# Patient Record
Sex: FEMALE | Race: WHITE | NOT HISPANIC OR LATINO | Employment: OTHER | URBAN - METROPOLITAN AREA
[De-identification: names, ages, dates, MRNs, and addresses within clinical notes are randomized per-mention and may not be internally consistent; named-entity substitution may affect disease eponyms.]

---

## 2017-01-30 RX ORDER — DILTIAZEM HYDROCHLORIDE 90 MG/1
CAPSULE, EXTENDED RELEASE ORAL
Qty: 90 CAPSULE | Refills: 0 | Status: SHIPPED | OUTPATIENT
Start: 2017-01-30 | End: 2017-05-02 | Stop reason: SDUPTHER

## 2017-03-02 ENCOUNTER — OFFICE VISIT (OUTPATIENT)
Dept: ENDOCRINOLOGY | Age: 72
End: 2017-03-02

## 2017-03-02 VITALS
WEIGHT: 99 LBS | HEIGHT: 60 IN | OXYGEN SATURATION: 96 % | SYSTOLIC BLOOD PRESSURE: 102 MMHG | DIASTOLIC BLOOD PRESSURE: 64 MMHG | HEART RATE: 89 BPM | BODY MASS INDEX: 19.44 KG/M2

## 2017-03-02 DIAGNOSIS — E05.20 TOXIC MULTINODULAR GOITER: Primary | ICD-10-CM

## 2017-03-02 DIAGNOSIS — I10 ESSENTIAL HYPERTENSION: ICD-10-CM

## 2017-03-02 DIAGNOSIS — I47.1 MULTIFOCAL ATRIAL TACHYCARDIA (HCC): ICD-10-CM

## 2017-03-02 DIAGNOSIS — J43.9 PULMONARY EMPHYSEMA, UNSPECIFIED EMPHYSEMA TYPE (HCC): ICD-10-CM

## 2017-03-02 DIAGNOSIS — E55.9 VITAMIN D INSUFFICIENCY: ICD-10-CM

## 2017-03-02 PROCEDURE — 99214 OFFICE O/P EST MOD 30 MIN: CPT | Performed by: INTERNAL MEDICINE

## 2017-03-02 NOTE — PROGRESS NOTES
Subjective   Maryam Arredondo is a 71 y.o. female.     HPI Comments: F/u for nontoxic multinodular goiter, hypothyroidism, copd , asthma     Goiter     Hypothyroidism     COPD        Patient is a 71-year-old female who came in for follow-up. She has toxic multinodular goiter. She had a thyroid scan and uptake in February 2014 which showed increased uptake of 40% with a heterogeneous distribution of the radiopharmaceutical in both lobes of the thyroid gland. A dominant area of photopenia is present on the left lobe. She had a thyroid ultrasound which showed a multinodular goiter with cysts and solid nodules. The dominant nodule in the left is partially cystic. She had a follow-up ultrasound in August 2014 which showed the left lobe dominant nodule is smaller. She is on Tapazole 10 mg 1/2 tab once a day. She has significant weight change since Oct 2016.  She denies bowel changes. She denies heat or cold intolerance. She denies palpitations. She denies tremors.      She was admitted last March 2016 for possible pneumonia and multifocal atrial tachycardia.  She has COPD. She denies any shortness of breath or coughing. She is on maintenance Advair, Spiriva and Pro-Air prn for COPD. She is on nocturnal O2. She follows with Dr. Anthony.      She has hypertension and was taken off lisinopril last May 2014. She is on diltiazem ER 90 mg/day. She follows with Dr. Bobo.      She had a previous right mastectomy for breast cancer. She has completed 5 years of Arimidex in 2009. She has osteopenia and was on Fosamax for unknown period of time. Her last bone mineral density was 2016 at [facility] and was normal. She is on Vit D 2000 units/day.     She will have a followup colonoscopy with Dr. Eddy next month.    The following portions of the patient's history were reviewed and updated as appropriate: allergies, current medications, past family history, past medical history, past social history, past surgical history and problem  "list.    Review of Systems   Constitutional: Negative.    HENT: Negative.    Eyes: Negative.    Respiratory: Negative.    Cardiovascular: Negative.    Gastrointestinal: Negative.    Endocrine: Negative.    Genitourinary: Negative.    Musculoskeletal: Negative.    Skin: Negative.    Allergic/Immunologic: Negative.    Neurological: Negative.    Hematological: Bruises/bleeds easily (bruise ).   Psychiatric/Behavioral: Positive for sleep disturbance (only on  o2 at night  ).       Objective      Vitals:    03/02/17 1026   BP: 102/64   BP Location: Left arm   Patient Position: Sitting   Cuff Size: Small Adult   Pulse: 89   SpO2: 96%   Weight: 99 lb (44.9 kg)   Height: 60\" (152.4 cm)     Physical Exam   Constitutional: She is oriented to person, place, and time. She appears well-developed and well-nourished. No distress.   HENT:   Head: Normocephalic.   Nose: Nose normal.   Mouth/Throat: No oropharyngeal exudate.   Eyes: Conjunctivae and EOM are normal. Right eye exhibits no discharge. Left eye exhibits no discharge. No scleral icterus.   Neck: Neck supple. No JVD present. No tracheal deviation present. No thyromegaly present.   Cardiovascular: Normal rate, regular rhythm, normal heart sounds and intact distal pulses.  Exam reveals no gallop and no friction rub.    No murmur heard.  Pulmonary/Chest: Effort normal and breath sounds normal. No respiratory distress. She has no wheezes. She has no rales. She exhibits no tenderness.   Abdominal: Soft. Bowel sounds are normal. She exhibits no distension and no mass. There is no tenderness.   Musculoskeletal: Normal range of motion. She exhibits no edema, tenderness or deformity.   Lymphadenopathy:     She has no cervical adenopathy.   Neurological: She is alert and oriented to person, place, and time. She displays normal reflexes. Coordination normal.   Skin: Skin is warm and dry. No erythema. No pallor.   Psychiatric: She has a normal mood and affect. Her behavior is normal. "     Office Visit on 10/21/2016   Component Date Value Ref Range Status   • Glucose 10/21/2016 90  65 - 99 mg/dL Final   • BUN 10/21/2016 17  8 - 23 mg/dL Final   • Creatinine 10/21/2016 0.79  0.57 - 1.00 mg/dL Final   • eGFR Non  Am 10/21/2016 72  >60 mL/min/1.73 Final    Comment: The MDRD GFR formula is only valid for adults with stable  renal function between ages 18 and 70.     • eGFR  Am 10/21/2016 87  >60 mL/min/1.73 Final   • BUN/Creatinine Ratio 10/21/2016 21.5  7.0 - 25.0 Final   • Sodium 10/21/2016 144  136 - 145 mmol/L Final   • Potassium 10/21/2016 4.3  3.5 - 5.2 mmol/L Final   • Chloride 10/21/2016 105  98 - 107 mmol/L Final   • Total CO2 10/21/2016 26.1  22.0 - 29.0 mmol/L Final   • Calcium 10/21/2016 10.0  8.6 - 10.5 mg/dL Final   • Total Protein 10/21/2016 6.7  6.0 - 8.5 g/dL Final   • Albumin 10/21/2016 4.30  3.50 - 5.20 g/dL Final   • Globulin 10/21/2016 2.4  gm/dL Final   • A/G Ratio 10/21/2016 1.8  g/dL Final   • Total Bilirubin 10/21/2016 <0.2  0.1 - 1.2 mg/dL Final   • Alkaline Phosphatase 10/21/2016 79  39 - 117 U/L Final   • AST (SGOT) 10/21/2016 20  1 - 32 U/L Final   • ALT (SGPT) 10/21/2016 19  1 - 33 U/L Final   • TSH 10/21/2016 1.670  0.270 - 4.200 mIU/mL Final   • Free T4 10/21/2016 1.01  0.93 - 1.70 ng/dL Final   • 25 Hydroxy, Vitamin D 10/21/2016 66.6  30.0 - 100.0 ng/mL Final    Comment: Reference Range for Total Vitamin D 25(OH)  Deficiency Â  Â <20.0 ng/mL  Insufficiency 21-29 ng/mL  Sufficiency Â   ng/mL  Toxicity      >100 ng/ml       Assessment/Plan   Maryam was seen today for goiter, hypothyroidism, copd and asthma.    Diagnoses and all orders for this visit:    Toxic multinodular goiter  -     Comprehensive Metabolic Panel  -     TSH  -     T4, Free    Vitamin D insufficiency    Essential hypertension    Multifocal atrial tachycardia    Pulmonary emphysema, unspecified emphysema type      Continue Tapazole 5 mg once a day.  Check thyroid function tests  today.  Continue vitamin D 2000 units per day.  Continue diltiazem per Dr. Bobo.  Continue inhalers for Dr. Seymour.    Send copy of my notes and labs to Dr. Seymour, Dr. Anthony and Dr. Bobo.    RTC 4 mos.

## 2017-03-03 LAB
ALBUMIN SERPL-MCNC: 4.1 G/DL (ref 3.5–5.2)
ALBUMIN/GLOB SERPL: 1.6 G/DL
ALP SERPL-CCNC: 77 U/L (ref 39–117)
ALT SERPL-CCNC: 14 U/L (ref 1–33)
AST SERPL-CCNC: 18 U/L (ref 1–32)
BILIRUB SERPL-MCNC: 0.2 MG/DL (ref 0.1–1.2)
BUN SERPL-MCNC: 15 MG/DL (ref 8–23)
BUN/CREAT SERPL: 21.1 (ref 7–25)
CALCIUM SERPL-MCNC: 9.9 MG/DL (ref 8.6–10.5)
CHLORIDE SERPL-SCNC: 104 MMOL/L (ref 98–107)
CO2 SERPL-SCNC: 28.8 MMOL/L (ref 22–29)
CREAT SERPL-MCNC: 0.71 MG/DL (ref 0.57–1)
GLOBULIN SER CALC-MCNC: 2.6 GM/DL
GLUCOSE SERPL-MCNC: 76 MG/DL (ref 65–99)
POTASSIUM SERPL-SCNC: 4.1 MMOL/L (ref 3.5–5.2)
PROT SERPL-MCNC: 6.7 G/DL (ref 6–8.5)
SODIUM SERPL-SCNC: 145 MMOL/L (ref 136–145)
T4 FREE SERPL-MCNC: 1.01 NG/DL (ref 0.93–1.7)
TSH SERPL DL<=0.005 MIU/L-ACNC: 0.8 MIU/ML (ref 0.27–4.2)

## 2017-03-30 ENCOUNTER — TRANSCRIBE ORDERS (OUTPATIENT)
Dept: SURGERY | Facility: CLINIC | Age: 72
End: 2017-03-30

## 2017-03-30 ENCOUNTER — TRANSCRIBE ORDERS (OUTPATIENT)
Dept: ADMINISTRATIVE | Facility: HOSPITAL | Age: 72
End: 2017-03-30

## 2017-03-30 DIAGNOSIS — Z12.31 VISIT FOR SCREENING MAMMOGRAM: Primary | ICD-10-CM

## 2017-04-21 RX ORDER — METHIMAZOLE 10 MG/1
TABLET ORAL
Qty: 30 TABLET | Refills: 4 | Status: ON HOLD | OUTPATIENT
Start: 2017-04-21 | End: 2017-04-28 | Stop reason: SDUPTHER

## 2017-04-28 ENCOUNTER — ANESTHESIA (OUTPATIENT)
Dept: GASTROENTEROLOGY | Facility: HOSPITAL | Age: 72
End: 2017-04-28

## 2017-04-28 ENCOUNTER — ANESTHESIA EVENT (OUTPATIENT)
Dept: GASTROENTEROLOGY | Facility: HOSPITAL | Age: 72
End: 2017-04-28

## 2017-04-28 PROCEDURE — 25010000002 PROPOFOL 10 MG/ML EMULSION: Performed by: ANESTHESIOLOGY

## 2017-04-28 RX ORDER — PROPOFOL 10 MG/ML
VIAL (ML) INTRAVENOUS CONTINUOUS PRN
Status: DISCONTINUED | OUTPATIENT
Start: 2017-04-28 | End: 2017-04-28 | Stop reason: SURG

## 2017-04-28 RX ORDER — PROPOFOL 10 MG/ML
VIAL (ML) INTRAVENOUS AS NEEDED
Status: DISCONTINUED | OUTPATIENT
Start: 2017-04-28 | End: 2017-04-28 | Stop reason: SURG

## 2017-04-28 RX ORDER — LIDOCAINE HYDROCHLORIDE 20 MG/ML
INJECTION, SOLUTION INFILTRATION; PERINEURAL AS NEEDED
Status: DISCONTINUED | OUTPATIENT
Start: 2017-04-28 | End: 2017-04-28 | Stop reason: SURG

## 2017-04-28 RX ADMIN — LIDOCAINE HYDROCHLORIDE 60 MG: 20 INJECTION, SOLUTION INFILTRATION; PERINEURAL at 10:15

## 2017-04-28 RX ADMIN — PROPOFOL 50 MG: 10 INJECTION, EMULSION INTRAVENOUS at 10:30

## 2017-04-28 RX ADMIN — PROPOFOL 50 MG: 10 INJECTION, EMULSION INTRAVENOUS at 10:25

## 2017-04-28 RX ADMIN — PROPOFOL 50 MG: 10 INJECTION, EMULSION INTRAVENOUS at 10:15

## 2017-04-28 RX ADMIN — SODIUM CHLORIDE, POTASSIUM CHLORIDE, SODIUM LACTATE AND CALCIUM CHLORIDE: 600; 310; 30; 20 INJECTION, SOLUTION INTRAVENOUS at 10:10

## 2017-04-28 RX ADMIN — PROPOFOL 50 MG: 10 INJECTION, EMULSION INTRAVENOUS at 10:20

## 2017-04-28 RX ADMIN — PROPOFOL 100 MCG/KG/MIN: 10 INJECTION, EMULSION INTRAVENOUS at 10:15

## 2017-04-28 NOTE — ANESTHESIA PREPROCEDURE EVALUATION
Anesthesia Evaluation     Patient summary reviewed and Nursing notes reviewed      Airway   Mallampati: I  TM distance: <3 FB  Neck ROM: full  no difficulty expected  Dental - normal exam     Pulmonary - normal exam   (+) COPD, asthma,   Cardiovascular - normal exam    (+) hypertension,       Neuro/Psych- negative ROS  GI/Hepatic/Renal/Endo    (+)  hyperthyroidism    Musculoskeletal (-) negative ROS    Abdominal  - normal exam    Bowel sounds: normal.   Substance History - negative use     OB/GYN negative ob/gyn ROS         Other                                    Anesthesia Plan    ASA 3     MAC     Anesthetic plan and risks discussed with patient.

## 2017-04-28 NOTE — ANESTHESIA POSTPROCEDURE EVALUATION
Patient: Maryam Arredondo    Procedure Summary     Date Anesthesia Start Anesthesia Stop Room / Location    04/28/17 1010 1104  [facility]       Procedure Diagnosis Surgeon Provider    COLONOSCOPY WITH POLYPECTOMY(COLD BIOPSY AND HOT SNARE) (N/A ) History of colon polyps  (History of colon polyps [Z86.010]) MD Erasmo Huber MD          Anesthesia Type: MAC  Last vitals  /61 (04/28/17 1120)    Temp      Pulse 74 (04/28/17 1120)   Resp 16 (04/28/17 1120)    SpO2 96 % (04/28/17 1120)      Post Anesthesia Care and Evaluation    Patient location during evaluation: PACU  Patient participation: complete - patient participated  Level of consciousness: awake and alert  Pain score: 0  Pain management: adequate  Airway patency: patent  Anesthetic complications: No anesthetic complications  PONV Status: none  Cardiovascular status: acceptable  Respiratory status: acceptable  Hydration status: acceptable

## 2017-04-29 RX ORDER — DILTIAZEM HYDROCHLORIDE 90 MG/1
CAPSULE, EXTENDED RELEASE ORAL
Qty: 90 CAPSULE | Refills: 0 | Status: CANCELLED | OUTPATIENT
Start: 2017-04-29

## 2017-05-01 ENCOUNTER — TELEPHONE (OUTPATIENT)
Dept: INTERNAL MEDICINE | Facility: HOSPITAL | Age: 72
End: 2017-05-01

## 2017-05-02 RX ORDER — DILTIAZEM HYDROCHLORIDE 90 MG/1
90 CAPSULE, EXTENDED RELEASE ORAL DAILY
Qty: 90 CAPSULE | Refills: 1 | Status: SHIPPED | OUTPATIENT
Start: 2017-05-02 | End: 2017-10-27 | Stop reason: SDUPTHER

## 2017-05-25 DIAGNOSIS — Z12.31 SCREENING MAMMOGRAM, ENCOUNTER FOR: Primary | ICD-10-CM

## 2017-06-29 ENCOUNTER — HOSPITAL ENCOUNTER (OUTPATIENT)
Dept: MAMMOGRAPHY | Facility: HOSPITAL | Age: 72
Discharge: HOME | End: 2017-06-29
Attending: SURGERY | Admitting: SURGERY

## 2017-06-29 DIAGNOSIS — Z12.31 VISIT FOR SCREENING MAMMOGRAM: ICD-10-CM

## 2017-06-29 PROCEDURE — G0202 SCR MAMMO BI INCL CAD: HCPCS

## 2017-06-29 PROCEDURE — 77063 BREAST TOMOSYNTHESIS BI: CPT

## 2017-07-12 ENCOUNTER — OFFICE VISIT (OUTPATIENT)
Dept: SURGERY | Facility: CLINIC | Age: 72
End: 2017-07-12

## 2017-07-12 VITALS
WEIGHT: 96 LBS | HEIGHT: 60 IN | HEART RATE: 86 BPM | RESPIRATION RATE: 20 BRPM | SYSTOLIC BLOOD PRESSURE: 106 MMHG | DIASTOLIC BLOOD PRESSURE: 68 MMHG | OXYGEN SATURATION: 96 % | BODY MASS INDEX: 18.85 KG/M2

## 2017-07-12 DIAGNOSIS — Z85.3 HISTORY OF BREAST CANCER IN FEMALE: Primary | ICD-10-CM

## 2017-07-12 PROCEDURE — 99213 OFFICE O/P EST LOW 20 MIN: CPT | Performed by: SURGERY

## 2017-07-12 NOTE — PROGRESS NOTES
Chief complaint:  Follow up    History of Present Illness    This is Maryam Arredondo 71 y.o. status post *** and is doing very well.  Patient denies fever, chills, nausea or vomiting.  Patient's pain is well-controlled.      The following portions of the patient's history were reviewed and updated as appropriate: allergies, current medications, past family history, past medical history, past social history, past surgical history and problem list.    Physical Exam  Incision is well-healed without evidence of {No wound infection:37935}.    Patient does not use tobacco products currently and I have counseled the patient not to start using tobacco products in the future.    Assessment/plan:    This is Maryam Arredondo 71 y.o. status post *** and is doing very well.  I have instructed the patient not lift greater than 10 pounds for total of 6 weeks from the time of surgery. I have instructed the patient follow-up as needed.    Luiza Eddy MD  General, Minimally Invasive and Endoscopic Surgery  [facility]    [address], [address]  [place], [place], [address]  P: [phone]  F: [phone]    Cc:  Kevin Seymour MD

## 2017-07-12 NOTE — PROGRESS NOTES
Chief complaint: History of breast cancer    Patient is a 71 y.o. female referred by Kevin Seymour MD for yearly follow-up of right breast cancer.  Patient has had a previous right mastectomy and denies any palpable masses in the left breast on self breast examinations.  Patient denies any nipple discharge.     Past Medical History:   Diagnosis Date   • Allergic rhinitis    • Asthma    • Breast cancer     s/p Lumpectomy ~2000 and chemo/XRT. Then  Masectomy ~2004 for some recurrence   • COPD (chronic obstructive pulmonary disease)    • Hypertension    • Hyperthyroidism    • Osteopenia    • Pneumothorax    • Toxic multinodular goiter    • Toxic multinodular goiter    • Vitamin D deficiency      Past Surgical History:   Procedure Laterality Date   • BREAST LUMPECTOMY Right    • BREAST LUMPECTOMY     • CARDIAC CATHETERIZATION      PS SAPHENOUS VAIN RIGHT LEG   • COLONOSCOPY      Complete   • COLONOSCOPY N/A 4/28/2017    Procedure: COLONOSCOPY WITH POLYPECTOMY(COLD BIOPSY AND HOT SNARE);  Surgeon: Luiza Eddy MD;  Location: [facility] ENDOSCOPY;  Service:    • HYSTERECTOMY     • MASTECTOMY Right 2004   • NECK SURGERY      2 spurs removed   • VASCULAR SURGERY      spider vein ablation     Family History   Problem Relation Age of Onset   • Arthritis Mother    • Heart failure Mother      Congestive Heart Failure   • Lung cancer Father    • Breast cancer Paternal Grandmother      Social History   Substance Use Topics   • Smoking status: Former Smoker     Packs/day: 2.00     Types: Cigarettes     Quit date: 3/14/1995   • Smokeless tobacco: Never Used      Comment: D/C 20 years ago, smoking 2 ppd before quitting   • Alcohol use Yes      Comment: Social use rare         Current Outpatient Prescriptions:   •  albuterol (PROVENTIL HFA;VENTOLIN HFA) 108 (90 BASE) MCG/ACT inhaler, Inhale 1 puff as needed., Disp: , Rfl:   •  budesonide-formoterol (SYMBICORT) 160-4.5 MCG/ACT inhaler, Inhale 2 puffs 2 (two) times a day.,  Disp: , Rfl:   •  calcium carbonate-vitamin d 600-400 MG-UNIT per tablet, Take 1 tablet by mouth., Disp: , Rfl:   •  cetirizine (ZyrTEC) 10 MG tablet, Take 10 mg by mouth daily., Disp: , Rfl:   •  Cholecalciferol (VITAMIN D3) 2000 UNITS tablet, Take 1 tablet by mouth daily., Disp: , Rfl:   •  diltiaZEM SR (CARDIZEM SR) 90 MG 12 hr capsule, Take 1 capsule by mouth Daily., Disp: 90 capsule, Rfl: 1  •  fluticasone (FLONASE) 50 MCG/ACT nasal spray, 2 Squirts into each nostril daily., Disp: , Rfl:   •  fluticasone-salmeterol (ADVAIR DISKUS) 250-50 MCG/DOSE DISKUS, Inhale 1 puff 2 (two) times a day. 1 puff twice daily or as directed , Disp: , Rfl:   •  methimazole (TAPAZOLE) 10 MG tablet, 1/2 tablet daily, Disp: 30 tablet, Rfl: 5  •  metroNIDAZOLE (METROCREAM) 0.75 % cream, , Disp: , Rfl:   •  multivitamin-minerals (OCUVITE) tablet, Take 1 tablet by mouth daily., Disp: , Rfl:   •  SPIRIVA RESPIMAT 2.5 MCG/ACT aerosol solution, 2 puffs 2 (two) times a day., Disp: , Rfl:   •  vitamin B-12 (CYANOCOBALAMIN) 1000 MCG tablet, Take 1,000 mcg by mouth daily., Disp: , Rfl:     Review of Systems   General: Patient reports good health  Eyes: No eye problems  Ears, nose, mouth and throat: No rhinitis, no hearing problems, no chronic cough  Cardiovascular/heart: Denies palpitations, syncope or chest pain  Respiratory/lung: Denies shortness of breath, hemoptysis, or dyspnea on exertion   Genital/urinary: No frequency, hematuria or dysuria  Hematological/lymphatic: Denies anemia or other problems  Musculoskeletal: No joint pain, no defects  Skin: No psoriasis or other skin issues  Neurological: No seizures or other neurological problems  Psychiatric: None  Endocrine: Negative  Gastro-intestinal: No constipation, no diarrhea, no melena, no hematochezia  All other systems have been reviewed and are negative.  Vitals:    07/12/17 1347   BP: 106/68   BP Location: Left arm   Patient Position: Sitting   Cuff Size: Adult   Pulse: 86   Resp: 20  "  SpO2: 96%   Weight: 96 lb (43.5 kg)   Height: 60\" (152.4 cm)       Physical Exam  General/physcological:   Alert and oriented x3 in no acute distress  HEENT: Normal cephalic, atraumatic, PERRLA, EOMI, sclera anicteric, moist mucous membranes, neck is supple, no JVD, no carotid bruits, no thyromegaly no adenopathy  Respiratory: CTA and percussion  CVA: RRR, normal S1-S2, no murmurs, no gallops or rubs  Breast: I have examined the breast in sitting position as well as supine position.  In the sitting position, the right mastectomy flaps have no evidence of local recurrence.  The left breast has no palpable masses or nipple discharge.  There is no palpable axillary or supraclavicular adenopathy bilaterally.  In the supine position, there is no new findings.  GI: Positive BS, soft, nondistended, nontender, no rebound, no guarding, no hernias, no organomegaly and no masses  Musculoskeletal: Full range of motion, no clubbing, no cyanosis or edema  Neurovascular: Grossly intact    Patient does not use tobacco products currently and I have counseled the patient to not start using tobacco products in the future.  I have personally reviewed her mammogram which is benign    Assessment:  Personal history of right breast cancer  Plan:  I have recommended that the patient continue self breast examinations and yearly mammograms and physical exam.  Patient will return in one year.    Luiza Eddy MD  General, Minimally Invasive and Endoscopic Surgery  [facility]    [address], [address]  [place], [place], [facility]  P: [phone]  F: [phone]    Cc:  Kevin Seymour MD                    "

## 2017-07-24 ENCOUNTER — OFFICE VISIT (OUTPATIENT)
Dept: ENDOCRINOLOGY | Age: 72
End: 2017-07-24

## 2017-07-24 VITALS
OXYGEN SATURATION: 95 % | DIASTOLIC BLOOD PRESSURE: 64 MMHG | BODY MASS INDEX: 18.81 KG/M2 | HEART RATE: 108 BPM | HEIGHT: 60 IN | SYSTOLIC BLOOD PRESSURE: 96 MMHG | WEIGHT: 95.8 LBS

## 2017-07-24 DIAGNOSIS — E55.9 VITAMIN D INSUFFICIENCY: ICD-10-CM

## 2017-07-24 DIAGNOSIS — J43.9 PULMONARY EMPHYSEMA, UNSPECIFIED EMPHYSEMA TYPE (HCC): ICD-10-CM

## 2017-07-24 DIAGNOSIS — I10 ESSENTIAL HYPERTENSION: ICD-10-CM

## 2017-07-24 DIAGNOSIS — E05.20 TOXIC MULTINODULAR GOITER: Primary | ICD-10-CM

## 2017-07-24 PROCEDURE — 99214 OFFICE O/P EST MOD 30 MIN: CPT | Performed by: INTERNAL MEDICINE

## 2017-07-24 NOTE — PROGRESS NOTES
Subjective   Maryam Arredondo is a 71 y.o. female.     HPI Comments: F/u for nontoxic MNG,hypothyroidism, copd , asthma     Goiter   Associated symptoms include fatigue.   Hypothyroidism   Associated symptoms include fatigue.   COPD   Associated symptoms include fatigue.      Patient is a 71-year-old female who came in for follow-up. She has toxic multinodular goiter. She had a thyroid scan and uptake in February 2014 which showed increased uptake of 40% with a heterogeneous distribution of the radiopharmaceutical in both lobes of the thyroid gland. A dominant area of photopenia is present on the left lobe. She had a thyroid ultrasound which showed a multinodular goiter with cysts and solid nodules. The dominant nodule in the left is partially cystic. She had a follow-up ultrasound in August 2014 which showed the left lobe dominant nodule is smaller. She is on Tapazole 5 mg 1 tab once a day. She has lost 4 lbs since 3/17.  She denies bowel changes. She denies heat or cold intolerance. She denies palpitations. She denies tremors.      She was admitted last March 2016 for possible pneumonia and multifocal atrial tachycardia.  She has COPD. She denies any shortness of breath or coughing. She is on maintenance Advair or Symbicort, Spiriva and Pro-Air prn for COPD. She is on nocturnal O2. She follows with Dr. Anthony.      She has hypertension and was taken off lisinopril last May 2014. She is on diltiazem ER 90 mg/day. She follows with Dr. Bobo.      She had a previous right mastectomy for breast cancer. She has completed 5 years of Arimidex in 2009. She has osteopenia and was on Fosamax for unknown period of time. Her last bone mineral density was 2016 at [facility] and was normal. She is on Vit D 2000 units/day.    She had a colonoscopy in April 2017 and 2 tubular adenoma with low-grade dysplasia were removed by Dr. Eddy.  She will have follow-up colonoscopy in 2 years.  She denies bowel complaints.      The following  "portions of the patient's history were reviewed and updated as appropriate: allergies, current medications, past family history, past medical history, past social history, past surgical history and problem list.    Review of Systems   Constitutional: Positive for fatigue.   HENT: Negative.    Respiratory: Negative.    Cardiovascular: Negative.    Gastrointestinal: Negative.    Endocrine: Negative.    Genitourinary: Negative.    Musculoskeletal: Negative.    Skin: Negative.    Allergic/Immunologic: Negative.    Neurological: Negative.    Hematological: Bruises/bleeds easily.   Psychiatric/Behavioral: Positive for sleep disturbance ( on o2 duting thenight ).       Objective      Vitals:    07/24/17 1152   BP: 96/64   BP Location: Left arm   Patient Position: Sitting   Cuff Size: Small Adult   Pulse: 108   SpO2: 95%   Weight: 95 lb 12.8 oz (43.5 kg)   Height: 60\" (152.4 cm)     Physical Exam   Constitutional: She is oriented to person, place, and time. She appears well-developed and well-nourished. No distress.   HENT:   Head: Normocephalic.   Nose: Nose normal.   Mouth/Throat: No oropharyngeal exudate.   Eyes: Conjunctivae and EOM are normal. Right eye exhibits no discharge. Left eye exhibits no discharge. No scleral icterus.   Neck: Neck supple. No JVD present. No tracheal deviation present. No thyromegaly present.   Cardiovascular: Normal rate, regular rhythm and normal heart sounds.  Exam reveals no gallop and no friction rub.    No murmur heard.  Pulmonary/Chest: Effort normal and breath sounds normal. No respiratory distress. She has no wheezes. She has no rales. She exhibits no tenderness.   Abdominal: Soft. Bowel sounds are normal. She exhibits no distension and no mass. There is no tenderness.   Musculoskeletal: Normal range of motion. She exhibits no edema, tenderness or deformity.   Lymphadenopathy:     She has no cervical adenopathy.   Neurological: She is alert and oriented to person, place, and time. She " has normal reflexes. She displays normal reflexes. Coordination normal.   Skin: Skin is warm and dry. No rash noted. No erythema. No pallor.   Psychiatric: She has a normal mood and affect. Her behavior is normal.     Admission on 04/28/2017, Discharged on 04/28/2017   Component Date Value Ref Range Status   • Case Report 04/28/2017    Final                    Value:Surgical Pathology Report                         Case: [id number]                                  Authorizing Provider:  Luiza Eddy MD     Collected:           04/28/2017 10:37 AM          Ordering Location:     [facility]  Received:            04/28/2017 12:47 PM                                 ENDO SUITES                                                                  Pathologist:           Dioni Ann MD                                                        Specimens:   1) - Large Intestine, Cecum, POLYP                                                                  2) - Large Intestine, Right / Ascending Colon, POLYP                                                3) - Large Intestine, Transverse Colon, POLYP                                             • Final Diagnosis 04/28/2017    Final                    Value:This result contains rich text formatting which cannot be displayed here.   • Gross Description 04/28/2017    Final                    Value:This result contains rich text formatting which cannot be displayed here.   • Microscopic Description 04/28/2017    Final                    Value:This result contains rich text formatting which cannot be displayed here.     Assessment/Plan   Maryam was seen today for goiter, hypothyroidism, copd and asthma.    Diagnoses and all orders for this visit:    Toxic multinodular goiter  -     Comprehensive Metabolic Panel  -     CBC & Differential  -     TSH  -     T4, Free  -     T3, Free    Vitamin D insufficiency  -     CBC & Differential  -     Vitamin D 25  Hydroxy    Essential hypertension    Pulmonary emphysema, unspecified emphysema type      Continue Tapazole 5 mg/day.  Continue Vit D 2000 u/day.  Continue diltiazem Er per Dr. Bobo.  Continue Advair/Symbicort, Spiriva, and Pro-air.    Send copy of my note and labs to Dr. Seymour and Dr. Bobo    RTC 4 mos.

## 2017-07-25 LAB
25(OH)D3+25(OH)D2 SERPL-MCNC: 65 NG/ML (ref 30–100)
ALBUMIN SERPL-MCNC: 4.2 G/DL (ref 3.5–5.2)
ALBUMIN/GLOB SERPL: 1.4 G/DL
ALP SERPL-CCNC: 73 U/L (ref 39–117)
ALT SERPL-CCNC: 16 U/L (ref 1–33)
AST SERPL-CCNC: 21 U/L (ref 1–32)
BASOPHILS # BLD AUTO: 0.04 10*3/MM3 (ref 0–0.2)
BASOPHILS NFR BLD AUTO: 0.6 % (ref 0–1.5)
BILIRUB SERPL-MCNC: 0.2 MG/DL (ref 0.1–1.2)
BUN SERPL-MCNC: 11 MG/DL (ref 8–23)
BUN/CREAT SERPL: 15.3 (ref 7–25)
CALCIUM SERPL-MCNC: 10.1 MG/DL (ref 8.6–10.5)
CHLORIDE SERPL-SCNC: 104 MMOL/L (ref 98–107)
CO2 SERPL-SCNC: 27.9 MMOL/L (ref 22–29)
CREAT SERPL-MCNC: 0.72 MG/DL (ref 0.57–1)
EOSINOPHIL # BLD AUTO: 0.13 10*3/MM3 (ref 0–0.7)
EOSINOPHIL NFR BLD AUTO: 1.9 % (ref 0.3–6.2)
ERYTHROCYTE [DISTWIDTH] IN BLOOD BY AUTOMATED COUNT: 14.5 % (ref 11.7–13)
GLOBULIN SER CALC-MCNC: 3 GM/DL
GLUCOSE SERPL-MCNC: 82 MG/DL (ref 65–99)
HCT VFR BLD AUTO: 41.7 % (ref 35.6–45.5)
HGB BLD-MCNC: 13.1 G/DL (ref 11.9–15.5)
IMM GRANULOCYTES # BLD: 0.02 10*3/MM3 (ref 0–0.03)
IMM GRANULOCYTES NFR BLD: 0.3 % (ref 0–0.5)
LYMPHOCYTES # BLD AUTO: 1.33 10*3/MM3 (ref 0.9–4.8)
LYMPHOCYTES NFR BLD AUTO: 19.4 % (ref 19.6–45.3)
MCH RBC QN AUTO: 31.6 PG (ref 26.9–32)
MCHC RBC AUTO-ENTMCNC: 31.4 G/DL (ref 32.4–36.3)
MCV RBC AUTO: 100.7 FL (ref 80.5–98.2)
MONOCYTES # BLD AUTO: 0.48 10*3/MM3 (ref 0.2–1.2)
MONOCYTES NFR BLD AUTO: 7 % (ref 5–12)
NEUTROPHILS # BLD AUTO: 4.84 10*3/MM3 (ref 1.9–8.1)
NEUTROPHILS NFR BLD AUTO: 70.8 % (ref 42.7–76)
PLATELET # BLD AUTO: 291 10*3/MM3 (ref 140–500)
POTASSIUM SERPL-SCNC: 4.5 MMOL/L (ref 3.5–5.2)
PROT SERPL-MCNC: 7.2 G/DL (ref 6–8.5)
RBC # BLD AUTO: 4.14 10*6/MM3 (ref 3.9–5.2)
SODIUM SERPL-SCNC: 144 MMOL/L (ref 136–145)
T3FREE SERPL-MCNC: 3.4 PG/ML (ref 2–4.4)
T4 FREE SERPL-MCNC: 1.25 NG/DL (ref 0.93–1.7)
TSH SERPL DL<=0.005 MIU/L-ACNC: 0.72 MIU/ML (ref 0.27–4.2)
WBC # BLD AUTO: 6.84 10*3/MM3 (ref 4.5–10.7)

## 2017-10-27 RX ORDER — DILTIAZEM HYDROCHLORIDE 90 MG/1
CAPSULE, EXTENDED RELEASE ORAL
Qty: 90 CAPSULE | Refills: 0 | Status: SHIPPED | OUTPATIENT
Start: 2017-10-27 | End: 2017-11-08 | Stop reason: HOSPADM

## 2017-10-30 ENCOUNTER — HOSPITAL ENCOUNTER (INPATIENT)
Facility: HOSPITAL | Age: 72
LOS: 9 days | Discharge: HOME | End: 2017-11-08
Attending: INTERNAL MEDICINE | Admitting: INTERNAL MEDICINE

## 2017-10-30 DIAGNOSIS — B34.8 INFECTION DUE TO PARAINFLUENZA VIRUS 1: ICD-10-CM

## 2017-10-30 DIAGNOSIS — J43.9 PULMONARY EMPHYSEMA, UNSPECIFIED EMPHYSEMA TYPE (HCC): ICD-10-CM

## 2017-10-30 DIAGNOSIS — R09.02 HYPOXIA: ICD-10-CM

## 2017-10-30 DIAGNOSIS — C34.12 MALIGNANT NEOPLASM OF UPPER LOBE OF LEFT LUNG (HCC): ICD-10-CM

## 2017-10-30 DIAGNOSIS — J18.9 PNEUMONIA DUE TO INFECTIOUS ORGANISM, UNSPECIFIED LATERALITY, UNSPECIFIED PART OF LUNG: ICD-10-CM

## 2017-10-30 DIAGNOSIS — R91.8 LUNG MASS: ICD-10-CM

## 2017-10-30 DIAGNOSIS — J44.0 COPD WITH ACUTE LOWER RESPIRATORY INFECTION (HCC): ICD-10-CM

## 2017-10-30 DIAGNOSIS — R53.1 GENERALIZED WEAKNESS: Primary | ICD-10-CM

## 2017-10-30 LAB — PROCALCITONIN SERPL-MCNC: 0.14 NG/ML (ref 0.1–0.25)

## 2017-10-30 PROCEDURE — 25010000002 LEVOFLOXACIN PER 250 MG: Performed by: INTERNAL MEDICINE

## 2017-10-30 PROCEDURE — 94640 AIRWAY INHALATION TREATMENT: CPT

## 2017-10-30 PROCEDURE — 25010000002 HEPARIN (PORCINE) PER 1000 UNITS: Performed by: INTERNAL MEDICINE

## 2017-10-30 PROCEDURE — 87486 CHLMYD PNEUM DNA AMP PROBE: CPT | Performed by: INTERNAL MEDICINE

## 2017-10-30 PROCEDURE — 94799 UNLISTED PULMONARY SVC/PX: CPT

## 2017-10-30 PROCEDURE — 84145 PROCALCITONIN (PCT): CPT | Performed by: INTERNAL MEDICINE

## 2017-10-30 PROCEDURE — 87581 M.PNEUMON DNA AMP PROBE: CPT | Performed by: INTERNAL MEDICINE

## 2017-10-30 PROCEDURE — 87633 RESP VIRUS 12-25 TARGETS: CPT | Performed by: INTERNAL MEDICINE

## 2017-10-30 PROCEDURE — 87040 BLOOD CULTURE FOR BACTERIA: CPT | Performed by: INTERNAL MEDICINE

## 2017-10-30 PROCEDURE — 87798 DETECT AGENT NOS DNA AMP: CPT | Performed by: INTERNAL MEDICINE

## 2017-10-30 RX ORDER — SODIUM CHLORIDE 0.9 % (FLUSH) 0.9 %
1-10 SYRINGE (ML) INJECTION AS NEEDED
Status: DISCONTINUED | OUTPATIENT
Start: 2017-10-30 | End: 2017-11-08 | Stop reason: HOSPADM

## 2017-10-30 RX ORDER — DILTIAZEM HYDROCHLORIDE 120 MG/1
120 CAPSULE, COATED, EXTENDED RELEASE ORAL
Status: DISCONTINUED | OUTPATIENT
Start: 2017-10-31 | End: 2017-11-08 | Stop reason: HOSPADM

## 2017-10-30 RX ORDER — TERBINAFINE HYDROCHLORIDE 250 MG/1
250 TABLET ORAL DAILY
COMMUNITY
End: 2017-11-08 | Stop reason: HOSPADM

## 2017-10-30 RX ORDER — ALBUTEROL SULFATE 2.5 MG/3ML
2.5 SOLUTION RESPIRATORY (INHALATION)
Status: DISCONTINUED | OUTPATIENT
Start: 2017-10-30 | End: 2017-11-08 | Stop reason: HOSPADM

## 2017-10-30 RX ORDER — LEVOFLOXACIN 5 MG/ML
750 INJECTION, SOLUTION INTRAVENOUS EVERY 24 HOURS
Status: DISCONTINUED | OUTPATIENT
Start: 2017-10-30 | End: 2017-11-01 | Stop reason: CLARIF

## 2017-10-30 RX ORDER — METHIMAZOLE 10 MG/1
5 TABLET ORAL DAILY
Status: DISCONTINUED | OUTPATIENT
Start: 2017-10-31 | End: 2017-11-08 | Stop reason: HOSPADM

## 2017-10-30 RX ORDER — MELATONIN
1000 DAILY
Status: DISCONTINUED | OUTPATIENT
Start: 2017-10-31 | End: 2017-11-08 | Stop reason: HOSPADM

## 2017-10-30 RX ORDER — ALBUTEROL SULFATE 90 UG/1
2 AEROSOL, METERED RESPIRATORY (INHALATION) EVERY 4 HOURS
Status: ON HOLD | COMMUNITY
Start: 2017-10-30 | End: 2017-11-08

## 2017-10-30 RX ORDER — BUDESONIDE AND FORMOTEROL FUMARATE DIHYDRATE 160; 4.5 UG/1; UG/1
2 AEROSOL RESPIRATORY (INHALATION)
Status: DISCONTINUED | OUTPATIENT
Start: 2017-10-30 | End: 2017-11-08 | Stop reason: HOSPADM

## 2017-10-30 RX ORDER — FLUTICASONE PROPIONATE 50 MCG
2 SPRAY, SUSPENSION (ML) NASAL DAILY
Status: DISCONTINUED | OUTPATIENT
Start: 2017-10-31 | End: 2017-11-08 | Stop reason: HOSPADM

## 2017-10-30 RX ORDER — MULTIPLE VITAMINS W/ MINERALS TAB 9MG-400MCG
1 TAB ORAL DAILY
Status: DISCONTINUED | OUTPATIENT
Start: 2017-10-31 | End: 2017-11-08 | Stop reason: HOSPADM

## 2017-10-30 RX ORDER — CETIRIZINE HYDROCHLORIDE 10 MG/1
10 TABLET ORAL DAILY
Status: DISCONTINUED | OUTPATIENT
Start: 2017-10-31 | End: 2017-11-08 | Stop reason: HOSPADM

## 2017-10-30 RX ORDER — HEPARIN SODIUM 5000 [USP'U]/ML
5000 INJECTION, SOLUTION INTRAVENOUS; SUBCUTANEOUS EVERY 12 HOURS SCHEDULED
Status: DISCONTINUED | OUTPATIENT
Start: 2017-10-30 | End: 2017-11-01

## 2017-10-30 RX ADMIN — HEPARIN SODIUM 5000 UNITS: 5000 INJECTION, SOLUTION INTRAVENOUS; SUBCUTANEOUS at 22:38

## 2017-10-30 RX ADMIN — LEVOFLOXACIN 750 MG: 5 INJECTION, SOLUTION INTRAVENOUS at 22:38

## 2017-10-30 RX ADMIN — ALBUTEROL SULFATE 2.5 MG: 2.5 SOLUTION RESPIRATORY (INHALATION) at 22:56

## 2017-10-31 ENCOUNTER — APPOINTMENT (OUTPATIENT)
Dept: CT IMAGING | Facility: HOSPITAL | Age: 72
End: 2017-10-31

## 2017-10-31 PROBLEM — B34.8: Status: ACTIVE | Noted: 2017-10-31

## 2017-10-31 LAB
ALBUMIN SERPL-MCNC: 3.4 G/DL (ref 3.5–5.2)
ALBUMIN/GLOB SERPL: 1 G/DL
ALP SERPL-CCNC: 65 U/L (ref 39–117)
ALT SERPL W P-5'-P-CCNC: 17 U/L (ref 1–33)
ANION GAP SERPL CALCULATED.3IONS-SCNC: 13.5 MMOL/L
AST SERPL-CCNC: 13 U/L (ref 1–32)
B PERT DNA SPEC QL NAA+PROBE: NOT DETECTED
BASOPHILS # BLD AUTO: 0.01 10*3/MM3 (ref 0–0.2)
BASOPHILS NFR BLD AUTO: 0.1 % (ref 0–1.5)
BILIRUB SERPL-MCNC: 0.2 MG/DL (ref 0.1–1.2)
BUN BLD-MCNC: 16 MG/DL (ref 8–23)
BUN/CREAT SERPL: 25.8 (ref 7–25)
C PNEUM DNA NPH QL NAA+NON-PROBE: NOT DETECTED
CALCIUM SPEC-SCNC: 8.6 MG/DL (ref 8.6–10.5)
CHLORIDE SERPL-SCNC: 101 MMOL/L (ref 98–107)
CO2 SERPL-SCNC: 25.5 MMOL/L (ref 22–29)
CREAT BLD-MCNC: 0.62 MG/DL (ref 0.57–1)
D-LACTATE SERPL-SCNC: 1 MMOL/L (ref 0.5–2)
DEPRECATED RDW RBC AUTO: 48.7 FL (ref 37–54)
EOSINOPHIL # BLD AUTO: 0.08 10*3/MM3 (ref 0–0.7)
EOSINOPHIL NFR BLD AUTO: 1.1 % (ref 0.3–6.2)
ERYTHROCYTE [DISTWIDTH] IN BLOOD BY AUTOMATED COUNT: 13.6 % (ref 11.7–13)
FLUAV H1 2009 PAND RNA NPH QL NAA+PROBE: NOT DETECTED
FLUAV H1 HA GENE NPH QL NAA+PROBE: NOT DETECTED
FLUAV H3 RNA NPH QL NAA+PROBE: NOT DETECTED
FLUAV SUBTYP SPEC NAA+PROBE: NOT DETECTED
FLUBV RNA ISLT QL NAA+PROBE: NOT DETECTED
GFR SERPL CREATININE-BSD FRML MDRD: 94 ML/MIN/1.73
GLOBULIN UR ELPH-MCNC: 3.3 GM/DL
GLUCOSE BLD-MCNC: 105 MG/DL (ref 65–99)
HADV DNA SPEC NAA+PROBE: NOT DETECTED
HCOV 229E RNA SPEC QL NAA+PROBE: NOT DETECTED
HCOV HKU1 RNA SPEC QL NAA+PROBE: NOT DETECTED
HCOV NL63 RNA SPEC QL NAA+PROBE: NOT DETECTED
HCOV OC43 RNA SPEC QL NAA+PROBE: NOT DETECTED
HCT VFR BLD AUTO: 36.9 % (ref 35.6–45.5)
HGB BLD-MCNC: 11.6 G/DL (ref 11.9–15.5)
HMPV RNA NPH QL NAA+NON-PROBE: NOT DETECTED
HPIV1 RNA SPEC QL NAA+PROBE: DETECTED
HPIV2 RNA SPEC QL NAA+PROBE: NOT DETECTED
HPIV3 RNA NPH QL NAA+PROBE: NOT DETECTED
HPIV4 P GENE NPH QL NAA+PROBE: NOT DETECTED
IMM GRANULOCYTES # BLD: 0.03 10*3/MM3 (ref 0–0.03)
IMM GRANULOCYTES NFR BLD: 0.4 % (ref 0–0.5)
LYMPHOCYTES # BLD AUTO: 1.14 10*3/MM3 (ref 0.9–4.8)
LYMPHOCYTES NFR BLD AUTO: 15.3 % (ref 19.6–45.3)
M PNEUMO IGG SER IA-ACNC: NOT DETECTED
MCH RBC QN AUTO: 30.9 PG (ref 26.9–32)
MCHC RBC AUTO-ENTMCNC: 31.4 G/DL (ref 32.4–36.3)
MCV RBC AUTO: 98.4 FL (ref 80.5–98.2)
MONOCYTES # BLD AUTO: 0.7 10*3/MM3 (ref 0.2–1.2)
MONOCYTES NFR BLD AUTO: 9.4 % (ref 5–12)
NEUTROPHILS # BLD AUTO: 5.47 10*3/MM3 (ref 1.9–8.1)
NEUTROPHILS NFR BLD AUTO: 73.7 % (ref 42.7–76)
PLATELET # BLD AUTO: 222 10*3/MM3 (ref 140–500)
PMV BLD AUTO: 10 FL (ref 6–12)
POTASSIUM BLD-SCNC: 3.3 MMOL/L (ref 3.5–5.2)
PROT SERPL-MCNC: 6.7 G/DL (ref 6–8.5)
RBC # BLD AUTO: 3.75 10*6/MM3 (ref 3.9–5.2)
RHINOVIRUS RNA SPEC NAA+PROBE: NOT DETECTED
RSV RNA NPH QL NAA+NON-PROBE: NOT DETECTED
SODIUM BLD-SCNC: 140 MMOL/L (ref 136–145)
TSH SERPL DL<=0.05 MIU/L-ACNC: 0.2 MIU/ML (ref 0.27–4.2)
WBC NRBC COR # BLD: 7.43 10*3/MM3 (ref 4.5–10.7)

## 2017-10-31 PROCEDURE — 94799 UNLISTED PULMONARY SVC/PX: CPT

## 2017-10-31 PROCEDURE — 83605 ASSAY OF LACTIC ACID: CPT | Performed by: INTERNAL MEDICINE

## 2017-10-31 PROCEDURE — 94640 AIRWAY INHALATION TREATMENT: CPT

## 2017-10-31 PROCEDURE — 0 IOPAMIDOL 61 % SOLUTION: Performed by: HOSPITALIST

## 2017-10-31 PROCEDURE — 87070 CULTURE OTHR SPECIMN AEROBIC: CPT | Performed by: INTERNAL MEDICINE

## 2017-10-31 PROCEDURE — 80053 COMPREHEN METABOLIC PANEL: CPT | Performed by: INTERNAL MEDICINE

## 2017-10-31 PROCEDURE — 84443 ASSAY THYROID STIM HORMONE: CPT | Performed by: INTERNAL MEDICINE

## 2017-10-31 PROCEDURE — 25010000002 LEVOFLOXACIN PER 250 MG: Performed by: INTERNAL MEDICINE

## 2017-10-31 PROCEDURE — 85025 COMPLETE CBC W/AUTO DIFF WBC: CPT | Performed by: INTERNAL MEDICINE

## 2017-10-31 PROCEDURE — 87205 SMEAR GRAM STAIN: CPT | Performed by: INTERNAL MEDICINE

## 2017-10-31 PROCEDURE — 71260 CT THORAX DX C+: CPT

## 2017-10-31 RX ORDER — POTASSIUM CHLORIDE 750 MG/1
40 CAPSULE, EXTENDED RELEASE ORAL AS NEEDED
Status: DISCONTINUED | OUTPATIENT
Start: 2017-10-31 | End: 2017-11-08 | Stop reason: HOSPADM

## 2017-10-31 RX ORDER — TERBINAFINE HYDROCHLORIDE 250 MG/1
250 TABLET ORAL
Status: DISCONTINUED | OUTPATIENT
Start: 2017-10-31 | End: 2017-11-08 | Stop reason: HOSPADM

## 2017-10-31 RX ORDER — CHOLECALCIFEROL (VITAMIN D3) 125 MCG
1000 CAPSULE ORAL DAILY
Status: DISCONTINUED | OUTPATIENT
Start: 2017-10-31 | End: 2017-11-08 | Stop reason: HOSPADM

## 2017-10-31 RX ORDER — ECHINACEA PURPUREA EXTRACT 125 MG
2 TABLET ORAL AS NEEDED
Status: DISCONTINUED | OUTPATIENT
Start: 2017-10-31 | End: 2017-11-08 | Stop reason: HOSPADM

## 2017-10-31 RX ORDER — POTASSIUM CHLORIDE 7.45 MG/ML
10 INJECTION INTRAVENOUS
Status: DISCONTINUED | OUTPATIENT
Start: 2017-10-31 | End: 2017-11-08 | Stop reason: HOSPADM

## 2017-10-31 RX ORDER — POTASSIUM CHLORIDE 1.5 G/1.77G
40 POWDER, FOR SOLUTION ORAL AS NEEDED
Status: DISCONTINUED | OUTPATIENT
Start: 2017-10-31 | End: 2017-11-08 | Stop reason: HOSPADM

## 2017-10-31 RX ADMIN — ALBUTEROL SULFATE 2.5 MG: 2.5 SOLUTION RESPIRATORY (INHALATION) at 16:13

## 2017-10-31 RX ADMIN — BUDESONIDE AND FORMOTEROL FUMARATE DIHYDRATE 2 PUFF: 160; 4.5 AEROSOL RESPIRATORY (INHALATION) at 19:19

## 2017-10-31 RX ADMIN — CYANOCOBALAMIN TAB 500 MCG 1000 MCG: 500 TAB at 22:49

## 2017-10-31 RX ADMIN — DILTIAZEM HYDROCHLORIDE 120 MG: 120 CAPSULE, COATED, EXTENDED RELEASE ORAL at 09:49

## 2017-10-31 RX ADMIN — POTASSIUM CHLORIDE 40 MEQ: 750 CAPSULE, EXTENDED RELEASE ORAL at 14:59

## 2017-10-31 RX ADMIN — ALBUTEROL SULFATE 2.5 MG: 2.5 SOLUTION RESPIRATORY (INHALATION) at 03:44

## 2017-10-31 RX ADMIN — ALBUTEROL SULFATE 2.5 MG: 2.5 SOLUTION RESPIRATORY (INHALATION) at 22:57

## 2017-10-31 RX ADMIN — METHIMAZOLE 5 MG: 10 TABLET ORAL at 09:49

## 2017-10-31 RX ADMIN — LEVOFLOXACIN 750 MG: 5 INJECTION, SOLUTION INTRAVENOUS at 22:00

## 2017-10-31 RX ADMIN — SALINE NASAL SPRAY 2 SPRAY: 1.5 SOLUTION NASAL at 16:07

## 2017-10-31 RX ADMIN — ALBUTEROL SULFATE 2.5 MG: 2.5 SOLUTION RESPIRATORY (INHALATION) at 11:13

## 2017-10-31 RX ADMIN — Medication 1 APPLICATION: at 09:51

## 2017-10-31 RX ADMIN — ALBUTEROL SULFATE 2.5 MG: 2.5 SOLUTION RESPIRATORY (INHALATION) at 07:48

## 2017-10-31 RX ADMIN — ALBUTEROL SULFATE 2.5 MG: 2.5 SOLUTION RESPIRATORY (INHALATION) at 19:19

## 2017-10-31 RX ADMIN — FLUTICASONE PROPIONATE 2 SPRAY: 50 SPRAY, METERED NASAL at 09:50

## 2017-10-31 RX ADMIN — BUDESONIDE AND FORMOTEROL FUMARATE DIHYDRATE 2 PUFF: 160; 4.5 AEROSOL RESPIRATORY (INHALATION) at 07:56

## 2017-10-31 RX ADMIN — POTASSIUM CHLORIDE 40 MEQ: 750 CAPSULE, EXTENDED RELEASE ORAL at 22:50

## 2017-10-31 RX ADMIN — VITAMIN D, TAB 1000IU (100/BT) 1000 UNITS: 25 TAB at 09:50

## 2017-10-31 RX ADMIN — CETIRIZINE HYDROCHLORIDE 10 MG: 10 TABLET, FILM COATED ORAL at 09:50

## 2017-10-31 RX ADMIN — MULTIPLE VITAMINS W/ MINERALS TAB 1 TABLET: TAB at 09:49

## 2017-10-31 RX ADMIN — IOPAMIDOL 75 ML: 612 INJECTION, SOLUTION INTRAVENOUS at 15:45

## 2017-11-01 LAB
ANION GAP SERPL CALCULATED.3IONS-SCNC: 11.5 MMOL/L
BASOPHILS # BLD AUTO: 0.02 10*3/MM3 (ref 0–0.2)
BASOPHILS NFR BLD AUTO: 0.3 % (ref 0–1.5)
BUN BLD-MCNC: 14 MG/DL (ref 8–23)
BUN/CREAT SERPL: 24.6 (ref 7–25)
CALCIUM SPEC-SCNC: 9.1 MG/DL (ref 8.6–10.5)
CHLORIDE SERPL-SCNC: 103 MMOL/L (ref 98–107)
CO2 SERPL-SCNC: 24.5 MMOL/L (ref 22–29)
CREAT BLD-MCNC: 0.57 MG/DL (ref 0.57–1)
DEPRECATED RDW RBC AUTO: 49.9 FL (ref 37–54)
EOSINOPHIL # BLD AUTO: 0.13 10*3/MM3 (ref 0–0.7)
EOSINOPHIL NFR BLD AUTO: 1.6 % (ref 0.3–6.2)
ERYTHROCYTE [DISTWIDTH] IN BLOOD BY AUTOMATED COUNT: 13.8 % (ref 11.7–13)
GFR SERPL CREATININE-BSD FRML MDRD: 104 ML/MIN/1.73
GLUCOSE BLD-MCNC: 103 MG/DL (ref 65–99)
HCT VFR BLD AUTO: 37.2 % (ref 35.6–45.5)
HGB BLD-MCNC: 11.6 G/DL (ref 11.9–15.5)
IMM GRANULOCYTES # BLD: 0.04 10*3/MM3 (ref 0–0.03)
IMM GRANULOCYTES NFR BLD: 0.5 % (ref 0–0.5)
LYMPHOCYTES # BLD AUTO: 1.07 10*3/MM3 (ref 0.9–4.8)
LYMPHOCYTES NFR BLD AUTO: 13.5 % (ref 19.6–45.3)
MCH RBC QN AUTO: 30.9 PG (ref 26.9–32)
MCHC RBC AUTO-ENTMCNC: 31.2 G/DL (ref 32.4–36.3)
MCV RBC AUTO: 98.9 FL (ref 80.5–98.2)
MONOCYTES # BLD AUTO: 0.76 10*3/MM3 (ref 0.2–1.2)
MONOCYTES NFR BLD AUTO: 9.6 % (ref 5–12)
NEUTROPHILS # BLD AUTO: 5.91 10*3/MM3 (ref 1.9–8.1)
NEUTROPHILS NFR BLD AUTO: 74.5 % (ref 42.7–76)
PLATELET # BLD AUTO: 278 10*3/MM3 (ref 140–500)
PMV BLD AUTO: 9.7 FL (ref 6–12)
POTASSIUM BLD-SCNC: 4.6 MMOL/L (ref 3.5–5.2)
RBC # BLD AUTO: 3.76 10*6/MM3 (ref 3.9–5.2)
SODIUM BLD-SCNC: 139 MMOL/L (ref 136–145)
WBC NRBC COR # BLD: 7.93 10*3/MM3 (ref 4.5–10.7)

## 2017-11-01 PROCEDURE — 97110 THERAPEUTIC EXERCISES: CPT

## 2017-11-01 PROCEDURE — 85025 COMPLETE CBC W/AUTO DIFF WBC: CPT | Performed by: HOSPITALIST

## 2017-11-01 PROCEDURE — 94799 UNLISTED PULMONARY SVC/PX: CPT

## 2017-11-01 PROCEDURE — 80048 BASIC METABOLIC PNL TOTAL CA: CPT | Performed by: HOSPITALIST

## 2017-11-01 PROCEDURE — 97161 PT EVAL LOW COMPLEX 20 MIN: CPT

## 2017-11-01 RX ORDER — LEVOFLOXACIN 750 MG/1
750 TABLET ORAL NIGHTLY
Status: DISCONTINUED | OUTPATIENT
Start: 2017-11-01 | End: 2017-11-07

## 2017-11-01 RX ADMIN — ALBUTEROL SULFATE 2.5 MG: 2.5 SOLUTION RESPIRATORY (INHALATION) at 03:31

## 2017-11-01 RX ADMIN — MULTIPLE VITAMINS W/ MINERALS TAB 1 TABLET: TAB at 08:36

## 2017-11-01 RX ADMIN — ALBUTEROL SULFATE 2.5 MG: 2.5 SOLUTION RESPIRATORY (INHALATION) at 09:25

## 2017-11-01 RX ADMIN — DILTIAZEM HYDROCHLORIDE 120 MG: 120 CAPSULE, COATED, EXTENDED RELEASE ORAL at 08:36

## 2017-11-01 RX ADMIN — ALBUTEROL SULFATE 2.5 MG: 2.5 SOLUTION RESPIRATORY (INHALATION) at 15:39

## 2017-11-01 RX ADMIN — VITAMIN D, TAB 1000IU (100/BT) 1000 UNITS: 25 TAB at 08:36

## 2017-11-01 RX ADMIN — BUDESONIDE AND FORMOTEROL FUMARATE DIHYDRATE 2 PUFF: 160; 4.5 AEROSOL RESPIRATORY (INHALATION) at 20:44

## 2017-11-01 RX ADMIN — ALBUTEROL SULFATE 2.5 MG: 2.5 SOLUTION RESPIRATORY (INHALATION) at 20:44

## 2017-11-01 RX ADMIN — CETIRIZINE HYDROCHLORIDE 10 MG: 10 TABLET, FILM COATED ORAL at 08:36

## 2017-11-01 RX ADMIN — LEVOFLOXACIN 750 MG: 750 TABLET, FILM COATED ORAL at 20:20

## 2017-11-01 RX ADMIN — METHIMAZOLE 5 MG: 10 TABLET ORAL at 08:37

## 2017-11-01 RX ADMIN — FLUTICASONE PROPIONATE 2 SPRAY: 50 SPRAY, METERED NASAL at 08:42

## 2017-11-01 RX ADMIN — TERBINAFINE HYDROCHLORIDE 250 MG: 250 TABLET ORAL at 12:37

## 2017-11-01 RX ADMIN — BUDESONIDE AND FORMOTEROL FUMARATE DIHYDRATE 2 PUFF: 160; 4.5 AEROSOL RESPIRATORY (INHALATION) at 09:25

## 2017-11-01 RX ADMIN — ALBUTEROL SULFATE 2.5 MG: 2.5 SOLUTION RESPIRATORY (INHALATION) at 11:59

## 2017-11-01 RX ADMIN — Medication 1 APPLICATION: at 08:42

## 2017-11-02 ENCOUNTER — APPOINTMENT (OUTPATIENT)
Dept: CT IMAGING | Facility: HOSPITAL | Age: 72
End: 2017-11-02
Attending: INTERNAL MEDICINE

## 2017-11-02 LAB
ANION GAP SERPL CALCULATED.3IONS-SCNC: 14.8 MMOL/L
BACTERIA SPEC RESP CULT: NORMAL
BACTERIA SPEC RESP CULT: NORMAL
BASOPHILS # BLD AUTO: 0.03 10*3/MM3 (ref 0–0.2)
BASOPHILS NFR BLD AUTO: 0.4 % (ref 0–1.5)
BUN BLD-MCNC: 15 MG/DL (ref 8–23)
BUN/CREAT SERPL: 26.8 (ref 7–25)
CALCIUM SPEC-SCNC: 9.4 MG/DL (ref 8.6–10.5)
CHLORIDE SERPL-SCNC: 99 MMOL/L (ref 98–107)
CO2 SERPL-SCNC: 25.2 MMOL/L (ref 22–29)
CREAT BLD-MCNC: 0.56 MG/DL (ref 0.57–1)
DEPRECATED RDW RBC AUTO: 48.7 FL (ref 37–54)
EOSINOPHIL # BLD AUTO: 0.16 10*3/MM3 (ref 0–0.7)
EOSINOPHIL NFR BLD AUTO: 2 % (ref 0.3–6.2)
ERYTHROCYTE [DISTWIDTH] IN BLOOD BY AUTOMATED COUNT: 13.4 % (ref 11.7–13)
GFR SERPL CREATININE-BSD FRML MDRD: 106 ML/MIN/1.73
GLUCOSE BLD-MCNC: 111 MG/DL (ref 65–99)
GRAM STN SPEC: NORMAL
HCT VFR BLD AUTO: 37.6 % (ref 35.6–45.5)
HGB BLD-MCNC: 11.9 G/DL (ref 11.9–15.5)
IMM GRANULOCYTES # BLD: 0.04 10*3/MM3 (ref 0–0.03)
IMM GRANULOCYTES NFR BLD: 0.5 % (ref 0–0.5)
INR PPP: 1.15 (ref 0.9–1.1)
LYMPHOCYTES # BLD AUTO: 1.24 10*3/MM3 (ref 0.9–4.8)
LYMPHOCYTES NFR BLD AUTO: 15.8 % (ref 19.6–45.3)
MCH RBC QN AUTO: 31.3 PG (ref 26.9–32)
MCHC RBC AUTO-ENTMCNC: 31.6 G/DL (ref 32.4–36.3)
MCV RBC AUTO: 98.9 FL (ref 80.5–98.2)
MONOCYTES # BLD AUTO: 0.77 10*3/MM3 (ref 0.2–1.2)
MONOCYTES NFR BLD AUTO: 9.8 % (ref 5–12)
NEUTROPHILS # BLD AUTO: 5.61 10*3/MM3 (ref 1.9–8.1)
NEUTROPHILS NFR BLD AUTO: 71.5 % (ref 42.7–76)
PLATELET # BLD AUTO: 283 10*3/MM3 (ref 140–500)
PMV BLD AUTO: 9.9 FL (ref 6–12)
POTASSIUM BLD-SCNC: 4.1 MMOL/L (ref 3.5–5.2)
PROTHROMBIN TIME: 14.3 SECONDS (ref 11.7–14.2)
RBC # BLD AUTO: 3.8 10*6/MM3 (ref 3.9–5.2)
SODIUM BLD-SCNC: 139 MMOL/L (ref 136–145)
WBC NRBC COR # BLD: 7.85 10*3/MM3 (ref 4.5–10.7)

## 2017-11-02 PROCEDURE — 85610 PROTHROMBIN TIME: CPT | Performed by: INTERNAL MEDICINE

## 2017-11-02 PROCEDURE — 0BBG3ZX EXCISION OF LEFT UPPER LUNG LOBE, PERCUTANEOUS APPROACH, DIAGNOSTIC: ICD-10-PCS | Performed by: RADIOLOGY

## 2017-11-02 PROCEDURE — 88360 TUMOR IMMUNOHISTOCHEM/MANUAL: CPT

## 2017-11-02 PROCEDURE — 80048 BASIC METABOLIC PNL TOTAL CA: CPT | Performed by: HOSPITALIST

## 2017-11-02 PROCEDURE — 88341 IMHCHEM/IMCYTCHM EA ADD ANTB: CPT | Performed by: HOSPITALIST

## 2017-11-02 PROCEDURE — 94799 UNLISTED PULMONARY SVC/PX: CPT

## 2017-11-02 PROCEDURE — 77012 CT SCAN FOR NEEDLE BIOPSY: CPT

## 2017-11-02 PROCEDURE — 88305 TISSUE EXAM BY PATHOLOGIST: CPT | Performed by: HOSPITALIST

## 2017-11-02 PROCEDURE — 88342 IMHCHEM/IMCYTCHM 1ST ANTB: CPT | Performed by: HOSPITALIST

## 2017-11-02 PROCEDURE — 97110 THERAPEUTIC EXERCISES: CPT

## 2017-11-02 PROCEDURE — 85025 COMPLETE CBC W/AUTO DIFF WBC: CPT | Performed by: HOSPITALIST

## 2017-11-02 RX ORDER — ALPRAZOLAM 0.5 MG/1
0.5 TABLET ORAL ONCE
Status: COMPLETED | OUTPATIENT
Start: 2017-11-02 | End: 2017-11-02

## 2017-11-02 RX ORDER — SODIUM CHLORIDE 0.9 % (FLUSH) 0.9 %
1-10 SYRINGE (ML) INJECTION AS NEEDED
Status: DISCONTINUED | OUTPATIENT
Start: 2017-11-02 | End: 2017-11-08 | Stop reason: HOSPADM

## 2017-11-02 RX ORDER — LIDOCAINE HYDROCHLORIDE 10 MG/ML
20 INJECTION, SOLUTION INFILTRATION; PERINEURAL ONCE
Status: COMPLETED | OUTPATIENT
Start: 2017-11-02 | End: 2017-11-02

## 2017-11-02 RX ADMIN — ALBUTEROL SULFATE 2.5 MG: 2.5 SOLUTION RESPIRATORY (INHALATION) at 07:22

## 2017-11-02 RX ADMIN — ALBUTEROL SULFATE 2.5 MG: 2.5 SOLUTION RESPIRATORY (INHALATION) at 23:30

## 2017-11-02 RX ADMIN — LEVOFLOXACIN 750 MG: 750 TABLET, FILM COATED ORAL at 21:17

## 2017-11-02 RX ADMIN — LIDOCAINE HYDROCHLORIDE 20 ML: 10 INJECTION, SOLUTION INFILTRATION; PERINEURAL at 10:41

## 2017-11-02 RX ADMIN — CETIRIZINE HYDROCHLORIDE 10 MG: 10 TABLET, FILM COATED ORAL at 09:14

## 2017-11-02 RX ADMIN — VITAMIN D, TAB 1000IU (100/BT) 1000 UNITS: 25 TAB at 09:14

## 2017-11-02 RX ADMIN — CYANOCOBALAMIN TAB 500 MCG 1000 MCG: 500 TAB at 09:14

## 2017-11-02 RX ADMIN — METHIMAZOLE 5 MG: 10 TABLET ORAL at 09:14

## 2017-11-02 RX ADMIN — MULTIPLE VITAMINS W/ MINERALS TAB 1 TABLET: TAB at 09:14

## 2017-11-02 RX ADMIN — BUDESONIDE AND FORMOTEROL FUMARATE DIHYDRATE 2 PUFF: 160; 4.5 AEROSOL RESPIRATORY (INHALATION) at 07:21

## 2017-11-02 RX ADMIN — ALPRAZOLAM 0.5 MG: 0.5 TABLET ORAL at 09:42

## 2017-11-02 RX ADMIN — TERBINAFINE HYDROCHLORIDE 250 MG: 250 TABLET ORAL at 14:11

## 2017-11-02 RX ADMIN — DILTIAZEM HYDROCHLORIDE 120 MG: 120 CAPSULE, COATED, EXTENDED RELEASE ORAL at 09:14

## 2017-11-02 RX ADMIN — ALBUTEROL SULFATE 2.5 MG: 2.5 SOLUTION RESPIRATORY (INHALATION) at 14:26

## 2017-11-02 RX ADMIN — ALBUTEROL SULFATE 2.5 MG: 2.5 SOLUTION RESPIRATORY (INHALATION) at 00:07

## 2017-11-02 RX ADMIN — BUDESONIDE AND FORMOTEROL FUMARATE DIHYDRATE 2 PUFF: 160; 4.5 AEROSOL RESPIRATORY (INHALATION) at 20:19

## 2017-11-02 RX ADMIN — Medication 1 APPLICATION: at 09:50

## 2017-11-02 RX ADMIN — FLUTICASONE PROPIONATE 2 SPRAY: 50 SPRAY, METERED NASAL at 14:12

## 2017-11-02 RX ADMIN — ALBUTEROL SULFATE 2.5 MG: 2.5 SOLUTION RESPIRATORY (INHALATION) at 03:18

## 2017-11-02 RX ADMIN — ALBUTEROL SULFATE 2.5 MG: 2.5 SOLUTION RESPIRATORY (INHALATION) at 20:19

## 2017-11-03 PROBLEM — C34.12 MALIGNANT NEOPLASM OF UPPER LOBE OF LEFT LUNG (HCC): Status: ACTIVE | Noted: 2017-11-03

## 2017-11-03 LAB
ANION GAP SERPL CALCULATED.3IONS-SCNC: 11.3 MMOL/L
BASOPHILS # BLD AUTO: 0.04 10*3/MM3 (ref 0–0.2)
BASOPHILS NFR BLD AUTO: 0.5 % (ref 0–1.5)
BUN BLD-MCNC: 15 MG/DL (ref 8–23)
BUN/CREAT SERPL: 24.2 (ref 7–25)
CALCIUM SPEC-SCNC: 9.1 MG/DL (ref 8.6–10.5)
CHLORIDE SERPL-SCNC: 102 MMOL/L (ref 98–107)
CO2 SERPL-SCNC: 27.7 MMOL/L (ref 22–29)
CREAT BLD-MCNC: 0.62 MG/DL (ref 0.57–1)
DEPRECATED RDW RBC AUTO: 49.1 FL (ref 37–54)
EOSINOPHIL # BLD AUTO: 0.24 10*3/MM3 (ref 0–0.7)
EOSINOPHIL NFR BLD AUTO: 3 % (ref 0.3–6.2)
ERYTHROCYTE [DISTWIDTH] IN BLOOD BY AUTOMATED COUNT: 13.5 % (ref 11.7–13)
GFR SERPL CREATININE-BSD FRML MDRD: 94 ML/MIN/1.73
GLUCOSE BLD-MCNC: 97 MG/DL (ref 65–99)
HCT VFR BLD AUTO: 36.7 % (ref 35.6–45.5)
HGB BLD-MCNC: 11.5 G/DL (ref 11.9–15.5)
IMM GRANULOCYTES # BLD: 0.08 10*3/MM3 (ref 0–0.03)
IMM GRANULOCYTES NFR BLD: 1 % (ref 0–0.5)
LYMPHOCYTES # BLD AUTO: 1.16 10*3/MM3 (ref 0.9–4.8)
LYMPHOCYTES NFR BLD AUTO: 14.7 % (ref 19.6–45.3)
MCH RBC QN AUTO: 31.4 PG (ref 26.9–32)
MCHC RBC AUTO-ENTMCNC: 31.3 G/DL (ref 32.4–36.3)
MCV RBC AUTO: 100.3 FL (ref 80.5–98.2)
MONOCYTES # BLD AUTO: 0.88 10*3/MM3 (ref 0.2–1.2)
MONOCYTES NFR BLD AUTO: 11.1 % (ref 5–12)
NEUTROPHILS # BLD AUTO: 5.51 10*3/MM3 (ref 1.9–8.1)
NEUTROPHILS NFR BLD AUTO: 69.7 % (ref 42.7–76)
PLATELET # BLD AUTO: 283 10*3/MM3 (ref 140–500)
PMV BLD AUTO: 9.9 FL (ref 6–12)
POTASSIUM BLD-SCNC: 4.4 MMOL/L (ref 3.5–5.2)
RBC # BLD AUTO: 3.66 10*6/MM3 (ref 3.9–5.2)
SODIUM BLD-SCNC: 141 MMOL/L (ref 136–145)
WBC NRBC COR # BLD: 7.91 10*3/MM3 (ref 4.5–10.7)

## 2017-11-03 PROCEDURE — 94799 UNLISTED PULMONARY SVC/PX: CPT

## 2017-11-03 PROCEDURE — 99223 1ST HOSP IP/OBS HIGH 75: CPT | Performed by: INTERNAL MEDICINE

## 2017-11-03 PROCEDURE — 80048 BASIC METABOLIC PNL TOTAL CA: CPT | Performed by: HOSPITALIST

## 2017-11-03 PROCEDURE — 85025 COMPLETE CBC W/AUTO DIFF WBC: CPT | Performed by: HOSPITALIST

## 2017-11-03 RX ADMIN — ALBUTEROL SULFATE 2.5 MG: 2.5 SOLUTION RESPIRATORY (INHALATION) at 08:28

## 2017-11-03 RX ADMIN — TERBINAFINE HYDROCHLORIDE 250 MG: 250 TABLET ORAL at 09:40

## 2017-11-03 RX ADMIN — ALBUTEROL SULFATE 2.5 MG: 2.5 SOLUTION RESPIRATORY (INHALATION) at 15:13

## 2017-11-03 RX ADMIN — BUDESONIDE AND FORMOTEROL FUMARATE DIHYDRATE 2 PUFF: 160; 4.5 AEROSOL RESPIRATORY (INHALATION) at 08:28

## 2017-11-03 RX ADMIN — BUDESONIDE AND FORMOTEROL FUMARATE DIHYDRATE 2 PUFF: 160; 4.5 AEROSOL RESPIRATORY (INHALATION) at 20:00

## 2017-11-03 RX ADMIN — MULTIPLE VITAMINS W/ MINERALS TAB 1 TABLET: TAB at 09:29

## 2017-11-03 RX ADMIN — ALBUTEROL SULFATE 2.5 MG: 2.5 SOLUTION RESPIRATORY (INHALATION) at 11:26

## 2017-11-03 RX ADMIN — LEVOFLOXACIN 750 MG: 750 TABLET, FILM COATED ORAL at 21:54

## 2017-11-03 RX ADMIN — FLUTICASONE PROPIONATE 2 SPRAY: 50 SPRAY, METERED NASAL at 09:29

## 2017-11-03 RX ADMIN — ALBUTEROL SULFATE 2.5 MG: 2.5 SOLUTION RESPIRATORY (INHALATION) at 23:20

## 2017-11-03 RX ADMIN — CETIRIZINE HYDROCHLORIDE 10 MG: 10 TABLET, FILM COATED ORAL at 09:29

## 2017-11-03 RX ADMIN — DILTIAZEM HYDROCHLORIDE 120 MG: 120 CAPSULE, COATED, EXTENDED RELEASE ORAL at 09:29

## 2017-11-03 RX ADMIN — VITAMIN D, TAB 1000IU (100/BT) 1000 UNITS: 25 TAB at 09:29

## 2017-11-03 RX ADMIN — ALBUTEROL SULFATE 2.5 MG: 2.5 SOLUTION RESPIRATORY (INHALATION) at 03:59

## 2017-11-03 RX ADMIN — ALBUTEROL SULFATE 2.5 MG: 2.5 SOLUTION RESPIRATORY (INHALATION) at 20:00

## 2017-11-03 RX ADMIN — METHIMAZOLE 5 MG: 10 TABLET ORAL at 09:29

## 2017-11-03 RX ADMIN — CYANOCOBALAMIN TAB 500 MCG 1000 MCG: 500 TAB at 09:29

## 2017-11-03 RX ADMIN — Medication 1 APPLICATION: at 09:29

## 2017-11-04 LAB
BACTERIA SPEC AEROBE CULT: NORMAL
BACTERIA SPEC AEROBE CULT: NORMAL

## 2017-11-04 PROCEDURE — 94799 UNLISTED PULMONARY SVC/PX: CPT

## 2017-11-04 RX ADMIN — BUDESONIDE AND FORMOTEROL FUMARATE DIHYDRATE 2 PUFF: 160; 4.5 AEROSOL RESPIRATORY (INHALATION) at 23:14

## 2017-11-04 RX ADMIN — MULTIPLE VITAMINS W/ MINERALS TAB 1 TABLET: TAB at 09:11

## 2017-11-04 RX ADMIN — CETIRIZINE HYDROCHLORIDE 10 MG: 10 TABLET, FILM COATED ORAL at 09:11

## 2017-11-04 RX ADMIN — CYANOCOBALAMIN TAB 500 MCG 1000 MCG: 500 TAB at 09:11

## 2017-11-04 RX ADMIN — ALBUTEROL SULFATE 2.5 MG: 2.5 SOLUTION RESPIRATORY (INHALATION) at 03:56

## 2017-11-04 RX ADMIN — ALBUTEROL SULFATE 2.5 MG: 2.5 SOLUTION RESPIRATORY (INHALATION) at 12:09

## 2017-11-04 RX ADMIN — BUDESONIDE AND FORMOTEROL FUMARATE DIHYDRATE 2 PUFF: 160; 4.5 AEROSOL RESPIRATORY (INHALATION) at 08:40

## 2017-11-04 RX ADMIN — DILTIAZEM HYDROCHLORIDE 120 MG: 120 CAPSULE, COATED, EXTENDED RELEASE ORAL at 09:10

## 2017-11-04 RX ADMIN — FLUTICASONE PROPIONATE 2 SPRAY: 50 SPRAY, METERED NASAL at 09:10

## 2017-11-04 RX ADMIN — METHIMAZOLE 5 MG: 10 TABLET ORAL at 09:10

## 2017-11-04 RX ADMIN — POLYETHYLENE GLYCOL 3350 17 G: 17 POWDER, FOR SOLUTION ORAL at 21:36

## 2017-11-04 RX ADMIN — ALBUTEROL SULFATE 2.5 MG: 2.5 SOLUTION RESPIRATORY (INHALATION) at 23:14

## 2017-11-04 RX ADMIN — LEVOFLOXACIN 750 MG: 750 TABLET, FILM COATED ORAL at 21:33

## 2017-11-04 RX ADMIN — TERBINAFINE HYDROCHLORIDE 250 MG: 250 TABLET ORAL at 09:15

## 2017-11-04 RX ADMIN — ALBUTEROL SULFATE 2.5 MG: 2.5 SOLUTION RESPIRATORY (INHALATION) at 08:40

## 2017-11-04 RX ADMIN — Medication 1 APPLICATION: at 09:15

## 2017-11-04 RX ADMIN — ALBUTEROL SULFATE 2.5 MG: 2.5 SOLUTION RESPIRATORY (INHALATION) at 16:18

## 2017-11-04 RX ADMIN — VITAMIN D, TAB 1000IU (100/BT) 1000 UNITS: 25 TAB at 09:11

## 2017-11-05 PROCEDURE — 94799 UNLISTED PULMONARY SVC/PX: CPT

## 2017-11-05 RX ADMIN — ALBUTEROL SULFATE 2.5 MG: 2.5 SOLUTION RESPIRATORY (INHALATION) at 22:35

## 2017-11-05 RX ADMIN — Medication 1 APPLICATION: at 08:04

## 2017-11-05 RX ADMIN — MULTIPLE VITAMINS W/ MINERALS TAB 1 TABLET: TAB at 08:04

## 2017-11-05 RX ADMIN — FLUTICASONE PROPIONATE 2 SPRAY: 50 SPRAY, METERED NASAL at 08:08

## 2017-11-05 RX ADMIN — SALINE NASAL SPRAY 2 SPRAY: 1.5 SOLUTION NASAL at 08:04

## 2017-11-05 RX ADMIN — ALBUTEROL SULFATE 2.5 MG: 2.5 SOLUTION RESPIRATORY (INHALATION) at 15:20

## 2017-11-05 RX ADMIN — ALBUTEROL SULFATE 2.5 MG: 2.5 SOLUTION RESPIRATORY (INHALATION) at 07:23

## 2017-11-05 RX ADMIN — LEVOFLOXACIN 750 MG: 750 TABLET, FILM COATED ORAL at 20:53

## 2017-11-05 RX ADMIN — ALBUTEROL SULFATE 2.5 MG: 2.5 SOLUTION RESPIRATORY (INHALATION) at 03:29

## 2017-11-05 RX ADMIN — BUDESONIDE AND FORMOTEROL FUMARATE DIHYDRATE 2 PUFF: 160; 4.5 AEROSOL RESPIRATORY (INHALATION) at 19:10

## 2017-11-05 RX ADMIN — CYANOCOBALAMIN TAB 500 MCG 1000 MCG: 500 TAB at 08:04

## 2017-11-05 RX ADMIN — DILTIAZEM HYDROCHLORIDE 120 MG: 120 CAPSULE, COATED, EXTENDED RELEASE ORAL at 08:20

## 2017-11-05 RX ADMIN — BUDESONIDE AND FORMOTEROL FUMARATE DIHYDRATE 2 PUFF: 160; 4.5 AEROSOL RESPIRATORY (INHALATION) at 07:23

## 2017-11-05 RX ADMIN — METHIMAZOLE 5 MG: 10 TABLET ORAL at 08:05

## 2017-11-05 RX ADMIN — CETIRIZINE HYDROCHLORIDE 10 MG: 10 TABLET, FILM COATED ORAL at 08:05

## 2017-11-05 RX ADMIN — TERBINAFINE HYDROCHLORIDE 250 MG: 250 TABLET ORAL at 08:05

## 2017-11-05 RX ADMIN — ALBUTEROL SULFATE 2.5 MG: 2.5 SOLUTION RESPIRATORY (INHALATION) at 12:11

## 2017-11-05 RX ADMIN — VITAMIN D, TAB 1000IU (100/BT) 1000 UNITS: 25 TAB at 08:05

## 2017-11-05 RX ADMIN — ALBUTEROL SULFATE 2.5 MG: 2.5 SOLUTION RESPIRATORY (INHALATION) at 19:09

## 2017-11-06 PROCEDURE — 94799 UNLISTED PULMONARY SVC/PX: CPT

## 2017-11-06 RX ADMIN — TERBINAFINE HYDROCHLORIDE 250 MG: 250 TABLET ORAL at 08:22

## 2017-11-06 RX ADMIN — Medication 1 APPLICATION: at 08:21

## 2017-11-06 RX ADMIN — METHIMAZOLE 5 MG: 10 TABLET ORAL at 08:20

## 2017-11-06 RX ADMIN — ALBUTEROL SULFATE 2.5 MG: 2.5 SOLUTION RESPIRATORY (INHALATION) at 19:41

## 2017-11-06 RX ADMIN — ALBUTEROL SULFATE 2.5 MG: 2.5 SOLUTION RESPIRATORY (INHALATION) at 07:20

## 2017-11-06 RX ADMIN — BUDESONIDE AND FORMOTEROL FUMARATE DIHYDRATE 2 PUFF: 160; 4.5 AEROSOL RESPIRATORY (INHALATION) at 19:42

## 2017-11-06 RX ADMIN — ALBUTEROL SULFATE 2.5 MG: 2.5 SOLUTION RESPIRATORY (INHALATION) at 11:16

## 2017-11-06 RX ADMIN — ALBUTEROL SULFATE 2.5 MG: 2.5 SOLUTION RESPIRATORY (INHALATION) at 23:54

## 2017-11-06 RX ADMIN — DILTIAZEM HYDROCHLORIDE 120 MG: 120 CAPSULE, COATED, EXTENDED RELEASE ORAL at 08:21

## 2017-11-06 RX ADMIN — CETIRIZINE HYDROCHLORIDE 10 MG: 10 TABLET, FILM COATED ORAL at 08:21

## 2017-11-06 RX ADMIN — CYANOCOBALAMIN TAB 500 MCG 1000 MCG: 500 TAB at 08:21

## 2017-11-06 RX ADMIN — BUDESONIDE AND FORMOTEROL FUMARATE DIHYDRATE 2 PUFF: 160; 4.5 AEROSOL RESPIRATORY (INHALATION) at 07:19

## 2017-11-06 RX ADMIN — ALBUTEROL SULFATE 2.5 MG: 2.5 SOLUTION RESPIRATORY (INHALATION) at 03:43

## 2017-11-06 RX ADMIN — VITAMIN D, TAB 1000IU (100/BT) 1000 UNITS: 25 TAB at 08:20

## 2017-11-06 RX ADMIN — MULTIPLE VITAMINS W/ MINERALS TAB 1 TABLET: TAB at 08:20

## 2017-11-06 RX ADMIN — FLUTICASONE PROPIONATE 2 SPRAY: 50 SPRAY, METERED NASAL at 08:21

## 2017-11-06 RX ADMIN — ALBUTEROL SULFATE 2.5 MG: 2.5 SOLUTION RESPIRATORY (INHALATION) at 14:40

## 2017-11-06 RX ADMIN — LEVOFLOXACIN 750 MG: 750 TABLET, FILM COATED ORAL at 21:18

## 2017-11-07 PROCEDURE — 94799 UNLISTED PULMONARY SVC/PX: CPT

## 2017-11-07 PROCEDURE — 93010 ELECTROCARDIOGRAM REPORT: CPT | Performed by: INTERNAL MEDICINE

## 2017-11-07 PROCEDURE — 93005 ELECTROCARDIOGRAM TRACING: CPT | Performed by: INTERNAL MEDICINE

## 2017-11-07 PROCEDURE — 94620 HC PULMONARY STRESS TEST SIMPLE: CPT

## 2017-11-07 PROCEDURE — 99222 1ST HOSP IP/OBS MODERATE 55: CPT | Performed by: INTERNAL MEDICINE

## 2017-11-07 PROCEDURE — 25010000002 DIGOXIN PER 500 MCG: Performed by: INTERNAL MEDICINE

## 2017-11-07 RX ORDER — ALBUTEROL SULFATE 2.5 MG/3ML
SOLUTION RESPIRATORY (INHALATION)
Status: DISPENSED
Start: 2017-11-07 | End: 2017-11-07

## 2017-11-07 RX ORDER — ALBUTEROL SULFATE 2.5 MG/3ML
2.5 SOLUTION RESPIRATORY (INHALATION) EVERY 4 HOURS PRN
Qty: 360 ML | Refills: 12 | Status: SHIPPED | OUTPATIENT
Start: 2017-11-07 | End: 2017-11-09

## 2017-11-07 RX ORDER — DIGOXIN 0.25 MG/ML
125 INJECTION INTRAMUSCULAR; INTRAVENOUS
Status: DISCONTINUED | OUTPATIENT
Start: 2017-11-07 | End: 2017-11-08 | Stop reason: HOSPADM

## 2017-11-07 RX ORDER — POLYETHYLENE GLYCOL 3350 17 G/17G
POWDER, FOR SOLUTION ORAL
Status: DISPENSED
Start: 2017-11-07 | End: 2017-11-07

## 2017-11-07 RX ADMIN — Medication 1 APPLICATION: at 08:03

## 2017-11-07 RX ADMIN — BUDESONIDE AND FORMOTEROL FUMARATE DIHYDRATE 2 PUFF: 160; 4.5 AEROSOL RESPIRATORY (INHALATION) at 07:04

## 2017-11-07 RX ADMIN — BUDESONIDE AND FORMOTEROL FUMARATE DIHYDRATE 2 PUFF: 160; 4.5 AEROSOL RESPIRATORY (INHALATION) at 18:54

## 2017-11-07 RX ADMIN — ALBUTEROL SULFATE 2.5 MG: 2.5 SOLUTION RESPIRATORY (INHALATION) at 18:53

## 2017-11-07 RX ADMIN — VITAMIN D, TAB 1000IU (100/BT) 1000 UNITS: 25 TAB at 08:01

## 2017-11-07 RX ADMIN — TERBINAFINE HYDROCHLORIDE 250 MG: 250 TABLET ORAL at 08:03

## 2017-11-07 RX ADMIN — DIGOXIN 125 MCG: 0.25 INJECTION INTRAMUSCULAR; INTRAVENOUS at 12:40

## 2017-11-07 RX ADMIN — POLYETHYLENE GLYCOL 3350 17 G: 17 POWDER, FOR SOLUTION ORAL at 08:02

## 2017-11-07 RX ADMIN — CYANOCOBALAMIN TAB 500 MCG 1000 MCG: 500 TAB at 08:01

## 2017-11-07 RX ADMIN — MULTIPLE VITAMINS W/ MINERALS TAB 1 TABLET: TAB at 08:01

## 2017-11-07 RX ADMIN — FLUTICASONE PROPIONATE 2 SPRAY: 50 SPRAY, METERED NASAL at 08:03

## 2017-11-07 RX ADMIN — CETIRIZINE HYDROCHLORIDE 10 MG: 10 TABLET, FILM COATED ORAL at 08:02

## 2017-11-07 RX ADMIN — METHIMAZOLE 5 MG: 10 TABLET ORAL at 08:01

## 2017-11-07 RX ADMIN — ALBUTEROL SULFATE 2.5 MG: 2.5 SOLUTION RESPIRATORY (INHALATION) at 11:05

## 2017-11-07 RX ADMIN — ALBUTEROL SULFATE 2.5 MG: 2.5 SOLUTION RESPIRATORY (INHALATION) at 03:47

## 2017-11-07 RX ADMIN — ALBUTEROL SULFATE 2.5 MG: 2.5 SOLUTION RESPIRATORY (INHALATION) at 07:04

## 2017-11-07 RX ADMIN — DILTIAZEM HYDROCHLORIDE 120 MG: 120 CAPSULE, COATED, EXTENDED RELEASE ORAL at 08:02

## 2017-11-07 RX ADMIN — ALBUTEROL SULFATE 2.5 MG: 2.5 SOLUTION RESPIRATORY (INHALATION) at 14:52

## 2017-11-08 VITALS
WEIGHT: 91.4 LBS | RESPIRATION RATE: 20 BRPM | SYSTOLIC BLOOD PRESSURE: 105 MMHG | BODY MASS INDEX: 17.94 KG/M2 | OXYGEN SATURATION: 94 % | HEART RATE: 122 BPM | DIASTOLIC BLOOD PRESSURE: 69 MMHG | HEIGHT: 60 IN | TEMPERATURE: 97.7 F

## 2017-11-08 LAB
T4 FREE SERPL-MCNC: 1.28 NG/DL (ref 0.93–1.7)
TSH SERPL DL<=0.05 MIU/L-ACNC: 1.01 MIU/ML (ref 0.27–4.2)

## 2017-11-08 PROCEDURE — 25010000002 DIGOXIN PER 500 MCG: Performed by: INTERNAL MEDICINE

## 2017-11-08 PROCEDURE — 94799 UNLISTED PULMONARY SVC/PX: CPT

## 2017-11-08 PROCEDURE — 84439 ASSAY OF FREE THYROXINE: CPT | Performed by: HOSPITALIST

## 2017-11-08 PROCEDURE — 84443 ASSAY THYROID STIM HORMONE: CPT | Performed by: HOSPITALIST

## 2017-11-08 RX ORDER — ALBUTEROL SULFATE 90 UG/1
2 AEROSOL, METERED RESPIRATORY (INHALATION) EVERY 4 HOURS PRN
Start: 2017-11-08 | End: 2020-04-08 | Stop reason: SDUPTHER

## 2017-11-08 RX ORDER — DIGOXIN 125 MCG
125 TABLET ORAL
Qty: 30 TABLET | Refills: 0 | Status: SHIPPED | OUTPATIENT
Start: 2017-11-08 | End: 2017-12-08

## 2017-11-08 RX ORDER — DILTIAZEM HYDROCHLORIDE 120 MG/1
120 CAPSULE, COATED, EXTENDED RELEASE ORAL
Qty: 30 CAPSULE | Refills: 0 | Status: SHIPPED | OUTPATIENT
Start: 2017-11-09 | End: 2017-12-09

## 2017-11-08 RX ADMIN — TERBINAFINE HYDROCHLORIDE 250 MG: 250 TABLET ORAL at 08:32

## 2017-11-08 RX ADMIN — ALBUTEROL SULFATE 2.5 MG: 2.5 SOLUTION RESPIRATORY (INHALATION) at 08:44

## 2017-11-08 RX ADMIN — DIGOXIN 125 MCG: 0.25 INJECTION INTRAMUSCULAR; INTRAVENOUS at 12:12

## 2017-11-08 RX ADMIN — BUDESONIDE AND FORMOTEROL FUMARATE DIHYDRATE 2 PUFF: 160; 4.5 AEROSOL RESPIRATORY (INHALATION) at 08:53

## 2017-11-08 RX ADMIN — ALBUTEROL SULFATE 2.5 MG: 2.5 SOLUTION RESPIRATORY (INHALATION) at 04:25

## 2017-11-08 RX ADMIN — METHIMAZOLE 5 MG: 10 TABLET ORAL at 08:31

## 2017-11-08 RX ADMIN — FLUTICASONE PROPIONATE 2 SPRAY: 50 SPRAY, METERED NASAL at 08:33

## 2017-11-08 RX ADMIN — DILTIAZEM HYDROCHLORIDE 120 MG: 120 CAPSULE, COATED, EXTENDED RELEASE ORAL at 08:31

## 2017-11-08 RX ADMIN — CYANOCOBALAMIN TAB 500 MCG 1000 MCG: 500 TAB at 08:31

## 2017-11-08 RX ADMIN — MULTIPLE VITAMINS W/ MINERALS TAB 1 TABLET: TAB at 08:31

## 2017-11-08 RX ADMIN — Medication 1 APPLICATION: at 12:12

## 2017-11-08 RX ADMIN — VITAMIN D, TAB 1000IU (100/BT) 1000 UNITS: 25 TAB at 08:32

## 2017-11-08 RX ADMIN — ALBUTEROL SULFATE 2.5 MG: 2.5 SOLUTION RESPIRATORY (INHALATION) at 00:07

## 2017-11-08 RX ADMIN — ALBUTEROL SULFATE 2.5 MG: 2.5 SOLUTION RESPIRATORY (INHALATION) at 12:31

## 2017-11-08 RX ADMIN — CETIRIZINE HYDROCHLORIDE 10 MG: 10 TABLET, FILM COATED ORAL at 08:32

## 2017-11-09 RX ORDER — ALBUTEROL SULFATE 2.5 MG/3ML
2.5 SOLUTION RESPIRATORY (INHALATION) EVERY 4 HOURS PRN
Qty: 360 ML | Refills: 3 | Status: SHIPPED | OUTPATIENT
Start: 2017-11-09 | End: 2018-04-24

## 2017-11-13 ENCOUNTER — TELEPHONE (OUTPATIENT)
Dept: SOCIAL WORK | Facility: HOSPITAL | Age: 72
End: 2017-11-13

## 2017-11-13 NOTE — TELEPHONE ENCOUNTER
Call Center contacted me regarding patient not able to get her nebulizer solution filled at pharmacy. It was delivered to her home today by [facility].

## 2017-11-15 ENCOUNTER — HOSPITAL ENCOUNTER (OUTPATIENT)
Dept: PET IMAGING | Facility: HOSPITAL | Age: 72
Discharge: HOME | End: 2017-11-15
Attending: INTERNAL MEDICINE | Admitting: INTERNAL MEDICINE

## 2017-11-15 ENCOUNTER — HOSPITAL ENCOUNTER (OUTPATIENT)
Dept: PET IMAGING | Facility: HOSPITAL | Age: 72
Discharge: HOME | End: 2017-11-15
Attending: INTERNAL MEDICINE

## 2017-11-15 DIAGNOSIS — C34.12 MALIGNANT NEOPLASM OF UPPER LOBE OF LEFT LUNG (HCC): ICD-10-CM

## 2017-11-15 LAB — GLUCOSE BLDC GLUCOMTR-MCNC: 85 MG/DL (ref 70–130)

## 2017-11-15 PROCEDURE — 0 FLUDEOXYGLUCOSE F18 SOLUTION: Performed by: INTERNAL MEDICINE

## 2017-11-15 PROCEDURE — A9552 F18 FDG: HCPCS | Performed by: INTERNAL MEDICINE

## 2017-11-15 PROCEDURE — 78815 PET IMAGE W/CT SKULL-THIGH: CPT

## 2017-11-15 PROCEDURE — 82962 GLUCOSE BLOOD TEST: CPT

## 2017-11-15 RX ADMIN — FLUDEOXYGLUCOSE F18 1 DOSE: 300 INJECTION INTRAVENOUS at 07:54

## 2017-11-22 ENCOUNTER — OFFICE VISIT (OUTPATIENT)
Dept: ONCOLOGY | Facility: CLINIC | Age: 72
End: 2017-11-22

## 2017-11-22 ENCOUNTER — LAB (OUTPATIENT)
Dept: LAB | Facility: HOSPITAL | Age: 72
End: 2017-11-22

## 2017-11-22 VITALS
WEIGHT: 92.8 LBS | BODY MASS INDEX: 18.22 KG/M2 | HEIGHT: 60 IN | SYSTOLIC BLOOD PRESSURE: 132 MMHG | OXYGEN SATURATION: 95 % | RESPIRATION RATE: 18 BRPM | HEART RATE: 96 BPM | TEMPERATURE: 98 F | DIASTOLIC BLOOD PRESSURE: 68 MMHG

## 2017-11-22 DIAGNOSIS — C34.12 MALIGNANT NEOPLASM OF UPPER LOBE OF LEFT LUNG (HCC): ICD-10-CM

## 2017-11-22 DIAGNOSIS — C34.12 MALIGNANT NEOPLASM OF UPPER LOBE OF LEFT LUNG (HCC): Primary | ICD-10-CM

## 2017-11-22 LAB
ALBUMIN SERPL-MCNC: 3.7 G/DL (ref 3.5–5.2)
ALBUMIN/GLOB SERPL: 1.2 G/DL (ref 1.1–2.4)
ALP SERPL-CCNC: 66 U/L (ref 38–116)
ALT SERPL W P-5'-P-CCNC: 13 U/L (ref 0–33)
ANION GAP SERPL CALCULATED.3IONS-SCNC: 10.2 MMOL/L
AST SERPL-CCNC: 17 U/L (ref 0–32)
BASOPHILS # BLD AUTO: 0.06 10*3/MM3 (ref 0–0.1)
BASOPHILS NFR BLD AUTO: 0.8 % (ref 0–1.1)
BILIRUB SERPL-MCNC: 0.2 MG/DL (ref 0.1–1.2)
BUN BLD-MCNC: 19 MG/DL (ref 6–20)
BUN/CREAT SERPL: 28.8 (ref 7.3–30)
CALCIUM SPEC-SCNC: 9.9 MG/DL (ref 8.5–10.2)
CHLORIDE SERPL-SCNC: 104 MMOL/L (ref 98–107)
CO2 SERPL-SCNC: 27.8 MMOL/L (ref 22–29)
CREAT BLD-MCNC: 0.66 MG/DL (ref 0.6–1.1)
DEPRECATED RDW RBC AUTO: 48.1 FL (ref 37–49)
EOSINOPHIL # BLD AUTO: 0.09 10*3/MM3 (ref 0–0.36)
EOSINOPHIL NFR BLD AUTO: 1.3 % (ref 1–5)
ERYTHROCYTE [DISTWIDTH] IN BLOOD BY AUTOMATED COUNT: 13.3 % (ref 11.7–14.5)
GFR SERPL CREATININE-BSD FRML MDRD: 88 ML/MIN/1.73
GLOBULIN UR ELPH-MCNC: 3.2 GM/DL (ref 1.8–3.5)
GLUCOSE BLD-MCNC: 77 MG/DL (ref 74–124)
HCT VFR BLD AUTO: 40 % (ref 34–45)
HGB BLD-MCNC: 12.6 G/DL (ref 11.5–14.9)
IMM GRANULOCYTES # BLD: 0.03 10*3/MM3 (ref 0–0.03)
IMM GRANULOCYTES NFR BLD: 0.4 % (ref 0–0.5)
LYMPHOCYTES # BLD AUTO: 1.15 10*3/MM3 (ref 1–3.5)
LYMPHOCYTES NFR BLD AUTO: 16.2 % (ref 20–49)
MCH RBC QN AUTO: 30.6 PG (ref 27–33)
MCHC RBC AUTO-ENTMCNC: 31.5 G/DL (ref 32–35)
MCV RBC AUTO: 97.1 FL (ref 83–97)
MONOCYTES # BLD AUTO: 0.59 10*3/MM3 (ref 0.25–0.8)
MONOCYTES NFR BLD AUTO: 8.3 % (ref 4–12)
NEUTROPHILS # BLD AUTO: 5.19 10*3/MM3 (ref 1.5–7)
NEUTROPHILS NFR BLD AUTO: 73 % (ref 39–75)
NRBC BLD MANUAL-RTO: 0 /100 WBC (ref 0–0)
PLATELET # BLD AUTO: 289 10*3/MM3 (ref 150–375)
PMV BLD AUTO: 9.3 FL (ref 8.9–12.1)
POTASSIUM BLD-SCNC: 4.4 MMOL/L (ref 3.5–4.7)
PROT SERPL-MCNC: 6.9 G/DL (ref 6.3–8)
RBC # BLD AUTO: 4.12 10*6/MM3 (ref 3.9–5)
SODIUM BLD-SCNC: 142 MMOL/L (ref 134–145)
WBC NRBC COR # BLD: 7.11 10*3/MM3 (ref 4–10)

## 2017-11-22 PROCEDURE — 99215 OFFICE O/P EST HI 40 MIN: CPT | Performed by: INTERNAL MEDICINE

## 2017-11-22 PROCEDURE — 36415 COLL VENOUS BLD VENIPUNCTURE: CPT | Performed by: INTERNAL MEDICINE

## 2017-11-22 PROCEDURE — 85025 COMPLETE CBC W/AUTO DIFF WBC: CPT | Performed by: INTERNAL MEDICINE

## 2017-11-22 PROCEDURE — 80053 COMPREHEN METABOLIC PANEL: CPT | Performed by: INTERNAL MEDICINE

## 2017-11-22 NOTE — PROGRESS NOTES
History:     Reason for follow up:   1.  Newly diagnosed left upper lobe non-small cell lung cancer, high PDL-1 (80%), negative egfr/alk/ros     HPI:  Maryam Arredondo presents for follow-up of her newly diagnosed lung cancer.  I initially met patient while inpatient for acute COPD exacerbation in parainfluenza virus when CT imaging revealed abnormality in the left upper lobe.  She underwent CT-guided biopsy which confirmed non-small cell lung cancer.  Since being discharged she had a PET CT scan performed which is detailed below.,  But does not reveal mediastinal, hilar adenopathy or abnormal liver lesions.  She has recovered quite nicely from her pneumonia and COPD exacerbation.  She continues to use the oxygen around the clock but is breathing much better.  She follows up with Dr. Anthony next week.  She denies any fevers, chills, night sweats.  She is a smoker.  She also follows with Dr. Christie for thyroid abnormalities.    Past Medical   Past Medical History:   Diagnosis Date   • Allergic rhinitis    • Asthma    • Breast cancer     s/p Lumpectomy ~2000 and chemo/XRT. Then  Masectomy ~2004 for some recurrence   • COPD (chronic obstructive pulmonary disease)    • Hyperthyroidism    • Osteopenia    • Oxygen dependent     2L - at night only   • Pneumothorax    • Toxic multinodular goiter    • Vitamin D deficiency     and   Patient Active Problem List   Diagnosis   • Pneumothorax   • Hypoxia   • Tachycardia   • Hyperthyroidism   • Essential hypertension   • Multifocal atrial tachycardia   • Toxic multinodular goiter   • Pulmonary emphysema   • Vitamin D insufficiency   • Pneumonia   • COPD with acute lower respiratory infection   • Lung mass - possible   • Infection due to parainfluenza virus 1   • Malignant neoplasm of upper lobe of left lung     Social History   Social History     Social History   • Marital status: Single     Spouse name: N/A   • Number of children: N/A   • Years of education: N/A     Occupational  "History   •  Retired     Social History Main Topics   • Smoking status: Former Smoker     Packs/day: 2.00     Types: Cigarettes     Quit date: 3/14/1995   • Smokeless tobacco: Never Used      Comment: D/C 20 years ago, smoking 2 ppd before quitting   • Alcohol use No      Comment: Social use rare   • Drug use: No   • Sexual activity: Defer      Comment: drinks decaf      Other Topics Concern   • Not on file     Social History Narrative     Family History  Family History   Problem Relation Age of Onset   • Arthritis Mother    • Heart failure Mother      Congestive Heart Failure   • Lung cancer Father    • Breast cancer Paternal Grandmother    • No Known Problems Sister      Allergies  Allergies   Allergen Reactions   • Codeine    • Mucinex D [Pseudoephedrine-Guaifenesin Er] Nausea Only     Felt hot in chest   • Penicillin V    • Penicillins        Medications: The current medication list was reviewed in the EMR.    Review of Systems  Review of Systems   Constitutional: Positive for fatigue. Negative for activity change, appetite change, chills, diaphoresis, fever and unexpected weight change.   Respiratory: Positive for shortness of breath. Negative for cough and chest tightness.    Cardiovascular: Negative for chest pain, palpitations and leg swelling.   Gastrointestinal: Negative for abdominal pain, blood in stool, constipation, diarrhea, nausea and vomiting.   Musculoskeletal: Positive for arthralgias. Negative for joint swelling and myalgias.   Hematological: Negative for adenopathy. Does not bruise/bleed easily.       Objective    Objective:     Vitals:    11/22/17 1353   BP: 132/68   Pulse: 96   Resp: 18   Temp: 98 °F (36.7 °C)   TempSrc: Oral   SpO2: 95%   Weight: 92 lb 12.8 oz (42.1 kg)   Height: 60.04\" (152.5 cm)   PainSc: 0-No pain     Current Status 11/22/2017   ECOG score 0     GENERAL:  Elderly female, thin, in no acute distress, oxygen in place.  SKIN:  Warm, dry without rashes, purpura or " petechiae.  HEAD:  Normocephalic.  EYES:  EOMs intact.  Conjunctivae normal.  EARS:  Hearing intact.  NOSE:  Septum midline.  No excoriations or nasal discharge.  NECK:  Supple with good range of motion; no thyromegaly or masses.  LYMPHATICS:  No cervical, supraclavicular adenopathy.  CHEST:  Lungs clear to percussion and auscultation.  Decreased air exchange diffusely, no wheezes  CARDIAC:  Regular rate and rhythm without murmurs, rubs or gallops. Normal S1,S2.  ABDOMEN:  Soft, nontender, no hepatosplenomegaly, normal bowel sounds  EXTREMITIES:  No clubbing, cyanosis or edema.  NEUROLOGICAL:  Cranial Nerves II-XII grossly intact.  No focal neurological deficits.  PSYCHIATRIC:  Normal affect and mood.         Labs and Imaging  Results for orders placed or performed in visit on 11/22/17   Comprehensive Metabolic Panel   Result Value Ref Range    Glucose 77 74 - 124 mg/dL    BUN 19 6 - 20 mg/dL    Creatinine 0.66 0.60 - 1.10 mg/dL    Sodium 142 134 - 145 mmol/L    Potassium 4.4 3.5 - 4.7 mmol/L    Chloride 104 98 - 107 mmol/L    CO2 27.8 22.0 - 29.0 mmol/L    Calcium 9.9 8.5 - 10.2 mg/dL    Total Protein 6.9 6.3 - 8.0 g/dL    Albumin 3.70 3.50 - 5.20 g/dL    ALT (SGPT) 13 0 - 33 U/L    AST (SGOT) 17 0 - 32 U/L    Alkaline Phosphatase 66 38 - 116 U/L    Total Bilirubin 0.2 0.1 - 1.2 mg/dL    eGFR Non African Amer 88 >60 mL/min/1.73    Globulin 3.2 1.8 - 3.5 gm/dL    A/G Ratio 1.2 1.1 - 2.4 g/dL    BUN/Creatinine Ratio 28.8 7.3 - 30.0    Anion Gap 10.2 mmol/L   CBC Auto Differential   Result Value Ref Range    WBC 7.11 4.00 - 10.00 10*3/mm3    RBC 4.12 3.90 - 5.00 10*6/mm3    Hemoglobin 12.6 11.5 - 14.9 g/dL    Hematocrit 40.0 34.0 - 45.0 %    MCV 97.1 (H) 83.0 - 97.0 fL    MCH 30.6 27.0 - 33.0 pg    MCHC 31.5 (L) 32.0 - 35.0 g/dL    RDW 13.3 11.7 - 14.5 %    RDW-SD 48.1 37.0 - 49.0 fl    MPV 9.3 8.9 - 12.1 fL    Platelets 289 150 - 375 10*3/mm3    Neutrophil % 73.0 39.0 - 75.0 %    Lymphocyte % 16.2 (L) 20.0 - 49.0 %     Monocyte % 8.3 4.0 - 12.0 %    Eosinophil % 1.3 1.0 - 5.0 %    Basophil % 0.8 0.0 - 1.1 %    Immature Grans % 0.4 0.0 - 0.5 %    Neutrophils, Absolute 5.19 1.50 - 7.00 10*3/mm3    Lymphocytes, Absolute 1.15 1.00 - 3.50 10*3/mm3    Monocytes, Absolute 0.59 0.25 - 0.80 10*3/mm3    Eosinophils, Absolute 0.09 0.00 - 0.36 10*3/mm3    Basophils, Absolute 0.06 0.00 - 0.10 10*3/mm3    Immature Grans, Absolute 0.03 0.00 - 0.03 10*3/mm3    nRBC 0.0 0.0 - 0.0 /100 WBC   **I reviewed PET/CT scans myself and concur with the below dictation.    Ct Chest With Contrast    Result Date: 10/31/2017  Narrative: CT CHEST WITH IV CONTRAST  HISTORY: 72-year-old female. Evaluate for pneumonia. Smoking history. Breast cancer history, right mastectomy.  TECHNIQUE: Radiation dose reduction techniques were utilized, including automated exposure control and exposure modulation based on body size. 3 mm images were obtained through the chest after the administration of IV contrast. Compared with previous chest CT from 11/03/2016.  FINDINGS: There or 2 new adjacent left upper lobe lung lesions. At the apex, there is a 3.2 cm pleural-based mass and slightly inferiorly there is an approximately 5 cm pleural-based mass along the anterior chest wall. There is also a new 7 mm irregular nodule along a left upper lobe scar adjacently. There are shotty and a few borderline enlarged mediastinal and bilateral hilar nodes. One of the more defined nodes is in the precarinal region measuring 1.5 x 0.8 cm. There is also significant bronchial thickening of both lower lobes and there is a sizable dense airspace consolidation in the peribronchial region of the right lower lobe. There are much less prominent reticular nodular opacities in the left lower lobe. There are no pleural or pericardial effusions. There are advanced emphysematous changes diffusely with associated scarring. Multinodular goiter is noted. In the visualized upper abdomen, there are 2  hyperenhancing right hepatic lobe liver lesions. There is a 1 cm peripherally enhancing lesion at the posterior hepatic segment and a uniformly hyperenhanced nodule is seen at the anterior hepatic segment measuring 8 mm.      Impression: 1. There has been interval development of what is likely extensive malignancy at the left upper lobe. There are 2 new pleural-based lesions measuring approximately 3 cm and 5 cm, and there is also a new 7 mm irregular nodule along a left upper lobe scar. The appearance is very suspicious for lung cancer. 2. There is likely acute bronchitis and pneumonia at both lower lobes, significantly more prominent in the right lower lobe. 3. The 2 tiny hyperenhancing liver lesions are indeterminate at this point. 4. There is mild mediastinal and bilateral hilar lymphadenopathy.  This report was finalized on 10/31/2017 4:13 PM by Dr. Ceci Solis MD.      Ct Needle Biopsy Lung    Result Date: 11/2/2017  Narrative: CT-GUIDED BIOPSY OF LUNG MASS  HISTORY: Female who is 72 years-old, with a history of breast cancer, right mastectomy, significant history of tobacco abuse with severe underlying emphysema presents for biopsy of anterior left upper lung mass.  The procedure, including but not limited to the risk of hemorrhage, infection, diagnostic failure, pneumothorax, chest tube placement, hemoptysis and possible hospital admission was discussed with the patient prior to examination and witnessed, informed consent was obtained.  PROCEDURE: Multiple axial images were obtained for localization purposes.  Radiation dose reduction techniques were utilized, including automated exposure control and exposure modulation based on body size.  Skin marker was placed.  Patient was prepped and draped in the usual fashion. Following adequate local anesthesia with 1% lidocaine, dermatotomy was placed and needle placement performed. Serial 18-gauge core biopsies were obtained and placed in formalin for analysis.  Follow-up imaging demonstrates no complicating feature.      Impression: 1. Satisfactory uneventful CT-guided biopsy of lung mass as described above. 2. No immediate significant post procedure complication.  This report was finalized on 11/2/2017 11:06 AM by Dr. Simón Zarate MD.      Nm Pet Skull Base To Mid Thigh    Result Date: 11/16/2017  Narrative:  FDG PET/CT IMAGING SKULL BASE TO MID THIGH  HISTORY: Newly diagnosed lung carcinoma. Initial staging.  TECHNIQUE: Blood glucose level at time of injection of FDG was 85 mg/dl.  6.2 mCi of FDG was injected. Approximately 90 minutes later, PET images were obtained. CT images were acquired for attenuation correction and anatomic localization.  COMPARISON: CT scan of the chest dated 10/31/2017.  FINDINGS: The biopsy proven carcinoma is a lobulated somewhat bilobed pleural-based mass in the anterior aspect of the left upper lobe. The bulkiest portion of the mass measures 4.8 x 3.0 cm in diameter. It has maximum SUV of approximately 10.0 g/mL. There is no hypermetabolic lymphadenopathy within the right ovary hilar region. Linear scarring of the posterior aspect of the right lung base demonstrates minimal uptake of FDG. Images of the neck demonstrate a small hypermetabolic nodule in the posterior aspect of the right lobe of the thyroid gland with maximum SUV of 8.4 g/mL. This may be a thyroid adenoma. A small thyroid malignancy or much less likely a metastatic lesion involving the thyroid gland cannot be entirely excluded. There are no foci of pathologic hypermetabolism within the abdomen or pelvis. Lung window images demonstrate severe emphysema that is most prominent in the upper lung zones.      Impression: Biopsy-proven lung carcinoma in the anterior aspect of the left upper lobe moderate hypermetabolism. There is no metabolic evidence of metastatic disease within the chest, abdomen or pelvis. A small hypermetabolic nodule in the right lobe of the thyroid gland may be  an adenoma or a small thyroid carcinoma. A metastasis to the thyroid gland is considered much less likely.  This report was finalized on 11/16/2017 3:07 PM by Dr. Erasmo Lion MD.        Assessment/Plan   Assessment/Plan:   This is a 72 y.o. female with:     1.  Newly diagnosed left upper lobe non-small cell lung cancer, high PDL-1 (80%), negative egfr/alk/ros   * Appears to be a local lung cancer based on PET/CT scan. On initially scans, it appeared she may have hilar and mediastinal adenopathy and an indeterminate liver lesion, but all PET negative. She had active infection during her initial CT scans that had resolved when PET was done. On CT, it appeared to be two lung lesions, all in one lobe, but are involving pleura- [id number]   * Per Pulmonology notes, her last recorded lung function was 46%, thus she wouldn't be a candidate for resection. Will refer to radiation oncology to consider radiation therapy to left upper lobe. She may be a candidate for adjuvant therapy, but I'd like to see her after radiation to decide.    * She is high PDL 1 and thus if she progressed that she could be a candidate for Keytruda therapy.     2. History of right breast cancer, initially diagnosed in 2000 treated with lumpectomy, radiation and endocrine therapy with local recurrence in 2005 treated with mastectomy and then Arimidex. New lung pathology does not appear to be related to prior breast cancer.      3. COPD  4. Abnormal thyroid imaging on PET. Unlikely to be metastatic disease. Will benefit from FNA, but I think she needs to have lung cancer treated first. She follows with Dr Christie and notes that she's had chronic thyroid disease - she sees him on 12/11 and we'll see what he says as well.      I spent 50 minutes with the patient, her sister, her niece and her nephew, with 41 minutes spent discussing her diagnosis of lung cancer, reviewing images with the patient and family members in reviewing treatment options.    Follow-up  in 1 month. I asked the patient to call for any questions, concerns, or new symptoms.

## 2017-11-30 ENCOUNTER — APPOINTMENT (OUTPATIENT)
Dept: RADIATION ONCOLOGY | Facility: HOSPITAL | Age: 72
End: 2017-11-30

## 2017-11-30 ENCOUNTER — CONSULT (OUTPATIENT)
Dept: RADIATION ONCOLOGY | Facility: HOSPITAL | Age: 72
End: 2017-11-30

## 2017-11-30 VITALS
BODY MASS INDEX: 17.94 KG/M2 | DIASTOLIC BLOOD PRESSURE: 70 MMHG | HEART RATE: 100 BPM | WEIGHT: 92 LBS | OXYGEN SATURATION: 97 % | SYSTOLIC BLOOD PRESSURE: 119 MMHG

## 2017-11-30 DIAGNOSIS — C34.12 MALIGNANT NEOPLASM OF UPPER LOBE OF LEFT LUNG (HCC): Primary | ICD-10-CM

## 2017-11-30 PROCEDURE — G0463 HOSPITAL OUTPT CLINIC VISIT: HCPCS | Performed by: RADIOLOGY

## 2017-11-30 PROCEDURE — 77334 RADIATION TREATMENT AID(S): CPT | Performed by: RADIOLOGY

## 2017-11-30 PROCEDURE — 77263 THER RADIOLOGY TX PLNG CPLX: CPT | Performed by: RADIOLOGY

## 2017-11-30 PROCEDURE — 77370 RADIATION PHYSICS CONSULT: CPT | Performed by: RADIOLOGY

## 2017-11-30 PROCEDURE — 99204 OFFICE O/P NEW MOD 45 MIN: CPT | Performed by: RADIOLOGY

## 2017-11-30 NOTE — PROGRESS NOTES
DIAGNOSIS and REASON FOR CONSULTATION: Malignant neoplasm of upper lobe of left lung - Stage T3 N0 M0 - for advice and recommendations regarding the diagnosis    Referring Provider:  Karlie Nur MD  Patient Care Team:  Kevin Seymour MD as PCP - General  Unruly Bobo MD as Consulting Physician (Cardiology)  Heather Anthony MD as Consulting Physician (Pulmonary Disease)  Luiza Eddy MD as Surgeon (General Surgery)  Laith Prado DPM as Consulting Physician (Podiatry)  Torrie Fierro MD as Referring Physician (Internal Medicine)  Karlie Nur MD as Consulting Physician (Hematology and Oncology)  Michael Christie MD as Consulting Physician (Endocrinology)    CHIEF COMPLAINT:  For advice and recommendations regarding Malignant neoplasm of upper lobe of left lung   Is k  HISTORY OF PRESENT ILLNESS:  The patient is a 72 y.o. year old female who is known to us from her previous radiation therapy directed at the right breast in 2000. She developed an in breast recurrence and underwent a mastectomy in 2004.    She has a history of a left sided lung lesion detected in March, 2016.  CAT scan on March 20, 2016 showed a 1.9 x 1.4 cm pleural-based nodule in the left lower lobe.  Close follow-up was recommended and she underwent a CT of the chest on November 3, 2016 which showed the prior mastectomy, resolution of the masslike opacity in the left lung base.    She then presented with respiratory symptoms in October and underwent a CAT scan of the chest on October 31, 2017 which showed 2 new left upper lobe lesions with a 3.2 cm pleural-based mass in the left upper lobe and a 5 cm pleural-based mass at the anterior chest wall with a new 7 mm irregular nodule along a left upper lobe scar, a few borderline enlarged mediastinal and bilateral hilar nodes with the largest in the precarinal region measuring 1.5 x 0.8 cm.  In addition two hyperenhancing right hepatic liver lesions were appreciated.    She  underwent a CT-guided biopsy on November 2, 2017 which revealed an adenocarcinoma consistent with a lung primary, negative for the ALK , Ross 1, EGFR.    She was referred on to the [facility] physicians who suggested a PET scan which was completed on November 15, 2017 and showed increased uptake in the left upper lobe biopsy-proven mass with an SUV of 10, measuring 4.8 x 3.0 cm.  No hypermetabolic adenopathy was appreciated.  A small hypermetabolic nodule was appreciated in the left lobe of the thyroid gland consistent with an adenoma.    She was discharged on oxygen and continues to use it around the clock.  She is following up with Dr. Christie regarding the thyroid abnormalities.  She is not felt to be a surgical candidate and the [facility] physicians are holding off systemic treatment for now.  I was asked to see the patient at the request of the referring provider noted above for advice and recommendations regarding this diagnosis.     Clinically, she is doing well. Her oxygenation has improved significantly. She is using the oxygean at all times but feels she is about ready to try some trials off the oxygen. She denies any pain, dysphagia, hemoptysis, cough or other respiratory symptoms     Past Medical History: she  has a past medical history of Allergic rhinitis; Asthma; Breast cancer; COPD (chronic obstructive pulmonary disease); Hyperthyroidism; Osteopenia; Oxygen dependent; Pneumothorax; Toxic multinodular goiter; and Vitamin D deficiency.    Past Surgical History:  she has a past surgical history that includes Breast lumpectomy (Right); Mastectomy (Right, 2004); Colonoscopy; Neck surgery; Hysterectomy; Vascular surgery; Breast lumpectomy; Cardiac catheterization; and Colonoscopy (N/A, 4/28/2017).    Meds:    Current Outpatient Prescriptions:   •  albuterol (PROVENTIL HFA;VENTOLIN HFA) 108 (90 Base) MCG/ACT inhaler, Inhale 2 puffs Every 4 (Four) Hours As Needed for Wheezing. Per MD - until current episode is resolved,  Disp: , Rfl:   •  albuterol (PROVENTIL) (2.5 MG/3ML) 0.083% nebulizer solution, Take 2.5 mg by nebulization Every 4 (Four) Hours As Needed for Wheezing., Disp: 360 mL, Rfl: 3  •  budesonide-formoterol (SYMBICORT) 160-4.5 MCG/ACT inhaler, Inhale 2 puffs 2 (two) times a day., Disp: , Rfl:   •  calcium carbonate-vitamin d 600-400 MG-UNIT per tablet, Take 1 tablet by mouth., Disp: , Rfl:   •  cetirizine (ZyrTEC) 10 MG tablet, Take 10 mg by mouth daily., Disp: , Rfl:   •  Cholecalciferol (VITAMIN D3) 2000 UNITS tablet, Take 1 tablet by mouth daily., Disp: , Rfl:   •  digoxin (LANOXIN) 125 MCG tablet, Take 1 tablet by mouth Daily for 30 days., Disp: 30 tablet, Rfl: 0  •  diltiaZEM CD (CARDIZEM CD) 120 MG 24 hr capsule, Take 1 capsule by mouth Daily for 30 days., Disp: 30 capsule, Rfl: 0  •  fluticasone (FLONASE) 50 MCG/ACT nasal spray, 2 Squirts into each nostril daily., Disp: , Rfl:   •  methimazole (TAPAZOLE) 10 MG tablet, 1/2 tablet daily (Patient taking differently: Take 5 mg by mouth Daily. 1/2 tablet daily), Disp: 30 tablet, Rfl: 5  •  multivitamin-minerals (OCUVITE) tablet, Take 1 tablet by mouth daily., Disp: , Rfl:   •  polyethylene glycol (MIRALAX) pack packet, Take 17 g by mouth Daily., Disp: , Rfl:   •  SPIRIVA RESPIMAT 2.5 MCG/ACT aerosol solution, 2 puffs Daily., Disp: , Rfl:   •  vitamin B-12 (CYANOCOBALAMIN) 1000 MCG tablet, Take 1,000 mcg by mouth daily., Disp: , Rfl:     Allergies:    Allergies   Allergen Reactions   • Codeine    • Mucinex D [Pseudoephedrine-Guaifenesin Er] Nausea Only     Felt hot in chest   • Penicillin V    • Penicillins        Family History:  her family history includes Arthritis in her mother; Breast cancer in her paternal grandmother; Heart failure in her mother; Lung cancer in her father; No Known Problems in her sister.    Social History:  she  reports that she quit smoking about 22 years ago. Her smoking use included Cigarettes. She smoked 2.00 packs per day. She has never used  smokeless tobacco. She reports that she does not drink alcohol or use illicit drugs.    Pertinent Findings on   Review of Systems   Constitutional: Positive for unexpected weight change. Negative for appetite change, chills, diaphoresis, fatigue and fever.   HENT:   Negative for hearing loss, lump/mass, mouth sores, nosebleeds, sore throat, tinnitus, trouble swallowing and voice change.    Eyes: Negative for eye problems and icterus.   Respiratory: Negative for chest tightness, cough, dizziness on exertion, hemoptysis, shortness of breath and wheezing.         Home oxygen use at 2L continuously   Cardiovascular: Negative for chest pain, leg swelling and palpitations.   Gastrointestinal: Negative for abdominal distention, abdominal pain, blood in stool, constipation, diarrhea, nausea, rectal pain and vomiting.   Endocrine: Negative for hot flashes.   Genitourinary: Negative for bladder incontinence, difficulty urinating, dyspareunia, dysuria, frequency, hematuria, menstrual problem, nocturia, pelvic pain, vaginal bleeding and vaginal discharge.    Musculoskeletal: Negative for arthralgias, back pain, flank pain, gait problem, myalgias, neck pain and neck stiffness.   Skin: Negative for itching, rash and wound.   Neurological: Negative for dizziness, extremity weakness, gait problem, headaches, light-headedness, numbness, seizures and speech difficulty.   Hematological: Negative for adenopathy. Does not bruise/bleed easily.   Psychiatric/Behavioral: Negative for confusion, decreased concentration, depression, sleep disturbance and suicidal ideas. The patient is not nervous/anxious.    :  Vitals:    11/30/17 0959   BP: 119/70   Pulse: 100   SpO2: 97%   Weight: 92 lb (41.7 kg)   PainSc: 0-No pain       Performance Status: (1) Restricted in physically strenuous activity, ambulatory and able to do work of light nature    Pertinent Findings on:  Physical Exam   Constitutional: She is oriented to person, place, and time.  She appears well-nourished. She is active and cooperative. No distress.   Thin   HENT:   Head: Normocephalic and atraumatic.   Nose: Nose normal.   Mouth/Throat: Mucous membranes are normal. Normal dentition.   Eyes: Conjunctivae and EOM are normal.   Neck: Normal range of motion.   Pulmonary/Chest: Effort normal.   On oxygen but sats good, without distress with speaking and ambulating   Abdominal: Normal appearance. There is no hepatosplenomegaly.   Musculoskeletal: Normal range of motion.       Vascular Status -  Her exam exhibits no right foot edema. Her exam exhibits no left foot edema.  Neurological: She is alert and oriented to person, place, and time.   Skin: Skin is warm and dry.   Psychiatric: She has a normal mood and affect. Her behavior is normal. Judgment and thought content normal.       Assessment:   1. Malignant neoplasm of upper lobe of left lung     Stage IIB (T3, N0, M0)  This assessment comes from my review of the imaging, pathology, physician notes and other pertinent information as mentioned.    Plan:   We reviewed today the details of the pathology and imaging studies up to this point and all questions were answered in that regard. There is a good understanding of the extent and stage of the disease.  We discussed the treatment options as well, specifically surgery, chemotherapy and radiation therapy and we discussed the specific recomendations given the extent of disease in this case.    Therefore, we discussed a course of treatment consisting of the radiation alone, approximately 7000 cGy in 35 treatments to the above mentioned areas over a 7 week period of time.  We discussed the logistics of the daily treatment as well as our treatment planning process.      We then discussed the acute side effects, specifically mild irritation of the skin in the treatment area and the small likelihood of increased cough, decreased appetite and fatigue.  We discussed the anticipated time frame for the  resolution of the above-mentioned symptoms. We then discussed the small but possible long-term possibility of radiation pneumonitis, fibrosis and the possibility of being more short of air at the conclusion of the radiation therapy simply from those benign changes.  We again discussed the importance of our treatment planning process in limiting the volume of lung treated as much as possible and I believe all questions were answered.     We were able to complete our initial treatment planning scan today. I will be fusing the most recent scans on to our treatment planning scan to insure adequate coverage of the involved area. I am anticipating getting the treatments underway early next week.    She returns to see Dr. Anthony early next week and is hopeful to decrease her oxygen use and be able to drive shortly.    I spent greater than 45 minutes in face-to-face time with the patient and 30 minutes of that time was spent in counseling and coordination of care, including review of imaging and pathology; prognosis and differential diagnosis; indications, goals, logistics, benefits and risks of treatment as well as alternatives and surveillance options.

## 2017-12-05 ENCOUNTER — OFFICE VISIT (OUTPATIENT)
Dept: CARDIOLOGY | Facility: CLINIC | Age: 72
End: 2017-12-05

## 2017-12-05 ENCOUNTER — APPOINTMENT (OUTPATIENT)
Dept: RADIATION ONCOLOGY | Facility: HOSPITAL | Age: 72
End: 2017-12-05

## 2017-12-05 VITALS
HEIGHT: 60 IN | WEIGHT: 95 LBS | DIASTOLIC BLOOD PRESSURE: 80 MMHG | BODY MASS INDEX: 18.65 KG/M2 | SYSTOLIC BLOOD PRESSURE: 120 MMHG | HEART RATE: 97 BPM

## 2017-12-05 DIAGNOSIS — I47.1 MULTIFOCAL ATRIAL TACHYCARDIA (HCC): Primary | ICD-10-CM

## 2017-12-05 PROCEDURE — 77301 RADIOTHERAPY DOSE PLAN IMRT: CPT | Performed by: RADIOLOGY

## 2017-12-05 PROCEDURE — 77300 RADIATION THERAPY DOSE PLAN: CPT | Performed by: RADIOLOGY

## 2017-12-05 PROCEDURE — 99214 OFFICE O/P EST MOD 30 MIN: CPT | Performed by: INTERNAL MEDICINE

## 2017-12-05 PROCEDURE — 77293 RESPIRATOR MOTION MGMT SIMUL: CPT | Performed by: RADIOLOGY

## 2017-12-05 PROCEDURE — 93000 ELECTROCARDIOGRAM COMPLETE: CPT | Performed by: INTERNAL MEDICINE

## 2017-12-05 NOTE — PROGRESS NOTES
Subjective:     Encounter Date:12/05/2017      Patient ID: Maryam Arredondo is a 72 y.o. female.    Chief Complaint:Multifocal atrial tachycardia  History of Present Illness    Patient presents today for reevaluation.  From a cardiovascular standpoint she's doing well.  Her heart rate remains under 100 consistently.  Her lungs have had steady improvement.  She has gone through rehabilitation she is able to get an incentive spirometer up to about 1500 mL and she is being evaluated by pulmonary rehabilitation.  All in all she's doing well with no palpitations shortness of breath or chest pain    Review of Systems   All other systems reviewed and are negative.        ECG 12 Lead  Date/Time: 12/5/2017 1:23 PM  Performed by: ROSELINE JI  Authorized by: ROSELINE JI   Comparison: compared with previous ECG from 12/2/2016  Similar to previous ECG  Rhythm: sinus rhythm  Clinical impression: abnormal ECG               Objective:     Physical Exam   Constitutional: She is oriented to person, place, and time.   Temporal muscular wasting appears chronically ill   HENT:   Head: Normocephalic.   Eyes: Conjunctivae are normal.   Neck: Normal range of motion.   Cardiovascular: Normal rate, regular rhythm and normal heart sounds.    Pulmonary/Chest: She has decreased breath sounds.   Abdominal: Soft. Bowel sounds are normal.   Musculoskeletal: Normal range of motion. She exhibits no edema.   Neurological: She is alert and oriented to person, place, and time.   Skin: Skin is warm and dry.   Psychiatric: She has a normal mood and affect. Her behavior is normal.   Vitals reviewed.      Lab Review:       Assessment:          Diagnosis Plan   1. Multifocal atrial tachycardia            Plan:       1.  Multifocal atrial tachycardia.  Patient remains stable on digoxin and all in all she's doing well.  In my clinical experience digoxin levels do not really help she has no signs of dig toxicity and her heart rate is well  controlled overall she's doing great I would continue the same.  2.  Significant lung disease.  3.  Patient is being set up for radiation treatment of another lung mass.  4.  At this point she is stable from a cardiovascular standpoint.  Would continue the same follow-up one year sooner or course if there is issues.  I did tell patient that with any type of lung exacerbation he will not be surprising if her heart rate goes up is usually very difficult to control patient's heart rates with history of MAT.

## 2017-12-06 PROCEDURE — 77470 SPECIAL RADIATION TREATMENT: CPT | Performed by: RADIOLOGY

## 2017-12-06 PROCEDURE — 77014 CHG CT GUIDANCE RADIATION THERAPY FLDS PLACEMENT: CPT | Performed by: RADIOLOGY

## 2017-12-06 PROCEDURE — 77386 CHG INTENSITY MODULATED RADIATION TX DLVR COMPLEX: CPT | Performed by: RADIOLOGY

## 2017-12-06 PROCEDURE — 77338 DESIGN MLC DEVICE FOR IMRT: CPT | Performed by: RADIOLOGY

## 2017-12-06 PROCEDURE — 77427 RADIATION TX MANAGEMENT X5: CPT | Performed by: RADIOLOGY

## 2017-12-06 PROCEDURE — 77386: CPT | Performed by: RADIOLOGY

## 2017-12-07 PROCEDURE — 77014 CHG CT GUIDANCE RADIATION THERAPY FLDS PLACEMENT: CPT | Performed by: RADIOLOGY

## 2017-12-07 PROCEDURE — 77386: CPT | Performed by: RADIOLOGY

## 2017-12-07 PROCEDURE — 77386 CHG INTENSITY MODULATED RADIATION TX DLVR COMPLEX: CPT | Performed by: RADIOLOGY

## 2017-12-08 PROCEDURE — 77386 CHG INTENSITY MODULATED RADIATION TX DLVR COMPLEX: CPT | Performed by: RADIOLOGY

## 2017-12-08 PROCEDURE — 77386: CPT | Performed by: RADIOLOGY

## 2017-12-08 PROCEDURE — 77014 CHG CT GUIDANCE RADIATION THERAPY FLDS PLACEMENT: CPT | Performed by: RADIOLOGY

## 2017-12-11 ENCOUNTER — OFFICE VISIT (OUTPATIENT)
Dept: ENDOCRINOLOGY | Age: 72
End: 2017-12-11

## 2017-12-11 VITALS
OXYGEN SATURATION: 96 % | SYSTOLIC BLOOD PRESSURE: 132 MMHG | DIASTOLIC BLOOD PRESSURE: 60 MMHG | HEIGHT: 60 IN | WEIGHT: 95.6 LBS | HEART RATE: 89 BPM | BODY MASS INDEX: 18.77 KG/M2

## 2017-12-11 DIAGNOSIS — I47.1 MULTIFOCAL ATRIAL TACHYCARDIA (HCC): ICD-10-CM

## 2017-12-11 DIAGNOSIS — E05.20 TOXIC MULTINODULAR GOITER: Primary | ICD-10-CM

## 2017-12-11 DIAGNOSIS — I10 ESSENTIAL HYPERTENSION: ICD-10-CM

## 2017-12-11 DIAGNOSIS — E05.90 HYPERTHYROIDISM: ICD-10-CM

## 2017-12-11 DIAGNOSIS — J43.9 PULMONARY EMPHYSEMA, UNSPECIFIED EMPHYSEMA TYPE (HCC): ICD-10-CM

## 2017-12-11 DIAGNOSIS — C34.12 MALIGNANT NEOPLASM OF UPPER LOBE OF LEFT LUNG (HCC): ICD-10-CM

## 2017-12-11 DIAGNOSIS — E55.9 VITAMIN D INSUFFICIENCY: ICD-10-CM

## 2017-12-11 LAB
ALBUMIN SERPL-MCNC: 4 G/DL (ref 3.5–5.2)
ALBUMIN/GLOB SERPL: 1.4 G/DL
ALP SERPL-CCNC: 73 U/L (ref 39–117)
ALT SERPL-CCNC: 11 U/L (ref 1–33)
AST SERPL-CCNC: 15 U/L (ref 1–32)
BILIRUB SERPL-MCNC: <0.2 MG/DL (ref 0.1–1.2)
BUN SERPL-MCNC: 19 MG/DL (ref 8–23)
BUN/CREAT SERPL: 29.2 (ref 7–25)
CALCIUM SERPL-MCNC: 9.6 MG/DL (ref 8.6–10.5)
CHLORIDE SERPL-SCNC: 103 MMOL/L (ref 98–107)
CO2 SERPL-SCNC: 29.4 MMOL/L (ref 22–29)
CREAT SERPL-MCNC: 0.65 MG/DL (ref 0.57–1)
GFR SERPLBLD CREATININE-BSD FMLA CKD-EPI: 108 ML/MIN/1.73
GFR SERPLBLD CREATININE-BSD FMLA CKD-EPI: 89 ML/MIN/1.73
GLOBULIN SER CALC-MCNC: 2.9 GM/DL
GLUCOSE SERPL-MCNC: 70 MG/DL (ref 65–99)
POTASSIUM SERPL-SCNC: 4.4 MMOL/L (ref 3.5–5.2)
PROT SERPL-MCNC: 6.9 G/DL (ref 6–8.5)
SODIUM SERPL-SCNC: 144 MMOL/L (ref 136–145)
T4 FREE SERPL-MCNC: 1.13 NG/DL (ref 0.93–1.7)
TSH SERPL DL<=0.005 MIU/L-ACNC: 0.81 MIU/ML (ref 0.27–4.2)

## 2017-12-11 PROCEDURE — 77386: CPT | Performed by: RADIOLOGY

## 2017-12-11 PROCEDURE — 99214 OFFICE O/P EST MOD 30 MIN: CPT | Performed by: INTERNAL MEDICINE

## 2017-12-11 PROCEDURE — 77014 CHG CT GUIDANCE RADIATION THERAPY FLDS PLACEMENT: CPT | Performed by: RADIOLOGY

## 2017-12-11 PROCEDURE — 77336 RADIATION PHYSICS CONSULT: CPT | Performed by: RADIOLOGY

## 2017-12-11 PROCEDURE — 77386 CHG INTENSITY MODULATED RADIATION TX DLVR COMPLEX: CPT | Performed by: RADIOLOGY

## 2017-12-11 RX ORDER — DIGOXIN 125 MCG
125 TABLET ORAL
COMMUNITY
End: 2019-11-25 | Stop reason: SDUPTHER

## 2017-12-11 RX ORDER — DILTIAZEM HYDROCHLORIDE 120 MG/1
120 CAPSULE, COATED, EXTENDED RELEASE ORAL DAILY
COMMUNITY
End: 2018-04-24 | Stop reason: ALTCHOICE

## 2017-12-11 NOTE — PROGRESS NOTES
Subjective   Maryam Arredondo is a 72 y.o. female.     HPI Comments: F/u for nontoxic MNG, hypothyroidism, COPD, asthma     Goiter     Hypothyroidism     COPD        Patient is a 72`-year-old female who came in for follow-up. She has toxic multinodular goiter. She had a thyroid scan and uptake in February 2014 which showed increased uptake of 40% with a heterogeneous distribution of the radiopharmaceutical in both lobes of the thyroid gland. A dominant area of photopenia is present on the left lobe. She had a thyroid ultrasound which showed a multinodular goiter with cysts and solid nodules. The dominant nodule in the left is partially cystic. She had a follow-up ultrasound in August 2014 which showed the left lobe dominant nodule is smaller. She is on Tapazole 5 mg 1 tab once a day. She has no weight change since July 2017.  She denies bowel changes. She denies heat or cold intolerance. She denies palpitations. She denies tremors.  Thyroid function test done on November 8, 2017 are as follows.  TSH 1.01.  Free T4 is normal at 1.28 ng per DL.      She had  left upper lobe lung biopsy in in November 2017 which yielded adenocarcinoma.  She had PET CT scan done in November 2017 which showed a small hypermetabolic nodule in the right lobe of the thyroid gland which may be an adenoma or a small thyroid cancer.  A metastasis to the thyroid gland is considered much less likely.  She has seen Dr. Nur and Dr. Bennett and is currently undergoing radiation therapy.    She was admitted last March 2016 for possible pneumonia and multifocal atrial tachycardia.  She has COPD. She denies any shortness of breath or coughing. She is on maintenance Advair or Symbicort, Spiriva and Pro-Air prn for COPD. She is on continuous O2. She follows with Dr. Anthony.      She has hypertension and was taken off lisinopril last May 2014. She is on diltiazem ER 90 mg/day and digoxin 0.125 mg/day. She follows with Dr. Bobo.      She had a previous  "right mastectomy for breast cancer. She has completed 5 years of Arimidex in 2009. She has osteopenia and was on Fosamax for unknown period of time. Her last bone mineral density was 2016 at [facility] and was normal. She is on Vit D 2000 units/day.     She had a colonoscopy in April 2017 and 2 tubular adenoma with low-grade dysplasia were removed by Dr. Eddy.  She will have follow-up colonoscopy in 2 years.  She denies bowel complaints.    The following portions of the patient's history were reviewed and updated as appropriate: allergies, current medications, past family history, past medical history, past social history, past surgical history and problem list.    Review of Systems   Constitutional: Negative.    HENT: Negative.    Eyes: Negative.    Respiratory: Positive for shortness of breath (on O2 / copd ).    Cardiovascular: Negative.    Gastrointestinal: Negative.    Endocrine: Negative.    Genitourinary: Negative.    Musculoskeletal: Negative.    Skin: Negative.    Allergic/Immunologic: Negative.    Neurological: Negative.    Hematological: Bruises/bleeds easily (bruise ).       Objective      Vitals:    12/11/17 1156   BP: 132/60   BP Location: Left arm   Patient Position: Sitting   Cuff Size: Small Adult   Pulse: 89   SpO2: 96%   Weight: 43.4 kg (95 lb 9.6 oz)   Height: 152.4 cm (60\")     Physical Exam   Constitutional: She is oriented to person, place, and time. She appears well-developed and well-nourished. No distress.   HENT:   Head: Normocephalic.   Nose: Nose normal.   Mouth/Throat: No oropharyngeal exudate.   Eyes: Conjunctivae and EOM are normal. Right eye exhibits no discharge. Left eye exhibits no discharge. No scleral icterus.   Neck: Neck supple. No JVD present. No tracheal deviation present. No thyromegaly present.   Cardiovascular: Normal rate, regular rhythm and normal heart sounds.  Exam reveals no friction rub.    No murmur heard.  Pulmonary/Chest: Effort normal and breath sounds normal. No " respiratory distress. She has no wheezes. She has no rales.   Abdominal: Soft. Bowel sounds are normal. She exhibits no distension and no mass. There is no tenderness.   Musculoskeletal: Normal range of motion. She exhibits no edema, tenderness or deformity.   Lymphadenopathy:     She has no cervical adenopathy.   Neurological: She is alert and oriented to person, place, and time. She displays normal reflexes. Coordination normal.   Skin: Skin is warm and dry. No rash noted. No erythema. No pallor.   Psychiatric: She has a normal mood and affect.     Lab on 11/22/2017   Component Date Value Ref Range Status   • Glucose 11/22/2017 77  74 - 124 mg/dL Final   • BUN 11/22/2017 19  6 - 20 mg/dL Final   • Creatinine 11/22/2017 0.66  0.60 - 1.10 mg/dL Final   • Sodium 11/22/2017 142  134 - 145 mmol/L Final   • Potassium 11/22/2017 4.4  3.5 - 4.7 mmol/L Final   • Chloride 11/22/2017 104  98 - 107 mmol/L Final   • CO2 11/22/2017 27.8  22.0 - 29.0 mmol/L Final   • Calcium 11/22/2017 9.9  8.5 - 10.2 mg/dL Final   • Total Protein 11/22/2017 6.9  6.3 - 8.0 g/dL Final   • Albumin 11/22/2017 3.70  3.50 - 5.20 g/dL Final   • ALT (SGPT) 11/22/2017 13  0 - 33 U/L Final   • AST (SGOT) 11/22/2017 17  0 - 32 U/L Final   • Alkaline Phosphatase 11/22/2017 66  38 - 116 U/L Final   • Total Bilirubin 11/22/2017 0.2  0.1 - 1.2 mg/dL Final   • eGFR Non African Amer 11/22/2017 88  >60 mL/min/1.73 Final   • Globulin 11/22/2017 3.2  1.8 - 3.5 gm/dL Final   • A/G Ratio 11/22/2017 1.2  1.1 - 2.4 g/dL Final   • BUN/Creatinine Ratio 11/22/2017 28.8  7.3 - 30.0 Final   • Anion Gap 11/22/2017 10.2  mmol/L Final   • WBC 11/22/2017 7.11  4.00 - 10.00 10*3/mm3 Final   • RBC 11/22/2017 4.12  3.90 - 5.00 10*6/mm3 Final   • Hemoglobin 11/22/2017 12.6  11.5 - 14.9 g/dL Final   • Hematocrit 11/22/2017 40.0  34.0 - 45.0 % Final   • MCV 11/22/2017 97.1* 83.0 - 97.0 fL Final   • MCH 11/22/2017 30.6  27.0 - 33.0 pg Final   • MCHC 11/22/2017 31.5* 32.0 - 35.0 g/dL  Final   • RDW 11/22/2017 13.3  11.7 - 14.5 % Final   • RDW-SD 11/22/2017 48.1  37.0 - 49.0 fl Final   • MPV 11/22/2017 9.3  8.9 - 12.1 fL Final   • Platelets 11/22/2017 289  150 - 375 10*3/mm3 Final   • Neutrophil % 11/22/2017 73.0  39.0 - 75.0 % Final   • Lymphocyte % 11/22/2017 16.2* 20.0 - 49.0 % Final   • Monocyte % 11/22/2017 8.3  4.0 - 12.0 % Final   • Eosinophil % 11/22/2017 1.3  1.0 - 5.0 % Final   • Basophil % 11/22/2017 0.8  0.0 - 1.1 % Final   • Immature Grans % 11/22/2017 0.4  0.0 - 0.5 % Final   • Neutrophils, Absolute 11/22/2017 5.19  1.50 - 7.00 10*3/mm3 Final   • Lymphocytes, Absolute 11/22/2017 1.15  1.00 - 3.50 10*3/mm3 Final   • Monocytes, Absolute 11/22/2017 0.59  0.25 - 0.80 10*3/mm3 Final   • Eosinophils, Absolute 11/22/2017 0.09  0.00 - 0.36 10*3/mm3 Final   • Basophils, Absolute 11/22/2017 0.06  0.00 - 0.10 10*3/mm3 Final   • Immature Grans, Absolute 11/22/2017 0.03  0.00 - 0.03 10*3/mm3 Final   • nRBC 11/22/2017 0.0  0.0 - 0.0 /100 WBC Final     Assessment/Plan   Maryam was seen today for goiter, hypothyroidism, copd and asthma.    Diagnoses and all orders for this visit:    Toxic multinodular goiter  -     TSH  -     T4, Free  -     Comprehensive Metabolic Panel  -     US Head Neck Soft Tissue    Malignant neoplasm of upper lobe of left lung  -     US Head Neck Soft Tissue    Essential hypertension    Multifocal atrial tachycardia    Pulmonary emphysema, unspecified emphysema type    Vitamin D insufficiency    Hyperthyroidism      She has a toxic multinodular goiter with the dominant nodule on the left side.  Continue Tapazole 5 mg once a day.  She had a PET scan done in November 2017 which showed increased uptake in the right lobe.  There is a hypermetabolic nodule on the right lobe of the thyroid gland which may be an adenoma or small thyroid cancer.  A metastasis to the thyroid gland is considered less likely on PET CT scan.    Schedule thyroid ultrasound.    Continue diltiazem and  digoxin per Dr. Bobo.    Continue vitamin D3 2000 units per day.    Send copy of my notes and labs to Dr. Kevin Seymour, Dr. Bobo, Dr. Nur, Dr. Bennett and Dr. Anthony    RTC 4 mos.

## 2017-12-12 ENCOUNTER — RADIATION ONCOLOGY WEEKLY ASSESSMENT (OUTPATIENT)
Dept: RADIATION ONCOLOGY | Facility: HOSPITAL | Age: 72
End: 2017-12-12

## 2017-12-12 VITALS
RESPIRATION RATE: 16 BRPM | SYSTOLIC BLOOD PRESSURE: 131 MMHG | HEIGHT: 60 IN | WEIGHT: 95.68 LBS | OXYGEN SATURATION: 98 % | DIASTOLIC BLOOD PRESSURE: 77 MMHG | BODY MASS INDEX: 18.78 KG/M2 | HEART RATE: 104 BPM

## 2017-12-12 DIAGNOSIS — C34.12 MALIGNANT NEOPLASM OF UPPER LOBE OF LEFT LUNG (HCC): Primary | ICD-10-CM

## 2017-12-12 PROCEDURE — 77386: CPT | Performed by: RADIOLOGY

## 2017-12-12 PROCEDURE — 77386 CHG INTENSITY MODULATED RADIATION TX DLVR COMPLEX: CPT | Performed by: RADIOLOGY

## 2017-12-12 PROCEDURE — 77014 CHG CT GUIDANCE RADIATION THERAPY FLDS PLACEMENT: CPT | Performed by: RADIOLOGY

## 2017-12-12 NOTE — PROGRESS NOTES
"  Physician Weekly Management Note    Diagnosis:     1. Malignant neoplasm of upper lobe of left lung     Stage IIB (T3, N0, M0)     Reason for Visit:   Radiation (4/35)    Concurrent Chemo:   No    Notes on Treatment course, Films, Medical progress and Plan:  Doing well. Still awaiting portable oxygen. No problems or questions, cont on.  Had thyroid eval with Dr. Christie. Records reviewed. Repeat U/S after this treatment.    ROS - Other than as listed above, as follows:  Constitutional - Normal - no complaints of fatigue, denies lack of appetite, fever, night sweats or change in weight.  Neck - Normal - denies neck masses, muscle weakness, neck pain, decreased range of motion or swelling.  Cardiovascular - Normal - denies arrhythmias, chest pain, dyspnea, edema, orthopnea or palpitations.  Respiratory - Normal - denies cough, dyspnea, hemoptysis, hiccoughs, pleuritic chest pain or wheezing.  Gastrointestinal - Normal - no complaints of constipation, abdominal pain, diarrhea, heartburn/dyspepsia, hematemesis, hemorrhoids, melena or GI bleeding, nausea, pain or cramping or vomiting.    PHYSICAL EXAM - Other than as listed above, as follows:  Vitals:    12/12/17 1007   BP: 131/77   Pulse: 104   Resp: 16   SpO2: 98%   Weight: 43.4 kg (95 lb 10.9 oz)   Height: 152.4 cm (60\")       Constitutional - Normal - no evidence of impaired alertness, inadequate appearance, premature or advanced chronologic age, uncooperativeness, altered mood, affect or disorientation.  Neck - Normal - no evidence of tender or enlarged lymph nodes, neck abnormalities, restricted range of motion or enlarged thyroid.  Chest - Normal - no evidence of chest abnormalities, tender or enlarged lymph nodes.  Respiratory - Normal - no evidence of abnormal breat sounds, chest abnormalities on palpation and chest abnormalities on percussion and normal breath sounds.  Hematologic/Lymphatic - Normal - no evidence of tender or enlarged axillary lymph nodes nor " "tender or enlarged neck nodes.    Performance Status:  (2) Ambulatory and capable of self care, unable to carry out work activity, up and about > 50% or waking hours    Problem added:  No problems updated.  Medications added: No orders of the defined types were placed in this encounter.    Ancillary referrals made: No orders of the defined types were placed in this encounter.      Technical aspects reviewed:  Weekly OBI approved if applicable? Yes  Weekly port films approved?   Yes  Change requests noted if applicable?  No  Patient setup and plan reviewed?  Yes    Chart Reviewed:  Continue current treatment plan?   Yes  Treatment plan change requested?  No    I have reviewed and marked as \"reviewed\" the current medications, allergies and problem list in the patients EMR.  I have reviewed the patient's medical, surgical  history in detail, reviewed any pertinent lab work and updated the computerized patient record if needed.    Patient's Care Team:  Patient Care Team:  Kevin Seymour MD as PCP - General  Unruly Bobo MD as Consulting Physician (Cardiology)  Heather Anthony MD as Consulting Physician (Pulmonary Disease)  Luiza Eddy MD as Surgeon (General Surgery)  Laith Prado DPM as Consulting Physician (Podiatry)  Karlie Nur MD as Consulting Physician (Hematology and Oncology)  Michael Christie MD as Consulting Physician (Endocrinology)    Seen and approved by:  Brandie Bennett MD, 12/12/2017  "

## 2017-12-13 ENCOUNTER — OFFICE VISIT (OUTPATIENT)
Dept: ONCOLOGY | Facility: CLINIC | Age: 72
End: 2017-12-13

## 2017-12-13 ENCOUNTER — APPOINTMENT (OUTPATIENT)
Dept: LAB | Facility: HOSPITAL | Age: 72
End: 2017-12-13

## 2017-12-13 VITALS
HEIGHT: 60 IN | SYSTOLIC BLOOD PRESSURE: 102 MMHG | DIASTOLIC BLOOD PRESSURE: 60 MMHG | TEMPERATURE: 98.1 F | BODY MASS INDEX: 18.42 KG/M2 | RESPIRATION RATE: 16 BRPM | WEIGHT: 93.8 LBS | HEART RATE: 96 BPM

## 2017-12-13 DIAGNOSIS — C34.12 MALIGNANT NEOPLASM OF UPPER LOBE OF LEFT LUNG (HCC): Primary | ICD-10-CM

## 2017-12-13 PROCEDURE — G0463 HOSPITAL OUTPT CLINIC VISIT: HCPCS | Performed by: INTERNAL MEDICINE

## 2017-12-13 PROCEDURE — 99215 OFFICE O/P EST HI 40 MIN: CPT | Performed by: INTERNAL MEDICINE

## 2017-12-13 PROCEDURE — 77386 CHG INTENSITY MODULATED RADIATION TX DLVR COMPLEX: CPT | Performed by: RADIOLOGY

## 2017-12-13 PROCEDURE — 77386: CPT | Performed by: RADIOLOGY

## 2017-12-13 PROCEDURE — 77014 CHG CT GUIDANCE RADIATION THERAPY FLDS PLACEMENT: CPT | Performed by: RADIOLOGY

## 2017-12-13 PROCEDURE — 77427 RADIATION TX MANAGEMENT X5: CPT | Performed by: RADIOLOGY

## 2017-12-13 NOTE — PROGRESS NOTES
History:     Reason for follow up:   1.  Stage IIB left upper lobe non-small cell lung cancer, high PDL-1 (80%), negative egfr/alk/ros     HPI:  Maryam Arredondo presents for follow-up of her Lung cancer.  I have reviewed notes from radiation oncology as well as endocrinology.  She is undergoing radiation therapy for curative intent treatment and received her 6th dose of 35 today.  Dr Christie has ordered a thyroid ultrasound. Patient thus far is tolerating treatment well and is improving in strength.  She has been scheduled for pulmonary rehabilitation.  She has chronic shortness of breath but denies any new cough.  No hemoptysis.  Her thyroid labs at her last visit with Dr. Christie were normal.    Past Medical   Past Medical History:   Diagnosis Date   • Allergic rhinitis    • Asthma    • Breast cancer     s/p Lumpectomy ~2000 and chemo/XRT. Then  Masectomy ~2004 for some recurrence   • COPD (chronic obstructive pulmonary disease)    • Hyperthyroidism    • Mitral regurgitation     mild to moderate   • Osteopenia    • Oxygen dependent     2L - at night only   • Pneumothorax    • SOB (shortness of breath)    • Tachycardia    • Toxic multinodular goiter    • Vitamin D deficiency     and   Patient Active Problem List   Diagnosis   • Pneumothorax   • Hypoxia   • Tachycardia   • Hyperthyroidism   • Essential hypertension   • Multifocal atrial tachycardia   • Toxic multinodular goiter   • Pulmonary emphysema   • Vitamin D insufficiency   • Pneumonia   • COPD with acute lower respiratory infection   • Lung mass - possible   • Infection due to parainfluenza virus 1   • Malignant neoplasm of upper lobe of left lung     Social History   Social History     Social History   • Marital status: Single     Spouse name: N/A   • Number of children: N/A   • Years of education: N/A     Occupational History   •  Retired     Social History Main Topics   • Smoking status: Former Smoker     Packs/day: 2.00     Types: Cigarettes     Quit date:  "3/14/1995   • Smokeless tobacco: Never Used      Comment: D/C 20 years ago, smoking 2 ppd before quitting   • Alcohol use No      Comment: Social use rare   • Drug use: No   • Sexual activity: Defer      Comment: drinks decaf      Other Topics Concern   • Not on file     Social History Narrative     Family History  Family History   Problem Relation Age of Onset   • Arthritis Mother    • Heart failure Mother      Congestive Heart Failure   • Lung cancer Father    • Breast cancer Paternal Grandmother    • No Known Problems Sister      Allergies  Allergies   Allergen Reactions   • Codeine    • Mucinex D [Pseudoephedrine-Guaifenesin Er] Nausea Only     Felt hot in chest   • Penicillin V    • Penicillins        Medications: The current medication list was reviewed in the EMR.    Review of Systems  Review of Systems   Constitutional: Positive for fatigue. Negative for activity change, appetite change, chills, diaphoresis, fever and unexpected weight change.   Respiratory: Positive for shortness of breath. Negative for cough and chest tightness.    Cardiovascular: Negative for chest pain, palpitations and leg swelling.   Gastrointestinal: Negative for abdominal pain, blood in stool, constipation, diarrhea, nausea and vomiting.   Musculoskeletal: Positive for arthralgias. Negative for joint swelling and myalgias.   Hematological: Negative for adenopathy. Does not bruise/bleed easily.       Objective    Objective:     Vitals:    12/13/17 1518   BP: 102/60   Pulse: 96   Resp: 16   Temp: 98.1 °F (36.7 °C)   Weight: 42.5 kg (93 lb 12.8 oz)   Height: 152.5 cm (60.04\")   PainSc: 0-No pain     Current Status 12/13/2017   ECOG score 0   GENERAL: female comfortable, no acute distress  SKIN:  Warm, without rashes, purpura or petechiae.   EYES:  EOMs intact.  Conjunctivae normal.  EARS:  Hearing intact.  RESP:  Lungs clear to percussion and auscultation. Poor airflow diffusely. Normal effort. Nasal cannula oxygen in place.   CARDIAC:  " Regular rate and rhythm without murmurs, rubs or gallops. Normal S1,S2. Lower extremity edema: No  GI:  Soft, nontender, normal bowel sounds  PSYCHIATRIC:  Normal affect and mood; alert and oriented x 3      Labs and Imaging  Results for orders placed or performed in visit on 12/11/17   TSH   Result Value Ref Range    TSH 0.810 0.270 - 4.200 mIU/mL   T4, Free   Result Value Ref Range    Free T4 1.13 0.93 - 1.70 ng/dL   Comprehensive Metabolic Panel   Result Value Ref Range    Glucose 70 65 - 99 mg/dL    BUN 19 8 - 23 mg/dL    Creatinine 0.65 0.57 - 1.00 mg/dL    eGFR Non African Am 89 >60 mL/min/1.73    eGFR African Am 108 >60 mL/min/1.73    BUN/Creatinine Ratio 29.2 (H) 7.0 - 25.0    Sodium 144 136 - 145 mmol/L    Potassium 4.4 3.5 - 5.2 mmol/L    Chloride 103 98 - 107 mmol/L    Total CO2 29.4 (H) 22.0 - 29.0 mmol/L    Calcium 9.6 8.6 - 10.5 mg/dL    Total Protein 6.9 6.0 - 8.5 g/dL    Albumin 4.00 3.50 - 5.20 g/dL    Globulin 2.9 gm/dL    A/G Ratio 1.4 g/dL    Total Bilirubin <0.2 0.1 - 1.2 mg/dL    Alkaline Phosphatase 73 39 - 117 U/L    AST (SGOT) 15 1 - 32 U/L    ALT (SGPT) 11 1 - 33 U/L     Assessment/Plan   Assessment/Plan:   This is a 72 y.o. female with:     1.  Stage IIB (T3N0M0) left upper lobe non-small cell lung cancer, high PDL-1 (80%), negative egfr/alk/ros   * Appears to be a local lung cancer based on PET/CT scan. On initially scans, it appeared she may have hilar and mediastinal adenopathy and an indeterminate liver lesion, but all PET negative. She had active infection during her initial CT scans that had resolved when PET was done. On CT, it appeared to be two lung lesions, all in one lobe, but are involving pleura- T3N0M0   * She is high PDL 1 and thus if she progressed that she could be a candidate for Keytruda therapy (not approved for adjuvant use though).    * Not a surgical candidate   * Currently undergoing definitive radiation therapy for local treatment   * Can consider adjuvant  chemotherapy once completed with radiation. Her strength is improving and she may be a candidate now. We discussed this today, but will discuss and re-evaluate her performance status after radiation.     2. History of right breast cancer, initially diagnosed in 2000 treated with lumpectomy, radiation, chemotherapy and endocrine therapy with local recurrence in 2005 treated with mastectomy and then Arimidex. New lung pathology does not appear to be related to prior breast cancer.      3. COPD  4. Abnormal thyroid imaging on PET. Unlikely to be metastatic disease. Following with Dr Christie and thyroid US is scheduled.      I again spent a good bit of time, 30 minutes total with 20 minutes spent face-to-face counseling on the standard of care treatment for stage II lung cancer which ideally is surgery followed by chemotherapy however with her not being a surgical candidate its radiation followed by possible chemotherapy.  Previously she was not considered a candidate for chemotherapy however she is becoming stronger and thus she may be a candidate    Follow-up in 2 months. I asked the patient to call for any questions, concerns, or new symptoms.

## 2017-12-14 ENCOUNTER — TRANSCRIBE ORDERS (OUTPATIENT)
Dept: CARDIAC REHAB | Facility: HOSPITAL | Age: 72
End: 2017-12-14

## 2017-12-14 DIAGNOSIS — J44.9 COPD, SEVERE (HCC): Primary | ICD-10-CM

## 2017-12-14 PROCEDURE — 77014 CHG CT GUIDANCE RADIATION THERAPY FLDS PLACEMENT: CPT | Performed by: RADIOLOGY

## 2017-12-14 PROCEDURE — 77386 CHG INTENSITY MODULATED RADIATION TX DLVR COMPLEX: CPT | Performed by: RADIOLOGY

## 2017-12-14 PROCEDURE — 77386: CPT | Performed by: RADIOLOGY

## 2017-12-15 PROCEDURE — 77386: CPT | Performed by: RADIOLOGY

## 2017-12-15 PROCEDURE — 77386 CHG INTENSITY MODULATED RADIATION TX DLVR COMPLEX: CPT | Performed by: RADIOLOGY

## 2017-12-15 PROCEDURE — 77014 CHG CT GUIDANCE RADIATION THERAPY FLDS PLACEMENT: CPT | Performed by: RADIOLOGY

## 2017-12-18 PROCEDURE — 77386 CHG INTENSITY MODULATED RADIATION TX DLVR COMPLEX: CPT | Performed by: RADIOLOGY

## 2017-12-18 PROCEDURE — 77336 RADIATION PHYSICS CONSULT: CPT | Performed by: RADIOLOGY

## 2017-12-18 PROCEDURE — 77014 CHG CT GUIDANCE RADIATION THERAPY FLDS PLACEMENT: CPT | Performed by: RADIOLOGY

## 2017-12-18 PROCEDURE — 77386: CPT | Performed by: RADIOLOGY

## 2017-12-19 ENCOUNTER — TREATMENT (OUTPATIENT)
Dept: CARDIAC REHAB | Facility: HOSPITAL | Age: 72
End: 2017-12-19

## 2017-12-19 DIAGNOSIS — J44.9 COPD, SEVERE (HCC): Primary | ICD-10-CM

## 2017-12-19 PROCEDURE — G0424 PULMONARY REHAB W EXER: HCPCS

## 2017-12-19 PROCEDURE — 77386 CHG INTENSITY MODULATED RADIATION TX DLVR COMPLEX: CPT | Performed by: RADIOLOGY

## 2017-12-19 PROCEDURE — 77386: CPT | Performed by: RADIOLOGY

## 2017-12-19 PROCEDURE — 77014 CHG CT GUIDANCE RADIATION THERAPY FLDS PLACEMENT: CPT | Performed by: RADIOLOGY

## 2017-12-20 ENCOUNTER — RADIATION ONCOLOGY WEEKLY ASSESSMENT (OUTPATIENT)
Dept: RADIATION ONCOLOGY | Facility: HOSPITAL | Age: 72
End: 2017-12-20

## 2017-12-20 VITALS
DIASTOLIC BLOOD PRESSURE: 60 MMHG | OXYGEN SATURATION: 97 % | TEMPERATURE: 97.3 F | HEART RATE: 100 BPM | BODY MASS INDEX: 119.17 KG/M2 | SYSTOLIC BLOOD PRESSURE: 110 MMHG | WEIGHT: 94.58 LBS | RESPIRATION RATE: 16 BRPM

## 2017-12-20 DIAGNOSIS — C34.12 MALIGNANT NEOPLASM OF UPPER LOBE OF LEFT LUNG (HCC): Primary | ICD-10-CM

## 2017-12-20 PROCEDURE — 77386 CHG INTENSITY MODULATED RADIATION TX DLVR COMPLEX: CPT | Performed by: RADIOLOGY

## 2017-12-20 PROCEDURE — 77427 RADIATION TX MANAGEMENT X5: CPT | Performed by: RADIOLOGY

## 2017-12-20 PROCEDURE — 77014 CHG CT GUIDANCE RADIATION THERAPY FLDS PLACEMENT: CPT | Performed by: RADIOLOGY

## 2017-12-20 PROCEDURE — 77386: CPT | Performed by: RADIOLOGY

## 2017-12-20 NOTE — PROGRESS NOTES
Physician Weekly Management Note    Diagnosis:     Diagnosis Plan   1. Malignant neoplasm of upper lobe of left lung         RT Details:  fx 11/35 left lung    Notes on Treatment course, Films, Medical progress:  Fatigue but otherwise doing well, no soa or cough out of normal.  Weekly Management:  Medication reviewed?   Yes  New medications given?   No  Problemlist reviewed?   Yes  Problem added?   No  Issues raised requiring referral to support services - task assigned to:  na    Technical aspects reviewed:  Weekly OBI approved?   Yes  Weekly port films approved?   Yes  Change requests noted on port film?   No  Patient setup and plan reviewed?   Yes    Chart Reviewed:  Continue current treatment plan?   Yes  Treatment plan change requested?   No  CBC reviewed?   Yes  Concurrent Chemo?   Yes    Objective     Toxicities:   fatigue     Review of Systems   Constitutional: Positive for fatigue.   Respiratory: Negative.           There were no vitals filed for this visit.    Current Status 12/13/2017   ECOG score 0       Physical Exam   Constitutional:   Thin, no acute distress           Problem Summary List    Diagnosis:     Diagnosis Plan   1. Malignant neoplasm of upper lobe of left lung       Pathology:   lung    Past Medical History:   Diagnosis Date   • Allergic rhinitis    • Asthma    • Breast cancer     s/p Lumpectomy ~2000 and chemo/XRT. Then  Masectomy ~2004 for some recurrence   • COPD (chronic obstructive pulmonary disease)    • Hyperthyroidism    • Mitral regurgitation     mild to moderate   • Osteopenia    • Oxygen dependent     2L - at night only   • Pneumothorax    • SOB (shortness of breath)    • Tachycardia    • Toxic multinodular goiter    • Vitamin D deficiency          Past Surgical History:   Procedure Laterality Date   • BREAST LUMPECTOMY Right    • BREAST LUMPECTOMY     • CARDIAC CATHETERIZATION      PS SAPHENOUS VAIN RIGHT LEG   • COLONOSCOPY      Complete   • COLONOSCOPY N/A 4/28/2017     Procedure: COLONOSCOPY WITH POLYPECTOMY(COLD BIOPSY AND HOT SNARE);  Surgeon: Luiza Eddy MD;  Location: [facility] ENDOSCOPY;  Service:    • HYSTERECTOMY     • MASTECTOMY Right 2004   • NECK SURGERY      2 spurs removed   • VASCULAR SURGERY      spider vein ablation         Current Outpatient Prescriptions on File Prior to Visit   Medication Sig Dispense Refill   • albuterol (PROVENTIL HFA;VENTOLIN HFA) 108 (90 Base) MCG/ACT inhaler Inhale 2 puffs Every 4 (Four) Hours As Needed for Wheezing. Per MD - until current episode is resolved     • albuterol (PROVENTIL) (2.5 MG/3ML) 0.083% nebulizer solution Take 2.5 mg by nebulization Every 4 (Four) Hours As Needed for Wheezing. 360 mL 3   • budesonide-formoterol (SYMBICORT) 160-4.5 MCG/ACT inhaler Inhale 2 puffs 2 (two) times a day.     • cetirizine (ZyrTEC) 10 MG tablet Take 10 mg by mouth daily.     • Cholecalciferol (VITAMIN D3) 2000 UNITS tablet Take 1 tablet by mouth daily.     • digoxin (LANOXIN) 125 MCG tablet Take 125 mcg by mouth Daily.     • diltiaZEM CD (CARTIA XT) 120 MG 24 hr capsule Take 120 mg by mouth Daily.     • fluticasone (FLONASE) 50 MCG/ACT nasal spray 2 Squirts into each nostril daily.     • methimazole (TAPAZOLE) 10 MG tablet 1/2 tablet daily (Patient taking differently: Take 5 mg by mouth Daily. 1/2 tablet daily) 30 tablet 5   • metroNIDAZOLE (METROCREAM) 0.75 % cream      • O2 (OXYGEN) Inhale Continuous.     • SPIRIVA RESPIMAT 2.5 MCG/ACT aerosol solution 2 puffs Daily.     • vitamin B-12 (CYANOCOBALAMIN) 1000 MCG tablet Take 1,000 mcg by mouth daily.       No current facility-administered medications on file prior to visit.        Allergies   Allergen Reactions   • Codeine    • Mucinex D [Pseudoephedrine-Guaifenesin Er] Nausea Only     Felt hot in chest   • Penicillin V    • Penicillins          Referring Provider:    No referring provider defined for this encounter.    Oncologist:  No primary care provider on file.      Seen and approved  by:  Vickie Lama MD  12/20/2017

## 2017-12-21 ENCOUNTER — TREATMENT (OUTPATIENT)
Dept: CARDIAC REHAB | Facility: HOSPITAL | Age: 72
End: 2017-12-21

## 2017-12-21 DIAGNOSIS — J44.9 COPD, SEVERE (HCC): Primary | ICD-10-CM

## 2017-12-21 PROCEDURE — 77386: CPT | Performed by: RADIOLOGY

## 2017-12-21 PROCEDURE — G0424 PULMONARY REHAB W EXER: HCPCS

## 2017-12-21 PROCEDURE — 77014 CHG CT GUIDANCE RADIATION THERAPY FLDS PLACEMENT: CPT | Performed by: RADIOLOGY

## 2017-12-21 PROCEDURE — 77386 CHG INTENSITY MODULATED RADIATION TX DLVR COMPLEX: CPT | Performed by: RADIOLOGY

## 2017-12-22 PROCEDURE — 77386: CPT | Performed by: RADIOLOGY

## 2017-12-22 PROCEDURE — 77014 CHG CT GUIDANCE RADIATION THERAPY FLDS PLACEMENT: CPT | Performed by: RADIOLOGY

## 2017-12-22 PROCEDURE — 77386 CHG INTENSITY MODULATED RADIATION TX DLVR COMPLEX: CPT | Performed by: RADIOLOGY

## 2017-12-23 ENCOUNTER — TREATMENT (OUTPATIENT)
Dept: CARDIAC REHAB | Facility: HOSPITAL | Age: 72
End: 2017-12-23

## 2017-12-23 DIAGNOSIS — J44.9 COPD, SEVERE (HCC): Primary | ICD-10-CM

## 2017-12-23 PROCEDURE — G0424 PULMONARY REHAB W EXER: HCPCS

## 2017-12-24 ENCOUNTER — HOSPITAL ENCOUNTER (OUTPATIENT)
Dept: ULTRASOUND IMAGING | Facility: HOSPITAL | Age: 72
Discharge: HOME | End: 2017-12-24
Attending: INTERNAL MEDICINE | Admitting: INTERNAL MEDICINE

## 2017-12-24 PROCEDURE — 76536 US EXAM OF HEAD AND NECK: CPT

## 2017-12-26 ENCOUNTER — RADIATION ONCOLOGY WEEKLY ASSESSMENT (OUTPATIENT)
Dept: RADIATION ONCOLOGY | Facility: HOSPITAL | Age: 72
End: 2017-12-26

## 2017-12-26 VITALS
RESPIRATION RATE: 16 BRPM | HEART RATE: 103 BPM | DIASTOLIC BLOOD PRESSURE: 70 MMHG | WEIGHT: 95 LBS | TEMPERATURE: 97 F | BODY MASS INDEX: 119.7 KG/M2 | OXYGEN SATURATION: 100 % | SYSTOLIC BLOOD PRESSURE: 141 MMHG

## 2017-12-26 DIAGNOSIS — C34.12 MALIGNANT NEOPLASM OF UPPER LOBE OF LEFT LUNG (HCC): Primary | ICD-10-CM

## 2017-12-26 PROCEDURE — 77386 CHG INTENSITY MODULATED RADIATION TX DLVR COMPLEX: CPT | Performed by: RADIOLOGY

## 2017-12-26 PROCEDURE — 77386: CPT | Performed by: RADIOLOGY

## 2017-12-26 PROCEDURE — 77336 RADIATION PHYSICS CONSULT: CPT | Performed by: RADIOLOGY

## 2017-12-26 PROCEDURE — 77014 CHG CT GUIDANCE RADIATION THERAPY FLDS PLACEMENT: CPT | Performed by: RADIOLOGY

## 2017-12-26 NOTE — PROGRESS NOTES
Physician Weekly Management Note    Diagnosis:     Diagnosis Plan   1. Malignant neoplasm of upper lobe of left lung         RT Details:  Treatment #14/35 - radiation alone.    Notes on Treatment course, Films, Medical progress:   Demonstrating good tolerance thus far.  Denies new shortness of breath, cough or esophageal symptoms.  Energy level is down a little bit.  No skin issues.  Continue as planned.    Weekly Management:  Medication reviewed?   Yes  New medications given?   No  Problemlist reviewed?   Yes  Problem added?   No      Technical aspects reviewed:  Weekly OBI approved?   Yes  Weekly port films approved?   Yes  Change requests noted on port film?   No  Patient setup and plan reviewed?   Yes    Chart Reviewed:  Continue current treatment plan?   Yes  Treatment plan change requested?   No  CBC reviewed?   No  Concurrent Chemo?   No    Objective     Toxicities:   As above     Review of Systems   As above    Vitals:    12/26/17 1016   BP: 141/70   Pulse: 103   Resp: 16   Temp: 97 °F (36.1 °C)   SpO2: 100%       Current Status 12/13/2017   ECOG score 0       Physical Exam  As above      Problem Summary List    Diagnosis:     Diagnosis Plan   1. Malignant neoplasm of upper lobe of left lung       Pathology:       Past Medical History:   Diagnosis Date   • Allergic rhinitis    • Asthma    • Breast cancer     s/p Lumpectomy ~2000 and chemo/XRT. Then  Masectomy ~2004 for some recurrence   • COPD (chronic obstructive pulmonary disease)    • Hyperthyroidism    • Mitral regurgitation     mild to moderate   • Osteopenia    • Oxygen dependent     2L - at night only   • Pneumothorax    • SOB (shortness of breath)    • Tachycardia    • Toxic multinodular goiter    • Vitamin D deficiency          Past Surgical History:   Procedure Laterality Date   • BREAST LUMPECTOMY Right    • BREAST LUMPECTOMY     • CARDIAC CATHETERIZATION      PS SAPHENOUS VAIN RIGHT LEG   • COLONOSCOPY      Complete   • COLONOSCOPY N/A 4/28/2017     Procedure: COLONOSCOPY WITH POLYPECTOMY(COLD BIOPSY AND HOT SNARE);  Surgeon: Luiza Eddy MD;  Location: [facility] ENDOSCOPY;  Service:    • HYSTERECTOMY     • MASTECTOMY Right 2004   • NECK SURGERY      2 spurs removed   • VASCULAR SURGERY      spider vein ablation         Current Outpatient Prescriptions on File Prior to Visit   Medication Sig Dispense Refill   • albuterol (PROVENTIL HFA;VENTOLIN HFA) 108 (90 Base) MCG/ACT inhaler Inhale 2 puffs Every 4 (Four) Hours As Needed for Wheezing. Per MD - until current episode is resolved     • albuterol (PROVENTIL) (2.5 MG/3ML) 0.083% nebulizer solution Take 2.5 mg by nebulization Every 4 (Four) Hours As Needed for Wheezing. 360 mL 3   • budesonide-formoterol (SYMBICORT) 160-4.5 MCG/ACT inhaler Inhale 2 puffs 2 (two) times a day.     • cetirizine (ZyrTEC) 10 MG tablet Take 10 mg by mouth daily.     • Cholecalciferol (VITAMIN D3) 2000 UNITS tablet Take 1 tablet by mouth daily.     • digoxin (LANOXIN) 125 MCG tablet Take 125 mcg by mouth Daily.     • diltiaZEM CD (CARTIA XT) 120 MG 24 hr capsule Take 120 mg by mouth Daily.     • fluticasone (FLONASE) 50 MCG/ACT nasal spray 2 Squirts into each nostril daily.     • methimazole (TAPAZOLE) 10 MG tablet 1/2 tablet daily (Patient taking differently: Take 5 mg by mouth Daily. 1/2 tablet daily) 30 tablet 5   • metroNIDAZOLE (METROCREAM) 0.75 % cream      • O2 (OXYGEN) Inhale Continuous.     • SPIRIVA RESPIMAT 2.5 MCG/ACT aerosol solution 2 puffs Daily.     • vitamin B-12 (CYANOCOBALAMIN) 1000 MCG tablet Take 1,000 mcg by mouth daily.       No current facility-administered medications on file prior to visit.        Allergies   Allergen Reactions   • Codeine    • Mucinex D [Pseudoephedrine-Guaifenesin Er] Nausea Only     Felt hot in chest   • Penicillin V    • Penicillins          Primary care MD:    Kevin Seymour MD    Oncologist: Karlie Nur M.D.    Seen and approved by:  Je Swanson MD  12/26/2017

## 2017-12-27 PROCEDURE — 77386: CPT | Performed by: RADIOLOGY

## 2017-12-27 PROCEDURE — 77386 CHG INTENSITY MODULATED RADIATION TX DLVR COMPLEX: CPT | Performed by: RADIOLOGY

## 2017-12-27 PROCEDURE — 77014 CHG CT GUIDANCE RADIATION THERAPY FLDS PLACEMENT: CPT | Performed by: RADIOLOGY

## 2017-12-28 ENCOUNTER — TREATMENT (OUTPATIENT)
Dept: CARDIAC REHAB | Facility: HOSPITAL | Age: 72
End: 2017-12-28

## 2017-12-28 ENCOUNTER — APPOINTMENT (OUTPATIENT)
Dept: ULTRASOUND IMAGING | Facility: HOSPITAL | Age: 72
End: 2017-12-28
Attending: INTERNAL MEDICINE

## 2017-12-28 DIAGNOSIS — J44.9 COPD, SEVERE (HCC): Primary | ICD-10-CM

## 2017-12-28 PROCEDURE — 77386: CPT | Performed by: RADIOLOGY

## 2017-12-28 PROCEDURE — 77386 CHG INTENSITY MODULATED RADIATION TX DLVR COMPLEX: CPT | Performed by: RADIOLOGY

## 2017-12-28 PROCEDURE — 77427 RADIATION TX MANAGEMENT X5: CPT | Performed by: RADIOLOGY

## 2017-12-28 PROCEDURE — 77014 CHG CT GUIDANCE RADIATION THERAPY FLDS PLACEMENT: CPT | Performed by: RADIOLOGY

## 2017-12-28 PROCEDURE — G0424 PULMONARY REHAB W EXER: HCPCS

## 2017-12-29 ENCOUNTER — APPOINTMENT (OUTPATIENT)
Dept: ULTRASOUND IMAGING | Facility: HOSPITAL | Age: 72
End: 2017-12-29
Attending: INTERNAL MEDICINE

## 2017-12-29 PROCEDURE — 77386 CHG INTENSITY MODULATED RADIATION TX DLVR COMPLEX: CPT | Performed by: RADIOLOGY

## 2017-12-29 PROCEDURE — 77386: CPT | Performed by: RADIOLOGY

## 2017-12-29 PROCEDURE — 77014 CHG CT GUIDANCE RADIATION THERAPY FLDS PLACEMENT: CPT | Performed by: RADIOLOGY

## 2017-12-30 ENCOUNTER — TREATMENT (OUTPATIENT)
Dept: CARDIAC REHAB | Facility: HOSPITAL | Age: 72
End: 2017-12-30

## 2017-12-30 DIAGNOSIS — J44.9 COPD, SEVERE (HCC): Primary | ICD-10-CM

## 2017-12-30 PROCEDURE — G0424 PULMONARY REHAB W EXER: HCPCS

## 2018-01-02 ENCOUNTER — TREATMENT (OUTPATIENT)
Dept: CARDIAC REHAB | Facility: HOSPITAL | Age: 73
End: 2018-01-02

## 2018-01-02 ENCOUNTER — APPOINTMENT (OUTPATIENT)
Dept: RADIATION ONCOLOGY | Facility: HOSPITAL | Age: 73
End: 2018-01-02

## 2018-01-02 DIAGNOSIS — J44.9 COPD, SEVERE (HCC): Primary | ICD-10-CM

## 2018-01-02 PROCEDURE — G0424 PULMONARY REHAB W EXER: HCPCS

## 2018-01-02 PROCEDURE — 77386: CPT | Performed by: RADIOLOGY

## 2018-01-02 PROCEDURE — 77014 CHG CT GUIDANCE RADIATION THERAPY FLDS PLACEMENT: CPT | Performed by: RADIOLOGY

## 2018-01-02 PROCEDURE — 77336 RADIATION PHYSICS CONSULT: CPT | Performed by: RADIOLOGY

## 2018-01-02 PROCEDURE — 77386 CHG INTENSITY MODULATED RADIATION TX DLVR COMPLEX: CPT | Performed by: RADIOLOGY

## 2018-01-03 ENCOUNTER — RADIATION ONCOLOGY WEEKLY ASSESSMENT (OUTPATIENT)
Dept: RADIATION ONCOLOGY | Facility: HOSPITAL | Age: 73
End: 2018-01-03

## 2018-01-03 VITALS
DIASTOLIC BLOOD PRESSURE: 69 MMHG | TEMPERATURE: 97.1 F | HEART RATE: 101 BPM | OXYGEN SATURATION: 97 % | RESPIRATION RATE: 16 BRPM | SYSTOLIC BLOOD PRESSURE: 126 MMHG

## 2018-01-03 DIAGNOSIS — C34.12 MALIGNANT NEOPLASM OF UPPER LOBE OF LEFT LUNG (HCC): Primary | ICD-10-CM

## 2018-01-03 PROCEDURE — 77386 CHG INTENSITY MODULATED RADIATION TX DLVR COMPLEX: CPT | Performed by: RADIOLOGY

## 2018-01-03 PROCEDURE — 77386: CPT | Performed by: RADIOLOGY

## 2018-01-03 PROCEDURE — 77014 CHG CT GUIDANCE RADIATION THERAPY FLDS PLACEMENT: CPT | Performed by: RADIOLOGY

## 2018-01-03 NOTE — PROGRESS NOTES
Physician Weekly Management Note    Diagnosis:     1. Malignant neoplasm of upper lobe of left lung     Stage IIB (T3, N0, M0)     Reason for Visit:   Radiation (19/35)    Concurrent Chemo:   No    Notes on Treatment course, Films, Medical progress and Plan:  Doing well. Still awaiting portable oxygen - working thru [facility]. Her pulmonary rehab is going well but hampered by the oxygen devices. I recommended she go to [employer] and decide which device she wants and let her MDs know. One of us can reorder if necessary. That hs been her only complaint thru her treatment thus far. She can't drive or work the current device which has been a struggle. Repeat thyroid work up discussed and she is pleased with that. No problems or questions with the RT or the lungs, cont on.      ROS - Other than as listed above, as follows:  Constitutional - Normal - no complaints of fatigue, denies lack of appetite, fever, night sweats or change in weight.  Neck - Normal - denies neck masses, muscle weakness, neck pain, decreased range of motion or swelling.  Cardiovascular - Normal - denies arrhythmias, chest pain, dyspnea, edema, orthopnea or palpitations.  Respiratory - Normal - denies cough, dyspnea, hemoptysis, hiccoughs, pleuritic chest pain or wheezing.  Gastrointestinal - Normal - no complaints of constipation, abdominal pain, diarrhea, heartburn/dyspepsia, hematemesis, hemorrhoids, melena or GI bleeding, nausea, pain or cramping or vomiting.    PHYSICAL EXAM - Other than as listed above, as follows:  Vitals:    01/03/18 0959   BP: 126/69   Pulse: 101   Resp: 16   Temp: 97.1 °F (36.2 °C)   TempSrc: Oral   SpO2: 97%   PainSc: 0-No pain       Constitutional - Normal - no evidence of impaired alertness, inadequate appearance, premature or advanced chronologic age, uncooperativeness, altered mood, affect or disorientation.  Neck - Normal - no evidence of tender or enlarged lymph nodes, neck abnormalities, restricted range of motion or  "enlarged thyroid.  Chest - Normal - no evidence of chest abnormalities, tender or enlarged lymph nodes.  Respiratory - Normal - no evidence of abnormal breat sounds, chest abnormalities on palpation and chest abnormalities on percussion and normal breath sounds.  Hematologic/Lymphatic - Normal - no evidence of tender or enlarged axillary lymph nodes nor tender or enlarged neck nodes.    Performance Status:  (2) Ambulatory and capable of self care, unable to carry out work activity, up and about > 50% or waking hours    Problem added:  No problems updated.  Medications added: No orders of the defined types were placed in this encounter.    Ancillary referrals made: No orders of the defined types were placed in this encounter.      Technical aspects reviewed:  Weekly OBI approved if applicable? Yes  Weekly port films approved?   Yes  Change requests noted if applicable?  No  Patient setup and plan reviewed?  Yes    Chart Reviewed:  Continue current treatment plan?   Yes  Treatment plan change requested?  No    I have reviewed and marked as \"reviewed\" the current medications, allergies and problem list in the patients EMR.  I have reviewed the patient's medical, surgical  history in detail, reviewed any pertinent lab work and updated the computerized patient record if needed.    Patient's Care Team:  Patient Care Team:  Kevin Seymour MD as PCP - General  Unruly Bobo MD as Consulting Physician (Cardiology)  Heather Anthony MD as Consulting Physician (Pulmonary Disease)  Luiza Eddy MD as Surgeon (General Surgery)  Laith Prado DPM as Consulting Physician (Podiatry)  Karlie Nur MD as Consulting Physician (Hematology and Oncology)  Michael Christie MD as Consulting Physician (Endocrinology)    Seen and approved by:  Brandie Bennett MD, 12/12/2017  "

## 2018-01-04 ENCOUNTER — TREATMENT (OUTPATIENT)
Dept: CARDIAC REHAB | Facility: HOSPITAL | Age: 73
End: 2018-01-04

## 2018-01-04 DIAGNOSIS — J44.9 COPD, SEVERE (HCC): Primary | ICD-10-CM

## 2018-01-04 PROCEDURE — G0424 PULMONARY REHAB W EXER: HCPCS

## 2018-01-04 PROCEDURE — 77386: CPT | Performed by: RADIOLOGY

## 2018-01-04 PROCEDURE — 77386 CHG INTENSITY MODULATED RADIATION TX DLVR COMPLEX: CPT | Performed by: RADIOLOGY

## 2018-01-04 PROCEDURE — 77014 CHG CT GUIDANCE RADIATION THERAPY FLDS PLACEMENT: CPT | Performed by: RADIOLOGY

## 2018-01-05 PROCEDURE — 77386: CPT | Performed by: RADIOLOGY

## 2018-01-05 PROCEDURE — 77427 RADIATION TX MANAGEMENT X5: CPT | Performed by: RADIOLOGY

## 2018-01-05 PROCEDURE — 77014 CHG CT GUIDANCE RADIATION THERAPY FLDS PLACEMENT: CPT | Performed by: RADIOLOGY

## 2018-01-05 PROCEDURE — 77386 CHG INTENSITY MODULATED RADIATION TX DLVR COMPLEX: CPT | Performed by: RADIOLOGY

## 2018-01-06 ENCOUNTER — TREATMENT (OUTPATIENT)
Dept: CARDIAC REHAB | Facility: HOSPITAL | Age: 73
End: 2018-01-06

## 2018-01-06 DIAGNOSIS — J44.9 COPD, SEVERE (HCC): Primary | ICD-10-CM

## 2018-01-06 PROCEDURE — G0424 PULMONARY REHAB W EXER: HCPCS

## 2018-01-08 PROCEDURE — 77386 CHG INTENSITY MODULATED RADIATION TX DLVR COMPLEX: CPT | Performed by: RADIOLOGY

## 2018-01-08 PROCEDURE — 77386: CPT | Performed by: RADIOLOGY

## 2018-01-08 PROCEDURE — 77014 CHG CT GUIDANCE RADIATION THERAPY FLDS PLACEMENT: CPT | Performed by: RADIOLOGY

## 2018-01-09 ENCOUNTER — TREATMENT (OUTPATIENT)
Dept: CARDIAC REHAB | Facility: HOSPITAL | Age: 73
End: 2018-01-09

## 2018-01-09 DIAGNOSIS — J44.9 COPD, SEVERE (HCC): Primary | ICD-10-CM

## 2018-01-09 PROCEDURE — 77386 CHG INTENSITY MODULATED RADIATION TX DLVR COMPLEX: CPT | Performed by: RADIOLOGY

## 2018-01-09 PROCEDURE — 77386: CPT | Performed by: RADIOLOGY

## 2018-01-09 PROCEDURE — 77014 CHG CT GUIDANCE RADIATION THERAPY FLDS PLACEMENT: CPT | Performed by: RADIOLOGY

## 2018-01-09 PROCEDURE — 77336 RADIATION PHYSICS CONSULT: CPT | Performed by: RADIOLOGY

## 2018-01-09 PROCEDURE — G0424 PULMONARY REHAB W EXER: HCPCS

## 2018-01-10 PROCEDURE — 77386: CPT | Performed by: RADIOLOGY

## 2018-01-10 PROCEDURE — 77014 CHG CT GUIDANCE RADIATION THERAPY FLDS PLACEMENT: CPT | Performed by: RADIOLOGY

## 2018-01-10 PROCEDURE — 77386 CHG INTENSITY MODULATED RADIATION TX DLVR COMPLEX: CPT | Performed by: RADIOLOGY

## 2018-01-11 ENCOUNTER — DOCUMENTATION (OUTPATIENT)
Dept: CARDIAC REHAB | Facility: HOSPITAL | Age: 73
End: 2018-01-11

## 2018-01-11 ENCOUNTER — TREATMENT (OUTPATIENT)
Dept: CARDIAC REHAB | Facility: HOSPITAL | Age: 73
End: 2018-01-11

## 2018-01-11 ENCOUNTER — RADIATION ONCOLOGY WEEKLY ASSESSMENT (OUTPATIENT)
Dept: RADIATION ONCOLOGY | Facility: HOSPITAL | Age: 73
End: 2018-01-11

## 2018-01-11 VITALS
TEMPERATURE: 98 F | WEIGHT: 96 LBS | HEART RATE: 89 BPM | SYSTOLIC BLOOD PRESSURE: 123 MMHG | BODY MASS INDEX: 120.96 KG/M2 | DIASTOLIC BLOOD PRESSURE: 68 MMHG | RESPIRATION RATE: 16 BRPM | OXYGEN SATURATION: 97 %

## 2018-01-11 DIAGNOSIS — C34.12 MALIGNANT NEOPLASM OF UPPER LOBE OF LEFT LUNG (HCC): Primary | ICD-10-CM

## 2018-01-11 DIAGNOSIS — J44.9 COPD, SEVERE (HCC): Primary | ICD-10-CM

## 2018-01-11 PROCEDURE — 77386 CHG INTENSITY MODULATED RADIATION TX DLVR COMPLEX: CPT | Performed by: RADIOLOGY

## 2018-01-11 PROCEDURE — G0424 PULMONARY REHAB W EXER: HCPCS

## 2018-01-11 PROCEDURE — 77386: CPT | Performed by: RADIOLOGY

## 2018-01-11 PROCEDURE — 77014 CHG CT GUIDANCE RADIATION THERAPY FLDS PLACEMENT: CPT | Performed by: RADIOLOGY

## 2018-01-11 RX ORDER — DILTIAZEM HYDROCHLORIDE 180 MG/1
180 CAPSULE, EXTENDED RELEASE ORAL DAILY
COMMUNITY
Start: 2018-01-03 | End: 2020-03-16 | Stop reason: SDUPTHER

## 2018-01-11 NOTE — PROGRESS NOTES
Physician Weekly Management Note    Diagnosis:     1. Malignant neoplasm of upper lobe of left lung     Stage IIB (T3, N0, M0)     Reason for Visit:   Radiation (25/35)    Concurrent Chemo:   No    Notes on Treatment course, Films, Medical progress and Plan:  Doing well.  Her pulmonary rehab is going well but hampered by the oxygen devices. Slight intermittent fatigue as expected. Breathing better.  No problems or questions with the RT or the lungs, cont on.      ROS - Other than as listed above, as follows:  Constitutional - Normal - no complaints of fatigue, denies lack of appetite, fever, night sweats or change in weight.  Neck - Normal - denies neck masses, muscle weakness, neck pain, decreased range of motion or swelling.  Cardiovascular - Normal - denies arrhythmias, chest pain, dyspnea, edema, orthopnea or palpitations.  Respiratory - Normal - denies cough, dyspnea, hemoptysis, hiccoughs, pleuritic chest pain or wheezing.  Gastrointestinal - Normal - no complaints of constipation, abdominal pain, diarrhea, heartburn/dyspepsia, hematemesis, hemorrhoids, melena or GI bleeding, nausea, pain or cramping or vomiting.    PHYSICAL EXAM - Other than as listed above, as follows:  Vitals:    01/11/18 1008   BP: 123/68   Pulse: 89   Resp: 16   Temp: 98 °F (36.7 °C)   TempSrc: Oral   SpO2: 97%   Weight: 43.5 kg (96 lb)   PainSc: 0-No pain       Constitutional - Normal - no evidence of impaired alertness, inadequate appearance, premature or advanced chronologic age, uncooperativeness, altered mood, affect or disorientation.  Neck - Normal - no evidence of tender or enlarged lymph nodes, neck abnormalities, restricted range of motion or enlarged thyroid.  Chest - Normal - no evidence of chest abnormalities, tender or enlarged lymph nodes.  Respiratory - Normal - no evidence of abnormal breat sounds, chest abnormalities on palpation and chest abnormalities on percussion and normal breath sounds.  Hematologic/Lymphatic -  "Normal - no evidence of tender or enlarged axillary lymph nodes nor tender or enlarged neck nodes.    Performance Status:  (2) Ambulatory and capable of self care, unable to carry out work activity, up and about > 50% or waking hours    Problem added:  No problems updated.  Medications added:   New Medications Ordered This Visit   Medications   • CARTIA  MG 24 hr capsule     Ancillary referrals made: No orders of the defined types were placed in this encounter.      Technical aspects reviewed:  Weekly OBI approved if applicable? Yes  Weekly port films approved?   Yes  Change requests noted if applicable?  No  Patient setup and plan reviewed?  Yes    Chart Reviewed:  Continue current treatment plan?   Yes  Treatment plan change requested?  No    I have reviewed and marked as \"reviewed\" the current medications, allergies and problem list in the patients EMR.  I have reviewed the patient's medical, surgical  history in detail, reviewed any pertinent lab work and updated the computerized patient record if needed.    Patient's Care Team:  Patient Care Team:  Kevin Seymour MD as PCP - General  Unruly Bobo MD as Consulting Physician (Cardiology)  Heather Anthony MD as Consulting Physician (Pulmonary Disease)  Luiza Eddy MD as Surgeon (General Surgery)  Laith Prado DPM as Consulting Physician (Podiatry)  Karlie Nur MD as Consulting Physician (Hematology and Oncology)  Michael Christie MD as Consulting Physician (Endocrinology)    Seen and approved by:  Brandie Bennett MD, 12/12/2017  "

## 2018-01-12 PROCEDURE — 77386: CPT | Performed by: RADIOLOGY

## 2018-01-12 PROCEDURE — 77386 CHG INTENSITY MODULATED RADIATION TX DLVR COMPLEX: CPT | Performed by: RADIOLOGY

## 2018-01-12 PROCEDURE — 77427 RADIATION TX MANAGEMENT X5: CPT | Performed by: RADIOLOGY

## 2018-01-12 PROCEDURE — 77014 CHG CT GUIDANCE RADIATION THERAPY FLDS PLACEMENT: CPT | Performed by: RADIOLOGY

## 2018-01-15 PROCEDURE — 77014 CHG CT GUIDANCE RADIATION THERAPY FLDS PLACEMENT: CPT | Performed by: RADIOLOGY

## 2018-01-15 PROCEDURE — 77386: CPT | Performed by: RADIOLOGY

## 2018-01-15 PROCEDURE — 77386 CHG INTENSITY MODULATED RADIATION TX DLVR COMPLEX: CPT | Performed by: RADIOLOGY

## 2018-01-16 PROCEDURE — 77336 RADIATION PHYSICS CONSULT: CPT | Performed by: RADIOLOGY

## 2018-01-17 PROCEDURE — 77386 CHG INTENSITY MODULATED RADIATION TX DLVR COMPLEX: CPT | Performed by: RADIOLOGY

## 2018-01-17 PROCEDURE — 77386: CPT | Performed by: RADIOLOGY

## 2018-01-17 PROCEDURE — 77014 CHG CT GUIDANCE RADIATION THERAPY FLDS PLACEMENT: CPT | Performed by: RADIOLOGY

## 2018-01-18 ENCOUNTER — TREATMENT (OUTPATIENT)
Dept: CARDIAC REHAB | Facility: HOSPITAL | Age: 73
End: 2018-01-18

## 2018-01-18 DIAGNOSIS — J44.9 COPD, SEVERE (HCC): Primary | ICD-10-CM

## 2018-01-18 PROCEDURE — 77386: CPT | Performed by: RADIOLOGY

## 2018-01-18 PROCEDURE — 77386 CHG INTENSITY MODULATED RADIATION TX DLVR COMPLEX: CPT | Performed by: RADIOLOGY

## 2018-01-18 PROCEDURE — 77014 CHG CT GUIDANCE RADIATION THERAPY FLDS PLACEMENT: CPT | Performed by: RADIOLOGY

## 2018-01-18 PROCEDURE — G0424 PULMONARY REHAB W EXER: HCPCS

## 2018-01-19 ENCOUNTER — RADIATION ONCOLOGY WEEKLY ASSESSMENT (OUTPATIENT)
Dept: RADIATION ONCOLOGY | Facility: HOSPITAL | Age: 73
End: 2018-01-19

## 2018-01-19 VITALS
TEMPERATURE: 96.7 F | HEART RATE: 99 BPM | DIASTOLIC BLOOD PRESSURE: 73 MMHG | BODY MASS INDEX: 119.7 KG/M2 | RESPIRATION RATE: 16 BRPM | WEIGHT: 95 LBS | SYSTOLIC BLOOD PRESSURE: 128 MMHG | OXYGEN SATURATION: 98 %

## 2018-01-19 DIAGNOSIS — C34.12 MALIGNANT NEOPLASM OF UPPER LOBE OF LEFT LUNG (HCC): Primary | ICD-10-CM

## 2018-01-19 PROCEDURE — 77386: CPT | Performed by: RADIOLOGY

## 2018-01-19 PROCEDURE — 77386 CHG INTENSITY MODULATED RADIATION TX DLVR COMPLEX: CPT | Performed by: RADIOLOGY

## 2018-01-19 PROCEDURE — 77014 CHG CT GUIDANCE RADIATION THERAPY FLDS PLACEMENT: CPT | Performed by: RADIOLOGY

## 2018-01-19 NOTE — PROGRESS NOTES
Physician Weekly Management Note    Diagnosis:     1. Malignant neoplasm of upper lobe of left lung     Stage IIB (T3, N0, M0)     Reason for Visit:   Radiation (30/35)    Concurrent Chemo:   No    Notes on Treatment course, Films, Medical progress and Plan:  Doing well.  Her pulmonary rehab is going well - getting new device soon.  Intermittent fatigue as expected. Breathing better.  Seeing a dietician next week as well  No problems or questions with the RT or the lungs, cont on.      ROS - Other than as listed above, as follows:  Constitutional - Normal - no complaints of fatigue, denies lack of appetite, fever, night sweats or change in weight.  Neck - Normal - denies neck masses, muscle weakness, neck pain, decreased range of motion or swelling.  Cardiovascular - Normal - denies arrhythmias, chest pain, dyspnea, edema, orthopnea or palpitations.  Respiratory - Normal - denies cough, dyspnea, hemoptysis, hiccoughs, pleuritic chest pain or wheezing.  Gastrointestinal - Normal - no complaints of constipation, abdominal pain, diarrhea, heartburn/dyspepsia, hematemesis, hemorrhoids, melena or GI bleeding, nausea, pain or cramping or vomiting.    PHYSICAL EXAM - Other than as listed above, as follows:  Vitals:    01/19/18 0943   BP: 128/73   Pulse: 99   Resp: 16   Temp: 96.7 °F (35.9 °C)   TempSrc: Oral   SpO2: 98%   Weight: 43.1 kg (95 lb)   PainSc: 0-No pain       Constitutional - Normal - no evidence of impaired alertness, inadequate appearance, premature or advanced chronologic age, uncooperativeness, altered mood, affect or disorientation.  Neck - Normal - no evidence of tender or enlarged lymph nodes, neck abnormalities, restricted range of motion or enlarged thyroid.  Chest - Normal - no evidence of chest abnormalities, tender or enlarged lymph nodes.  Respiratory - Normal - no evidence of abnormal breat sounds, chest abnormalities on palpation and chest abnormalities on percussion and normal breath  "sounds.  Hematologic/Lymphatic - Normal - no evidence of tender or enlarged axillary lymph nodes nor tender or enlarged neck nodes.    Performance Status:  (2) Ambulatory and capable of self care, unable to carry out work activity, up and about > 50% or waking hours    Problem added:  No problems updated.  Medications added:   No orders of the defined types were placed in this encounter.    Ancillary referrals made: No orders of the defined types were placed in this encounter.      Technical aspects reviewed:  Weekly OBI approved if applicable? Yes  Weekly port films approved?   Yes  Change requests noted if applicable?  No  Patient setup and plan reviewed?  Yes    Chart Reviewed:  Continue current treatment plan?   Yes  Treatment plan change requested?  No    I have reviewed and marked as \"reviewed\" the current medications, allergies and problem list in the patients EMR.  I have reviewed the patient's medical, surgical  history in detail, reviewed any pertinent lab work and updated the computerized patient record if needed.    Patient's Care Team:  Patient Care Team:  Kevin Seymour MD as PCP - General  Unruly Bobo MD as Consulting Physician (Cardiology)  Heather Anthony MD as Consulting Physician (Pulmonary Disease)  Luiza Eddy MD as Surgeon (General Surgery)  Laith Prado DPM as Consulting Physician (Podiatry)  Karlie Nur MD as Consulting Physician (Hematology and Oncology)  Michael Christie MD as Consulting Physician (Endocrinology)    Seen and approved by:  Brandie Bennett MD, 12/12/2017  "

## 2018-01-20 ENCOUNTER — TREATMENT (OUTPATIENT)
Dept: CARDIAC REHAB | Facility: HOSPITAL | Age: 73
End: 2018-01-20

## 2018-01-20 DIAGNOSIS — J44.9 COPD, SEVERE (HCC): Primary | ICD-10-CM

## 2018-01-20 PROCEDURE — G0424 PULMONARY REHAB W EXER: HCPCS

## 2018-01-22 PROCEDURE — 77386 CHG INTENSITY MODULATED RADIATION TX DLVR COMPLEX: CPT | Performed by: RADIOLOGY

## 2018-01-22 PROCEDURE — 77427 RADIATION TX MANAGEMENT X5: CPT | Performed by: RADIOLOGY

## 2018-01-22 PROCEDURE — 77386: CPT | Performed by: RADIOLOGY

## 2018-01-22 PROCEDURE — 77014 CHG CT GUIDANCE RADIATION THERAPY FLDS PLACEMENT: CPT | Performed by: RADIOLOGY

## 2018-01-23 ENCOUNTER — TREATMENT (OUTPATIENT)
Dept: CARDIAC REHAB | Facility: HOSPITAL | Age: 73
End: 2018-01-23

## 2018-01-23 DIAGNOSIS — J44.9 COPD, SEVERE (HCC): Primary | ICD-10-CM

## 2018-01-23 PROCEDURE — 77386: CPT | Performed by: RADIOLOGY

## 2018-01-23 PROCEDURE — 77336 RADIATION PHYSICS CONSULT: CPT | Performed by: RADIOLOGY

## 2018-01-23 PROCEDURE — 77014 CHG CT GUIDANCE RADIATION THERAPY FLDS PLACEMENT: CPT | Performed by: RADIOLOGY

## 2018-01-23 PROCEDURE — 77386 CHG INTENSITY MODULATED RADIATION TX DLVR COMPLEX: CPT | Performed by: RADIOLOGY

## 2018-01-23 NOTE — PROGRESS NOTES
CARDIAC/PULMONARY REHAB NUTRITION EDUCATION/ASSESSMENT      72 y.o.         Height: 60 in    Weight: 95.5 lb     BMI: 18.7 IBW: 100 lb +/- 10%                Time seen: 8:30 AM   Diet Survey Score: 54   Weight Assessment: Underweight  Weight Change:  Lost to 88 lb when ill, working to regain to usual weight  Usual Weight: 100 lb  Desired Weight: 100 lb     Current Diet: Regular  Appetite: good   Factors limiting PO intake:  N/A  Taste/smell changes:  No Food records reviewed? Yes     Occupation: Retired  Job Activity Level: N/A    Routine Exercise: mild   Who does the patient live with: Lives alone  Who does the cooking at home: Patient and sister  Spouse/significant other present for diet instruction today? Yes  Patient actively receiving lifestyle support from others at home? Yes       Pertinent Lab Values:   Total: No components found for: CHOLESTEROL  HDL: No results found for: HDL  LDL:No results found for: LDL  Triglyceride: No components found for: TRIGLYCERIDE  Last HGBA1C with date if applicable:No components found for: A1C  Glucose:   Glucose   Date Value Ref Range Status   11/22/2017 77 74 - 124 mg/dL Final    Nutritional Supplements: Vitamin D3, Vitamin B12, Eye vitamins             Stated Problem Areas / Concerns: Maryam states that she lost to 88 pounds in the Fall of 2017 after hospitalization and diagnosis of double pneumonia and stage II lung cancer. She is presently receiving radiation treatment for lung cancer. Her usual weight is 100 pounds. She has regained 7.5 pounds. She would like to maintain a goal weight of 100 pounds. She states that she had breast cancer treatment in 2004. She drinks a nutrition supplement beverage daily. (Boost or Ensure)     Assessment / Recommendations: Offered encouragement for Maryam to continue with daily nutrition supplement beverage. Maryam appears to have excellent support from her sister who prepares and delivers many of Maryam's meals. Her sister was present  today for medical nutrition assessment. Offered encouragement for Maryam's successful efforts at regaining weight and discussed high-protein, high-calorie eating tips to help with those goals. Discussed the [employer]'s nutrition tips.    Motivation level toward diet compliance: strong         Instructed on: [employer]'s nutrition tips, high-protein, high-calorie eating tips  Written materials given: [employer]'s nutrition tips, high-protein, high-calorie eating tips       Goals: Follow [employer]'s nutrition tips             10:26 AM  1/23/2018  Xin Keita RD

## 2018-01-24 ENCOUNTER — RADIATION ONCOLOGY WEEKLY ASSESSMENT (OUTPATIENT)
Dept: RADIATION ONCOLOGY | Facility: HOSPITAL | Age: 73
End: 2018-01-24

## 2018-01-24 VITALS
BODY MASS INDEX: 122.22 KG/M2 | WEIGHT: 97 LBS | RESPIRATION RATE: 16 BRPM | SYSTOLIC BLOOD PRESSURE: 137 MMHG | HEART RATE: 107 BPM | DIASTOLIC BLOOD PRESSURE: 81 MMHG | TEMPERATURE: 97.1 F | OXYGEN SATURATION: 98 %

## 2018-01-24 DIAGNOSIS — C34.12 MALIGNANT NEOPLASM OF UPPER LOBE OF LEFT LUNG (HCC): Primary | ICD-10-CM

## 2018-01-24 PROCEDURE — 77014 CHG CT GUIDANCE RADIATION THERAPY FLDS PLACEMENT: CPT | Performed by: RADIOLOGY

## 2018-01-24 PROCEDURE — 77386 CHG INTENSITY MODULATED RADIATION TX DLVR COMPLEX: CPT | Performed by: RADIOLOGY

## 2018-01-24 PROCEDURE — 77386: CPT | Performed by: RADIOLOGY

## 2018-01-24 NOTE — PROGRESS NOTES
Physician Weekly Management Note    Diagnosis:     1. Malignant neoplasm of upper lobe of left lung    ;  - Clinical: Stage IIB (T3, N0, M0)     Reason for Visit:   Radiation (33/35)    Concurrent Chemo:   No    Notes on Treatment course, Films, Medical progress and Plan:  Doing well. bREATHING BETTER, feeling better. Awaiting portable tank. Reviewed CT scan in 6 weeks and follow up here thereafter. Sees Dr. Nur on 2/15 as well. No problems or questions, cont on.    ROS - Other than as listed above, as follows:  Constitutional - Normal - no complaints of fatigue, denies lack of appetite, fever, night sweats or change in weight.  Neck - Normal - denies neck masses, muscle weakness, neck pain, decreased range of motion or swelling.  Cardiovascular - Normal - denies arrhythmias, chest pain, dyspnea, edema, orthopnea or palpitations.  Respiratory - Normal - denies cough, dyspnea, hemoptysis, hiccoughs, pleuritic chest pain or wheezing.  Gastrointestinal - Normal - no complaints of constipation, abdominal pain, diarrhea, heartburn/dyspepsia, hematemesis, hemorrhoids, melena or GI bleeding, nausea, pain or cramping or vomiting.    PHYSICAL EXAM - Other than as listed above, as follows:  Vitals:    01/24/18 0954   BP: 137/81   Pulse: 107   Resp: 16   Temp: 97.1 °F (36.2 °C)   TempSrc: Tympanic   SpO2: 98%   Weight: 44 kg (97 lb)   PainSc: 0-No pain       Constitutional - Normal - no evidence of impaired alertness, inadequate appearance, premature or advanced chronologic age, uncooperativeness, altered mood, affect or disorientation.  Neck - Normal - no evidence of tender or enlarged lymph nodes, neck abnormalities, restricted range of motion or enlarged thyroid.  Chest - Normal - no evidence of chest abnormalities, tender or enlarged lymph nodes.  Respiratory - Normal - no evidence of abnormal breat sounds, chest abnormalities on palpation and chest abnormalities on percussion and normal breath  "sounds.  Hematologic/Lymphatic - Normal - no evidence of tender or enlarged axillary lymph nodes nor tender or enlarged neck nodes.    Performance Status:  (2) Ambulatory and capable of self care, unable to carry out work activity, up and about > 50% or waking hours    Problem added:  No problems updated.  Medications added: No orders of the defined types were placed in this encounter.    Ancillary referrals made: No orders of the defined types were placed in this encounter.      Technical aspects reviewed:  Weekly OBI approved if applicable? Yes  Weekly port films approved?   Yes  Change requests noted if applicable?  No  Patient setup and plan reviewed?  Yes    Chart Reviewed:  Continue current treatment plan?   Yes  Treatment plan change requested?  No    I have reviewed and marked as \"reviewed\" the current medications, allergies and problem list in the patients EMR.  I have reviewed the patient's medical, surgical  history in detail, reviewed any pertinent lab work and updated the computerized patient record if needed.    Patient's Care Team:  Patient Care Team:  Kevin Seymour MD as PCP - General  Unruly Bobo MD as Consulting Physician (Cardiology)  Heather Anthony MD as Consulting Physician (Pulmonary Disease)  Luiza Eddy MD as Surgeon (General Surgery)  Laith Prado DPM as Consulting Physician (Podiatry)  Karlie Nur MD as Consulting Physician (Hematology and Oncology)  Michael Christie MD as Consulting Physician (Endocrinology)    Seen and approved by:  Brandie Bennett MD, 01/24/2018  "

## 2018-01-25 ENCOUNTER — TREATMENT (OUTPATIENT)
Dept: CARDIAC REHAB | Facility: HOSPITAL | Age: 73
End: 2018-01-25

## 2018-01-25 DIAGNOSIS — J44.9 COPD, SEVERE (HCC): Primary | ICD-10-CM

## 2018-01-25 PROCEDURE — 77386 CHG INTENSITY MODULATED RADIATION TX DLVR COMPLEX: CPT | Performed by: RADIOLOGY

## 2018-01-25 PROCEDURE — 77014 CHG CT GUIDANCE RADIATION THERAPY FLDS PLACEMENT: CPT | Performed by: RADIOLOGY

## 2018-01-25 PROCEDURE — 77386: CPT | Performed by: RADIOLOGY

## 2018-01-25 PROCEDURE — G0424 PULMONARY REHAB W EXER: HCPCS

## 2018-01-26 PROCEDURE — 77386 CHG INTENSITY MODULATED RADIATION TX DLVR COMPLEX: CPT | Performed by: RADIOLOGY

## 2018-01-26 PROCEDURE — 77014 CHG CT GUIDANCE RADIATION THERAPY FLDS PLACEMENT: CPT | Performed by: RADIOLOGY

## 2018-01-26 PROCEDURE — 77386: CPT | Performed by: RADIOLOGY

## 2018-01-27 ENCOUNTER — TREATMENT (OUTPATIENT)
Dept: CARDIAC REHAB | Facility: HOSPITAL | Age: 73
End: 2018-01-27

## 2018-01-27 DIAGNOSIS — J44.9 COPD, SEVERE (HCC): Primary | ICD-10-CM

## 2018-01-27 PROCEDURE — G0424 PULMONARY REHAB W EXER: HCPCS

## 2018-01-30 ENCOUNTER — TREATMENT (OUTPATIENT)
Dept: CARDIAC REHAB | Facility: HOSPITAL | Age: 73
End: 2018-01-30

## 2018-01-30 DIAGNOSIS — J44.9 COPD, SEVERE (HCC): Primary | ICD-10-CM

## 2018-01-30 PROCEDURE — G0424 PULMONARY REHAB W EXER: HCPCS

## 2018-02-01 ENCOUNTER — TREATMENT (OUTPATIENT)
Dept: CARDIAC REHAB | Facility: HOSPITAL | Age: 73
End: 2018-02-01

## 2018-02-01 DIAGNOSIS — J44.9 COPD, SEVERE (HCC): Primary | ICD-10-CM

## 2018-02-01 PROCEDURE — G0424 PULMONARY REHAB W EXER: HCPCS

## 2018-02-03 ENCOUNTER — TREATMENT (OUTPATIENT)
Dept: CARDIAC REHAB | Facility: HOSPITAL | Age: 73
End: 2018-02-03

## 2018-02-03 DIAGNOSIS — J44.9 COPD, SEVERE (HCC): Primary | ICD-10-CM

## 2018-02-03 PROCEDURE — G0424 PULMONARY REHAB W EXER: HCPCS

## 2018-02-06 ENCOUNTER — TELEPHONE (OUTPATIENT)
Dept: RADIATION ONCOLOGY | Facility: HOSPITAL | Age: 73
End: 2018-02-06

## 2018-02-06 ENCOUNTER — TREATMENT (OUTPATIENT)
Dept: CARDIAC REHAB | Facility: HOSPITAL | Age: 73
End: 2018-02-06

## 2018-02-06 DIAGNOSIS — J44.9 COPD, SEVERE (HCC): Primary | ICD-10-CM

## 2018-02-06 PROCEDURE — G0424 PULMONARY REHAB W EXER: HCPCS

## 2018-02-06 NOTE — TELEPHONE ENCOUNTER
Returned patient phone call. Ms Arredondo asks when Radiation will be out of her system so she can go to dentist.. Informed patient she may go to dentist.

## 2018-02-08 ENCOUNTER — TREATMENT (OUTPATIENT)
Dept: CARDIAC REHAB | Facility: HOSPITAL | Age: 73
End: 2018-02-08

## 2018-02-08 DIAGNOSIS — J44.9 COPD, SEVERE (HCC): Primary | ICD-10-CM

## 2018-02-08 PROCEDURE — G0424 PULMONARY REHAB W EXER: HCPCS

## 2018-02-10 ENCOUNTER — TREATMENT (OUTPATIENT)
Dept: CARDIAC REHAB | Facility: HOSPITAL | Age: 73
End: 2018-02-10

## 2018-02-10 DIAGNOSIS — J44.9 COPD, SEVERE (HCC): Primary | ICD-10-CM

## 2018-02-10 PROCEDURE — G0424 PULMONARY REHAB W EXER: HCPCS

## 2018-02-15 ENCOUNTER — OFFICE VISIT (OUTPATIENT)
Dept: ONCOLOGY | Facility: CLINIC | Age: 73
End: 2018-02-15

## 2018-02-15 ENCOUNTER — TREATMENT (OUTPATIENT)
Dept: CARDIAC REHAB | Facility: HOSPITAL | Age: 73
End: 2018-02-15

## 2018-02-15 ENCOUNTER — LAB (OUTPATIENT)
Dept: LAB | Facility: HOSPITAL | Age: 73
End: 2018-02-15

## 2018-02-15 VITALS
SYSTOLIC BLOOD PRESSURE: 120 MMHG | DIASTOLIC BLOOD PRESSURE: 60 MMHG | TEMPERATURE: 98.6 F | RESPIRATION RATE: 16 BRPM | HEIGHT: 60 IN | BODY MASS INDEX: 18.69 KG/M2 | WEIGHT: 95.2 LBS | HEART RATE: 106 BPM

## 2018-02-15 DIAGNOSIS — C34.12 MALIGNANT NEOPLASM OF UPPER LOBE OF LEFT LUNG (HCC): Primary | ICD-10-CM

## 2018-02-15 DIAGNOSIS — C34.12 MALIGNANT NEOPLASM OF UPPER LOBE OF LEFT LUNG (HCC): ICD-10-CM

## 2018-02-15 DIAGNOSIS — J44.9 COPD, SEVERE (HCC): Primary | ICD-10-CM

## 2018-02-15 LAB
ALBUMIN SERPL-MCNC: 3.8 G/DL (ref 3.5–5.2)
ALBUMIN/GLOB SERPL: 1.2 G/DL (ref 1.1–2.4)
ALP SERPL-CCNC: 81 U/L (ref 38–116)
ALT SERPL W P-5'-P-CCNC: 14 U/L (ref 0–33)
ANION GAP SERPL CALCULATED.3IONS-SCNC: 8.7 MMOL/L
AST SERPL-CCNC: 17 U/L (ref 0–32)
BASOPHILS # BLD AUTO: 0.04 10*3/MM3 (ref 0–0.1)
BASOPHILS NFR BLD AUTO: 0.6 % (ref 0–1.1)
BILIRUB SERPL-MCNC: 0.2 MG/DL (ref 0.1–1.2)
BUN BLD-MCNC: 14 MG/DL (ref 6–20)
BUN/CREAT SERPL: 21.5 (ref 7.3–30)
CALCIUM SPEC-SCNC: 9.4 MG/DL (ref 8.5–10.2)
CHLORIDE SERPL-SCNC: 104 MMOL/L (ref 98–107)
CO2 SERPL-SCNC: 31.3 MMOL/L (ref 22–29)
CREAT BLD-MCNC: 0.65 MG/DL (ref 0.6–1.1)
DEPRECATED RDW RBC AUTO: 51.3 FL (ref 37–49)
EOSINOPHIL # BLD AUTO: 0.1 10*3/MM3 (ref 0–0.36)
EOSINOPHIL NFR BLD AUTO: 1.5 % (ref 1–5)
ERYTHROCYTE [DISTWIDTH] IN BLOOD BY AUTOMATED COUNT: 14.2 % (ref 11.7–14.5)
GFR SERPL CREATININE-BSD FRML MDRD: 89 ML/MIN/1.73
GLOBULIN UR ELPH-MCNC: 3.3 GM/DL (ref 1.8–3.5)
GLUCOSE BLD-MCNC: 88 MG/DL (ref 74–124)
HCT VFR BLD AUTO: 40.6 % (ref 34–45)
HGB BLD-MCNC: 12.8 G/DL (ref 11.5–14.9)
IMM GRANULOCYTES # BLD: 0.05 10*3/MM3 (ref 0–0.03)
IMM GRANULOCYTES NFR BLD: 0.7 % (ref 0–0.5)
LYMPHOCYTES # BLD AUTO: 0.24 10*3/MM3 (ref 1–3.5)
LYMPHOCYTES NFR BLD AUTO: 3.6 % (ref 20–49)
MCH RBC QN AUTO: 30.8 PG (ref 27–33)
MCHC RBC AUTO-ENTMCNC: 31.5 G/DL (ref 32–35)
MCV RBC AUTO: 97.6 FL (ref 83–97)
MONOCYTES # BLD AUTO: 0.76 10*3/MM3 (ref 0.25–0.8)
MONOCYTES NFR BLD AUTO: 11.3 % (ref 4–12)
NEUTROPHILS # BLD AUTO: 5.54 10*3/MM3 (ref 1.5–7)
NEUTROPHILS NFR BLD AUTO: 82.3 % (ref 39–75)
NRBC BLD MANUAL-RTO: 0 /100 WBC (ref 0–0)
PLATELET # BLD AUTO: 260 10*3/MM3 (ref 150–375)
PMV BLD AUTO: 8.4 FL (ref 8.9–12.1)
POTASSIUM BLD-SCNC: 3.8 MMOL/L (ref 3.5–4.7)
PROT SERPL-MCNC: 7.1 G/DL (ref 6.3–8)
RBC # BLD AUTO: 4.16 10*6/MM3 (ref 3.9–5)
SODIUM BLD-SCNC: 144 MMOL/L (ref 134–145)
WBC NRBC COR # BLD: 6.73 10*3/MM3 (ref 4–10)

## 2018-02-15 PROCEDURE — 85025 COMPLETE CBC W/AUTO DIFF WBC: CPT | Performed by: INTERNAL MEDICINE

## 2018-02-15 PROCEDURE — 99215 OFFICE O/P EST HI 40 MIN: CPT | Performed by: INTERNAL MEDICINE

## 2018-02-15 PROCEDURE — 80053 COMPREHEN METABOLIC PANEL: CPT | Performed by: INTERNAL MEDICINE

## 2018-02-15 PROCEDURE — 36415 COLL VENOUS BLD VENIPUNCTURE: CPT | Performed by: INTERNAL MEDICINE

## 2018-02-15 PROCEDURE — G0424 PULMONARY REHAB W EXER: HCPCS

## 2018-02-15 RX ORDER — ACYCLOVIR 400 MG/1
TABLET ORAL
COMMUNITY
Start: 2018-02-12 | End: 2018-04-24

## 2018-02-15 NOTE — PROGRESS NOTES
History:     Reason for follow up:   1.  Stage IIB left upper lobe non-small cell lung cancer, high PDL-1 (80%), negative egfr/alk/ros   * status post radiation therapy     HPI:  Maryam Arredondo presents for follow-up of her Lung cancer.  She completed radiation therapy.  She returns today to discuss the possibility of adjuvant chemotherapy.  She remains on supplemental oxygen and has fatigue.  She is working with physical therapy and is increased her activity quite a bit but does not feel mentally or physically ready for chemotherapy.  She reports that her shortness of breath is improving.  She does have nasal and sinus congestion for which she is recently been started on an antibiotic.  She denies any fevers, chills, night sweats or change in cough.  She has a CT scan scheduled for March.    Past Medical   Past Medical History:   Diagnosis Date   • Allergic rhinitis    • Asthma    • Breast cancer     s/p Lumpectomy ~2000 and chemo/XRT. Then  Masectomy ~2004 for some recurrence   • COPD (chronic obstructive pulmonary disease)    • Hyperthyroidism    • Mitral regurgitation     mild to moderate   • Osteopenia    • Oxygen dependent     2L - at night only   • Pneumothorax    • SOB (shortness of breath)    • Tachycardia    • Toxic multinodular goiter    • Vitamin D deficiency     and   Patient Active Problem List   Diagnosis   • Pneumothorax   • Hypoxia   • Tachycardia   • Hyperthyroidism   • Essential hypertension   • Multifocal atrial tachycardia   • Toxic multinodular goiter   • Pulmonary emphysema   • Vitamin D insufficiency   • Pneumonia   • COPD with acute lower respiratory infection   • Lung mass - possible   • Infection due to parainfluenza virus 1   • Malignant neoplasm of upper lobe of left lung     Social History   Social History     Social History   • Marital status: Single     Spouse name: N/A   • Number of children: N/A   • Years of education: N/A     Occupational History   • Admistrative Assistant Retired  "    Social History Main Topics   • Smoking status: Former Smoker     Packs/day: 2.00     Years: 50.00     Types: Cigarettes     Quit date: 3/14/1994   • Smokeless tobacco: Never Used      Comment: D/C 20 years ago, smoking 2 ppd before quitting   • Alcohol use No      Comment: Social use rare   • Drug use: No   • Sexual activity: Defer      Comment: drinks decaf      Other Topics Concern   • Not on file     Social History Narrative     Family History  Family History   Problem Relation Age of Onset   • Arthritis Mother    • Heart failure Mother      Congestive Heart Failure   • Lung cancer Father    • Breast cancer Paternal Grandmother    • No Known Problems Sister      Allergies  Allergies   Allergen Reactions   • Codeine    • Mucinex D [Pseudoephedrine-Guaifenesin Er] Nausea Only     Felt hot in chest   • Penicillin V    • Penicillins        Medications: The current medication list was reviewed in the EMR.    Review of Systems  Review of Systems   Constitutional: Positive for fatigue. Negative for activity change, appetite change, chills, diaphoresis, fever and unexpected weight change.   HENT: Positive for congestion and sinus pressure.    Respiratory: Positive for shortness of breath. Negative for cough and chest tightness.    Cardiovascular: Negative for chest pain, palpitations and leg swelling.   Gastrointestinal: Negative for abdominal pain, blood in stool, constipation, diarrhea, nausea and vomiting.   Musculoskeletal: Positive for arthralgias. Negative for joint swelling and myalgias.   Hematological: Negative for adenopathy. Does not bruise/bleed easily.       Objective    Objective:     Vitals:    02/15/18 1115   BP: 120/60   Pulse: 106   Resp: 16   Temp: 98.6 °F (37 °C)   Weight: 43.2 kg (95 lb 3.2 oz)   Height: 152.5 cm (60.04\")   PainSc: 0-No pain     Current Status 2/15/2018   ECOG score 0   GENERAL:  Well-developed, thin female in no acute distress. Vital signs reviewed.   SKIN:  Warm, dry without " rashes, purpura or petechiae.  EYES:  Pupils equal, round and reactive to light.  EOMs intact.  Conjunctivae normal.  HEAD:  Normocephalic.  EARS/NOSE/MOUTH/THROAT:  Hearing intact. Septum midline.  No excoriations or nasal discharge. No stomatitis or ulcers.  Lips normal. Oropharynx without lesions or exudates. Nasal cannula oxygen in place. + frontal sinus pressure  NECK:  Supple with good range of motion; no masses  LYMPHATICS:  No cervical, supraclavicular, axillary adenopathy.  RESP:  Lungs clear to percussion and auscultation but with generally decreased airflow. Normal respiratory effort.   CARDIAC:   Tachycardic and regular rhythm without murmurs, rubs or gallops. Normal S1,S2. No edema  GI:  Soft, nontender, no hepatosplenomegaly, normal bowel sounds  MSK:  No clubbing or cyanosis, No joint swelling noted in hands  NEUROLOGICAL:  Cranial Nerves II-XII grossly intact.  No focal neurological deficits.  PSYCHIATRIC:  Normal affect and mood.  Alert and Oriented x 3.     Labs and Imaging  Results for orders placed or performed in visit on 02/15/18   Comprehensive Metabolic Panel   Result Value Ref Range    Glucose 88 74 - 124 mg/dL    BUN 14 6 - 20 mg/dL    Creatinine 0.65 0.60 - 1.10 mg/dL    Sodium 144 134 - 145 mmol/L    Potassium 3.8 3.5 - 4.7 mmol/L    Chloride 104 98 - 107 mmol/L    CO2 31.3 (H) 22.0 - 29.0 mmol/L    Calcium 9.4 8.5 - 10.2 mg/dL    Total Protein 7.1 6.3 - 8.0 g/dL    Albumin 3.80 3.50 - 5.20 g/dL    ALT (SGPT) 14 0 - 33 U/L    AST (SGOT) 17 0 - 32 U/L    Alkaline Phosphatase 81 38 - 116 U/L    Total Bilirubin 0.2 0.1 - 1.2 mg/dL    eGFR Non African Amer 89 >60 mL/min/1.73    Globulin 3.3 1.8 - 3.5 gm/dL    A/G Ratio 1.2 1.1 - 2.4 g/dL    BUN/Creatinine Ratio 21.5 7.3 - 30.0    Anion Gap 8.7 mmol/L   CBC Auto Differential   Result Value Ref Range    WBC 6.73 4.00 - 10.00 10*3/mm3    RBC 4.16 3.90 - 5.00 10*6/mm3    Hemoglobin 12.8 11.5 - 14.9 g/dL    Hematocrit 40.6 34.0 - 45.0 %    MCV  97.6 (H) 83.0 - 97.0 fL    MCH 30.8 27.0 - 33.0 pg    MCHC 31.5 (L) 32.0 - 35.0 g/dL    RDW 14.2 11.7 - 14.5 %    RDW-SD 51.3 (H) 37.0 - 49.0 fl    MPV 8.4 (L) 8.9 - 12.1 fL    Platelets 260 150 - 375 10*3/mm3    Neutrophil % 82.3 (H) 39.0 - 75.0 %    Lymphocyte % 3.6 (L) 20.0 - 49.0 %    Monocyte % 11.3 4.0 - 12.0 %    Eosinophil % 1.5 1.0 - 5.0 %    Basophil % 0.6 0.0 - 1.1 %    Immature Grans % 0.7 (H) 0.0 - 0.5 %    Neutrophils, Absolute 5.54 1.50 - 7.00 10*3/mm3    Lymphocytes, Absolute 0.24 (L) 1.00 - 3.50 10*3/mm3    Monocytes, Absolute 0.76 0.25 - 0.80 10*3/mm3    Eosinophils, Absolute 0.10 0.00 - 0.36 10*3/mm3    Basophils, Absolute 0.04 0.00 - 0.10 10*3/mm3    Immature Grans, Absolute 0.05 (H) 0.00 - 0.03 10*3/mm3    nRBC 0.0 0.0 - 0.0 /100 WBC     Assessment/Plan   Assessment/Plan:   This is a 72 y.o. female with:     1.  Stage IIB (T3N0M0) left upper lobe non-small cell lung cancer, high PDL-1 (80%), negative egfr/alk/ros   * Appears to be a local lung cancer based on PET/CT scan. On initially scans, it appeared she may have hilar and mediastinal adenopathy and an indeterminate liver lesion, but all PET negative. She had active infection during her initial CT scans that had resolved when PET was done. On CT, it appeared to be two lung lesions, all in one lobe, but are involving pleura- T3N0M0   * She is high PDL 1 and thus if she progressed that she could be a candidate for Keytruda therapy (not approved for adjuvant use though).    * status post definitive radiation therapy for local treatment, completed 1/26/2018. Not a surgical candidate   * We discussed that adjuvant chemotherapy would be standard of care however she has not a candidate for chemotherapy and declines at.  We discussed that at this point we will move into surveillance mode.  She has imaging studies scheduled with Dr. Bennett with follow-up afterwards to see her.  I will plan to see her back in 3 months time.  We discussed today that if  she were to have progression later than we can consider Keytruda.    2. History of right breast cancer, initially diagnosed in 2000 treated with lumpectomy, radiation, chemotherapy and endocrine therapy with local recurrence in 2005 treated with mastectomy and then Arimidex. New lung pathology does not appear to be related to prior breast cancer.      3. COPD  4. Abnormal thyroid imaging on PET. Unlikely to be metastatic disease. Following with Dr Christie.     I spent 27 minutes with the patient and family member with 23 minutes spent face-to-face counseling regarding standard of care treatment, side effects of chemotherapy and goals of care if this does progress.    Follow-up in 3 months. I asked the patient to call for any questions, concerns, or new symptoms.

## 2018-02-17 ENCOUNTER — TREATMENT (OUTPATIENT)
Dept: CARDIAC REHAB | Facility: HOSPITAL | Age: 73
End: 2018-02-17

## 2018-02-17 DIAGNOSIS — J44.9 COPD, SEVERE (HCC): Primary | ICD-10-CM

## 2018-02-17 PROCEDURE — G0424 PULMONARY REHAB W EXER: HCPCS

## 2018-02-20 ENCOUNTER — TREATMENT (OUTPATIENT)
Dept: CARDIAC REHAB | Facility: HOSPITAL | Age: 73
End: 2018-02-20

## 2018-02-20 DIAGNOSIS — J44.9 COPD, SEVERE (HCC): Primary | ICD-10-CM

## 2018-02-20 PROCEDURE — G0424 PULMONARY REHAB W EXER: HCPCS

## 2018-02-21 NOTE — PROGRESS NOTES
Date of First Steps ACP interview: 1/11/2018  Location of interview: [facility]  First Steps ACP Facilitator: Layla Jain RN  Referral Source: Dee Saba  Present for facilitation: Patient    SUMMARY OF ADVANCE CARE PLANNING DISCUSSION:  Maryam presents for First Steps facilitation. We reviewed purpose and goals for advance care planning.    I reviewed with Maryam qualities to consider when choosing a healthcare agent. Maryam had selected daughter, Patience Tripathi as her healthcare agent. I encouraged Maryam to discuss her preferences for future care with healthcare agents and others close to her.    Maryam describes past experiences dealing with friends or family who have been seriously ill: She has several family experiences. From these experiences Maryam learned that her daughter will make decisions for her.    Maryam identified the following as most important to living well: spending time with family    Mayram describes cultural, Spiritism, spiritual or personal practices/beliefs that are important to her or might help her choose the care wanted, or not wanted: Maryam voices that she is a Gnosticist.    Goals of medical care for severe, permanent brain injury were explored: Yes     Education materials were provided on: Advance Care Planning and Healthcare Agent Selection    Each section of the advance care/living will document was reviewed and discussed.    Advance care/living will document finished during this facilitation? no    Time spent on preparation, facilitation and documentation was under 30 minutes.    RECOMMENDATIONS/FOLLOW-UP:  Bring Living Will in for review.    CONSULT/NOTE ROUTED      Layla Jain RN

## 2018-02-22 ENCOUNTER — TREATMENT (OUTPATIENT)
Dept: CARDIAC REHAB | Facility: HOSPITAL | Age: 73
End: 2018-02-22

## 2018-02-22 DIAGNOSIS — J44.9 COPD, SEVERE (HCC): Primary | ICD-10-CM

## 2018-02-22 PROCEDURE — G0424 PULMONARY REHAB W EXER: HCPCS

## 2018-02-24 ENCOUNTER — TREATMENT (OUTPATIENT)
Dept: CARDIAC REHAB | Facility: HOSPITAL | Age: 73
End: 2018-02-24

## 2018-02-24 DIAGNOSIS — J44.9 COPD, SEVERE (HCC): Primary | ICD-10-CM

## 2018-02-24 PROCEDURE — G0424 PULMONARY REHAB W EXER: HCPCS

## 2018-02-25 ENCOUNTER — DOCUMENTATION (OUTPATIENT)
Dept: RADIATION ONCOLOGY | Facility: HOSPITAL | Age: 73
End: 2018-02-25

## 2018-02-26 NOTE — PROGRESS NOTES
RADIATION THERAPY TREATMENT SUMMARY    DIAGNOSIS:  Malignant neoplasm of upper lobe of left lung     Patient Care Team:  Kevin Seymour MD as PCP - General  Unruly Bobo MD as Consulting Physician (Cardiology)  Heather Anthony MD as Consulting Physician (Pulmonary Disease)  Luiza Eddy MD as Surgeon (General Surgery)  Laith Prado DPM as Consulting Physician (Podiatry)  Karlie Nur MD as Consulting Physician (Hematology and Oncology)  Michael Christie MD as Consulting Physician (Endocrinology)    The patient is a 72 y.o. year old female who has recently completed radiation therapy treatment for the above mentioned diagnosis.     Please see the specifics of the course of treatment below.    Dates of treatment:  12/6/2017 - 1/26/2018.  Treatment Site - Left Lung  Treatment Intent - Curative  Total Dose in cGy - 7000  Number of Treatments - 35  Dose per fraction - 200 cGy per fraction  Fractions per day - 1 fx/day  Fractions per week - 5 fx/week  Treatment Type - Rapid Arc  Energy - 6 MVP  Normalization - Volumetric Normalization-- see below  Imaging/Field Verification - IGRT using CBCT daily    Tolerance and Toxicities:  she tolerated the treatments well, with an improvement in her performance status throughout. she required no treatment breaks and the above course of treatments was completed in 51 elapsed days    F/U plans:  I have asked her to undergo a CT of the chest in 6 weeks and return to see me thereafter.

## 2018-02-27 ENCOUNTER — TREATMENT (OUTPATIENT)
Dept: CARDIAC REHAB | Facility: HOSPITAL | Age: 73
End: 2018-02-27

## 2018-02-27 DIAGNOSIS — J44.9 COPD, SEVERE (HCC): Primary | ICD-10-CM

## 2018-02-27 PROCEDURE — G0424 PULMONARY REHAB W EXER: HCPCS

## 2018-03-01 ENCOUNTER — TREATMENT (OUTPATIENT)
Dept: CARDIAC REHAB | Facility: HOSPITAL | Age: 73
End: 2018-03-01

## 2018-03-01 DIAGNOSIS — E05.20 TOXIC MULTINODULAR GOITER: Primary | ICD-10-CM

## 2018-03-01 DIAGNOSIS — J44.9 COPD, SEVERE (HCC): Primary | ICD-10-CM

## 2018-03-01 DIAGNOSIS — E05.90 HYPERTHYROIDISM: ICD-10-CM

## 2018-03-01 PROCEDURE — G0424 PULMONARY REHAB W EXER: HCPCS

## 2018-03-01 RX ORDER — METHIMAZOLE 10 MG/1
TABLET ORAL
Qty: 30 TABLET | Refills: 5 | Status: SHIPPED | OUTPATIENT
Start: 2018-03-01 | End: 2018-04-24 | Stop reason: SDUPTHER

## 2018-03-03 ENCOUNTER — TREATMENT (OUTPATIENT)
Dept: CARDIAC REHAB | Facility: HOSPITAL | Age: 73
End: 2018-03-03

## 2018-03-03 DIAGNOSIS — J44.9 COPD, SEVERE (HCC): Primary | ICD-10-CM

## 2018-03-03 PROCEDURE — G0424 PULMONARY REHAB W EXER: HCPCS

## 2018-03-06 ENCOUNTER — TREATMENT (OUTPATIENT)
Dept: CARDIAC REHAB | Facility: HOSPITAL | Age: 73
End: 2018-03-06

## 2018-03-06 DIAGNOSIS — J44.9 COPD, SEVERE (HCC): Primary | ICD-10-CM

## 2018-03-06 PROCEDURE — G0424 PULMONARY REHAB W EXER: HCPCS

## 2018-03-08 ENCOUNTER — TREATMENT (OUTPATIENT)
Dept: CARDIAC REHAB | Facility: HOSPITAL | Age: 73
End: 2018-03-08

## 2018-03-08 DIAGNOSIS — J44.9 COPD, SEVERE (HCC): Primary | ICD-10-CM

## 2018-03-08 PROCEDURE — G0424 PULMONARY REHAB W EXER: HCPCS

## 2018-03-13 ENCOUNTER — TREATMENT (OUTPATIENT)
Dept: CARDIAC REHAB | Facility: HOSPITAL | Age: 73
End: 2018-03-13

## 2018-03-13 DIAGNOSIS — J44.9 COPD, SEVERE (HCC): Primary | ICD-10-CM

## 2018-03-13 PROCEDURE — G0424 PULMONARY REHAB W EXER: HCPCS

## 2018-03-14 ENCOUNTER — HOSPITAL ENCOUNTER (OUTPATIENT)
Dept: CT IMAGING | Facility: HOSPITAL | Age: 73
Discharge: HOME | End: 2018-03-14
Attending: RADIOLOGY | Admitting: RADIOLOGY

## 2018-03-14 DIAGNOSIS — C34.12 MALIGNANT NEOPLASM OF UPPER LOBE OF LEFT LUNG (HCC): ICD-10-CM

## 2018-03-14 LAB — CREAT BLDA-MCNC: 0.6 MG/DL (ref 0.6–1.3)

## 2018-03-14 PROCEDURE — 71260 CT THORAX DX C+: CPT

## 2018-03-14 PROCEDURE — 0 IOPAMIDOL 61 % SOLUTION: Performed by: RADIOLOGY

## 2018-03-14 PROCEDURE — 82565 ASSAY OF CREATININE: CPT

## 2018-03-14 RX ADMIN — IOPAMIDOL 75 ML: 612 INJECTION, SOLUTION INTRAVENOUS at 11:39

## 2018-03-15 ENCOUNTER — TREATMENT (OUTPATIENT)
Dept: CARDIAC REHAB | Facility: HOSPITAL | Age: 73
End: 2018-03-15

## 2018-03-15 DIAGNOSIS — J44.9 COPD, SEVERE (HCC): Primary | ICD-10-CM

## 2018-03-15 PROCEDURE — G0424 PULMONARY REHAB W EXER: HCPCS

## 2018-03-17 ENCOUNTER — TREATMENT (OUTPATIENT)
Dept: CARDIAC REHAB | Facility: HOSPITAL | Age: 73
End: 2018-03-17

## 2018-03-17 DIAGNOSIS — J44.9 COPD, SEVERE (HCC): Primary | ICD-10-CM

## 2018-03-17 PROCEDURE — G0424 PULMONARY REHAB W EXER: HCPCS

## 2018-03-20 ENCOUNTER — TREATMENT (OUTPATIENT)
Dept: CARDIAC REHAB | Facility: HOSPITAL | Age: 73
End: 2018-03-20

## 2018-03-20 DIAGNOSIS — J44.9 COPD, SEVERE (HCC): Primary | ICD-10-CM

## 2018-03-20 PROCEDURE — G0424 PULMONARY REHAB W EXER: HCPCS

## 2018-03-22 ENCOUNTER — OFFICE VISIT (OUTPATIENT)
Dept: RADIATION ONCOLOGY | Facility: HOSPITAL | Age: 73
End: 2018-03-22

## 2018-03-22 ENCOUNTER — TREATMENT (OUTPATIENT)
Dept: CARDIAC REHAB | Facility: HOSPITAL | Age: 73
End: 2018-03-22

## 2018-03-22 ENCOUNTER — APPOINTMENT (OUTPATIENT)
Dept: RADIATION ONCOLOGY | Facility: HOSPITAL | Age: 73
End: 2018-03-22

## 2018-03-22 VITALS
HEART RATE: 95 BPM | SYSTOLIC BLOOD PRESSURE: 125 MMHG | WEIGHT: 95 LBS | TEMPERATURE: 98 F | OXYGEN SATURATION: 96 % | BODY MASS INDEX: 18.53 KG/M2 | DIASTOLIC BLOOD PRESSURE: 69 MMHG | RESPIRATION RATE: 16 BRPM

## 2018-03-22 DIAGNOSIS — C34.12 MALIGNANT NEOPLASM OF UPPER LOBE OF LEFT LUNG (HCC): Primary | ICD-10-CM

## 2018-03-22 DIAGNOSIS — J44.9 COPD, SEVERE (HCC): Primary | ICD-10-CM

## 2018-03-22 PROCEDURE — G0424 PULMONARY REHAB W EXER: HCPCS

## 2018-03-22 PROCEDURE — 99024 POSTOP FOLLOW-UP VISIT: CPT | Performed by: RADIOLOGY

## 2018-03-22 RX ORDER — IPRATROPIUM BROMIDE 21 UG/1
SPRAY, METERED NASAL AS NEEDED
COMMUNITY
Start: 2018-03-02 | End: 2019-02-12

## 2018-03-22 NOTE — PROGRESS NOTES
DIAGNOSIS and REASON FOR FOLLOW UP: Malignant neoplasm of upper lobe of left lung     Stage IIB (T3, N0, M0)     Patient Care Team:  Kevin Seymour MD as PCP - General  Unruly Bobo MD as Consulting Physician (Cardiology)  Heather Anthony MD as Consulting Physician (Pulmonary Disease)  Luiza Eddy MD as Surgeon (General Surgery)  Laith Prado DPM as Consulting Physician (Podiatry)  Karlie Nur MD as Consulting Physician (Hematology and Oncology)  Michael Christie MD as Consulting Physician (Endocrinology)    CHIEF COMPLAINT:  Post-radiation follow up  I had the pleasure of seeing Maryam Arredondo  back in the department today, now approximately 7 weeks out from the radiation therapy portion of her treatment for the above mentioned diagnosis.  She is doing amazingly well. She has completed her pulmonary rehab and is ready to return to [facility]. She is only intermittently short of breath and denies significant cough, dysphagia, hemoptysis or weight loss.    She did undergo a CT of the chest on March 14, 2018 which thankfully shows a nice response to treatment with a decrease in the size of the left lung disease and no new nodules or lymphadenopathy.     Past Medical History: she  has a past medical history of Allergic rhinitis; Asthma; Breast cancer; COPD (chronic obstructive pulmonary disease); Hyperthyroidism; Mitral regurgitation; Osteopenia; Oxygen dependent; Pneumothorax; SOB (shortness of breath); Tachycardia; Toxic multinodular goiter; and Vitamin D deficiency.   No history exists.       Past Surgical History:  she has a past surgical history that includes Breast lumpectomy (Right); Mastectomy (Right, 2004); Colonoscopy; Neck surgery; Hysterectomy; Vascular surgery; Breast lumpectomy; Cardiac catheterization; and Colonoscopy (N/A, 4/28/2017).    Meds:    Current Outpatient Prescriptions:   •  albuterol (PROVENTIL HFA;VENTOLIN HFA) 108 (90 Base) MCG/ACT inhaler, Inhale 2 puffs Every 4  (Four) Hours As Needed for Wheezing. Per MD - until current episode is resolved, Disp: , Rfl:   •  albuterol (PROVENTIL) (2.5 MG/3ML) 0.083% nebulizer solution, Take 2.5 mg by nebulization Every 4 (Four) Hours As Needed for Wheezing., Disp: 360 mL, Rfl: 3  •  budesonide-formoterol (SYMBICORT) 160-4.5 MCG/ACT inhaler, Inhale 2 puffs 2 (two) times a day., Disp: , Rfl:   •  CARTIA  MG 24 hr capsule, , Disp: , Rfl:   •  cetirizine (ZyrTEC) 10 MG tablet, Take 10 mg by mouth daily., Disp: , Rfl:   •  Cholecalciferol (VITAMIN D3) 2000 UNITS tablet, Take 1 tablet by mouth daily., Disp: , Rfl:   •  digoxin (LANOXIN) 125 MCG tablet, Take 125 mcg by mouth Daily., Disp: , Rfl:   •  diltiaZEM CD (CARTIA XT) 120 MG 24 hr capsule, Take 120 mg by mouth Daily., Disp: , Rfl:   •  fluticasone (FLONASE) 50 MCG/ACT nasal spray, 2 Squirts into each nostril daily., Disp: , Rfl:   •  ipratropium (ATROVENT) 0.03 % nasal spray, , Disp: , Rfl:   •  methimazole (TAPAZOLE) 10 MG tablet, 1/2 tablet daily (Patient taking differently: Take 5 mg by mouth Daily. 1/2 tablet daily), Disp: 30 tablet, Rfl: 5  •  metroNIDAZOLE (METROCREAM) 0.75 % cream, , Disp: , Rfl:   •  O2 (OXYGEN), Inhale Continuous., Disp: , Rfl:   •  SPIRIVA RESPIMAT 2.5 MCG/ACT aerosol solution, 2 puffs Daily., Disp: , Rfl:   •  vitamin B-12 (CYANOCOBALAMIN) 1000 MCG tablet, Take 1,000 mcg by mouth daily., Disp: , Rfl:   •  acyclovir (ZOVIRAX) 400 MG tablet, , Disp: , Rfl:   •  methIMAzole (TAPAZOLE) 10 MG tablet, TAKE ONE-HALF TABLET BY MOUTH DAILY, Disp: 30 tablet, Rfl: 5  •  mupirocin (BACTROBAN) 2 % ointment, , Disp: , Rfl:     Allergies:    Allergies   Allergen Reactions   • Codeine    • Mucinex D [Pseudoephedrine-Guaifenesin Er] Nausea Only     Felt hot in chest   • Penicillin V    • Penicillins        Family History:  her family history includes Arthritis in her mother; Breast cancer in her paternal grandmother; Heart failure in her mother; Lung cancer in her father;  No Known Problems in her sister.    Social History:  she  reports that she quit smoking about 24 years ago. Her smoking use included Cigarettes. She has a 100.00 pack-year smoking history. She has never used smokeless tobacco. She reports that she does not drink alcohol or use drugs.    Pertinent Findings on Review of Systems:  Fourteen systems have been reviewed with the patient and are negative other than as mentioned above.    Performance Status:  (2) Ambulatory and capable of self care, unable to carry out work activity, up and about > 50% or waking hours    Pertinent Findings on Physical Exam:  Vitals:    03/22/18 0955   BP: 125/69   Pulse: 95   Resp: 16   Temp: 98 °F (36.7 °C)   TempSrc: Oral   SpO2: 96%   Weight: 43.1 kg (95 lb)   PainSc: 0-No pain       Physical Exam   Constitutional: She is oriented to person, place, and time. She appears well-developed and well-nourished. She is active and cooperative. No distress.   HENT:   Head: Normocephalic and atraumatic.   Nose: Nose normal.   Mouth/Throat: Mucous membranes are normal. Normal dentition.   Eyes: Conjunctivae and EOM are normal.   Neck: Normal range of motion.   Pulmonary/Chest: Effort normal. She has wheezes.   Abdominal: Normal appearance. There is no hepatosplenomegaly.   Musculoskeletal: Normal range of motion.     Vascular Status -  Her right foot exhibits normal right foot edema. Her left foot exhibits normal left foot edema.  Neurological: She is alert and oriented to person, place, and time.   Skin: Skin is warm and dry.   Psychiatric: She has a normal mood and affect. Her behavior is normal. Judgment and thought content normal.       Assessment:  Malignant neoplasm of upper lobe of left lung    - Clinical: Stage IIB (T3, N0, M0)   Doing very well post treatment clinically and radiographically    Plan:   I am thrilled with her clinical and radiographic response, as is she. She does have some wheezing on exam and she has been off both the  nebulizer and her ProAir inhaler for at least several weeks. I recommended she return to the inhaler as prescribed as the nebulizer makes her jittery and anxious.     Regarding follow up, I recommended a repeat CT of the chest in 3 months and she was agreeable to that. She will return to see Dr. Nur in 2 months as well and she knows to call if she has problems prior to that.

## 2018-04-03 ENCOUNTER — HOSPITAL ENCOUNTER (OUTPATIENT)
Dept: ULTRASOUND IMAGING | Facility: HOSPITAL | Age: 73
Discharge: HOME | End: 2018-04-03
Attending: INTERNAL MEDICINE | Admitting: INTERNAL MEDICINE

## 2018-04-03 DIAGNOSIS — E05.90 HYPERTHYROIDISM: ICD-10-CM

## 2018-04-03 DIAGNOSIS — E05.20 TOXIC MULTINODULAR GOITER: ICD-10-CM

## 2018-04-03 PROCEDURE — 76536 US EXAM OF HEAD AND NECK: CPT

## 2018-04-05 ENCOUNTER — TELEPHONE (OUTPATIENT)
Dept: ENDOCRINOLOGY | Age: 73
End: 2018-04-05

## 2018-04-05 NOTE — TELEPHONE ENCOUNTER
----- Message from Dodie CHRISTOPH Jenkins sent at 4/5/2018  3:01 PM EDT -----  Contact: PATIENT  PATIENT ASKING FOR CALL BACK SAYING IT IS VERY IMPORTANT THAT SHE SPEAK TO YOU TODAY.  SHE SAID DR. GOMEZ IN DR. RICHARDS OFFICE TOLD HER SOMETHING DIFFERENT THAN YOU TOLD HER ABOUT HER THYROID.   SHE SAID DR. DURAN AND DR. BROWN WILL HAVE TO TALK WHEN DR. BROWN GETS BACK IN TOWN.   SHE WANTS  TO WAIT TO TELL YOU WHAT DR. GOMEZ TOLD HER.    [phone]

## 2018-04-06 ENCOUNTER — TRANSCRIBE ORDERS (OUTPATIENT)
Dept: ADMINISTRATIVE | Facility: HOSPITAL | Age: 73
End: 2018-04-06

## 2018-04-06 DIAGNOSIS — E04.1 THYROID NODULE: Primary | ICD-10-CM

## 2018-04-17 ENCOUNTER — TELEPHONE (OUTPATIENT)
Dept: RADIATION ONCOLOGY | Facility: HOSPITAL | Age: 73
End: 2018-04-17

## 2018-04-17 NOTE — TELEPHONE ENCOUNTER
Patient calls to ask if it is ok for mammogram after having radiation to lung . I assured her it is fine. She voices understanding.

## 2018-04-18 ENCOUNTER — TRANSCRIBE ORDERS (OUTPATIENT)
Dept: ADMINISTRATIVE | Facility: HOSPITAL | Age: 73
End: 2018-04-18

## 2018-04-18 DIAGNOSIS — E04.1 THYROID NODULE: Primary | ICD-10-CM

## 2018-04-23 ENCOUNTER — APPOINTMENT (OUTPATIENT)
Dept: ULTRASOUND IMAGING | Facility: HOSPITAL | Age: 73
End: 2018-04-23

## 2018-04-24 ENCOUNTER — OFFICE VISIT (OUTPATIENT)
Dept: ENDOCRINOLOGY | Age: 73
End: 2018-04-24

## 2018-04-24 VITALS
HEART RATE: 93 BPM | BODY MASS INDEX: 18.69 KG/M2 | SYSTOLIC BLOOD PRESSURE: 128 MMHG | HEIGHT: 60 IN | DIASTOLIC BLOOD PRESSURE: 62 MMHG | OXYGEN SATURATION: 96 % | WEIGHT: 95.2 LBS

## 2018-04-24 DIAGNOSIS — J43.9 PULMONARY EMPHYSEMA, UNSPECIFIED EMPHYSEMA TYPE (HCC): ICD-10-CM

## 2018-04-24 DIAGNOSIS — E05.20 TOXIC MULTINODULAR GOITER: Primary | ICD-10-CM

## 2018-04-24 DIAGNOSIS — C34.12 MALIGNANT NEOPLASM OF UPPER LOBE OF LEFT LUNG (HCC): ICD-10-CM

## 2018-04-24 DIAGNOSIS — I10 ESSENTIAL HYPERTENSION: ICD-10-CM

## 2018-04-24 PROCEDURE — 99214 OFFICE O/P EST MOD 30 MIN: CPT | Performed by: INTERNAL MEDICINE

## 2018-04-24 NOTE — PROGRESS NOTES
Subjective   Maryam Arredondo is a 72 y.o. female.     F/u for nontoxic MNG,hypothyroidism, COPD,asthma       Goiter   Associated symptoms include fatigue.   Hypothyroidism   Associated symptoms include fatigue.   COPD   Associated symptoms include fatigue.      Patient is a 72`-year-old female who came in for follow-up. She has toxic multinodular goiter. She had a thyroid scan and uptake in February 2014 which showed increased uptake of 40% with a heterogeneous distribution of the radiopharmaceutical in both lobes of the thyroid gland. A dominant area of photopenia is present on the left lobe. She had a thyroid ultrasound which showed a multinodular goiter with cysts and solid nodules. The dominant nodule in the left is partially cystic. She had a follow-up ultrasound in August 2014 which showed the left lobe dominant nodule is smaller. She is on Tapazole 10 mg 1 tab once a day. She has gained 1 lb since 12/17.  She denies bowel changes. She denies heat or cold intolerance. She denies palpitations. She denies tremors.  She denies dysphagia.      She had  left upper lobe lung biopsy in in November 2017 which yielded adenocarcinoma.  She had PET CT scan done in November 2017 which showed a small hypermetabolic nodule in the right lobe of the thyroid gland which may be an adenoma or a small thyroid cancer.  A metastasis to the thyroid gland is considered much less likely.  She has seen Dr. Nur and Dr. Bennett and has completed radiation therapy.  She is going thru rehab.  She is scheduled for a follow-up PET CT on May 30, 2008.     She has COPD. She denies any shortness of breath or coughing. She is on maintenance Symbicort, Spiriva and Pro-Air prn for COPD. She is on continuous O2. She follows with Dr. Anthony.      She has hypertension and history of multifocal tachycardia.  She was taken off lisinopril last May 2014. She is on diltiazem ER 90 mg/day and digoxin 0.125 mg/day. She follows with Dr. Bobo.      She had a  "previous right mastectomy for breast cancer. She has completed 5 years of Arimidex in 2009. She has osteopenia and was on Fosamax for unknown period of time. Her last bone mineral density was 2016 at [facility] and was normal. She is on Vit D 2000 units/day.     She had a colonoscopy in April 2017 and 2 tubular adenoma with low-grade dysplasia were removed by Dr. Eddy.  She will have follow-up colonoscopy in 2 years.  She denies bowel complaints.    The following portions of the patient's history were reviewed and updated as appropriate: allergies, current medications, past family history, past medical history, past social history, past surgical history and problem list.    Review of Systems   Constitutional: Positive for fatigue.   HENT: Negative.    Eyes: Negative.    Respiratory: Positive for shortness of breath.    Cardiovascular: Negative.    Gastrointestinal: Negative.    Endocrine: Negative.    Genitourinary: Negative.    Musculoskeletal: Negative.    Skin: Negative.    Allergic/Immunologic: Negative.    Neurological: Negative.    Hematological: Bruises/bleeds easily (bruise ).   Psychiatric/Behavioral: Negative.        Objective      Vitals:    04/24/18 1446   BP: 128/62   BP Location: Left arm   Patient Position: Sitting   Cuff Size: Small Adult   Pulse: 93   SpO2: 96%   Weight: 43.2 kg (95 lb 3.2 oz)   Height: 152.5 cm (60.04\")     Physical Exam   Constitutional: She is oriented to person, place, and time. She appears well-developed and well-nourished.   HENT:   Head: Normocephalic.   Nose: Nose normal.   Mouth/Throat: No oropharyngeal exudate.   Eyes: Conjunctivae and EOM are normal. Right eye exhibits no discharge. Left eye exhibits no discharge. No scleral icterus.   Neck: Neck supple. No JVD present. No tracheal deviation present. No thyromegaly present.   Cardiovascular: Normal rate, regular rhythm, normal heart sounds and intact distal pulses.  Exam reveals no friction rub.    No murmur " heard.  Pulmonary/Chest: Effort normal and breath sounds normal. No respiratory distress. She has no wheezes. She has no rales.   Abdominal: Soft. Bowel sounds are normal. She exhibits no distension. There is no tenderness. There is no guarding.   Musculoskeletal: Normal range of motion. She exhibits no edema, tenderness or deformity.   Lymphadenopathy:     She has no cervical adenopathy.   Neurological: She is alert and oriented to person, place, and time.   Skin: Skin is warm and dry. No rash noted. No erythema. No pallor.   Psychiatric: She has a normal mood and affect. Her behavior is normal.     Hospital Outpatient Visit on 03/14/2018   Component Date Value Ref Range Status   • Creatinine 03/14/2018 0.60  0.60 - 1.30 mg/dL Final     Assessment/Plan   Maryam was seen today for goiter, hypothyroidism, copd and asthma.    Diagnoses and all orders for this visit:    Toxic multinodular goiter  -     TSH  -     T4, Free    Malignant neoplasm of upper lobe of left lung    Pulmonary emphysema, unspecified emphysema type    Essential hypertension      I have discussed with patient and her sister about the malignancy risk of thyroid nodules.  Patient has been offered the option of needle biopsy versus continued observation.  She has been informed that the most common thyroid malignancy are typically slow growing.  She will wait for the results of the follow-up PET CT and make a decision.    Continue Tapazole 5 mg once a day.    Continue Cartia  mg once a day.    Continue Symbicort, Spiriva and albuterol.    Send copy of my note to Dr. Kevin Seymour, Dr. Nur, and Dr. Bobo    RTC 4 mos

## 2018-04-25 LAB
T4 FREE SERPL-MCNC: 1.31 NG/DL (ref 0.93–1.7)
TSH SERPL DL<=0.005 MIU/L-ACNC: 0.4 MIU/ML (ref 0.27–4.2)

## 2018-05-10 ENCOUNTER — APPOINTMENT (OUTPATIENT)
Dept: ONCOLOGY | Facility: CLINIC | Age: 73
End: 2018-05-10

## 2018-05-10 ENCOUNTER — APPOINTMENT (OUTPATIENT)
Dept: LAB | Facility: HOSPITAL | Age: 73
End: 2018-05-10

## 2018-05-10 DIAGNOSIS — Z12.31 SCREENING MAMMOGRAM, ENCOUNTER FOR: Primary | ICD-10-CM

## 2018-05-10 DIAGNOSIS — Z12.11 SCREEN FOR COLON CANCER: ICD-10-CM

## 2018-05-18 NOTE — PROGRESS NOTES
History:     Reason for follow up:   1.  Stage IIB left upper lobe non-small cell lung cancer, high PDL-1 (80%), negative egfr/alk/ros   * status post radiation therapy     HPI:  Maryam Arredondo presents for follow-up of her Lung cancer. She returns for surveillance. She has scans scheduled for next week with follow up with radiation oncology afterwards. No new cough. She has chronic cough that's unchanged. She has fatigue, but its stable.     Past Medical   Past Medical History:   Diagnosis Date   • Allergic rhinitis    • Asthma    • Breast cancer     s/p Lumpectomy ~2000 and chemo/XRT. Then  Masectomy ~2004 for some recurrence   • COPD (chronic obstructive pulmonary disease)    • Hyperthyroidism    • Mitral regurgitation     mild to moderate   • Osteopenia    • Oxygen dependent     2L - at night only   • Pneumothorax    • SOB (shortness of breath)    • Tachycardia    • Toxic multinodular goiter    • Vitamin D deficiency     and   Patient Active Problem List   Diagnosis   • Pneumothorax   • Hypoxia   • Tachycardia   • Hyperthyroidism   • Essential hypertension   • Multifocal atrial tachycardia   • Toxic multinodular goiter   • Pulmonary emphysema   • Vitamin D insufficiency   • Pneumonia   • COPD with acute lower respiratory infection   • Infection due to parainfluenza virus 1   • Malignant neoplasm of upper lobe of left lung     Social History   Social History     Social History   • Marital status: Single     Spouse name: N/A   • Number of children: N/A   • Years of education: N/A     Occupational History   • Admistrative Assistant Retired     Social History Main Topics   • Smoking status: Former Smoker     Packs/day: 2.00     Years: 50.00     Types: Cigarettes     Quit date: 3/14/1994   • Smokeless tobacco: Never Used      Comment: D/C 20 years ago, smoking 2 ppd before quitting   • Alcohol use No      Comment: Social use rare   • Drug use: No   • Sexual activity: Defer      Comment: drinks decaf      Other Topics  "Concern   • Not on file     Social History Narrative   • No narrative on file     Family History  Family History   Problem Relation Age of Onset   • Arthritis Mother    • Heart failure Mother         Congestive Heart Failure   • Lung cancer Father    • Breast cancer Paternal Grandmother    • No Known Problems Sister      Allergies  Allergies   Allergen Reactions   • Codeine    • Mucinex D [Pseudoephedrine-Guaifenesin Er] Nausea Only     Felt hot in chest   • Penicillin V    • Penicillins        Medications: The current medication list was reviewed in the EMR.    Review of Systems  Review of Systems   Constitutional: Positive for fatigue. Negative for activity change, appetite change, chills, diaphoresis, fever and unexpected weight change.   HENT: Negative for congestion and sinus pressure.    Respiratory: Positive for cough and shortness of breath. Negative for chest tightness.    Cardiovascular: Negative for chest pain, palpitations and leg swelling.   Gastrointestinal: Negative for abdominal pain, blood in stool, constipation, diarrhea, nausea and vomiting.   Musculoskeletal: Positive for arthralgias. Negative for joint swelling and myalgias.   Hematological: Negative for adenopathy. Does not bruise/bleed easily.       Objective    Objective:     Vitals:    05/21/18 1025   BP: 120/50   Pulse: 88   Resp: 18   Temp: 98.5 °F (36.9 °C)   SpO2: 92%  Comment: at rest   Weight: 43.3 kg (95 lb 6.4 oz)   Height: 152.5 cm (60.04\")   PainSc: 0-No pain     Current Status 5/21/2018   ECOG score 0   GENERAL:thin female comfortable, no acute distress, NC oxygen in place.   SKIN:  Warm, without rashes, purpura or petechiae.   EYES:  EOMs intact.  Conjunctivae normal. Pupils equal and reactive to light.   EARS:  Hearing intact.  RESP:  Lungs clear to percussion and auscultation but with decreased airflow diffusely. Normal effort.   CARDIAC:  Regular rate and rhythm without murmurs, rubs or gallops. Normal S1,S2. Lower extremity " edema:  No  GI:  Soft, nontender, normal bowel sounds  PSYCHIATRIC:  Normal affect and mood; alert and oriented x 3; Insight and judgement appropriate      Labs and Imaging  Results for orders placed or performed in visit on 05/21/18   CBC Auto Differential   Result Value Ref Range    WBC 7.69 4.00 - 10.00 10*3/mm3    RBC 4.11 3.90 - 5.00 10*6/mm3    Hemoglobin 12.7 11.5 - 14.9 g/dL    Hematocrit 39.7 34.0 - 45.0 %    MCV 96.6 83.0 - 97.0 fL    MCH 30.9 27.0 - 33.0 pg    MCHC 32.0 32.0 - 35.0 g/dL    RDW 14.0 11.7 - 14.5 %    RDW-SD 49.9 (H) 37.0 - 49.0 fl    MPV 9.6 8.9 - 12.1 fL    Platelets 245 150 - 375 10*3/mm3    Neutrophil % 79.3 (H) 39.0 - 75.0 %    Lymphocyte % 7.0 (L) 20.0 - 49.0 %    Monocyte % 11.2 4.0 - 12.0 %    Eosinophil % 1.3 1.0 - 5.0 %    Basophil % 0.5 0.0 - 1.1 %    Immature Grans % 0.7 (H) 0.0 - 0.5 %    Neutrophils, Absolute 6.10 1.50 - 7.00 10*3/mm3    Lymphocytes, Absolute 0.54 (L) 1.00 - 3.50 10*3/mm3    Monocytes, Absolute 0.86 (H) 0.25 - 0.80 10*3/mm3    Eosinophils, Absolute 0.10 0.00 - 0.36 10*3/mm3    Basophils, Absolute 0.04 0.00 - 0.10 10*3/mm3    Immature Grans, Absolute 0.05 (H) 0.00 - 0.03 10*3/mm3    nRBC 0.0 0.0 - 0.0 /100 WBC       Assessment/Plan   Assessment/Plan:   This is a 72 y.o. female with:     1.  Stage IIB (T3N0M0) left upper lobe non-small cell lung cancer, high PDL-1 (80%), negative egfr/alk/ros   * Appears to be a local lung cancer based on PET/CT scan. On initially scans, it appeared she may have hilar and mediastinal adenopathy and an indeterminate liver lesion, but all PET negative. She had active infection during her initial CT scans that had resolved when PET was done. On CT, it appeared to be two lung lesions, all in one lobe, but are involving pleura- T3N0M0   * She is high PDL 1 and thus if she progressed that she could be a candidate for Keytruda therapy (not approved for adjuvant use though).    * status post definitive radiation therapy for local  treatment, completed 1/26/2018. Not a surgical candidate   * We discussed that adjuvant chemotherapy would be standard of care however she was not a candidate for chemotherapy and declined as well.     * CT scans scheduled for next week. I will plan to see her back after scans to review the results.     2. History of right breast cancer, initially diagnosed in 2000 treated with lumpectomy, radiation, chemotherapy and endocrine therapy with local recurrence in 2005 treated with mastectomy and then Arimidex. New lung pathology does not appear to be related to prior breast cancer.      3. COPD    Follow-up in 2 weeks. I asked the patient to call for any questions, concerns, or new symptoms.

## 2018-05-21 ENCOUNTER — OFFICE VISIT (OUTPATIENT)
Dept: ONCOLOGY | Facility: CLINIC | Age: 73
End: 2018-05-21

## 2018-05-21 ENCOUNTER — LAB (OUTPATIENT)
Dept: LAB | Facility: HOSPITAL | Age: 73
End: 2018-05-21

## 2018-05-21 VITALS
RESPIRATION RATE: 18 BRPM | OXYGEN SATURATION: 92 % | WEIGHT: 95.4 LBS | SYSTOLIC BLOOD PRESSURE: 120 MMHG | HEART RATE: 88 BPM | TEMPERATURE: 98.5 F | BODY MASS INDEX: 18.73 KG/M2 | DIASTOLIC BLOOD PRESSURE: 50 MMHG | HEIGHT: 60 IN

## 2018-05-21 DIAGNOSIS — C34.12 MALIGNANT NEOPLASM OF UPPER LOBE OF LEFT LUNG (HCC): ICD-10-CM

## 2018-05-21 DIAGNOSIS — C34.12 MALIGNANT NEOPLASM OF UPPER LOBE OF LEFT LUNG (HCC): Primary | ICD-10-CM

## 2018-05-21 LAB
ALBUMIN SERPL-MCNC: 4 G/DL (ref 3.5–5.2)
ALBUMIN/GLOB SERPL: 1.4 G/DL (ref 1.1–2.4)
ALP SERPL-CCNC: 76 U/L (ref 38–116)
ALT SERPL W P-5'-P-CCNC: 17 U/L (ref 0–33)
ANION GAP SERPL CALCULATED.3IONS-SCNC: 10.6 MMOL/L
AST SERPL-CCNC: 17 U/L (ref 0–32)
BASOPHILS # BLD AUTO: 0.04 10*3/MM3 (ref 0–0.1)
BASOPHILS NFR BLD AUTO: 0.5 % (ref 0–1.1)
BILIRUB SERPL-MCNC: 0.2 MG/DL (ref 0.1–1.2)
BUN BLD-MCNC: 18 MG/DL (ref 6–20)
BUN/CREAT SERPL: 30 (ref 7.3–30)
CALCIUM SPEC-SCNC: 9.5 MG/DL (ref 8.5–10.2)
CHLORIDE SERPL-SCNC: 104 MMOL/L (ref 98–107)
CO2 SERPL-SCNC: 29.4 MMOL/L (ref 22–29)
CREAT BLD-MCNC: 0.6 MG/DL (ref 0.6–1.1)
DEPRECATED RDW RBC AUTO: 49.9 FL (ref 37–49)
EOSINOPHIL # BLD AUTO: 0.1 10*3/MM3 (ref 0–0.36)
EOSINOPHIL NFR BLD AUTO: 1.3 % (ref 1–5)
ERYTHROCYTE [DISTWIDTH] IN BLOOD BY AUTOMATED COUNT: 14 % (ref 11.7–14.5)
GFR SERPL CREATININE-BSD FRML MDRD: 98 ML/MIN/1.73
GLOBULIN UR ELPH-MCNC: 2.8 GM/DL (ref 1.8–3.5)
GLUCOSE BLD-MCNC: 64 MG/DL (ref 74–124)
HCT VFR BLD AUTO: 39.7 % (ref 34–45)
HGB BLD-MCNC: 12.7 G/DL (ref 11.5–14.9)
IMM GRANULOCYTES # BLD: 0.05 10*3/MM3 (ref 0–0.03)
IMM GRANULOCYTES NFR BLD: 0.7 % (ref 0–0.5)
LYMPHOCYTES # BLD AUTO: 0.54 10*3/MM3 (ref 1–3.5)
LYMPHOCYTES NFR BLD AUTO: 7 % (ref 20–49)
MCH RBC QN AUTO: 30.9 PG (ref 27–33)
MCHC RBC AUTO-ENTMCNC: 32 G/DL (ref 32–35)
MCV RBC AUTO: 96.6 FL (ref 83–97)
MONOCYTES # BLD AUTO: 0.86 10*3/MM3 (ref 0.25–0.8)
MONOCYTES NFR BLD AUTO: 11.2 % (ref 4–12)
NEUTROPHILS # BLD AUTO: 6.1 10*3/MM3 (ref 1.5–7)
NEUTROPHILS NFR BLD AUTO: 79.3 % (ref 39–75)
NRBC BLD MANUAL-RTO: 0 /100 WBC (ref 0–0)
PLATELET # BLD AUTO: 245 10*3/MM3 (ref 150–375)
PMV BLD AUTO: 9.6 FL (ref 8.9–12.1)
POTASSIUM BLD-SCNC: 4 MMOL/L (ref 3.5–4.7)
PROT SERPL-MCNC: 6.8 G/DL (ref 6.3–8)
RBC # BLD AUTO: 4.11 10*6/MM3 (ref 3.9–5)
SODIUM BLD-SCNC: 144 MMOL/L (ref 134–145)
WBC NRBC COR # BLD: 7.69 10*3/MM3 (ref 4–10)

## 2018-05-21 PROCEDURE — 99213 OFFICE O/P EST LOW 20 MIN: CPT | Performed by: INTERNAL MEDICINE

## 2018-05-21 PROCEDURE — 36415 COLL VENOUS BLD VENIPUNCTURE: CPT | Performed by: INTERNAL MEDICINE

## 2018-05-21 PROCEDURE — 85025 COMPLETE CBC W/AUTO DIFF WBC: CPT | Performed by: INTERNAL MEDICINE

## 2018-05-21 PROCEDURE — 80053 COMPREHEN METABOLIC PANEL: CPT | Performed by: INTERNAL MEDICINE

## 2018-05-30 ENCOUNTER — HOSPITAL ENCOUNTER (OUTPATIENT)
Dept: CT IMAGING | Facility: HOSPITAL | Age: 73
Discharge: HOME | End: 2018-05-30
Attending: RADIOLOGY | Admitting: RADIOLOGY

## 2018-05-30 DIAGNOSIS — C34.12 MALIGNANT NEOPLASM OF UPPER LOBE OF LEFT LUNG (HCC): ICD-10-CM

## 2018-05-30 LAB — CREAT BLDA-MCNC: 0.6 MG/DL (ref 0.6–1.3)

## 2018-05-30 PROCEDURE — 71260 CT THORAX DX C+: CPT

## 2018-05-30 PROCEDURE — 25010000002 IOPAMIDOL 61 % SOLUTION: Performed by: RADIOLOGY

## 2018-05-30 PROCEDURE — 82565 ASSAY OF CREATININE: CPT

## 2018-05-30 RX ADMIN — IOPAMIDOL 75 ML: 612 INJECTION, SOLUTION INTRAVENOUS at 09:23

## 2018-06-07 ENCOUNTER — OFFICE VISIT (OUTPATIENT)
Dept: RADIATION ONCOLOGY | Facility: HOSPITAL | Age: 73
End: 2018-06-07

## 2018-06-07 VITALS
WEIGHT: 97 LBS | HEIGHT: 60 IN | TEMPERATURE: 96.5 F | RESPIRATION RATE: 16 BRPM | DIASTOLIC BLOOD PRESSURE: 71 MMHG | SYSTOLIC BLOOD PRESSURE: 141 MMHG | BODY MASS INDEX: 19.04 KG/M2 | HEART RATE: 63 BPM | OXYGEN SATURATION: 93 %

## 2018-06-07 DIAGNOSIS — C34.12 MALIGNANT NEOPLASM OF UPPER LOBE OF LEFT LUNG (HCC): Primary | ICD-10-CM

## 2018-06-07 PROCEDURE — 99214 OFFICE O/P EST MOD 30 MIN: CPT | Performed by: RADIOLOGY

## 2018-06-07 NOTE — PROGRESS NOTES
DIAGNOSIS and REASON FOR FOLLOW UP: Malignant neoplasm of upper lobe of left lung     Stage IIB (T3, N0, M0)     Patient Care Team:  Kevin Seymour MD as PCP - General  Unruly Bobo MD as Consulting Physician (Cardiology)  Heather Anthony MD as Consulting Physician (Pulmonary Disease)  Luiza Eddy MD as Surgeon (General Surgery)  Laith Prado DPM as Consulting Physician (Podiatry)  Karlie Nur MD as Consulting Physician (Hematology and Oncology)  Michael Christie MD as Consulting Physician (Endocrinology)  Brandie Bennett MD as Consulting Physician (Radiation Oncology)    CHIEF COMPLAINT:  Post-radiation follow up  I had the pleasure of seeing Maryam Arredondo  back in the department today, now approximately 4 and 1/2 months out from the radiation therapy portion of her treatment for the above mentioned diagnosis.  She is doing amazingly well. She is now going to milestone routinely, has been out fishing and is feeling great. She is rarely short of air and is anxious to discontinue her oxygen use during the day if possible. She denies significant cough, dysphagia, hemoptysis or weight loss.  She is being more proactive with her inhaler use and does feel that has helped the wheezing.    She did undergo a CT of the chest on May 30, 2018 which thankfully still shows a decrease in size of the left upper lobe lesion, now measuring only 1.7 cm. Additionally no new lesions are noted, no new lymphadenopathy or metastatic disease is noted. nice response to treatment with a decrease in the size of the left lung disease and no new nodules or lymphadenopathy.     Past Medical History: she  has a past medical history of Allergic rhinitis; Asthma; Breast cancer; COPD (chronic obstructive pulmonary disease); Hyperthyroidism; Mitral regurgitation; Osteopenia; Oxygen dependent; Pneumothorax; SOB (shortness of breath); Tachycardia; Toxic multinodular goiter; and Vitamin D deficiency.   No history exists.        Past Surgical History:  she has a past surgical history that includes Breast lumpectomy (Right); Mastectomy (Right, 2004); Colonoscopy; Neck surgery; Hysterectomy; Vascular surgery; Breast lumpectomy; Cardiac catheterization; and Colonoscopy (N/A, 4/28/2017).    Meds:    Current Outpatient Prescriptions:   •  albuterol (PROVENTIL HFA;VENTOLIN HFA) 108 (90 Base) MCG/ACT inhaler, Inhale 2 puffs Every 4 (Four) Hours As Needed for Wheezing. Per MD - until current episode is resolved (Patient taking differently: Inhale 2 puffs As Needed for Wheezing. Per MD - until current episode is resolved ), Disp: , Rfl:   •  budesonide-formoterol (SYMBICORT) 160-4.5 MCG/ACT inhaler, Inhale 2 puffs 2 (two) times a day., Disp: , Rfl:   •  CARTIA  MG 24 hr capsule, , Disp: , Rfl:   •  cetirizine (ZyrTEC) 10 MG tablet, Take 10 mg by mouth daily., Disp: , Rfl:   •  Cholecalciferol (VITAMIN D3) 2000 UNITS tablet, Take 1 tablet by mouth daily., Disp: , Rfl:   •  digoxin (LANOXIN) 125 MCG tablet, Take 125 mcg by mouth Daily., Disp: , Rfl:   •  fluticasone (FLONASE) 50 MCG/ACT nasal spray, 2 Squirts into each nostril daily., Disp: , Rfl:   •  ipratropium (ATROVENT) 0.03 % nasal spray, As Needed., Disp: , Rfl:   •  methimazole (TAPAZOLE) 10 MG tablet, 1/2 tablet daily (Patient taking differently: Take 5 mg by mouth Daily. 1/2 tablet daily), Disp: 30 tablet, Rfl: 5  •  metroNIDAZOLE (METROCREAM) 0.75 % cream, , Disp: , Rfl:   •  mupirocin (BACTROBAN) 2 % ointment, , Disp: , Rfl:   •  O2 (OXYGEN), Inhale Continuous., Disp: , Rfl:   •  SPIRIVA RESPIMAT 2.5 MCG/ACT aerosol solution, 2 puffs Daily., Disp: , Rfl:   •  vitamin B-12 (CYANOCOBALAMIN) 1000 MCG tablet, Take 1,000 mcg by mouth daily., Disp: , Rfl:     Allergies:    Allergies   Allergen Reactions   • Codeine    • Mucinex D [Pseudoephedrine-Guaifenesin Er] Nausea Only     Felt hot in chest   • Penicillin V    • Penicillins        Family History:  her family history includes  "Arthritis in her mother; Breast cancer in her paternal grandmother; Heart failure in her mother; Lung cancer in her father; No Known Problems in her sister.    Social History:  she  reports that she quit smoking about 24 years ago. Her smoking use included Cigarettes. She has a 100.00 pack-year smoking history. She has never used smokeless tobacco. She reports that she does not drink alcohol or use drugs.    Pertinent Findings on Review of Systems:  Fourteen systems have been reviewed with the patient and are negative other than as mentioned above.    Performance Status:  (1)     Pertinent Findings on Physical Exam:  Vitals:    06/07/18 0944   BP: 141/71   Pulse: 63   Resp: 16   Temp: 96.5 °F (35.8 °C)   TempSrc: Tympanic   SpO2: 93%   Weight: 44 kg (97 lb)   Height: 152.5 cm (60.04\")   PainSc: 0-No pain       Physical Exam   Constitutional: She is oriented to person, place, and time. She appears well-developed and well-nourished. She is active and cooperative. No distress.   HENT:   Head: Normocephalic and atraumatic.   Nose: Nose normal.   Mouth/Throat: Mucous membranes are normal. Normal dentition.   Eyes: Conjunctivae and EOM are normal.   Neck: Normal range of motion.   Pulmonary/Chest: Effort normal. She has no wheezes.   Abdominal: Normal appearance. There is no hepatosplenomegaly.   Musculoskeletal: Normal range of motion.     Vascular Status -  Her right foot exhibits no edema. Her left foot exhibits no edema.  Neurological: She is alert and oriented to person, place, and time.   Skin: Skin is warm and dry.   Psychiatric: She has a normal mood and affect. Her behavior is normal. Judgment and thought content normal.       Assessment:  Malignant neoplasm of upper lobe of left lung    - Clinical: Stage IIB (T3, N0, M0)   Doing very well post treatment clinically and radiographically    Plan:   I am thrilled with her clinical and radiographic response, as is she. I have encouraged her to keep increasing her " activity and do think it is reasonable for her to be able to be off the oxygen during the day and used only as needed. She will discuss that further with her internist and pulmonologist.  She does also return to see Dr. Nur in 2 weeks for routine follow up.    Given the nice response, I recommended a repeat CT of the chest in 4 months and she was agreeable to that. I will see her shortly thereafter and she knows to call if she has problems prior to that.

## 2018-06-14 ENCOUNTER — APPOINTMENT (OUTPATIENT)
Dept: LAB | Facility: HOSPITAL | Age: 73
End: 2018-06-14

## 2018-07-10 ENCOUNTER — HOSPITAL ENCOUNTER (OUTPATIENT)
Dept: MAMMOGRAPHY | Facility: HOSPITAL | Age: 73
Discharge: HOME | End: 2018-07-10
Attending: SURGERY | Admitting: SURGERY

## 2018-07-10 DIAGNOSIS — Z12.31 SCREENING MAMMOGRAM, ENCOUNTER FOR: ICD-10-CM

## 2018-07-10 PROCEDURE — 77067 SCR MAMMO BI INCL CAD: CPT

## 2018-07-18 ENCOUNTER — OFFICE VISIT (OUTPATIENT)
Dept: SURGERY | Facility: CLINIC | Age: 73
End: 2018-07-18

## 2018-07-18 VITALS
SYSTOLIC BLOOD PRESSURE: 119 MMHG | WEIGHT: 98.3 LBS | BODY MASS INDEX: 19.17 KG/M2 | OXYGEN SATURATION: 93 % | DIASTOLIC BLOOD PRESSURE: 78 MMHG | HEART RATE: 98 BPM

## 2018-07-18 DIAGNOSIS — Z85.3 HISTORY OF BREAST CANCER IN FEMALE: Primary | ICD-10-CM

## 2018-07-18 PROCEDURE — 99213 OFFICE O/P EST LOW 20 MIN: CPT | Performed by: SURGERY

## 2018-07-18 NOTE — PROGRESS NOTES
Chief complaint:  Post-op  Follow up    History of Present Illness    This is Maryam Arredondo 72 y.o. status post *** and is doing very well.  Patient denies fever, chills, nausea or vomiting.  Patient's pain is well-controlled.      The following portions of the patient's history were reviewed and updated as appropriate: allergies, current medications, past family history, past medical history, past social history, past surgical history and problem list.    Vitals:    07/18/18 0908   BP: 119/78   BP Location: Left arm   Patient Position: Sitting   Cuff Size: Adult   Pulse: 98   SpO2: 93%   Weight: 44.6 kg (98 lb 4.8 oz)       Physical Exam  Incision is well-healed without evidence of {No wound infection:12412}.    Patient does not use tobacco products currently and I have counseled the patient not to start using tobacco products in the future.    Assessment/plan:    This is Maryam Arredondo 72 y.o. status post *** and is doing very well.  I have instructed the patient not lift greater than 10 pounds for total of 6 weeks from the time of surgery. I have instructed the patient follow-up as needed.    Luiza Eddy MD  General, Minimally Invasive and Endoscopic Surgery  [facility]    [address], [address]  [place], [place], [address]  P: [phone]  F: [phone]    Cc:  Kevin Seymour MD

## 2018-07-23 NOTE — PROGRESS NOTES
CC:  History of right breast cancer    Patient is a 72 y.o. female presents today with history of right breast cancer yearly follow-up for examination.  I have reviewed her mammograms which are benign.  Patient has had a previous right modified radical mastectomy.  Her left breast is intact.  Patient denies any new symptoms of breast cancer.  Patient denies feeling any masses or nipple discharge.    Past Medical History:   Diagnosis Date   • Allergic rhinitis    • Asthma    • Breast cancer (CMS/HCC)     s/p Lumpectomy ~2000 and chemo/XRT. Then  Masectomy ~2004 for some recurrence   • COPD (chronic obstructive pulmonary disease) (CMS/HCC)    • Hyperthyroidism    • Mitral regurgitation     mild to moderate   • Osteopenia    • Oxygen dependent     2L - at night only   • Pneumothorax    • SOB (shortness of breath)    • Tachycardia    • Toxic multinodular goiter    • Vitamin D deficiency      Past Surgical History:   Procedure Laterality Date   • BREAST LUMPECTOMY Right    • BREAST LUMPECTOMY     • CARDIAC CATHETERIZATION      PS SAPHENOUS VAIN RIGHT LEG   • COLONOSCOPY      Complete   • COLONOSCOPY N/A 4/28/2017    Procedure: COLONOSCOPY WITH POLYPECTOMY(COLD BIOPSY AND HOT SNARE);  Surgeon: Luiza Eddy MD;  Location: [facility] ENDOSCOPY;  Service:    • HYSTERECTOMY     • MASTECTOMY Right 2004   • NECK SURGERY      2 spurs removed   • VASCULAR SURGERY      spider vein ablation     Family History   Problem Relation Age of Onset   • Arthritis Mother    • Heart failure Mother         Congestive Heart Failure   • Lung cancer Father    • Breast cancer Paternal Grandmother    • No Known Problems Sister      Social History   Substance Use Topics   • Smoking status: Former Smoker     Packs/day: 2.00     Years: 50.00     Types: Cigarettes     Quit date: 3/14/1994   • Smokeless tobacco: Never Used      Comment: D/C 20 years ago, smoking 2 ppd before quitting   • Alcohol use No      Comment: Social use rare         Current  Outpatient Prescriptions:   •  albuterol (PROVENTIL HFA;VENTOLIN HFA) 108 (90 Base) MCG/ACT inhaler, Inhale 2 puffs Every 4 (Four) Hours As Needed for Wheezing. Per MD - until current episode is resolved (Patient taking differently: Inhale 2 puffs As Needed for Wheezing. Per MD - until current episode is resolved ), Disp: , Rfl:   •  budesonide-formoterol (SYMBICORT) 160-4.5 MCG/ACT inhaler, Inhale 2 puffs 2 (two) times a day., Disp: , Rfl:   •  CARTIA  MG 24 hr capsule, , Disp: , Rfl:   •  cetirizine (ZyrTEC) 10 MG tablet, Take 10 mg by mouth daily., Disp: , Rfl:   •  Cholecalciferol (VITAMIN D3) 2000 UNITS tablet, Take 1 tablet by mouth daily., Disp: , Rfl:   •  digoxin (LANOXIN) 125 MCG tablet, Take 125 mcg by mouth Daily., Disp: , Rfl:   •  fluticasone (FLONASE) 50 MCG/ACT nasal spray, 2 Squirts into each nostril daily., Disp: , Rfl:   •  ipratropium (ATROVENT) 0.03 % nasal spray, As Needed., Disp: , Rfl:   •  methimazole (TAPAZOLE) 10 MG tablet, 1/2 tablet daily (Patient taking differently: Take 5 mg by mouth Daily. 1/2 tablet daily), Disp: 30 tablet, Rfl: 5  •  metroNIDAZOLE (METROCREAM) 0.75 % cream, , Disp: , Rfl:   •  mupirocin (BACTROBAN) 2 % ointment, , Disp: , Rfl:   •  O2 (OXYGEN), Inhale Continuous., Disp: , Rfl:   •  SPIRIVA RESPIMAT 2.5 MCG/ACT aerosol solution, 2 puffs Daily., Disp: , Rfl:   •  vitamin B-12 (CYANOCOBALAMIN) 1000 MCG tablet, Take 1,000 mcg by mouth daily., Disp: , Rfl:     Review of Systems  All other systems have been reviewed and are negative.  Vitals:    07/18/18 0908   BP: 119/78   BP Location: Left arm   Patient Position: Sitting   Cuff Size: Adult   Pulse: 98   SpO2: 93%   Weight: 44.6 kg (98 lb 4.8 oz)       Physical Exam  General/physcological:   Alert and oriented x3 in no acute distress,Patient is on oxygen  HEENT: Normal cephalic, atraumatic, PERRLA, EOMI, sclera anicteric, moist mucous membranes, neck is supple  Respiratory: CTA and percussion  CVA: RRR, normal  S1-S2, no murmurs, no gallops or rubs  Breast: Breast examination was done in the sitting position as well as supine position.  The right mastectomy flaps are without evidence of local recurrence.  There is no palpable left breast masses or nipple retraction.  There is no bilateral axillary or subclavicular adenopathy.  GI: Positive BS, soft, nondistended, nontender, no rebound, no guarding, no hernias, no organomegaly and no masses  Musculoskeletal: no clubbing, no cyanosis or edema  Neurovascular: Grossly intact    I have personally reviewed her mammogram which is normal    Assessment:  History of right breast cancer with modified radical mastectomy  Normal mammogram of left breast    Plan:  I have recommended that the patient undergo yearly mammogram, self breast examination and physical.  Patient will follow-up in one year.    Luiza Eddy MD  General, Minimally Invasive and Endoscopic Surgery  [facility]    [address], [address]  [place], [address]  P: [phone]  F: [phone]    Cc:  Kevin Seymour MD

## 2018-10-03 ENCOUNTER — OFFICE VISIT (OUTPATIENT)
Dept: ENDOCRINOLOGY | Age: 73
End: 2018-10-03

## 2018-10-03 VITALS
DIASTOLIC BLOOD PRESSURE: 60 MMHG | WEIGHT: 100.4 LBS | OXYGEN SATURATION: 98 % | SYSTOLIC BLOOD PRESSURE: 120 MMHG | HEIGHT: 60 IN | BODY MASS INDEX: 19.71 KG/M2 | HEART RATE: 100 BPM

## 2018-10-03 DIAGNOSIS — E05.20 TOXIC MULTINODULAR GOITER: Primary | ICD-10-CM

## 2018-10-03 DIAGNOSIS — J44.0 COPD WITH ACUTE LOWER RESPIRATORY INFECTION (HCC): ICD-10-CM

## 2018-10-03 DIAGNOSIS — C34.12 MALIGNANT NEOPLASM OF UPPER LOBE OF LEFT LUNG (HCC): ICD-10-CM

## 2018-10-03 DIAGNOSIS — I10 ESSENTIAL HYPERTENSION: ICD-10-CM

## 2018-10-03 DIAGNOSIS — J30.9 ALLERGIC RHINITIS, UNSPECIFIED SEASONALITY, UNSPECIFIED TRIGGER: ICD-10-CM

## 2018-10-03 PROBLEM — B34.8: Status: RESOLVED | Noted: 2017-10-31 | Resolved: 2018-10-03

## 2018-10-03 LAB
ALBUMIN SERPL-MCNC: 4.2 G/DL (ref 3.5–5.2)
ALBUMIN/GLOB SERPL: 1.4 G/DL
ALP SERPL-CCNC: 85 U/L (ref 39–117)
ALT SERPL-CCNC: 19 U/L (ref 1–33)
AST SERPL-CCNC: 17 U/L (ref 1–32)
BILIRUB SERPL-MCNC: <0.2 MG/DL (ref 0.1–1.2)
BUN SERPL-MCNC: 14 MG/DL (ref 8–23)
BUN/CREAT SERPL: 23.7 (ref 7–25)
CALCIUM SERPL-MCNC: 10 MG/DL (ref 8.6–10.5)
CHLORIDE SERPL-SCNC: 104 MMOL/L (ref 98–107)
CO2 SERPL-SCNC: 29.2 MMOL/L (ref 22–29)
CREAT SERPL-MCNC: 0.59 MG/DL (ref 0.57–1)
GLOBULIN SER CALC-MCNC: 3 GM/DL
GLUCOSE SERPL-MCNC: 84 MG/DL (ref 65–99)
POTASSIUM SERPL-SCNC: 4.7 MMOL/L (ref 3.5–5.2)
PROT SERPL-MCNC: 7.2 G/DL (ref 6–8.5)
SODIUM SERPL-SCNC: 144 MMOL/L (ref 136–145)
T4 FREE SERPL-MCNC: 1.41 NG/DL (ref 0.93–1.7)
TSH SERPL DL<=0.005 MIU/L-ACNC: 0.17 MIU/ML (ref 0.27–4.2)

## 2018-10-03 PROCEDURE — 99214 OFFICE O/P EST MOD 30 MIN: CPT | Performed by: INTERNAL MEDICINE

## 2018-10-03 RX ORDER — ACYCLOVIR 400 MG/1
400 TABLET ORAL
COMMUNITY
End: 2019-03-08 | Stop reason: DRUGHIGH

## 2018-10-03 RX ORDER — CLINDAMYCIN PHOSPHATE 10 UG/ML
LOTION TOPICAL 2 TIMES DAILY
COMMUNITY
End: 2020-10-28

## 2018-10-03 RX ORDER — MONTELUKAST SODIUM 10 MG/1
10 TABLET ORAL NIGHTLY
Qty: 10 TABLET | Refills: 0 | Status: SHIPPED | OUTPATIENT
Start: 2018-10-03 | End: 2018-10-15 | Stop reason: SDUPTHER

## 2018-10-03 RX ORDER — VIT C/E/ZN/COPPR/LUTEIN/ZEAXAN 250MG-90MG
2 CAPSULE ORAL EVERY MORNING
COMMUNITY
End: 2021-01-01

## 2018-10-03 NOTE — PROGRESS NOTES
Subjective   Maryam Arredondo is a 73 y.o. female.     F/u for nontoxic MNG,hypothyroidism, COPD, asthma       Goiter   Associated symptoms include numbness (fingers ).   Hypothyroidism   Associated symptoms include numbness (fingers ).   COPD   Associated symptoms include numbness (fingers ).      Patient is a 73`-year-old female who came in for follow-up. She has toxic multinodular goiter. She had a thyroid scan and uptake in February 2014 which showed increased uptake of 40% with a heterogeneous distribution of the radiopharmaceutical in both lobes of the thyroid gland. A dominant area of photopenia is present on the left lobe. She had a thyroid ultrasound which showed a multinodular goiter with cysts and solid nodules. The dominant nodule in the left is partially cystic. She had a follow-up ultrasound in August 2014 which showed the left lobe dominant nodule is smaller. She is on Tapazole 5 mg 1 tab once a day. She has gained 5 lb since 4/18.  She denies bowel changes. She denies heat or cold intolerance. She denies palpitations. She denies tremors.  She denies dysphagia.    She had a thyroid ultrasound done on April 4, 2018 which showed a multinodular goiter.  There is a hypervascular 2 cm nodule in the upper right thyroid lobe which corresponds to the hypermetabolic nodule on the PET CT scan of November 15, 2017.  She wants to continued observation due to her other co-morbidities.      She had  left upper lobe lung biopsy in in November 2017 which yielded adenocarcinoma.  She had PET CT scan done in November 2017 which showed a small hypermetabolic nodule in the right lobe of the thyroid gland which may be an adenoma or a small thyroid cancer.  A metastasis to the thyroid gland is considered much less likely.  She has seen Dr. Nur and Dr. Bennett and has completed radiation therapy.  She is going thru rehab.  She had CT scan of the chest in May 2018 which showed slight interval decrease in size of pleural-based  lung tumor in the left upper lobe and moderately severe emphysema.  There is no lymphadenopathy     She has COPD. She denies any shortness of breath or coughing.  She complains of nasal congestion and is on Flonase and Zyrtec.  She is on maintenance Symbicort, Spiriva and Pro-Air prn for COPD. She is on continuous O2.  She is exercising regularly at the gym.  She follows with Dr. Anthony.      She has hypertension and history of multifocal tachycardia.  She was taken off lisinopril last May 2014. She is on diltiazem ER 90 mg/day and digoxin 0.125 mg/day. She follows with Dr. Bobo.      She had a previous right mastectomy for breast cancer. She has completed 5 years of Arimidex in 2009. She has osteopenia and was on Fosamax for unknown period of time. Her last bone mineral density was 2016 at [facility] and was normal. She is on Vit D 2000 units/day.     She had a colonoscopy in April 2017 and 2 tubular adenoma with low-grade dysplasia were removed by Dr. Eddy.  She was advised a repeat colonoscopy in 2019.  She denies bowel complaints.    The following portions of the patient's history were reviewed and updated as appropriate: allergies, current medications, past family history, past medical history, past social history, past surgical history and problem list.    Review of Systems   Constitutional: Negative.    HENT: Negative.    Eyes: Negative.    Respiratory: Negative.    Cardiovascular: Negative.    Gastrointestinal: Negative.    Endocrine: Negative.    Genitourinary: Negative.    Musculoskeletal: Negative.    Skin: Negative.    Allergic/Immunologic: Negative.    Neurological: Positive for numbness (fingers ).   Hematological: Bruises/bleeds easily.   Psychiatric/Behavioral: Negative.        Objective      Vitals:    10/03/18 0957 10/03/18 1050   BP: 150/56 120/60   BP Location: Left arm    Patient Position: Sitting    Cuff Size: Small Adult    Pulse: 111 100   SpO2: 98%    Weight: 45.5 kg (100 lb 6.4 oz)   "  Height: 152.5 cm (60.04\")      Physical Exam   Constitutional: She is oriented to person, place, and time. She appears well-developed and well-nourished. No distress.   HENT:   Head: Normocephalic.   Nose: Nose normal.   Mouth/Throat: No oropharyngeal exudate.   Eyes: Conjunctivae and EOM are normal. Right eye exhibits no discharge. Left eye exhibits no discharge. No scleral icterus.   Neck: Neck supple. No JVD present. No tracheal deviation present. No thyromegaly present.   Cardiovascular: Normal rate, regular rhythm and normal heart sounds.  Exam reveals no friction rub.    No murmur heard.  Pulmonary/Chest: Effort normal and breath sounds normal. No respiratory distress. She has no wheezes. She has no rales.   Abdominal: Soft. Bowel sounds are normal. She exhibits no distension. There is no tenderness. There is no guarding.   Musculoskeletal: She exhibits no edema, tenderness or deformity.   Lymphadenopathy:     She has no cervical adenopathy.   Neurological: She is alert and oriented to person, place, and time. She displays normal reflexes. No cranial nerve deficit.   Skin: Skin is warm and dry. No rash noted. No erythema. No pallor.   Psychiatric: She has a normal mood and affect. Her behavior is normal.     Hospital Outpatient Visit on 05/30/2018   Component Date Value Ref Range Status   • Creatinine 05/30/2018 0.60  0.60 - 1.30 mg/dL Final    Serial Number: [id number]:  [id number]     Assessment/Plan   Maryam was seen today for goiter, hypothyroidism, copd and asthma.    Diagnoses and all orders for this visit:    Toxic multinodular goiter  -     Comprehensive Metabolic Panel  -     TSH  -     T4, Free    Essential hypertension  -     Comprehensive Metabolic Panel    COPD with acute lower respiratory infection (CMS/HCC)  -     Comprehensive Metabolic Panel    Malignant neoplasm of upper lobe of left lung (CMS/HCC)    Allergic rhinitis, unspecified seasonality, unspecified trigger  -     montelukast " (SINGULAIR) 10 MG tablet; Take 1 tablet by mouth Every Night.      Continue Tapazole 5 mg per day.  Check thyroid function tests.  Continue diltiazem ER and digoxin per Dr. Bobo.  Start Singulair 10 mg once a day.  Continue Flonase and Zyrtec.  Follow-up with Dr. Seymour next week.  Continue Symbicort, Spiriva, and albuterol per Dr. Anthony.  Flu vaccine this fall    Send copy of my note to Dr. Seymour, Dr. Anthony, Dr. Nur, and Dr. Bennett    RTC 4 mos

## 2018-10-08 ENCOUNTER — HOSPITAL ENCOUNTER (OUTPATIENT)
Dept: CT IMAGING | Facility: HOSPITAL | Age: 73
Discharge: HOME | End: 2018-10-08
Attending: RADIOLOGY | Admitting: RADIOLOGY

## 2018-10-08 DIAGNOSIS — C34.12 MALIGNANT NEOPLASM OF UPPER LOBE OF LEFT LUNG (HCC): ICD-10-CM

## 2018-10-08 LAB — CREAT BLDA-MCNC: 0.6 MG/DL (ref 0.6–1.3)

## 2018-10-08 PROCEDURE — 82565 ASSAY OF CREATININE: CPT

## 2018-10-08 PROCEDURE — 71260 CT THORAX DX C+: CPT

## 2018-10-08 PROCEDURE — 25010000002 IOPAMIDOL 61 % SOLUTION: Performed by: RADIOLOGY

## 2018-10-08 RX ADMIN — IOPAMIDOL 75 ML: 612 INJECTION, SOLUTION INTRAVENOUS at 09:25

## 2018-10-09 DIAGNOSIS — E05.90 HYPERTHYROIDISM: ICD-10-CM

## 2018-10-09 RX ORDER — METHIMAZOLE 5 MG/1
TABLET ORAL
Qty: 30 TABLET | Refills: 0 | Status: SHIPPED | OUTPATIENT
Start: 2018-10-09 | End: 2018-11-21 | Stop reason: DRUGHIGH

## 2018-10-15 ENCOUNTER — OFFICE VISIT (OUTPATIENT)
Dept: ONCOLOGY | Facility: CLINIC | Age: 73
End: 2018-10-15

## 2018-10-15 ENCOUNTER — LAB (OUTPATIENT)
Dept: LAB | Facility: HOSPITAL | Age: 73
End: 2018-10-15

## 2018-10-15 VITALS
WEIGHT: 102.4 LBS | HEIGHT: 60 IN | HEART RATE: 78 BPM | SYSTOLIC BLOOD PRESSURE: 128 MMHG | BODY MASS INDEX: 20.1 KG/M2 | OXYGEN SATURATION: 94 % | TEMPERATURE: 98.5 F | DIASTOLIC BLOOD PRESSURE: 66 MMHG | RESPIRATION RATE: 14 BRPM

## 2018-10-15 DIAGNOSIS — C34.12 MALIGNANT NEOPLASM OF UPPER LOBE OF LEFT LUNG (HCC): Primary | ICD-10-CM

## 2018-10-15 DIAGNOSIS — E05.90 HYPERTHYROIDISM: Primary | ICD-10-CM

## 2018-10-15 DIAGNOSIS — C34.12 MALIGNANT NEOPLASM OF UPPER LOBE OF LEFT LUNG (HCC): ICD-10-CM

## 2018-10-15 DIAGNOSIS — E05.20 TOXIC MULTINODULAR GOITER: ICD-10-CM

## 2018-10-15 DIAGNOSIS — J30.9 ALLERGIC RHINITIS, UNSPECIFIED SEASONALITY, UNSPECIFIED TRIGGER: ICD-10-CM

## 2018-10-15 DIAGNOSIS — R91.1 LEFT UPPER LOBE PULMONARY NODULE: ICD-10-CM

## 2018-10-15 LAB
ALBUMIN SERPL-MCNC: 4.1 G/DL (ref 3.5–5.2)
ALBUMIN/GLOB SERPL: 1.4 G/DL (ref 1.1–2.4)
ALP SERPL-CCNC: 81 U/L (ref 38–116)
ALT SERPL W P-5'-P-CCNC: 19 U/L (ref 0–33)
ANION GAP SERPL CALCULATED.3IONS-SCNC: 8.8 MMOL/L
AST SERPL-CCNC: 19 U/L (ref 0–32)
BASOPHILS # BLD AUTO: 0.04 10*3/MM3 (ref 0–0.1)
BASOPHILS NFR BLD AUTO: 0.6 % (ref 0–1.1)
BILIRUB SERPL-MCNC: 0.2 MG/DL (ref 0.1–1.2)
BUN BLD-MCNC: 18 MG/DL (ref 6–20)
BUN/CREAT SERPL: 29 (ref 7.3–30)
CALCIUM SPEC-SCNC: 9.5 MG/DL (ref 8.5–10.2)
CHLORIDE SERPL-SCNC: 105 MMOL/L (ref 98–107)
CO2 SERPL-SCNC: 30.2 MMOL/L (ref 22–29)
CREAT BLD-MCNC: 0.62 MG/DL (ref 0.6–1.1)
DEPRECATED RDW RBC AUTO: 49.9 FL (ref 37–49)
EOSINOPHIL # BLD AUTO: 0.08 10*3/MM3 (ref 0–0.36)
EOSINOPHIL NFR BLD AUTO: 1.2 % (ref 1–5)
ERYTHROCYTE [DISTWIDTH] IN BLOOD BY AUTOMATED COUNT: 13.8 % (ref 11.7–14.5)
GFR SERPL CREATININE-BSD FRML MDRD: 94 ML/MIN/1.73
GLOBULIN UR ELPH-MCNC: 2.9 GM/DL (ref 1.8–3.5)
GLUCOSE BLD-MCNC: 90 MG/DL (ref 74–124)
HCT VFR BLD AUTO: 40.5 % (ref 34–45)
HGB BLD-MCNC: 13 G/DL (ref 11.5–14.9)
IMM GRANULOCYTES # BLD: 0.07 10*3/MM3 (ref 0–0.03)
IMM GRANULOCYTES NFR BLD: 1 % (ref 0–0.5)
LYMPHOCYTES # BLD AUTO: 0.7 10*3/MM3 (ref 1–3.5)
LYMPHOCYTES NFR BLD AUTO: 10.1 % (ref 20–49)
MCH RBC QN AUTO: 31.6 PG (ref 27–33)
MCHC RBC AUTO-ENTMCNC: 32.1 G/DL (ref 32–35)
MCV RBC AUTO: 98.3 FL (ref 83–97)
MONOCYTES # BLD AUTO: 0.62 10*3/MM3 (ref 0.25–0.8)
MONOCYTES NFR BLD AUTO: 9 % (ref 4–12)
NEUTROPHILS # BLD AUTO: 5.4 10*3/MM3 (ref 1.5–7)
NEUTROPHILS NFR BLD AUTO: 78.1 % (ref 39–75)
NRBC BLD MANUAL-RTO: 0 /100 WBC (ref 0–0)
PLATELET # BLD AUTO: 267 10*3/MM3 (ref 150–375)
PMV BLD AUTO: 8.9 FL (ref 8.9–12.1)
POTASSIUM BLD-SCNC: 4.2 MMOL/L (ref 3.5–4.7)
PROT SERPL-MCNC: 7 G/DL (ref 6.3–8)
RBC # BLD AUTO: 4.12 10*6/MM3 (ref 3.9–5)
SODIUM BLD-SCNC: 144 MMOL/L (ref 134–145)
WBC NRBC COR # BLD: 6.91 10*3/MM3 (ref 4–10)

## 2018-10-15 PROCEDURE — 36415 COLL VENOUS BLD VENIPUNCTURE: CPT | Performed by: INTERNAL MEDICINE

## 2018-10-15 PROCEDURE — 80053 COMPREHEN METABOLIC PANEL: CPT | Performed by: INTERNAL MEDICINE

## 2018-10-15 PROCEDURE — 99215 OFFICE O/P EST HI 40 MIN: CPT | Performed by: INTERNAL MEDICINE

## 2018-10-15 PROCEDURE — 85025 COMPLETE CBC W/AUTO DIFF WBC: CPT | Performed by: INTERNAL MEDICINE

## 2018-10-15 RX ORDER — MONTELUKAST SODIUM 10 MG/1
10 TABLET ORAL EVERY MORNING
Qty: 30 TABLET | Refills: 5 | Status: SHIPPED | OUTPATIENT
Start: 2018-10-15 | End: 2021-02-16

## 2018-10-15 NOTE — PROGRESS NOTES
History:     Reason for follow up:   1.  Stage IIB left upper lobe non-small cell lung cancer, high PDL-1 (80%), negative egfr/alk/ros   * status post radiation therapy     HPI:  Maryam Arredondo presents for follow-up of her lung cancer.  Patient had a repeat CT scan last week that reveals a new nodularity.  She is feeling well.  She is working out at the gym.  No new or progressive shortness of breath.  She remains on supplemental oxygen.  No cough or fevers.    Reviewed, confirmed and updated history (past medical, social and family)   Past Medical   Past Medical History:   Diagnosis Date   • Allergic rhinitis    • Asthma    • Breast cancer (CMS/HCC)     s/p Lumpectomy ~2000 and chemo/XRT. Then  Masectomy ~2004 for some recurrence   • COPD (chronic obstructive pulmonary disease) (CMS/HCC)    • Hyperthyroidism    • Mitral regurgitation     mild to moderate   • Osteopenia    • Oxygen dependent     2L - at night only   • Pneumothorax    • SOB (shortness of breath)    • Tachycardia    • Toxic multinodular goiter    • Vitamin D deficiency     and   Patient Active Problem List   Diagnosis   • Pneumothorax   • Hypoxia   • Tachycardia   • Hyperthyroidism   • Essential hypertension   • Multifocal atrial tachycardia (CMS/HCC)   • Toxic multinodular goiter   • Pulmonary emphysema (CMS/HCC)   • Vitamin D insufficiency   • Pneumonia   • COPD with acute lower respiratory infection (CMS/HCC)   • Malignant neoplasm of upper lobe of left lung (CMS/HCC)     Social History   Social History     Social History   • Marital status: Single     Spouse name: N/A   • Number of children: N/A   • Years of education: N/A     Occupational History   • Admistrative Assistant Retired     Social History Main Topics   • Smoking status: Former Smoker     Packs/day: 2.00     Years: 50.00     Types: Cigarettes     Quit date: 3/14/1994   • Smokeless tobacco: Never Used      Comment: D/C 20 years ago, smoking 2 ppd before quitting   • Alcohol use No       "Comment: Social use rare   • Drug use: No   • Sexual activity: Defer      Comment: drinks decaf      Other Topics Concern   • Not on file     Social History Narrative   • No narrative on file     Family History  Family History   Problem Relation Age of Onset   • Arthritis Mother    • Heart failure Mother         Congestive Heart Failure   • Lung cancer Father    • Breast cancer Paternal Grandmother    • No Known Problems Sister      Allergies  Allergies   Allergen Reactions   • Codeine    • Mucinex D [Pseudoephedrine-Guaifenesin Er] Nausea Only     Felt hot in chest   • Penicillin V    • Penicillins        Medications: The current medication list was reviewed in the EMR.    Review of Systems  Review of Systems   Constitutional: Positive for fatigue. Negative for activity change, appetite change, chills, diaphoresis, fever and unexpected weight change.   HENT: Negative for congestion and sinus pressure.    Respiratory: Negative for cough, chest tightness and shortness of breath.    Cardiovascular: Negative for chest pain, palpitations and leg swelling.   Gastrointestinal: Negative for abdominal pain, blood in stool, constipation, diarrhea, nausea and vomiting.   Endocrine: Negative.    Genitourinary: Negative.    Musculoskeletal: Positive for arthralgias. Negative for joint swelling and myalgias.   Skin: Negative.    Allergic/Immunologic: Negative.    Neurological: Negative.    Hematological: Negative for adenopathy. Does not bruise/bleed easily.       Objective    Objective:     Vitals:    10/15/18 1303   BP: 128/66   Pulse: 78   Resp: 14   Temp: 98.5 °F (36.9 °C)   TempSrc: Oral   SpO2: 94%  Comment: 2 L   Weight: 46.4 kg (102 lb 6.4 oz)   Height: 152.5 cm (60.04\")   PainSc: 0-No pain     Current Status 10/15/2018   ECOG score 0   GENERAL:  Well-developed, well-nourished female in no acute distress. Vital signs reviewed.   SKIN:  Warm, dry without rashes, purpura or petechiae.  EYES:  Pupils equal, round and " reactive to light.  EOMs intact.  Conjunctivae normal.  HEAD:  Normocephalic.  EARS/NOSE/MOUTH/THROAT:  Hearing intact. Septum midline.  No excoriations or nasal discharge. Nasal cannula in place. No stomatitis or ulcers.  Lips normal. Oropharynx without lesions or exudates.  NECK:  Supple with good range of motion; no thyromegaly or masses  LYMPHATICS:  No cervical, supraclavicular, axillary adenopathy.  RESP:  Lungs clear to percussion and auscultation but with decreased airflow diffusely. Normal respiratory effort.   CARDIAC:  Regular rate and rhythm without murmurs, rubs or gallops. Normal S1,S2. No edema  GI:  Soft, nontender, normal bowel sounds  MSK:  No clubbing or cyanosis, No joint swelling noted in hands  NEUROLOGICAL:  Cranial Nerves II-XII grossly intact.  No focal neurological deficits.  PSYCHIATRIC:  Normal affect and mood.  Alert and Oriented x 3.       Labs and Imaging  Results for orders placed or performed in visit on 10/15/18   Comprehensive Metabolic Panel   Result Value Ref Range    Glucose 90 74 - 124 mg/dL    BUN 18 6 - 20 mg/dL    Creatinine 0.62 0.60 - 1.10 mg/dL    Sodium 144 134 - 145 mmol/L    Potassium 4.2 3.5 - 4.7 mmol/L    Chloride 105 98 - 107 mmol/L    CO2 30.2 (H) 22.0 - 29.0 mmol/L    Calcium 9.5 8.5 - 10.2 mg/dL    Total Protein 7.0 6.3 - 8.0 g/dL    Albumin 4.10 3.50 - 5.20 g/dL    ALT (SGPT) 19 0 - 33 U/L    AST (SGOT) 19 0 - 32 U/L    Alkaline Phosphatase 81 38 - 116 U/L    Total Bilirubin 0.2 0.1 - 1.2 mg/dL    eGFR Non African Amer 94 >60 mL/min/1.73    Globulin 2.9 1.8 - 3.5 gm/dL    A/G Ratio 1.4 1.1 - 2.4 g/dL    BUN/Creatinine Ratio 29.0 7.3 - 30.0    Anion Gap 8.8 mmol/L   CBC Auto Differential   Result Value Ref Range    WBC 6.91 4.00 - 10.00 10*3/mm3    RBC 4.12 3.90 - 5.00 10*6/mm3    Hemoglobin 13.0 11.5 - 14.9 g/dL    Hematocrit 40.5 34.0 - 45.0 %    MCV 98.3 (H) 83.0 - 97.0 fL    MCH 31.6 27.0 - 33.0 pg    MCHC 32.1 32.0 - 35.0 g/dL    RDW 13.8 11.7 - 14.5 %     RDW-SD 49.9 (H) 37.0 - 49.0 fl    MPV 8.9 8.9 - 12.1 fL    Platelets 267 150 - 375 10*3/mm3    Neutrophil % 78.1 (H) 39.0 - 75.0 %    Lymphocyte % 10.1 (L) 20.0 - 49.0 %    Monocyte % 9.0 4.0 - 12.0 %    Eosinophil % 1.2 1.0 - 5.0 %    Basophil % 0.6 0.0 - 1.1 %    Immature Grans % 1.0 (H) 0.0 - 0.5 %    Neutrophils, Absolute 5.40 1.50 - 7.00 10*3/mm3    Lymphocytes, Absolute 0.70 (L) 1.00 - 3.50 10*3/mm3    Monocytes, Absolute 0.62 0.25 - 0.80 10*3/mm3    Eosinophils, Absolute 0.08 0.00 - 0.36 10*3/mm3    Basophils, Absolute 0.04 0.00 - 0.10 10*3/mm3    Immature Grans, Absolute 0.07 (H) 0.00 - 0.03 10*3/mm3    nRBC 0.0 0.0 - 0.0 /100 WBC     *I reviewed scans myself and concur with the below dictation.    Ct Chest With Contrast    Result Date: 10/10/2018  Narrative: CT CHEST WITH IV CONTRAST  HISTORY: 73-year-old female with left upper lobe lung cancer status post radiation. Followup.  TECHNIQUE: Radiation dose reduction techniques were utilized, including automated exposure control and exposure modulation based on body size. 3 mm images were obtained through the chest after the administration of IV contrast. Compared with previous chest CTs from 05/30/2018 and 03/14/2018.  FINDINGS: There is no significant change in the residual left apical lesion measuring approximately 1.7 cm transversely, image 10. Slightly inferiorly is a triangulated density which is stable as well measuring approximately 2.0 x 1.4 cm. Slightly inferiorly is a new irregular airspace consolidation along the lateral pleura measuring approximately 6 cm in craniocaudad span on the coronal reconstruction series. There is a 1.8 cm nodular component, image 30. There are no new pulmonary opacities on the right and there are no pleural or pericardial effusions. There is no lymphadenopathy within the chest. Advanced emphysematous change noted. Multinodular goiter appears stable. Right mastectomy changes are noted. There is no new abnormality within the  visualized abdomen. There is heterogeneous fatty infiltration of the liver and the few tiny hyperenhancing foci within the right hepatic lobe are stable likely representing either flash filled hemangiomas or vascular malformations.      Impression: The residual left apical lesion is stable and the small triangulated pleural-based lesion inferiorly is stable as well. There is, however, a new pleural-based airspace consolidation inferiorly at the left upper lobe which measures 6 cm in craniocaudad span and has a nodular component which measures 1.8 cm. Although radiation fibrosis is possible, the etiology is uncertain at this point and close followup is recommended.  This report was finalized on 10/10/2018 1:28 PM by Dr. Ceci Solis M.D.          Assessment/Plan   Assessment/Plan:   This is a 72 y.o. female with:     1.  Stage IIB (T3N0M0) left upper lobe non-small cell lung cancer, high PDL-1 (80%), negative egfr/alk/ros   * Appears to be a local lung cancer based on PET/CT scan. On initial scans, it appeared she may have hilar and mediastinal adenopathy and an indeterminate liver lesion, but all PET negative. She had active infection during her initial CT scans that had resolved when PET was done. On CT, it appeared to be two lung lesions, all in one lobe, but are involving pleura- T3N0M0   * She is high PDL 1 and thus if she progressed that she could be a candidate for Keytruda therapy (not approved for adjuvant use though).    * status post definitive radiation therapy for local treatment, completed 1/26/2018. Not a surgical candidate and not a candidate for adjuvant therapy   * CT scans in October stability of the previously treated area, but with new nodularity along the left pleura, inferior to previously treated area. Unclear etiology, but could be scarring, recurrent tumor, or another primary. I think a PET/CT scan is needed and if FDG avid, will obtain a biopsy. If FDG negative, will repeat CT chest in 3  months.      2. New left pleural nodularity. See #1. PET/CT scan ordered.     3. History of right breast cancer, initially diagnosed in 2000 treated with lumpectomy, radiation, chemotherapy and endocrine therapy with local recurrence in 2005 treated with mastectomy and then Arimidex.    * Mammogram due July 2019    4. COPD.     Follow-up depends on PET scan with plan as above. I will call her with results. She desires follow up with Dr Ventura upon my departure.

## 2018-10-18 ENCOUNTER — HOSPITAL ENCOUNTER (OUTPATIENT)
Dept: PET IMAGING | Facility: HOSPITAL | Age: 73
Discharge: HOME | End: 2018-10-18
Attending: INTERNAL MEDICINE | Admitting: INTERNAL MEDICINE

## 2018-10-18 ENCOUNTER — HOSPITAL ENCOUNTER (OUTPATIENT)
Dept: PET IMAGING | Facility: HOSPITAL | Age: 73
Discharge: HOME | End: 2018-10-18
Attending: INTERNAL MEDICINE

## 2018-10-18 DIAGNOSIS — R91.1 LEFT UPPER LOBE PULMONARY NODULE: ICD-10-CM

## 2018-10-18 DIAGNOSIS — C34.12 MALIGNANT NEOPLASM OF UPPER LOBE OF LEFT LUNG (HCC): ICD-10-CM

## 2018-10-18 LAB — GLUCOSE BLDC GLUCOMTR-MCNC: 92 MG/DL (ref 70–130)

## 2018-10-18 PROCEDURE — A9552 F18 FDG: HCPCS | Performed by: INTERNAL MEDICINE

## 2018-10-18 PROCEDURE — 78815 PET IMAGE W/CT SKULL-THIGH: CPT

## 2018-10-18 PROCEDURE — 0 FLUDEOXYGLUCOSE F18 SOLUTION: Performed by: INTERNAL MEDICINE

## 2018-10-18 PROCEDURE — 82962 GLUCOSE BLOOD TEST: CPT

## 2018-10-18 RX ADMIN — FLUDEOXYGLUCOSE F18 1 DOSE: 300 INJECTION INTRAVENOUS at 09:38

## 2018-10-23 ENCOUNTER — TELEPHONE (OUTPATIENT)
Dept: GENERAL RADIOLOGY | Facility: HOSPITAL | Age: 73
End: 2018-10-23

## 2018-10-23 ENCOUNTER — DOCUMENTATION (OUTPATIENT)
Dept: ONCOLOGY | Facility: CLINIC | Age: 73
End: 2018-10-23

## 2018-10-23 DIAGNOSIS — C34.12 MALIGNANT NEOPLASM OF UPPER LOBE OF LEFT LUNG (HCC): Primary | ICD-10-CM

## 2018-10-23 NOTE — PROGRESS NOTES
Try to call patient to discuss PET scan result but had to leave a message asking her to return my call. PET scan favors radiation fibrosis rather than recurrent disease. She sees radiation oncology tomorrow. Plan to repeat CT chest in 8 weeks with follow up with Dr Ventura afterwards.

## 2018-10-24 ENCOUNTER — OFFICE VISIT (OUTPATIENT)
Dept: RADIATION ONCOLOGY | Facility: HOSPITAL | Age: 73
End: 2018-10-24

## 2018-10-24 VITALS
BODY MASS INDEX: 20.03 KG/M2 | HEART RATE: 119 BPM | SYSTOLIC BLOOD PRESSURE: 121 MMHG | HEIGHT: 60 IN | OXYGEN SATURATION: 96 % | TEMPERATURE: 97.1 F | DIASTOLIC BLOOD PRESSURE: 58 MMHG | WEIGHT: 102 LBS

## 2018-10-24 DIAGNOSIS — C34.12 MALIGNANT NEOPLASM OF UPPER LOBE OF LEFT LUNG (HCC): Primary | ICD-10-CM

## 2018-10-24 PROCEDURE — 99214 OFFICE O/P EST MOD 30 MIN: CPT | Performed by: RADIOLOGY

## 2018-10-24 NOTE — PROGRESS NOTES
DIAGNOSIS and REASON FOR FOLLOW UP: Malignant neoplasm of upper lobe of left lung     Stage IIB (T3, N0, M0)     Patient Care Team:  Kevin Seymour MD as PCP - General  Kevin Seymour MD as PCP - Claims Attributed  Unruly Bobo MD as Consulting Physician (Cardiology)  Heather Anthony MD as Consulting Physician (Pulmonary Disease)  Luiza Eddy MD as Surgeon (General Surgery)  Laith Prado DPM as Consulting Physician (Podiatry)  Karlie Nur MD as Consulting Physician (Hematology and Oncology)  Michael Christie MD as Consulting Physician (Endocrinology)  Brandie Bennett MD as Consulting Physician (Radiation Oncology)    CHIEF COMPLAINT:  Post-radiation follow up  I had the pleasure of seeing Maryam Arredondo  back in the department today, now approximately 9 months out from the radiation therapy portion of her treatment for the above mentioned diagnosis.  She is doing amazingly well. She is still going to milestone routinely and is feeling great. She is rarely short of air and uses her inhaler only once every couple of weeks. She denies significant cough, dysphagia, hemoptysis or weight loss.      She did undergo a CT of the chest on October 8, 2018 which showed stability of the left apical lung lesion but a new pleural based consolidation inferiorly measuring 6 cm with a nodular component measuring 1.8 cm.  This prompted a PET scan that was completed on October 18, 2018 which shows mild uptake in the apical lesion with an SUV of 3.1 measuring 2 x 1.3 cm, mild uptake within the left chest wall related to treatment, mild uptake in the breast with recent negative mammogram and mild uptake in the left upper lobe lesion witn an SUV of 2.4 - all consistent with radiation change.    Past Medical History: she  has a past medical history of Allergic rhinitis; Asthma; Breast cancer (CMS/HCC); COPD (chronic obstructive pulmonary disease) (CMS/HCC); Hyperthyroidism; Mitral regurgitation;  Osteopenia; Oxygen dependent; Pneumothorax; SOB (shortness of breath); Tachycardia; Toxic multinodular goiter; and Vitamin D deficiency.   No history exists.       Past Surgical History:  she has a past surgical history that includes Breast lumpectomy (Right); Mastectomy (Right, 2004); Colonoscopy; Neck surgery; Hysterectomy; Vascular surgery; Breast lumpectomy; Cardiac catheterization; and Colonoscopy (N/A, 4/28/2017).    Meds:    Current Outpatient Prescriptions:   •  acyclovir (ZOVIRAX) 400 MG tablet, Take 400 mg by mouth Every 4 (Four) Hours While Awake., Disp: , Rfl:   •  albuterol (PROVENTIL HFA;VENTOLIN HFA) 108 (90 Base) MCG/ACT inhaler, Inhale 2 puffs Every 4 (Four) Hours As Needed for Wheezing. Per MD - until current episode is resolved (Patient taking differently: Inhale 2 puffs As Needed for Wheezing. Per MD - until current episode is resolved ), Disp: , Rfl:   •  budesonide-formoterol (SYMBICORT) 160-4.5 MCG/ACT inhaler, Inhale 2 puffs 2 (two) times a day., Disp: , Rfl:   •  CARTIA  MG 24 hr capsule, , Disp: , Rfl:   •  cetirizine (ZyrTEC) 10 MG tablet, Take 10 mg by mouth daily., Disp: , Rfl:   •  Cholecalciferol (VITAMIN D3) 2000 UNITS tablet, Take 1 tablet by mouth daily., Disp: , Rfl:   •  clindamycin (CLEOCIN T) 1 % lotion, Apply  topically to the appropriate area as directed 2 (Two) Times a Day., Disp: , Rfl:   •  digoxin (LANOXIN) 125 MCG tablet, Take 125 mcg by mouth Daily., Disp: , Rfl:   •  fluticasone (FLONASE) 50 MCG/ACT nasal spray, 2 Squirts into each nostril daily., Disp: , Rfl:   •  ipratropium (ATROVENT) 0.03 % nasal spray, As Needed., Disp: , Rfl:   •  methimazole (TAPAZOLE) 10 MG tablet, 1/2 tablet daily (Patient taking differently: Take 5 mg by mouth Daily. 1/2 tablet daily), Disp: 30 tablet, Rfl: 5  •  methIMAzole (TAPAZOLE) 5 MG tablet, 1/2 tablet daily, Disp: 30 tablet, Rfl: 0  •  metroNIDAZOLE (METROCREAM) 0.75 % cream, , Disp: , Rfl:   •  montelukast (SINGULAIR) 10 MG  "tablet, Take 1 tablet by mouth Every Morning., Disp: 30 tablet, Rfl: 5  •  Multiple Vitamins-Minerals (PRESERVISION AREDS 2) chewable tablet, Chew 2 capsules Every Morning., Disp: , Rfl:   •  mupirocin (BACTROBAN) 2 % ointment, , Disp: , Rfl:   •  O2 (OXYGEN), Inhale Continuous., Disp: , Rfl:   •  SPIRIVA RESPIMAT 2.5 MCG/ACT aerosol solution, 2 puffs Daily., Disp: , Rfl:     Allergies:    Allergies   Allergen Reactions   • Codeine    • Mucinex D [Pseudoephedrine-Guaifenesin Er] Nausea Only     Felt hot in chest   • Penicillin V    • Penicillins        Family History:  her family history includes Arthritis in her mother; Breast cancer in her paternal grandmother; Heart failure in her mother; Lung cancer in her father; No Known Problems in her sister.    Social History:  she  reports that she quit smoking about 24 years ago. Her smoking use included Cigarettes. She has a 100.00 pack-year smoking history. She has never used smokeless tobacco. She reports that she does not drink alcohol or use drugs.    Pertinent Findings on Review of Systems:  Fourteen systems have been reviewed with the patient and are negative other than as mentioned above.    Performance Status:  (1)     Pertinent Findings on Physical Exam:  Vitals:    10/24/18 1333   BP: 121/58   Pulse: 119   Temp: 97.1 °F (36.2 °C)   TempSrc: Tympanic   SpO2: 96%   Weight: 46.3 kg (102 lb)   Height: 152.5 cm (60.04\")   PainSc: 0-No pain       Physical Exam   Constitutional: She is oriented to person, place, and time. She appears well-developed and well-nourished. She is active and cooperative. No distress.   HENT:   Head: Normocephalic and atraumatic.   Nose: Nose normal.   Mouth/Throat: Mucous membranes are normal. Normal dentition.   Eyes: Conjunctivae and EOM are normal.   Neck: Normal range of motion.   Pulmonary/Chest: Effort normal and breath sounds normal. No tachypnea. No respiratory distress. She has no decreased breath sounds. She has no wheezes. She " has no rhonchi.   Abdominal: Normal appearance. There is no hepatosplenomegaly.   Musculoskeletal: Normal range of motion.     Vascular Status -  Her right foot exhibits no edema. Her left foot exhibits no edema.  Neurological: She is alert and oriented to person, place, and time.   Skin: Skin is warm and dry.   Psychiatric: She has a normal mood and affect. Her behavior is normal. Judgment and thought content normal.     Assessment:  Malignant neoplasm of upper lobe of left lung    - Clinical: Stage IIB (T3, N0, M0)   Doing very well post treatment clinically and radiographically    Plan:   We have reviewed the CT and PET scan images today and I agree they are consistent with post treatment changes. I am thrilled with her clinical and radiographic response, as is she.  I recommended a repeat CT of the chest in 4 months and she was agreeable to that.  She returns to meet Dr. Ventura on January 4, 2019 and I will see her shortly after her scan and she knows to call if she has problems prior to that.    I spent greater than 25 minutes in face-to-face time with the patient and 15 minutes of that time was spent in review of imaging as well as surveillance and potential outcomes.

## 2018-11-19 ENCOUNTER — RESULTS ENCOUNTER (OUTPATIENT)
Dept: ENDOCRINOLOGY | Age: 73
End: 2018-11-19

## 2018-11-19 DIAGNOSIS — E05.20 TOXIC MULTINODULAR GOITER: ICD-10-CM

## 2018-11-19 DIAGNOSIS — E05.90 HYPERTHYROIDISM: ICD-10-CM

## 2018-11-20 LAB
T3FREE SERPL-MCNC: 3.1 PG/ML (ref 2–4.4)
T4 FREE SERPL-MCNC: 1.3 NG/DL (ref 0.93–1.7)
TSH SERPL DL<=0.005 MIU/L-ACNC: 0.79 MIU/ML (ref 0.27–4.2)

## 2018-11-21 DIAGNOSIS — E05.90 HYPERTHYROIDISM: ICD-10-CM

## 2018-11-21 RX ORDER — METHIMAZOLE 10 MG/1
TABLET ORAL
Qty: 30 TABLET | Refills: 5
Start: 2018-11-21 | End: 2019-01-07 | Stop reason: ALTCHOICE

## 2018-11-21 RX ORDER — METHIMAZOLE 5 MG/1
TABLET ORAL
Qty: 30 TABLET | Refills: 0
Start: 2018-11-21 | End: 2019-01-07 | Stop reason: SDUPTHER

## 2018-11-30 DIAGNOSIS — E05.20 TOXIC MULTINODULAR GOITER: ICD-10-CM

## 2018-11-30 DIAGNOSIS — I10 ESSENTIAL HYPERTENSION: Primary | ICD-10-CM

## 2018-11-30 DIAGNOSIS — E05.90 HYPERTHYROIDISM: ICD-10-CM

## 2018-12-05 ENCOUNTER — OFFICE VISIT (OUTPATIENT)
Dept: CARDIOLOGY | Facility: CLINIC | Age: 73
End: 2018-12-05

## 2018-12-05 VITALS
BODY MASS INDEX: 20.1 KG/M2 | DIASTOLIC BLOOD PRESSURE: 70 MMHG | SYSTOLIC BLOOD PRESSURE: 118 MMHG | HEART RATE: 92 BPM | HEIGHT: 60 IN | WEIGHT: 102.4 LBS

## 2018-12-05 DIAGNOSIS — I10 ESSENTIAL HYPERTENSION: ICD-10-CM

## 2018-12-05 DIAGNOSIS — I47.1 MULTIFOCAL ATRIAL TACHYCARDIA (HCC): Primary | ICD-10-CM

## 2018-12-05 PROCEDURE — 93000 ELECTROCARDIOGRAM COMPLETE: CPT | Performed by: INTERNAL MEDICINE

## 2018-12-05 PROCEDURE — 99214 OFFICE O/P EST MOD 30 MIN: CPT | Performed by: INTERNAL MEDICINE

## 2018-12-05 NOTE — PROGRESS NOTES
Subjective:     Encounter Date:12/05/2018      Patient ID: Maryam Arredondo is a 73 y.o. female.    Chief Complaint:Multifocal atrial tachycardia  Hypertension   This is a chronic problem. Associated symptoms include shortness of breath.       Patient presents today for reevaluation.  Patient suffered an upper respiratory infection was treated.  Her heart Axid did very well through the whole event.  She continues to exercise she is up to treadmill.  Prior to the upper respiratory infection she was walking 2 miles at 1.8 miles per hour.  No chest pains palpitations swelling fatigue.    Review of Systems   Respiratory: Positive for shortness of breath.    All other systems reviewed and are negative.        ECG 12 Lead  Date/Time: 12/5/2018 1:44 PM  Performed by: Unruly Bobo MD  Authorized by: Unruly Bobo MD   Comparison: compared with previous ECG from 12/5/2017  Similar to previous ECG  Rhythm: sinus rhythm  Clinical impression: non-specific ECG               Objective:     Physical Exam   Constitutional: She is oriented to person, place, and time.   Temporal muscular wasting appears chronically ill   HENT:   Head: Normocephalic.   Eyes: Conjunctivae are normal.   Neck: Normal range of motion.   Cardiovascular: Normal rate, regular rhythm and normal heart sounds.   Pulmonary/Chest: She has decreased breath sounds.   Abdominal: Soft. Bowel sounds are normal.   Musculoskeletal: Normal range of motion. She exhibits no edema.   Neurological: She is alert and oriented to person, place, and time.   Skin: Skin is warm and dry.   Psychiatric: She has a normal mood and affect. Her behavior is normal.   Vitals reviewed.      Lab Review:       Assessment:          Diagnosis Plan   1. Multifocal atrial tachycardia (CMS/HCC)  ECG 12 Lead   2. Essential hypertension            Plan:       1.  Multifocal atrial tachycardia.  Patient remains stable.  Patient recently had an upper respiratory infection.  She is back  on a treadmill slowly getting back to where she was.  Before the upper respiratory infection she was doing 2 miles at 1.8 miles per hour.  2.  Significant lung disease.  He uses her oxygen concentrator intermittently.  3.  History of lung mass followed by oncology status post 36 treatments of radiation.  4.  Hypertension blood pressures good.    5. Continue same follow-up one year sooner if issues.

## 2018-12-28 ENCOUNTER — HOSPITAL ENCOUNTER (OUTPATIENT)
Dept: PET IMAGING | Facility: HOSPITAL | Age: 73
Discharge: HOME | End: 2018-12-28
Attending: INTERNAL MEDICINE | Admitting: INTERNAL MEDICINE

## 2018-12-28 DIAGNOSIS — C34.12 MALIGNANT NEOPLASM OF UPPER LOBE OF LEFT LUNG (HCC): ICD-10-CM

## 2018-12-28 PROCEDURE — 71260 CT THORAX DX C+: CPT

## 2018-12-28 PROCEDURE — 82565 ASSAY OF CREATININE: CPT

## 2018-12-28 PROCEDURE — 25010000002 IOPAMIDOL 61 % SOLUTION: Performed by: INTERNAL MEDICINE

## 2018-12-28 RX ADMIN — IOPAMIDOL 75 ML: 612 INJECTION, SOLUTION INTRAVENOUS at 11:57

## 2019-01-04 ENCOUNTER — APPOINTMENT (OUTPATIENT)
Dept: LAB | Facility: HOSPITAL | Age: 74
End: 2019-01-04

## 2019-01-04 ENCOUNTER — OFFICE VISIT (OUTPATIENT)
Dept: ONCOLOGY | Facility: CLINIC | Age: 74
End: 2019-01-04

## 2019-01-04 VITALS
DIASTOLIC BLOOD PRESSURE: 64 MMHG | WEIGHT: 96.5 LBS | SYSTOLIC BLOOD PRESSURE: 114 MMHG | OXYGEN SATURATION: 94 % | HEIGHT: 60 IN | HEART RATE: 101 BPM | BODY MASS INDEX: 18.94 KG/M2 | RESPIRATION RATE: 16 BRPM | TEMPERATURE: 97.4 F

## 2019-01-04 DIAGNOSIS — C34.12 MALIGNANT NEOPLASM OF UPPER LOBE OF LEFT LUNG (HCC): Primary | ICD-10-CM

## 2019-01-04 LAB
BASOPHILS # BLD AUTO: 0.04 10*3/MM3 (ref 0–0.1)
BASOPHILS NFR BLD AUTO: 0.6 % (ref 0–1.1)
DEPRECATED RDW RBC AUTO: 46.1 FL (ref 37–49)
EOSINOPHIL # BLD AUTO: 0.07 10*3/MM3 (ref 0–0.36)
EOSINOPHIL NFR BLD AUTO: 1 % (ref 1–5)
ERYTHROCYTE [DISTWIDTH] IN BLOOD BY AUTOMATED COUNT: 13.5 % (ref 11.7–14.5)
HCT VFR BLD AUTO: 39.3 % (ref 34–45)
HGB BLD-MCNC: 12.8 G/DL (ref 11.5–14.9)
IMM GRANULOCYTES # BLD AUTO: 0.05 10*3/MM3 (ref 0–0.03)
IMM GRANULOCYTES NFR BLD AUTO: 0.7 % (ref 0–0.5)
LYMPHOCYTES # BLD AUTO: 0.82 10*3/MM3 (ref 1–3.5)
LYMPHOCYTES NFR BLD AUTO: 11.4 % (ref 20–49)
MCH RBC QN AUTO: 30.5 PG (ref 27–33)
MCHC RBC AUTO-ENTMCNC: 32.6 G/DL (ref 32–35)
MCV RBC AUTO: 93.8 FL (ref 83–97)
MONOCYTES # BLD AUTO: 0.63 10*3/MM3 (ref 0.25–0.8)
MONOCYTES NFR BLD AUTO: 8.8 % (ref 4–12)
NEUTROPHILS # BLD AUTO: 5.58 10*3/MM3 (ref 1.5–7)
NEUTROPHILS NFR BLD AUTO: 77.5 % (ref 39–75)
NRBC BLD AUTO-RTO: 0 /100 WBC (ref 0–0)
PLATELET # BLD AUTO: 297 10*3/MM3 (ref 150–375)
PMV BLD AUTO: 9 FL (ref 8.9–12.1)
RBC # BLD AUTO: 4.19 10*6/MM3 (ref 3.9–5)
WBC NRBC COR # BLD: 7.19 10*3/MM3 (ref 4–10)

## 2019-01-04 PROCEDURE — 85025 COMPLETE CBC W/AUTO DIFF WBC: CPT | Performed by: INTERNAL MEDICINE

## 2019-01-04 PROCEDURE — 36415 COLL VENOUS BLD VENIPUNCTURE: CPT | Performed by: INTERNAL MEDICINE

## 2019-01-04 PROCEDURE — 99214 OFFICE O/P EST MOD 30 MIN: CPT | Performed by: INTERNAL MEDICINE

## 2019-01-04 NOTE — PROGRESS NOTES
History:     Reason for follow up:   1.  Stage IIB left upper lobe non-small cell lung cancer, high PDL-1 (80%), negative egfr/alk/ros   * status post radiation therapy completed 1/26/18     HPI:  Maryam Arredondo presents for follow-up of her lung cancer.  This is my first time meeting the patient as I am taking over care from Dr. Nur.  The patient completed radiation therapy to a left upper lobe non-small cell carcinoma January 2018.  PET scan in October showed an area of pleural based opacification in the left upper lobe SUV 3.1 favoring radiation fibrosis.  There was a hypermetabolic nodule in the right thyroid lobe.  This was previously present on PET 2017 and thyroid ultrasound has shown multinodular goiter.  She states this is followed by her endocrinologist.    In return today she is doing well with no increased shortness of breath, cough, chest wall pain.  She exercises at milestone.    Reviewed, confirmed and updated history (past medical, social and family)   Past Medical   Past Medical History:   Diagnosis Date   • Allergic rhinitis    • Asthma    • Breast cancer (CMS/HCC)     s/p Lumpectomy ~2000 and chemo/XRT. Then  Masectomy ~2004 for some recurrence   • COPD (chronic obstructive pulmonary disease) (CMS/HCC)    • Hyperthyroidism    • Mitral regurgitation     mild to moderate   • Osteopenia    • Oxygen dependent     2L - at night only   • Pneumothorax    • SOB (shortness of breath)    • Tachycardia    • Toxic multinodular goiter    • Vitamin D deficiency     and   Patient Active Problem List   Diagnosis   • Pneumothorax   • Hypoxia   • Tachycardia   • Hyperthyroidism   • Essential hypertension   • Multifocal atrial tachycardia (CMS/HCC)   • Toxic multinodular goiter   • Pulmonary emphysema (CMS/HCC)   • Vitamin D insufficiency   • Pneumonia   • COPD with acute lower respiratory infection (CMS/HCC)   • Malignant neoplasm of upper lobe of left lung (CMS/HCC)     Social History   Social History      Socioeconomic History   • Marital status: Single     Spouse name: Not on file   • Number of children: Not on file   • Years of education: Not on file   • Highest education level: Not on file   Social Needs   • Financial resource strain: Not on file   • Food insecurity - worry: Not on file   • Food insecurity - inability: Not on file   • Transportation needs - medical: Not on file   • Transportation needs - non-medical: Not on file   Occupational History   • Occupation: Admistrative Assistant     Employer: RETIRED   Tobacco Use   • Smoking status: Former Smoker     Packs/day: 2.00     Years: 50.00     Pack years: 100.00     Types: Cigarettes     Last attempt to quit: 3/14/1994     Years since quittin.8   • Smokeless tobacco: Never Used   • Tobacco comment: D/C 20 years ago, smoking 2 ppd before quitting   Substance and Sexual Activity   • Alcohol use: No     Comment: Social use rare   • Drug use: No   • Sexual activity: Defer     Comment: drinks decaf    Other Topics Concern   • Not on file   Social History Narrative   • Not on file     Family History  Family History   Problem Relation Age of Onset   • Arthritis Mother    • Heart failure Mother         Congestive Heart Failure   • Lung cancer Father    • Breast cancer Paternal Grandmother    • No Known Problems Sister      Allergies  Allergies   Allergen Reactions   • Codeine    • Mucinex D [Pseudoephedrine-Guaifenesin Er] Nausea Only     Felt hot in chest   • Penicillin V    • Penicillins        Medications: The current medication list was reviewed in the EMR.    Review of Systems  Review of Systems   Constitutional: Negative for activity change, appetite change, chills, diaphoresis, fatigue, fever and unexpected weight change.   HENT: Negative for congestion and sinus pressure.    Respiratory: Negative for cough, chest tightness and shortness of breath.    Cardiovascular: Negative for chest pain, palpitations and leg swelling.   Gastrointestinal: Negative  "for abdominal pain, blood in stool, constipation, diarrhea, nausea and vomiting.   Endocrine: Negative.    Genitourinary: Negative.    Musculoskeletal: Positive for arthralgias. Negative for joint swelling and myalgias.   Skin: Negative.    Allergic/Immunologic: Negative.    Neurological: Negative.    Hematological: Negative for adenopathy. Does not bruise/bleed easily.       Objective    Objective:     Vitals:    01/04/19 1244   BP: 114/64   Pulse: 101   Resp: 16   Temp: 97.4 °F (36.3 °C)   TempSrc: Oral   SpO2: 94%   Weight: 43.8 kg (96 lb 8 oz)   Height: 152.5 cm (60.04\")   PainSc: 0-No pain     Current Status 1/4/2019   ECOG score 0   GENERAL:  Well-developed, well-nourished female in no acute distress. Vital signs reviewed.   SKIN:  Warm, dry without rashes, purpura or petechiae.  EYES:  Pupils equal, round and reactive to light.  EOMs intact.  Conjunctivae normal.  HEAD:  Normocephalic.  EARS/NOSE/MOUTH/THROAT:  Hearing intact. Septum midline.  No excoriations or nasal discharge. Nasal cannula in place. No stomatitis or ulcers.  Lips normal. Oropharynx without lesions or exudates.  NECK:  Supple with good range of motion; no thyromegaly or masses  LYMPHATICS:  No cervical, supraclavicular, axillary adenopathy.  RESP:  Lungs clear to percussion and auscultation but with decreased airflow diffusely. Normal respiratory effort.   CARDIAC:  Regular rate and rhythm without murmurs, rubs or gallops. Normal S1,S2. No edema  GI:  Soft, nontender, normal bowel sounds  MSK:  No clubbing or cyanosis, No joint swelling noted in hands  NEUROLOGICAL:   No focal neurological deficits.  PSYCHIATRIC:  Normal affect and mood.  Alert and Oriented x 3.       Labs and Imaging  Results for orders placed or performed in visit on 01/04/19   CBC Auto Differential   Result Value Ref Range    WBC 7.19 4.00 - 10.00 10*3/mm3    RBC 4.19 3.90 - 5.00 10*6/mm3    Hemoglobin 12.8 11.5 - 14.9 g/dL    Hematocrit 39.3 34.0 - 45.0 %    MCV 93.8 " 83.0 - 97.0 fL    MCH 30.5 27.0 - 33.0 pg    MCHC 32.6 32.0 - 35.0 g/dL    RDW 13.5 11.7 - 14.5 %    RDW-SD 46.1 37.0 - 49.0 fl    MPV 9.0 8.9 - 12.1 fL    Platelets 297 150 - 375 10*3/mm3    Neutrophil % 77.5 (H) 39.0 - 75.0 %    Lymphocyte % 11.4 (L) 20.0 - 49.0 %    Monocyte % 8.8 4.0 - 12.0 %    Eosinophil % 1.0 1.0 - 5.0 %    Basophil % 0.6 0.0 - 1.1 %    Immature Grans % 0.7 (H) 0.0 - 0.5 %    Neutrophils, Absolute 5.58 1.50 - 7.00 10*3/mm3    Lymphocytes, Absolute 0.82 (L) 1.00 - 3.50 10*3/mm3    Monocytes, Absolute 0.63 0.25 - 0.80 10*3/mm3    Eosinophils, Absolute 0.07 0.00 - 0.36 10*3/mm3    Basophils, Absolute 0.04 0.00 - 0.10 10*3/mm3    Immature Grans, Absolute 0.05 (H) 0.00 - 0.03 10*3/mm3    nRBC 0.0 0.0 - 0.0 /100 WBC       CT chest 12/28/18: There is decreased size of the left apical mass 1.3 cm compared to previous 1.5 cm.  Adjacent granulated density also decreased in size from 1.7 down to 0.9 cm.  There is stable emphysema and apical scarring.  There are stable thyroid nodules.  No new lymphadenopathy in the chest.  There is a right hepatic enhancing lesion stable.  I personally reviewed the CT chest and there is stable to improved disease/scar in the left upper lobe of the lung.    Assessment/Plan   Assessment/Plan:   This is a 72 y.o. female with:     1.  Stage IIB (T3N0M0) left upper lobe non-small cell lung cancer, high PDL-1 (80%), negative egfr/alk/ros   * Appears to be a local lung cancer based on PET/CT scan. On initial scans, it appeared she may have hilar and mediastinal adenopathy and an indeterminate liver lesion, but all PET negative. She had active infection during her initial CT scans that had resolved when PET was done. On CT, it appeared to be two lung lesions, all in one lobe, but are involving pleura- T3N0M0   * She is high PDL 1 and thus if she progressed that she could be a candidate for Keytruda therapy (not approved for adjuvant use though).    * status post definitive  radiation therapy for local treatment, completed 1/26/2018. Not a surgical candidate and not a candidate for adjuvant therapy   * CT scans in October stability of the previously treated area, but with new nodularity along the left pleura, inferior to previously treated area.  PET scan felt most consistent with radiation changes.   *CT chest will 12/28/18: Stable to decreased left apical mass and adjacent nodularity     2.  Multinodular goiter stable by CT--followed by endocrinology    3. History of right breast cancer, initially diagnosed in 2000 treated with lumpectomy, radiation, chemotherapy and endocrine therapy with local recurrence in 2005 treated with mastectomy and then Arimidex.    * Mammogram due July 2019    4. COPD.      I recommended every 4 month CT of the chest year 2 of surveillance.  CT and M.D. visit ordered 4 months.  I will check a BMP prior to IV contrast.  All problems new to me today.

## 2019-01-07 RX ORDER — METHIMAZOLE 10 MG/1
TABLET ORAL
Qty: 30 TABLET | Refills: 4 | OUTPATIENT
Start: 2019-01-07

## 2019-01-07 RX ORDER — METHIMAZOLE 5 MG/1
TABLET ORAL
Qty: 60 TABLET | Refills: 5 | Status: SHIPPED | OUTPATIENT
Start: 2019-01-07 | End: 2019-08-11 | Stop reason: SDUPTHER

## 2019-01-11 LAB — CREAT BLDA-MCNC: 0.7 MG/DL (ref 0.6–1.3)

## 2019-01-25 ENCOUNTER — TELEPHONE (OUTPATIENT)
Dept: ENDOCRINOLOGY | Age: 74
End: 2019-01-25

## 2019-01-25 NOTE — TELEPHONE ENCOUNTER
Called and sw pt told her id work with her reg madai appt       ----- Message from Dodie Jenkins sent at 1/25/2019 11:02 AM EST -----  Contact: patient  Patient wants to talk to you. She has a lab appointment on 1-29-19 and due to being on antibiotic for sinusitus and ear infection and the doctor told her not to get out she is concerned about her lab appt.   I told her I can schedule around 2-4-19 and if she is still sick and can't come in for labs that she could let us know that she needs same day labs when she sees Dr. Christie on 2-12-19.     She wants to discuss with you before making a decision. She said she started calling our office today at 9:30am and she just got through.    Pt - [phone]

## 2019-01-28 LAB
ALBUMIN SERPL-MCNC: 4 G/DL (ref 3.5–5.2)
ALBUMIN/GLOB SERPL: 1.5 G/DL
ALP SERPL-CCNC: 74 U/L (ref 39–117)
ALT SERPL-CCNC: 21 U/L (ref 1–33)
AST SERPL-CCNC: 13 U/L (ref 1–32)
BILIRUB SERPL-MCNC: 0.2 MG/DL (ref 0.1–1.2)
BUN SERPL-MCNC: 23 MG/DL (ref 8–23)
BUN/CREAT SERPL: 29.9 (ref 7–25)
CALCIUM SERPL-MCNC: 9.9 MG/DL (ref 8.6–10.5)
CHLORIDE SERPL-SCNC: 103 MMOL/L (ref 98–107)
CO2 SERPL-SCNC: 29.2 MMOL/L (ref 22–29)
CREAT SERPL-MCNC: 0.77 MG/DL (ref 0.57–1)
GLOBULIN SER CALC-MCNC: 2.6 GM/DL
GLUCOSE SERPL-MCNC: 81 MG/DL (ref 65–99)
POTASSIUM SERPL-SCNC: 4.1 MMOL/L (ref 3.5–5.2)
PROT SERPL-MCNC: 6.6 G/DL (ref 6–8.5)
SODIUM SERPL-SCNC: 143 MMOL/L (ref 136–145)
T4 FREE SERPL-MCNC: 1.34 NG/DL (ref 0.93–1.7)
TSH SERPL DL<=0.005 MIU/L-ACNC: 0.77 MIU/ML (ref 0.27–4.2)

## 2019-01-29 ENCOUNTER — RESULTS ENCOUNTER (OUTPATIENT)
Dept: ENDOCRINOLOGY | Age: 74
End: 2019-01-29

## 2019-01-29 DIAGNOSIS — I10 ESSENTIAL HYPERTENSION: ICD-10-CM

## 2019-01-29 DIAGNOSIS — E05.90 HYPERTHYROIDISM: ICD-10-CM

## 2019-01-29 DIAGNOSIS — E05.20 TOXIC MULTINODULAR GOITER: ICD-10-CM

## 2019-02-08 NOTE — PROGRESS NOTES
Subjective   Maryam Arredondo is a 73 y.o. female.     F/u for nontoxic MNG,hypothyroidism,COPD, asthma // flu vaccine and Hep A  @PCP        Patient is a 73`-year-old female who came in for follow-up. She has toxic multinodular goiter. She had a thyroid scan and uptake in February 2014 which showed increased uptake of 40% with a heterogeneous distribution of the radiopharmaceutical in both lobes of the thyroid gland. A dominant area of photopenia is present on the left lobe. She had a thyroid ultrasound which showed a multinodular goiter with cysts and solid nodules. The dominant nodule in the left is partially cystic. She had a follow-up ultrasound in August 2014 which showed the left lobe dominant nodule is smaller. She is on Tapazole 5 mg 1.5 tab once a day. She has gained 4 lb since 10/18.  She denies bowel changes. She denies heat or cold intolerance. She denies palpitations. She denies tremors.  She denies dysphagia.     She had a thyroid ultrasound done on April 4, 2018 which showed a multinodular goiter.  There is a hypervascular 2 cm nodule in the upper right thyroid lobe which corresponds to the hypermetabolic nodule on the PET CT scan of November 15, 2017.  She wants to continued observation due to her other co-morbidities.      She had  left upper lobe lung biopsy in in November 2017 which yielded adenocarcinoma.  She had PET CT scan done in November 2017 which showed a small hypermetabolic nodule in the right lobe of the thyroid gland which may be an adenoma or a small thyroid cancer.  A metastasis to the thyroid gland is considered much less likely.  She has completed radiation therapy.  She had CT scan of the chest in May 2018 which showed slight interval decrease in size of pleural-based lung tumor in the left upper lobe and moderately severe emphysema.  There is no lymphadenopathy.  She will be followed by Dr. Ventura    She had a follow-up CT scan of the chest done in January 2019 which showed a  slightly decreased in size.  The pleural-based airspace consolidation in the left upper lobe showed interval decrease in size.  There are no new pulmonary nodules or mediastinal lymphadenopathy.  Stable numerous thyroid nodules.  Small hyperenhancing liver lesions are unchanged from prior imaging in 2017.    She has COPD. She denies any shortness of breath or coughing.  She complains of nasal congestion and is on Flonase and Zyrtec.  She is on maintenance Symbicort, Spiriva and Pro-Air prn for COPD. She is on continuous O2.  She is exercising regularly at the gym.  She follows with Dr. Anthony.      She has hypertension and history of multifocal tachycardia.  She was taken off lisinopril last May 2014. She is on diltiazem ER 90 mg/day and digoxin 0.125 mg/day. She follows with Dr. Bobo.      She had a previous right mastectomy for breast cancer. She has completed 5 years of Arimidex in 2009. She has osteopenia and was on Fosamax for unknown period of time. Her last bone mineral density was 2016 at [facility] and was normal. She is on Vit D 2000 units/day.     She had a colonoscopy in April 2017 and 2 tubular adenoma with low-grade dysplasia were removed by Dr. Eddy.  She was advised a repeat colonoscopy in 2019 to be done Dr. JOAN Frankel.  She denies bowel complaints.    The following portions of the patient's history were reviewed and updated as appropriate: allergies, current medications, past family history, past medical history, past social history, past surgical history and problem list.    Review of Systems   Constitutional: Positive for fatigue.   HENT: Negative.    Eyes: Negative.    Respiratory: Positive for shortness of breath.    Cardiovascular: Negative.    Gastrointestinal: Negative.    Endocrine: Negative.    Genitourinary: Negative.    Musculoskeletal: Negative.    Skin: Negative.    Allergic/Immunologic: Negative.    Neurological: Negative for numbness.   Hematological: Bruises/bleeds easily.  "  Psychiatric/Behavioral: Negative.        Objective      Vitals:    02/12/19 1152   BP: 122/54   BP Location: Left arm   Patient Position: Sitting   Cuff Size: Small Adult   Pulse: 112   SpO2: 96%   Weight: 47.4 kg (104 lb 6.4 oz)   Height: 152.5 cm (60.04\")     Physical Exam   Constitutional: She is oriented to person, place, and time. She appears well-developed and well-nourished. No distress.   HENT:   Head: Normocephalic.   Nose: Nose normal.   Mouth/Throat: No oropharyngeal exudate.   Eyes: Conjunctivae and EOM are normal. Right eye exhibits no discharge. Left eye exhibits no discharge. No scleral icterus.   Neck: Neck supple. No JVD present. No tracheal deviation present. No thyromegaly present.   Cardiovascular: Normal rate, regular rhythm, normal heart sounds and intact distal pulses. Exam reveals no gallop and no friction rub.   No murmur heard.  Pulmonary/Chest: Effort normal and breath sounds normal. No stridor. No respiratory distress. She has no wheezes. She has no rales. She exhibits no tenderness.   Abdominal: Soft. Bowel sounds are normal. She exhibits no distension and no mass. There is no tenderness. There is no rebound and no guarding.   Musculoskeletal: Normal range of motion. She exhibits no edema, tenderness or deformity.   Lymphadenopathy:     She has no cervical adenopathy.   Neurological: She is alert and oriented to person, place, and time. She displays normal reflexes. She exhibits normal muscle tone. Coordination normal.   Skin: Skin is warm and dry. No erythema. No pallor.   Psychiatric: She has a normal mood and affect. Her behavior is normal.     Results Encounter on 01/29/2019   Component Date Value Ref Range Status   • Glucose 01/28/2019 81  65 - 99 mg/dL Final   • BUN 01/28/2019 23  8 - 23 mg/dL Final   • Creatinine 01/28/2019 0.77  0.57 - 1.00 mg/dL Final   • eGFR Non African Am 01/28/2019 73  >60 mL/min/1.73 Final    Comment: The MDRD GFR formula is only valid for adults with " stable  renal function between ages 18 and 70.     • eGFR  Am 01/28/2019 89  >60 mL/min/1.73 Final   • BUN/Creatinine Ratio 01/28/2019 29.9* 7.0 - 25.0 Final   • Sodium 01/28/2019 143  136 - 145 mmol/L Final   • Potassium 01/28/2019 4.1  3.5 - 5.2 mmol/L Final   • Chloride 01/28/2019 103  98 - 107 mmol/L Final   • Total CO2 01/28/2019 29.2* 22.0 - 29.0 mmol/L Final   • Calcium 01/28/2019 9.9  8.6 - 10.5 mg/dL Final   • Total Protein 01/28/2019 6.6  6.0 - 8.5 g/dL Final   • Albumin 01/28/2019 4.00  3.50 - 5.20 g/dL Final   • Globulin 01/28/2019 2.6  gm/dL Final   • A/G Ratio 01/28/2019 1.5  g/dL Final   • Total Bilirubin 01/28/2019 0.2  0.1 - 1.2 mg/dL Final   • Alkaline Phosphatase 01/28/2019 74  39 - 117 U/L Final   • AST (SGOT) 01/28/2019 13  1 - 32 U/L Final   • ALT (SGPT) 01/28/2019 21  1 - 33 U/L Final   • Free T4 01/28/2019 1.34  0.93 - 1.70 ng/dL Final   • TSH 01/28/2019 0.774  0.270 - 4.200 mIU/mL Final     Assessment/Plan   Maryam was seen today for goiter, hypothyroidism, copd and asthma.    Diagnoses and all orders for this visit:    Toxic multinodular goiter    Essential hypertension    Multifocal atrial tachycardia (CMS/HCC)    COPD with acute lower respiratory infection (CMS/HCC)    Malignant neoplasm of upper lobe of left lung (CMS/HCC)    Vitamin D insufficiency      Continue Tapazole 5 mg 1.5 tablets daily  Continue diltiazem 180 mg once a day and digoxin 0.125 mg/day per Dr. Bobo.  Continue albuterol, Symbicort, Zyrtec, Flonase,  Singulair, and Spiriva per Dr. Anthony.  Continue vitamin D 2000 units/day.    Send copy of my note to Dr. Lazcano, Dr. Je Ventura, Dr. Bennett and Dr. Anthony    RT 4 mos

## 2019-02-12 ENCOUNTER — OFFICE VISIT (OUTPATIENT)
Dept: ENDOCRINOLOGY | Age: 74
End: 2019-02-12

## 2019-02-12 VITALS
WEIGHT: 104.4 LBS | HEART RATE: 112 BPM | BODY MASS INDEX: 20.5 KG/M2 | OXYGEN SATURATION: 96 % | DIASTOLIC BLOOD PRESSURE: 54 MMHG | SYSTOLIC BLOOD PRESSURE: 122 MMHG | HEIGHT: 60 IN

## 2019-02-12 DIAGNOSIS — E05.20 TOXIC MULTINODULAR GOITER: Primary | ICD-10-CM

## 2019-02-12 DIAGNOSIS — I47.1 MULTIFOCAL ATRIAL TACHYCARDIA (HCC): ICD-10-CM

## 2019-02-12 DIAGNOSIS — I10 ESSENTIAL HYPERTENSION: ICD-10-CM

## 2019-02-12 DIAGNOSIS — J44.0 COPD WITH ACUTE LOWER RESPIRATORY INFECTION (HCC): ICD-10-CM

## 2019-02-12 DIAGNOSIS — C34.12 MALIGNANT NEOPLASM OF UPPER LOBE OF LEFT LUNG (HCC): ICD-10-CM

## 2019-02-12 DIAGNOSIS — E55.9 VITAMIN D INSUFFICIENCY: ICD-10-CM

## 2019-02-12 PROCEDURE — 99214 OFFICE O/P EST MOD 30 MIN: CPT | Performed by: INTERNAL MEDICINE

## 2019-03-05 ENCOUNTER — TELEPHONE (OUTPATIENT)
Dept: ENDOCRINOLOGY | Age: 74
End: 2019-03-05

## 2019-03-05 NOTE — TELEPHONE ENCOUNTER
----- Message from Dodie Jenkins sent at 3/5/2019 10:34 AM EST -----  Contact: patient  Patient said she has lost her paperwork given to her at 2-12-19 visit  with diagnosis listed and asked if you will call her with them.     Pt - [phone] said she will not be available until 2pm or later today.

## 2019-03-08 RX ORDER — ACYCLOVIR 400 MG/1
TABLET ORAL
Start: 2019-03-08 | End: 2019-11-04

## 2019-03-10 ENCOUNTER — HOSPITAL ENCOUNTER (EMERGENCY)
Facility: HOSPITAL | Age: 74
Discharge: HOME | End: 2019-03-10
Attending: EMERGENCY MEDICINE | Admitting: EMERGENCY MEDICINE

## 2019-03-10 ENCOUNTER — APPOINTMENT (OUTPATIENT)
Dept: GENERAL RADIOLOGY | Facility: HOSPITAL | Age: 74
End: 2019-03-10

## 2019-03-10 VITALS
TEMPERATURE: 98.8 F | BODY MASS INDEX: 19.63 KG/M2 | WEIGHT: 104 LBS | HEART RATE: 86 BPM | HEIGHT: 61 IN | RESPIRATION RATE: 18 BRPM | OXYGEN SATURATION: 96 % | DIASTOLIC BLOOD PRESSURE: 71 MMHG | SYSTOLIC BLOOD PRESSURE: 134 MMHG

## 2019-03-10 DIAGNOSIS — B34.2 CORONAVIRUS INFECTION: ICD-10-CM

## 2019-03-10 DIAGNOSIS — J06.9 VIRAL URI: Primary | ICD-10-CM

## 2019-03-10 LAB
ALBUMIN SERPL-MCNC: 3.7 G/DL (ref 3.5–5.2)
ALBUMIN/GLOB SERPL: 1.2 G/DL
ALP SERPL-CCNC: 64 U/L (ref 39–117)
ALT SERPL W P-5'-P-CCNC: 16 U/L (ref 1–33)
ANION GAP SERPL CALCULATED.3IONS-SCNC: 11.6 MMOL/L
AST SERPL-CCNC: 16 U/L (ref 1–32)
B PARAPERT DNA SPEC QL NAA+PROBE: NOT DETECTED
B PERT DNA SPEC QL NAA+PROBE: NOT DETECTED
BASOPHILS # BLD AUTO: 0.03 10*3/MM3 (ref 0–0.2)
BASOPHILS NFR BLD AUTO: 0.5 % (ref 0–1.5)
BILIRUB SERPL-MCNC: 0.2 MG/DL (ref 0.1–1.2)
BILIRUB UR QL STRIP: NEGATIVE
BUN BLD-MCNC: 22 MG/DL (ref 8–23)
BUN/CREAT SERPL: 35.5 (ref 7–25)
C PNEUM DNA NPH QL NAA+NON-PROBE: NOT DETECTED
CALCIUM SPEC-SCNC: 9.7 MG/DL (ref 8.6–10.5)
CHLORIDE SERPL-SCNC: 105 MMOL/L (ref 98–107)
CLARITY UR: CLEAR
CO2 SERPL-SCNC: 25.4 MMOL/L (ref 22–29)
COLOR UR: YELLOW
CREAT BLD-MCNC: 0.62 MG/DL (ref 0.57–1)
DEPRECATED RDW RBC AUTO: 48.7 FL (ref 37–54)
DIGOXIN SERPL-MCNC: 1.1 NG/ML (ref 0.6–1.2)
EOSINOPHIL # BLD AUTO: 0.09 10*3/MM3 (ref 0–0.4)
EOSINOPHIL NFR BLD AUTO: 1.4 % (ref 0.3–6.2)
ERYTHROCYTE [DISTWIDTH] IN BLOOD BY AUTOMATED COUNT: 13.6 % (ref 12.3–15.4)
FLUAV H1 2009 PAND RNA NPH QL NAA+PROBE: NOT DETECTED
FLUAV H1 HA GENE NPH QL NAA+PROBE: NOT DETECTED
FLUAV H3 RNA NPH QL NAA+PROBE: NOT DETECTED
FLUAV SUBTYP SPEC NAA+PROBE: NOT DETECTED
FLUBV RNA ISLT QL NAA+PROBE: NOT DETECTED
GFR SERPL CREATININE-BSD FRML MDRD: 94 ML/MIN/1.73
GLOBULIN UR ELPH-MCNC: 3.1 GM/DL
GLUCOSE BLD-MCNC: 94 MG/DL (ref 65–99)
GLUCOSE UR STRIP-MCNC: NEGATIVE MG/DL
HADV DNA SPEC NAA+PROBE: NOT DETECTED
HCOV 229E RNA SPEC QL NAA+PROBE: DETECTED
HCOV HKU1 RNA SPEC QL NAA+PROBE: NOT DETECTED
HCOV NL63 RNA SPEC QL NAA+PROBE: NOT DETECTED
HCOV OC43 RNA SPEC QL NAA+PROBE: NOT DETECTED
HCT VFR BLD AUTO: 38.5 % (ref 34–46.6)
HGB BLD-MCNC: 12.1 G/DL (ref 12–15.9)
HGB UR QL STRIP.AUTO: NEGATIVE
HMPV RNA NPH QL NAA+NON-PROBE: NOT DETECTED
HPIV1 RNA SPEC QL NAA+PROBE: NOT DETECTED
HPIV2 RNA SPEC QL NAA+PROBE: NOT DETECTED
HPIV3 RNA NPH QL NAA+PROBE: NOT DETECTED
HPIV4 P GENE NPH QL NAA+PROBE: NOT DETECTED
IMM GRANULOCYTES # BLD AUTO: 0.04 10*3/MM3 (ref 0–0.05)
IMM GRANULOCYTES NFR BLD AUTO: 0.6 % (ref 0–0.5)
KETONES UR QL STRIP: ABNORMAL
LEUKOCYTE ESTERASE UR QL STRIP.AUTO: NEGATIVE
LYMPHOCYTES # BLD AUTO: 0.47 10*3/MM3 (ref 0.7–3.1)
LYMPHOCYTES NFR BLD AUTO: 7.2 % (ref 19.6–45.3)
M PNEUMO IGG SER IA-ACNC: NOT DETECTED
MCH RBC QN AUTO: 30.3 PG (ref 26.6–33)
MCHC RBC AUTO-ENTMCNC: 31.4 G/DL (ref 31.5–35.7)
MCV RBC AUTO: 96.5 FL (ref 79–97)
MONOCYTES # BLD AUTO: 0.7 10*3/MM3 (ref 0.1–0.9)
MONOCYTES NFR BLD AUTO: 10.7 % (ref 5–12)
NEUTROPHILS # BLD AUTO: 5.19 10*3/MM3 (ref 1.4–7)
NEUTROPHILS NFR BLD AUTO: 79.6 % (ref 42.7–76)
NITRITE UR QL STRIP: NEGATIVE
NRBC BLD AUTO-RTO: 0 /100 WBC (ref 0–0)
NT-PROBNP SERPL-MCNC: 504.4 PG/ML (ref 0–900)
PH UR STRIP.AUTO: <=5 [PH] (ref 5–8)
PLATELET # BLD AUTO: 247 10*3/MM3 (ref 140–450)
PMV BLD AUTO: 9.3 FL (ref 6–12)
POTASSIUM BLD-SCNC: 4.1 MMOL/L (ref 3.5–5.2)
PROCALCITONIN SERPL-MCNC: 0.07 NG/ML (ref 0.1–0.25)
PROT SERPL-MCNC: 6.8 G/DL (ref 6–8.5)
PROT UR QL STRIP: NEGATIVE
RBC # BLD AUTO: 3.99 10*6/MM3 (ref 3.77–5.28)
RHINOVIRUS RNA SPEC NAA+PROBE: NOT DETECTED
RSV RNA NPH QL NAA+NON-PROBE: NOT DETECTED
SODIUM BLD-SCNC: 142 MMOL/L (ref 136–145)
SP GR UR STRIP: 1.02 (ref 1–1.03)
TROPONIN T SERPL-MCNC: <0.01 NG/ML (ref 0–0.03)
UROBILINOGEN UR QL STRIP: ABNORMAL
WBC NRBC COR # BLD: 6.52 10*3/MM3 (ref 3.4–10.8)

## 2019-03-10 PROCEDURE — 87486 CHLMYD PNEUM DNA AMP PROBE: CPT | Performed by: EMERGENCY MEDICINE

## 2019-03-10 PROCEDURE — 84145 PROCALCITONIN (PCT): CPT | Performed by: EMERGENCY MEDICINE

## 2019-03-10 PROCEDURE — 81003 URINALYSIS AUTO W/O SCOPE: CPT | Performed by: EMERGENCY MEDICINE

## 2019-03-10 PROCEDURE — 87798 DETECT AGENT NOS DNA AMP: CPT | Performed by: EMERGENCY MEDICINE

## 2019-03-10 PROCEDURE — 99284 EMERGENCY DEPT VISIT MOD MDM: CPT

## 2019-03-10 PROCEDURE — 93005 ELECTROCARDIOGRAM TRACING: CPT | Performed by: EMERGENCY MEDICINE

## 2019-03-10 PROCEDURE — 87633 RESP VIRUS 12-25 TARGETS: CPT | Performed by: EMERGENCY MEDICINE

## 2019-03-10 PROCEDURE — 80162 ASSAY OF DIGOXIN TOTAL: CPT | Performed by: EMERGENCY MEDICINE

## 2019-03-10 PROCEDURE — 85025 COMPLETE CBC W/AUTO DIFF WBC: CPT | Performed by: EMERGENCY MEDICINE

## 2019-03-10 PROCEDURE — 84484 ASSAY OF TROPONIN QUANT: CPT | Performed by: EMERGENCY MEDICINE

## 2019-03-10 PROCEDURE — 93010 ELECTROCARDIOGRAM REPORT: CPT | Performed by: INTERNAL MEDICINE

## 2019-03-10 PROCEDURE — 87581 M.PNEUMON DNA AMP PROBE: CPT | Performed by: EMERGENCY MEDICINE

## 2019-03-10 PROCEDURE — 80053 COMPREHEN METABOLIC PANEL: CPT | Performed by: EMERGENCY MEDICINE

## 2019-03-10 PROCEDURE — 83880 ASSAY OF NATRIURETIC PEPTIDE: CPT | Performed by: EMERGENCY MEDICINE

## 2019-03-10 PROCEDURE — 71046 X-RAY EXAM CHEST 2 VIEWS: CPT

## 2019-03-10 RX ORDER — ALBUTEROL SULFATE 2.5 MG/3ML
2.5 SOLUTION RESPIRATORY (INHALATION) ONCE
Status: DISCONTINUED | OUTPATIENT
Start: 2019-03-10 | End: 2019-03-10 | Stop reason: HOSPADM

## 2019-03-10 NOTE — ED NOTES
Respiratory therapy note 3/11/19    Patient and her daughter who is at bedside both state that Albuterol nebulizer treatments make her very jittery, shake, nervousness, and a little nauseated.  Patient declined breathing treatment and stated she is not SOA as long as she has her oxygen on.  She does not feel she needs a treatment at this time.  JOAN Strickland, RRT

## 2019-03-10 NOTE — ED NOTES
Patient in room 39, family at bedside.  Patient c/o congestion,fatigue, nonproductive cough, and generalized muscle aches. Hx of COPD, chronically wears 2L 02 per NC.   Patient had low grade fever 99.8 last night at home.  Patient denies any pain. Has lung cancer, not actively receiving chemotherapy.   Patient denies CP, SOA, or any other s/s at this time. Patient in NAD at this time, VSS, call light within reach, patient alert.        Adelina Hines, RN  03/10/19 1206       Adelina Hines, RN  03/10/19 1203

## 2019-03-10 NOTE — ED PROVIDER NOTES
" EMERGENCY DEPARTMENT ENCOUNTER    CHIEF COMPLAINT  Chief Complaint: fatigue   History given by: patient   History limited by: nothing   Room Number: 39/39  PMD: Kevin Seymour MD      HPI:  Pt is a 73 y.o. female who presents to the ED complaining of fatigue that has been going on since Thursday 3/7/2019. Pt reports \"I have not been feeling well, I just have been so tired\". Pt admits congestion, low-grade fever, and mild cough. Pt reports she is only SOA when she is not on her oxygen.  Pt denies CP, vomiting, dysuria, hematuria, abd pain, and diarrhea. Pt has a hx of lung CA and COPD. Pt is not currently on anything for her CA. Pt is not a smoker.     Duration: 4 days   Onset: gradual  Timing: constant   Quality:  \"I have not been feeling well, I just have been so tired\"  Intensity/Severity: moderate   Progression: unchanged   Associated Symptoms: pt admits congestion, low-grade fever, and mild cough   Aggravating Factors: none mentioned   Alleviating Factors: none mentioned   Previous Episodes: pt has a hx of lung CA and COPD   Treatment before arrival: none mentioned    PAST MEDICAL HISTORY  Active Ambulatory Problems     Diagnosis Date Noted   • Pneumothorax    • Hypoxia 03/14/2016   • Tachycardia 03/14/2016   • Hyperthyroidism 03/14/2016   • Essential hypertension 03/14/2016   • Multifocal atrial tachycardia (CMS/HCC) 03/18/2016   • Toxic multinodular goiter 03/19/2016   • Pulmonary emphysema (CMS/HCC) 10/21/2016   • Vitamin D insufficiency 10/21/2016   • Pneumonia 10/30/2017   • COPD with acute lower respiratory infection (CMS/HCC) 10/30/2017   • Malignant neoplasm of upper lobe of left lung (CMS/HCC) 11/03/2017     Resolved Ambulatory Problems     Diagnosis Date Noted   • COPD exacerbation (CMS/HCC) 03/14/2016   • Infection due to parainfluenza virus 1 10/31/2017     Past Medical History:   Diagnosis Date   • Allergic rhinitis    • Asthma    • Breast cancer (CMS/[facility])    • COPD (chronic obstructive " pulmonary disease) (CMS/HCC)    • Hyperthyroidism    • Mitral regurgitation    • Osteopenia    • Oxygen dependent    • Pneumothorax    • SOB (shortness of breath)    • Tachycardia    • Toxic multinodular goiter    • Vitamin D deficiency        PAST SURGICAL HISTORY  Past Surgical History:   Procedure Laterality Date   • BREAST LUMPECTOMY Right    • BREAST LUMPECTOMY     • CARDIAC CATHETERIZATION      PS SAPHENOUS VAIN RIGHT LEG   • COLONOSCOPY      Complete   • COLONOSCOPY N/A 2017    Procedure: COLONOSCOPY WITH POLYPECTOMY(COLD BIOPSY AND HOT SNARE);  Surgeon: Luiza Eddy MD;  Location: [facility] ENDOSCOPY;  Service:    • HYSTERECTOMY     • MASTECTOMY Right    • NECK SURGERY      2 spurs removed   • VASCULAR SURGERY      spider vein ablation       FAMILY HISTORY  Family History   Problem Relation Age of Onset   • Arthritis Mother    • Heart failure Mother         Congestive Heart Failure   • Lung cancer Father    • Breast cancer Paternal Grandmother    • No Known Problems Sister        SOCIAL HISTORY  Social History     Socioeconomic History   • Marital status: Single     Spouse name: Not on file   • Number of children: Not on file   • Years of education: Not on file   • Highest education level: Not on file   Social Needs   • Financial resource strain: Not on file   • Food insecurity - worry: Not on file   • Food insecurity - inability: Not on file   • Transportation needs - medical: Not on file   • Transportation needs - non-medical: Not on file   Occupational History   • Occupation: Admistrative Assistant     Employer: RETIRED   Tobacco Use   • Smoking status: Former Smoker     Packs/day: 2.00     Years: 50.00     Pack years: 100.00     Types: Cigarettes     Last attempt to quit: 3/14/1994     Years since quittin.0   • Smokeless tobacco: Never Used   • Tobacco comment: D/C 20 years ago, smoking 2 ppd before quitting   Substance and Sexual Activity   • Alcohol use: No     Comment: Social use  rare   • Drug use: No   • Sexual activity: Defer     Comment: drinks decaf    Other Topics Concern   • Not on file   Social History Narrative   • Not on file       ALLERGIES  Codeine; Mucinex d [pseudoephedrine-guaifenesin er]; Penicillin v; and Penicillins    REVIEW OF SYSTEMS  Review of Systems   Constitutional: Positive for fatigue and fever (low-grade).   HENT: Positive for congestion. Negative for sore throat.    Eyes: Negative.    Respiratory: Positive for cough (mild). Negative for shortness of breath.    Cardiovascular: Negative for chest pain.   Gastrointestinal: Negative for abdominal pain, diarrhea and vomiting.   Genitourinary: Negative for dysuria and hematuria.   Musculoskeletal: Negative for neck pain.   Skin: Negative for rash.   Neurological: Negative for weakness, numbness and headaches.   Hematological: Negative.    Psychiatric/Behavioral: Negative.    All other systems reviewed and are negative.      PHYSICAL EXAM  ED Triage Vitals [03/10/19 1146]   Temp Heart Rate Resp BP SpO2   -- 119 20 -- 93 %      Temp src Heart Rate Source Patient Position BP Location FiO2 (%)   -- Monitor -- -- --       Physical Exam   Constitutional: She is oriented to person, place, and time. No distress.   Pt is elderly   HENT:   Head: Normocephalic and atraumatic.   Eyes: EOM are normal. Pupils are equal, round, and reactive to light.   Neck: Normal range of motion. Neck supple.   Cardiovascular: Regular rhythm and normal heart sounds. Tachycardia present.   Pulmonary/Chest: Effort normal. No respiratory distress. She has decreased breath sounds (bilaterally).   Abdominal: Soft. There is no tenderness. There is no rebound and no guarding.   Musculoskeletal: Normal range of motion. She exhibits no edema.   Neurological: She is alert and oriented to person, place, and time. She has normal sensation and normal strength.   Skin: Skin is warm and dry. No rash noted.   Psychiatric: Mood and affect normal.   Nursing note and  vitals reviewed.      LAB RESULTS  Lab Results (last 24 hours)     Procedure Component Value Units Date/Time    Respiratory Panel, PCR - Swab, Nasopharynx [[id number]]  (Abnormal) Collected:  03/10/19 1219    Specimen:  Swab from Nasopharynx Updated:  03/10/19 1350     ADENOVIRUS, PCR Not Detected     Coronavirus 229E Detected     Coronavirus HKU1 Not Detected     Coronavirus NL63 Not Detected     Coronavirus OC43 Not Detected     Human Metapneumovirus Not Detected     Human Rhinovirus/Enterovirus Not Detected     Influenza B PCR Not Detected     Parainfluenza Virus 1 Not Detected     Parainfluenza Virus 2 Not Detected     Parainfluenza Virus 3 Not Detected     Parainfluenza Virus 4 Not Detected     Bordetella pertussis pcr Not Detected     Influenza A H1 2009 PCR Not Detected     Chlamydophila pneumoniae PCR Not Detected     Mycoplasma pneumo by PCR Not Detected     Influenza A PCR Not Detected     Influenza A H3 Not Detected     Influenza A H1 Not Detected     RSV, PCR Not Detected     Bordetella parapertussis PCR Not Detected    CBC & Differential [[id number]] Collected:  03/10/19 1233    Specimen:  Blood Updated:  03/10/19 1249    Narrative:       The following orders were created for panel order CBC & Differential.  Procedure                               Abnormality         Status                     ---------                               -----------         ------                     CBC Auto Differential[[id number]]        Abnormal            Final result                 Please view results for these tests on the individual orders.    Comprehensive Metabolic Panel [[id number]]  (Abnormal) Collected:  03/10/19 1233    Specimen:  Blood from Arm, Left Updated:  03/10/19 1310     Glucose 94 mg/dL      BUN 22 mg/dL      Creatinine 0.62 mg/dL      Sodium 142 mmol/L      Potassium 4.1 mmol/L      Chloride 105 mmol/L      CO2 25.4 mmol/L      Calcium 9.7 mg/dL      Total Protein 6.8 g/dL      Albumin 3.70 g/dL      ALT  (SGPT) 16 U/L      AST (SGOT) 16 U/L      Alkaline Phosphatase 64 U/L      Total Bilirubin 0.2 mg/dL      eGFR Non African Amer 94 mL/min/1.73      Globulin 3.1 gm/dL      A/G Ratio 1.2 g/dL      BUN/Creatinine Ratio 35.5     Anion Gap 11.6 mmol/L     Narrative:       The MDRD GFR formula is only valid for adults with stable renal function between ages 18 and 70.    Troponin [[id number]]  (Normal) Collected:  03/10/19 1233    Specimen:  Blood from Arm, Left Updated:  03/10/19 1315     Troponin T <0.010 ng/mL     Narrative:       Troponin T Reference Range:  <= 0.03 ng/mL-   Negative for AMI  >0.03 ng/mL-     Abnormal for myocardial necrosis.  Clinicians would have to utilize clinical acumen, EKG, Troponin and serial changes to determine if it is an Acute Myocardial Infarction or myocardial injury due to an underlying chronic condition.     BNP [[id number]]  (Normal) Collected:  03/10/19 1233    Specimen:  Blood from Arm, Left Updated:  03/10/19 1315     proBNP 504.4 pg/mL     Narrative:       Among patients with dyspnea, NT-proBNP is highly sensitive for the detection of acute congestive heart failure. In addition NT-proBNP of <300 pg/ml effectively rules out acute congestive heart failure with 99% negative predictive value.    Procalcitonin [[id number]]  (Abnormal) Collected:  03/10/19 1233    Specimen:  Blood from Arm, Left Updated:  03/10/19 1315     Procalcitonin 0.07 ng/mL     Narrative:       As a Marker for Sepsis (Non-Neonates):   1. <0.5 ng/mL represents a low risk of severe sepsis and/or septic shock.  1. >2 ng/mL represents a high risk of severe sepsis and/or septic shock.    As a Marker for Lower Respiratory Tract Infections that require antibiotic therapy:  PCT on Admission     Antibiotic Therapy             6-12 Hrs later  > 0.5                Strongly Recommended            >0.25 - <0.5         Recommended  0.1 - 0.25           Discouraged                   Remeasure/reassess PCT  <0.1                  "Strongly Discouraged          Remeasure/reassess PCT      As 28 day mortality risk marker: \"Change in Procalcitonin Result\" (> 80 % or <=80 %) if Day 0 (or Day 1) and Day 4 values are available. Refer to http://www.[facility]-pct-calculator.com/   Change in PCT <=80 %   A decrease of PCT levels below or equal to 80 % defines a positive change in PCT test result representing a higher risk for 28-day all-cause mortality of patients diagnosed with severe sepsis or septic shock.  Change in PCT > 80 %   A decrease of PCT levels of more than 80 % defines a negative change in PCT result representing a lower risk for 28-day all-cause mortality of patients diagnosed with severe sepsis or septic shock.                CBC Auto Differential [[id number]]  (Abnormal) Collected:  03/10/19 1233    Specimen:  Blood from Arm, Left Updated:  03/10/19 1249     WBC 6.52 10*3/mm3      RBC 3.99 10*6/mm3      Hemoglobin 12.1 g/dL      Hematocrit 38.5 %      MCV 96.5 fL      MCH 30.3 pg      MCHC 31.4 g/dL      RDW 13.6 %      RDW-SD 48.7 fl      MPV 9.3 fL      Platelets 247 10*3/mm3      Neutrophil % 79.6 %      Lymphocyte % 7.2 %      Monocyte % 10.7 %      Eosinophil % 1.4 %      Basophil % 0.5 %      Immature Grans % 0.6 %      Neutrophils, Absolute 5.19 10*3/mm3      Lymphocytes, Absolute 0.47 10*3/mm3      Monocytes, Absolute 0.70 10*3/mm3      Eosinophils, Absolute 0.09 10*3/mm3      Basophils, Absolute 0.03 10*3/mm3      Immature Grans, Absolute 0.04 10*3/mm3      nRBC 0.0 /100 WBC     Digoxin Level [[id number]]  (Normal) Collected:  03/10/19 1233    Specimen:  Blood from Arm, Left Updated:  03/10/19 1310     Digoxin 1.10 ng/mL     Urinalysis With Microscopic If Indicated (No Culture) - Urine, Clean Catch [[id number]]  (Abnormal) Collected:  03/10/19 1337    Specimen:  Urine, Clean Catch Updated:  03/10/19 1356     Color, UA Yellow     Appearance, UA Clear     pH, UA <=5.0     Specific Gravity, UA 1.019     Glucose, UA Negative     " "Ketones, UA Trace     Bilirubin, UA Negative     Blood, UA Negative     Protein, UA Negative     Leuk Esterase, UA Negative     Nitrite, UA Negative     Urobilinogen, UA 0.2 E.U./dL    Narrative:       Urine microscopic not indicated.          I ordered the above labs and reviewed the results    RADIOLOGY  XR Chest 2 View   Final Result           I ordered the above noted radiological studies. Interpreted by radiologist. Reviewed by me in PACS.       PROCEDURES  Procedures    EKG          EKG time: 1228  Rhythm/Rate: NSR, 94  P waves and MT: normal  QRS, axis: normal   ST and T waves: normal     Interpreted Contemporaneously by me, independently viewed  unchanged compared to prior 11/7/2017.      PROGRESS AND CONSULTS  ED Course as of Mar 10 1743   Sun Mar 10, 2019   1439 2:39 PM  Patient with congestion.  States she feels weak but denies any increase in dyspnea.  Has minimal cough.  Her respiratory viral panel is positive for corona virus.  PCT is normal.  Will discharge home.  Discussed with Dr. Ochoa.  No antibiotics.  Patient does not want steroids.  Will follow up with Dr. Anthony.  [SL]      ED Course User Index  [SL] Ryan Leigh MD       1212  Ordered CXR and urinalysis for further evaluation.     1300  Pt refused breathing treatment because it makes her \"jittery\".     1352  Rechecked pt. Pt is resting comfortably. Notified pt of lab results and imaging which revealed positive results for coronavirus. Informed pt of plan to place a call to pulmonology.    1356  Placed a call to pulmonology.     1413  Discussed pt case w Dr. Ochoa (pulmonology) who reports no antibiotics and offering steroids if she wants them.    1437  Rechecked pt. Pt is resting comfortably who declined steroids. Notified pt of discussion w Dr. Ochoa. Advised pt to follow up w Dr. Anthony (pulmonology) as scheduled and follow up w PCP for further evaluation and treatment. Instructed pt to get plenty of rest and drink plenty of fluids. " Discussed the plan to discharge. Pt understands and agrees with the plan, all questions answered.      MEDICAL DECISION MAKING  Results were reviewed/discussed with the patient and they were also made aware of online access. Pt also made aware that some labs, such as cultures, will not be resulted during ER visit and follow up with PMD is necessary.     MDM  Number of Diagnoses or Management Options  Coronavirus infection:   Viral URI:      Amount and/or Complexity of Data Reviewed  Clinical lab tests: ordered and reviewed (Procal= 0.07)  Tests in the radiology section of CPT®: ordered and reviewed (CXR= severe COPD. No evidence of acute pneumonia.)  Tests in the medicine section of CPT®: ordered and reviewed (See EKG procedure note)  Decide to obtain previous medical records or to obtain history from someone other than the patient: yes  Review and summarize past medical records: yes (Pt's previous medical records show pt had an oncology visit on 1/4/2019 and was dx w malignant neoplasm of upper lobe of L lung.)  Discuss the patient with other providers: yes (Discussed pt case w Dr. Ochoa (pulmonology))           DIAGNOSIS  Final diagnoses:   Viral URI   Coronavirus infection       DISPOSITION  DISCHARGE    Patient discharged in stable condition.    Reviewed implications of results, diagnosis, meds, responsibility to follow up, warning signs and symptoms of possible worsening, potential complications and reasons to return to ER.    Patient/Family voiced understanding of above instructions.    Discussed plan for discharge, as there is no emergent indication for admission. Patient referred to primary care provider for BP management due to today's BP. Pt/family is agreeable and understands need for follow up and repeat testing.  Pt is aware that discharge does not mean that nothing is wrong but it indicates no emergency is present that requires admission and they must continue care with follow-up as given below or  physician of their choice.     FOLLOW-UP  Kevin Seymour MD  [address]  [facility] [id number] [phone]    Schedule an appointment as soon as possible for a visit in 2 days      Angelina Anthony MD  [address]  [facility] [id number] [phone]    Schedule an appointment as soon as possible for a visit            Medication List      Changed    albuterol sulfate  (90 Base) MCG/ACT inhaler  Commonly known as:  PROVENTIL HFA;VENTOLIN HFA;PROAIR HFA  Inhale 2 puffs Every 4 (Four) Hours As Needed for Wheezing. Per MD - until   current episode is resolved  What changed:    when to take this  additional instructions              Latest Documented Vital Signs:  As of 5:43 PM  BP- 134/71 HR- 86 Temp- 98.8 °F (37.1 °C) (Tympanic) O2 sat- 96%    --  Documentation assistance provided by sharad Lord for Dr. Lu MD. Information recorded by the scribe was done at my direction and has been verified and validated by me.               Stefani Lord  03/10/19 2793       Ryan Leigh MD  03/10/19 5523

## 2019-03-19 ENCOUNTER — OFFICE VISIT (OUTPATIENT)
Dept: SURGERY | Facility: CLINIC | Age: 74
End: 2019-03-19

## 2019-03-19 VITALS — HEART RATE: 100 BPM | HEIGHT: 60 IN | OXYGEN SATURATION: 98 % | BODY MASS INDEX: 19.83 KG/M2 | WEIGHT: 101 LBS

## 2019-03-19 DIAGNOSIS — Z85.3 HISTORY OF BREAST CANCER: Primary | ICD-10-CM

## 2019-03-19 DIAGNOSIS — Z86.010 HISTORY OF COLON POLYPS: ICD-10-CM

## 2019-03-19 PROCEDURE — 99214 OFFICE O/P EST MOD 30 MIN: CPT | Performed by: SURGERY

## 2019-03-19 NOTE — PROGRESS NOTES
Subjective   Maryam Arredondo is a 73 y.o. female who presents to the office in surgical consultation from Kevin Seymour MD for a history of breast cancer and colonic polyps.    History of Present Illness     The patient has a history of right breast cancer and underwent a lumpectomy followed by chemotherapy and radiation therapy.  She then developed a recurrence in 2004 and required a right mastectomy.  She has had no problems since that time.  She has not noticed any suspicious abnormalities along her chest wall or left breast.  Her most recent mammogram was on 7/10/2018 that showed no abnormalities involving the left breast.    The patient also has a history of colonic polyps.  Her most recent colonoscopy was on 4/28/2017 at which time 4 polyps were removed.  The pathology results were benign with tubular adenomas and hyperplastic polyps.      Review of Systems   Constitutional: Negative for activity change, appetite change, fatigue and fever.   HENT: Negative for trouble swallowing and voice change.    Respiratory: Negative for chest tightness and shortness of breath.    Cardiovascular: Negative for chest pain and palpitations.   Gastrointestinal: Negative for abdominal pain, blood in stool, constipation, diarrhea, nausea and vomiting.   Endocrine: Negative for cold intolerance and heat intolerance.   Genitourinary: Negative for dysuria and flank pain.   Neurological: Negative for dizziness and light-headedness.   Hematological: Negative for adenopathy. Does not bruise/bleed easily.   Psychiatric/Behavioral: Negative for agitation and confusion.     Past Medical History:   Diagnosis Date   • Allergic rhinitis    • Asthma    • Breast cancer (CMS/HCC)     s/p Lumpectomy ~2000 and chemo/XRT. Then  Masectomy ~2004 for some recurrence   • COPD (chronic obstructive pulmonary disease) (CMS/HCC)    • Hyperthyroidism    • Mitral regurgitation     mild to moderate   • Osteopenia    • Oxygen dependent     2L - at night  only   • Pneumothorax    • SOB (shortness of breath)    • Tachycardia    • Toxic multinodular goiter    • Vitamin D deficiency      Past Surgical History:   Procedure Laterality Date   • BREAST LUMPECTOMY Right    • BREAST LUMPECTOMY     • CARDIAC CATHETERIZATION      PS SAPHENOUS VAIN RIGHT LEG   • COLONOSCOPY      Complete   • COLONOSCOPY N/A 2017    Procedure: COLONOSCOPY WITH POLYPECTOMY(COLD BIOPSY AND HOT SNARE);  Surgeon: Luiza Eddy MD;  Location: [facility] ENDOSCOPY;  Service:    • HYSTERECTOMY     • MASTECTOMY Right    • NECK SURGERY      2 spurs removed   • VASCULAR SURGERY      spider vein ablation     Family History   Problem Relation Age of Onset   • Arthritis Mother    • Heart failure Mother         Congestive Heart Failure   • Lung cancer Father    • Breast cancer Paternal Grandmother    • No Known Problems Sister      Social History     Socioeconomic History   • Marital status: Single     Spouse name: Not on file   • Number of children: Not on file   • Years of education: Not on file   • Highest education level: Not on file   Occupational History   • Occupation: Admistrative Assistant     Employer: RETIRED   Tobacco Use   • Smoking status: Former Smoker     Packs/day: 2.00     Years: 50.00     Pack years: 100.00     Types: Cigarettes     Last attempt to quit: 3/14/1994     Years since quittin.0   • Smokeless tobacco: Never Used   • Tobacco comment: D/C 20 years ago, smoking 2 ppd before quitting   Substance and Sexual Activity   • Alcohol use: No     Comment: Social use rare   • Drug use: No   • Sexual activity: Defer     Comment: drinks decaf        Objective   Physical Exam   Constitutional: She is oriented to person, place, and time. She appears well-developed and well-nourished.  Non-toxic appearance.   Eyes: EOM are normal. No scleral icterus.   Pulmonary/Chest: Effort normal. No respiratory distress.   Right chest wall: Well-healed mastectomy with no nodules and no axillary  lymphadenopathy.  Left breast: Normal appearance with no suspicious nodules and no axillary lymphadenopathy.   Abdominal: Soft. Normal appearance. There is no tenderness.   Neurological: She is alert and oriented to person, place, and time.   Skin: Skin is warm and dry.   Psychiatric: She has a normal mood and affect. Her behavior is normal. Judgment and thought content normal.       Assessment/Plan       The primary encounter diagnosis was History of breast cancer. A diagnosis of History of colon polyps was also pertinent to this visit.    The patient has a history of right breast cancer and underwent a right breast mastectomy.  There is no evidence of recurrent disease.  She has been scheduled for a regular screening mammogram of the left breast.    The patient has a history of colonic polyps with 1 of the polyps being 1 cm in size.  All were benign.  It should be safe to repeat a colonoscopy in 3 years, which is 1 year from now.  She will be placed in recall for that procedure.

## 2019-04-23 ENCOUNTER — LAB (OUTPATIENT)
Dept: LAB | Facility: HOSPITAL | Age: 74
End: 2019-04-23

## 2019-04-23 ENCOUNTER — HOSPITAL ENCOUNTER (OUTPATIENT)
Dept: PET IMAGING | Facility: HOSPITAL | Age: 74
Discharge: HOME | End: 2019-04-23
Attending: INTERNAL MEDICINE | Admitting: INTERNAL MEDICINE

## 2019-04-23 DIAGNOSIS — C34.12 MALIGNANT NEOPLASM OF UPPER LOBE OF LEFT LUNG (HCC): ICD-10-CM

## 2019-04-23 LAB
ANION GAP SERPL CALCULATED.3IONS-SCNC: 10.9 MMOL/L
BUN BLD-MCNC: 15 MG/DL (ref 6–20)
BUN/CREAT SERPL: 25 (ref 7.3–30)
CALCIUM SPEC-SCNC: 9.6 MG/DL (ref 8.5–10.2)
CHLORIDE SERPL-SCNC: 104 MMOL/L (ref 98–107)
CO2 SERPL-SCNC: 27.1 MMOL/L (ref 22–29)
CREAT BLD-MCNC: 0.6 MG/DL (ref 0.6–1.1)
CREAT BLDA-MCNC: 0.6 MG/DL (ref 0.6–1.3)
GFR SERPL CREATININE-BSD FRML MDRD: 98 ML/MIN/1.73
GLUCOSE BLD-MCNC: 81 MG/DL (ref 74–124)
POTASSIUM BLD-SCNC: 4 MMOL/L (ref 3.5–4.7)
SODIUM BLD-SCNC: 142 MMOL/L (ref 134–145)

## 2019-04-23 PROCEDURE — 82565 ASSAY OF CREATININE: CPT

## 2019-04-23 PROCEDURE — 25010000002 IOPAMIDOL 61 % SOLUTION: Performed by: INTERNAL MEDICINE

## 2019-04-23 PROCEDURE — 36415 COLL VENOUS BLD VENIPUNCTURE: CPT | Performed by: INTERNAL MEDICINE

## 2019-04-23 PROCEDURE — 71260 CT THORAX DX C+: CPT

## 2019-04-23 PROCEDURE — 80048 BASIC METABOLIC PNL TOTAL CA: CPT | Performed by: INTERNAL MEDICINE

## 2019-04-23 RX ADMIN — IOPAMIDOL 75 ML: 612 INJECTION, SOLUTION INTRAVENOUS at 13:57

## 2019-04-29 ENCOUNTER — TELEPHONE (OUTPATIENT)
Dept: ONCOLOGY | Facility: HOSPITAL | Age: 74
End: 2019-04-29

## 2019-04-29 ENCOUNTER — OFFICE VISIT (OUTPATIENT)
Dept: ONCOLOGY | Facility: CLINIC | Age: 74
End: 2019-04-29

## 2019-04-29 ENCOUNTER — APPOINTMENT (OUTPATIENT)
Dept: LAB | Facility: HOSPITAL | Age: 74
End: 2019-04-29

## 2019-04-29 VITALS
DIASTOLIC BLOOD PRESSURE: 71 MMHG | HEART RATE: 106 BPM | WEIGHT: 103.6 LBS | HEIGHT: 60 IN | BODY MASS INDEX: 20.34 KG/M2 | SYSTOLIC BLOOD PRESSURE: 136 MMHG | TEMPERATURE: 97.7 F | RESPIRATION RATE: 16 BRPM | OXYGEN SATURATION: 94 %

## 2019-04-29 DIAGNOSIS — C34.12 MALIGNANT NEOPLASM OF UPPER LOBE OF LEFT LUNG (HCC): Primary | ICD-10-CM

## 2019-04-29 PROCEDURE — 99213 OFFICE O/P EST LOW 20 MIN: CPT | Performed by: INTERNAL MEDICINE

## 2019-04-29 NOTE — TELEPHONE ENCOUNTER
----- Message from Elena Hercules sent at 4/29/2019  3:52 PM EDT -----  [phone]  Results of visit  To  Dr. Ventura this  Morning sent to  Dr. Kevin Lazcano And other issues.

## 2019-04-29 NOTE — TELEPHONE ENCOUNTER
Pt called and requested that today's visit note and labs be sent to Dr Kevin Velasquezing her PCP. She said she had done this in the past when she was seen by Dr Nur so He PCP can be aware of whats going on with her. Message sent to Medical Records.

## 2019-04-29 NOTE — PROGRESS NOTES
History:     Reason for follow up:   1.  Stage IIB left upper lobe non-small cell lung cancer, high PDL-1 (80%), negative egfr/alk/ros   * status post radiation therapy completed 1/26/18     HPI:  Maryam Arredondo presents for follow-up of her lung cancer.  The patient completed radiation therapy to a left upper lobe non-small cell carcinoma January 2018.  PET scan in October showed an area of pleural based opacification in the left upper lobe SUV 3.1 favoring radiation fibrosis.  There was a hypermetabolic nodule in the right thyroid lobe.  This was previously present on PET 2017 and thyroid ultrasound has shown multinodular goiter.  She states this is followed by her endocrinologist.    He returns today for follow-up.  She is having allergy related cough and congestion on therapy by her primary care provider.  She denies fever.  Allergies have slightly worsened shortness of breath at baseline.    Reviewed, confirmed and updated history (past medical, social and family)   Past Medical   Past Medical History:   Diagnosis Date   • Allergic rhinitis    • Asthma    • Breast cancer (CMS/HCC)     s/p Lumpectomy ~2000 and chemo/XRT. Then  Masectomy ~2004 for some recurrence   • COPD (chronic obstructive pulmonary disease) (CMS/HCC)    • Hyperthyroidism    • Mitral regurgitation     mild to moderate   • Osteopenia    • Oxygen dependent     2L - at night only   • Pneumothorax    • SOB (shortness of breath)    • Tachycardia    • Toxic multinodular goiter    • Vitamin D deficiency     and   Patient Active Problem List   Diagnosis   • Pneumothorax   • Hypoxia   • Tachycardia   • Hyperthyroidism   • Essential hypertension   • Multifocal atrial tachycardia (CMS/HCC)   • Toxic multinodular goiter   • Pulmonary emphysema (CMS/HCC)   • Vitamin D insufficiency   • Pneumonia   • COPD with acute lower respiratory infection (CMS/HCC)   • Malignant neoplasm of upper lobe of left lung (CMS/HCC)     Social History   Social History      Socioeconomic History   • Marital status: Single     Spouse name: Not on file   • Number of children: Not on file   • Years of education: Not on file   • Highest education level: Not on file   Occupational History   • Occupation: Admistrative Assistant     Employer: RETIRED   Tobacco Use   • Smoking status: Former Smoker     Packs/day: 2.00     Years: 50.00     Pack years: 100.00     Types: Cigarettes     Last attempt to quit: 3/14/1994     Years since quittin.1   • Smokeless tobacco: Never Used   • Tobacco comment: D/C 20 years ago, smoking 2 ppd before quitting   Substance and Sexual Activity   • Alcohol use: No     Comment: Social use rare   • Drug use: No   • Sexual activity: Defer     Comment: drinks decaf      Family History  Family History   Problem Relation Age of Onset   • Arthritis Mother    • Heart failure Mother         Congestive Heart Failure   • Lung cancer Father    • Breast cancer Paternal Grandmother    • No Known Problems Sister      Allergies  Allergies   Allergen Reactions   • Codeine    • Mucinex D [Pseudoephedrine-Guaifenesin Er] Nausea Only     Felt hot in chest   • Penicillin V    • Penicillins        Medications: The current medication list was reviewed in the EMR.    Review of Systems  Review of Systems   Constitutional: Negative for activity change, appetite change, chills, diaphoresis, fatigue, fever and unexpected weight change.   HENT: Positive for congestion. Negative for sinus pressure.    Respiratory: Positive for cough and shortness of breath. Negative for chest tightness.    Cardiovascular: Negative for chest pain, palpitations and leg swelling.   Gastrointestinal: Negative for abdominal pain, blood in stool, constipation, diarrhea, nausea and vomiting.   Endocrine: Negative.    Genitourinary: Negative.    Musculoskeletal: Positive for arthralgias. Negative for joint swelling and myalgias.   Skin: Negative.    Allergic/Immunologic: Negative.    Neurological: Negative.   "  Hematological: Negative for adenopathy. Does not bruise/bleed easily.       Objective    Objective:     Vitals:    04/29/19 1010   BP: 136/71   Pulse: 106   Resp: 16   Temp: 97.7 °F (36.5 °C)   TempSrc: Oral   SpO2: 94%   Weight: 47 kg (103 lb 9.6 oz)   Height: 152.4 cm (60\")   PainSc: 0-No pain     Current Status 4/29/2019   ECOG score 0   GENERAL:  Well-developed, well-nourished female in no acute distress. Vital signs reviewed.   SKIN:  Warm, dry without rashes, purpura or petechiae.  EYES:  Pupils equal, round and reactive to light.  EOMs intact.  Conjunctivae normal.  HEAD:  Normocephalic.  EARS/NOSE/MOUTH/THROAT:  Hearing intact. Septum midline.  No excoriations or nasal discharge. Nasal cannula in place. No stomatitis or ulcers.  Lips normal. Oropharynx without lesions or exudates.  NECK:  Supple with good range of motion; no thyromegaly or masses  LYMPHATICS:  No cervical, supraclavicular, axillary adenopathy.  RESP: Creased breath sounds, no wheezing, no respiratory distress  CARDIAC:  Regular rate and rhythm without murmurs, rubs or gallops. Normal S1,S2. No edema  GI:  Soft, nontender, normal bowel sounds  MSK:  No clubbing or cyanosis, No joint swelling noted in hands  NEUROLOGICAL:   No focal neurological deficits.  PSYCHIATRIC:  Normal affect and mood.  Alert and Oriented x 3.       Labs and Imaging  Results for orders placed or performed during the hospital encounter of 04/23/19   POC Creatinine   Result Value Ref Range    Creatinine 0.60 0.60 - 1.30 mg/dL       CT chest 12/28/18: There is decreased size of the left apical mass 1.3 cm compared to previous 1.5 cm.  Adjacent granulated density also decreased in size from 1.7 down to 0.9 cm.  There is stable emphysema and apical scarring.  There are stable thyroid nodules.  No new lymphadenopathy in the chest.  There is a right hepatic enhancing lesion stable.  I personally reviewed the CT chest and there is stable to improved disease/scar in the left " upper lobe of the lung.    CT chest 4/23/2019: Again seen are hypodense bilateral thyroid lesions, hyperenhancing lesions in the liver unchanged.  There is no mediastinal, hilar or axillary lymphadenopathy.  There is severe emphysema.  There are stable nodules in the right middle and lower lobe since 11/3/2016.  There is a new sub-6 mm nodule in the anterior right middle lobe.  Stable masslike thickening in the left lung apex since 12/28/2018 and decreased since 10/8/2018.  There is irregular opacification in the left apex likely radiation fibrosis.    Assessment/Plan   Assessment/Plan:   This is a 72 y.o. female with:     1.  Stage IIB (T3N0M0) left upper lobe non-small cell lung cancer, high PDL-1 (80%), negative egfr/alk/ros   * Appears to be a local lung cancer based on PET/CT scan. On initial scans, it appeared she may have hilar and mediastinal adenopathy and an indeterminate liver lesion, but all PET negative. She had active infection during her initial CT scans that had resolved when PET was done. On CT, it appeared to be two lung lesions, all in one lobe, but are involving pleura- T3N0M0   * She is high PDL 1 and thus if she progressed that she could be a candidate for Keytruda therapy (not approved for adjuvant use though).    * status post definitive radiation therapy for local treatment, completed 1/26/2018. Not a surgical candidate and not a candidate for adjuvant therapy   * CT scans in October stability of the previously treated area, but with new nodularity along the left pleura, inferior to previously treated area.  PET scan felt most consistent with radiation changes.   *CT chest 12/28/18: Stable to decreased left apical mass and adjacent nodularity   *CT chest 4/23/2019: Stable findings except a new sub-6 mm nodule in the anterior right middle lobe and evolving radiation fibrosis of the left upper lung     2.  Multinodular goiter stable by CT--followed by endocrinology    3. History of right  breast cancer, initially diagnosed in 2000 treated with lumpectomy, radiation, chemotherapy and endocrine therapy with local recurrence in 2005 treated with mastectomy and then Arimidex.    * Mammogram due July 2019    4. COPD.      I recommended every 4 month CT of the chest year 2 of surveillance.  CT and M.D. visit ordered 4 months.

## 2019-06-03 DIAGNOSIS — R79.9 ABNORMAL FINDING OF BLOOD CHEMISTRY: ICD-10-CM

## 2019-06-03 DIAGNOSIS — I10 ESSENTIAL HYPERTENSION: Primary | ICD-10-CM

## 2019-06-03 DIAGNOSIS — E05.20 TOXIC MULTINODULAR GOITER: ICD-10-CM

## 2019-06-03 DIAGNOSIS — R68.89 ABNORMAL ENDOCRINE LABORATORY TEST FINDING: ICD-10-CM

## 2019-06-03 DIAGNOSIS — E05.90 HYPERTHYROIDISM: ICD-10-CM

## 2019-06-03 DIAGNOSIS — E55.9 VITAMIN D INSUFFICIENCY: ICD-10-CM

## 2019-06-11 ENCOUNTER — RESULTS ENCOUNTER (OUTPATIENT)
Dept: ENDOCRINOLOGY | Age: 74
End: 2019-06-11

## 2019-06-11 DIAGNOSIS — R79.9 ABNORMAL FINDING OF BLOOD CHEMISTRY: ICD-10-CM

## 2019-06-11 DIAGNOSIS — I10 ESSENTIAL HYPERTENSION: ICD-10-CM

## 2019-06-11 DIAGNOSIS — E05.20 TOXIC MULTINODULAR GOITER: ICD-10-CM

## 2019-06-11 DIAGNOSIS — R68.89 ABNORMAL ENDOCRINE LABORATORY TEST FINDING: ICD-10-CM

## 2019-06-11 DIAGNOSIS — E55.9 VITAMIN D INSUFFICIENCY: ICD-10-CM

## 2019-06-11 DIAGNOSIS — E05.90 HYPERTHYROIDISM: ICD-10-CM

## 2019-06-12 LAB
25(OH)D3+25(OH)D2 SERPL-MCNC: 76.6 NG/ML (ref 30–100)
ALBUMIN SERPL-MCNC: 4.2 G/DL (ref 3.5–5.2)
ALBUMIN/GLOB SERPL: 1.6 G/DL
ALP SERPL-CCNC: 73 U/L (ref 39–117)
ALT SERPL-CCNC: 14 U/L (ref 1–33)
AST SERPL-CCNC: 14 U/L (ref 1–32)
BASOPHILS # BLD AUTO: 0.04 10*3/MM3 (ref 0–0.2)
BASOPHILS NFR BLD AUTO: 0.7 % (ref 0–1.5)
BILIRUB SERPL-MCNC: 0.2 MG/DL (ref 0.2–1.2)
BUN SERPL-MCNC: 16 MG/DL (ref 8–23)
BUN/CREAT SERPL: 23.9 (ref 7–25)
CALCIUM SERPL-MCNC: 9.6 MG/DL (ref 8.6–10.5)
CHLORIDE SERPL-SCNC: 102 MMOL/L (ref 98–107)
CO2 SERPL-SCNC: 28.2 MMOL/L (ref 22–29)
CREAT SERPL-MCNC: 0.67 MG/DL (ref 0.57–1)
EOSINOPHIL # BLD AUTO: 0.13 10*3/MM3 (ref 0–0.4)
EOSINOPHIL NFR BLD AUTO: 2.1 % (ref 0.3–6.2)
ERYTHROCYTE [DISTWIDTH] IN BLOOD BY AUTOMATED COUNT: 14.2 % (ref 12.3–15.4)
GLOBULIN SER CALC-MCNC: 2.6 GM/DL
GLUCOSE SERPL-MCNC: 94 MG/DL (ref 65–99)
HCT VFR BLD AUTO: 42.1 % (ref 34–46.6)
HGB BLD-MCNC: 12.7 G/DL (ref 12–15.9)
IMM GRANULOCYTES # BLD AUTO: 0.03 10*3/MM3 (ref 0–0.05)
IMM GRANULOCYTES NFR BLD AUTO: 0.5 % (ref 0–0.5)
LYMPHOCYTES # BLD AUTO: 0.72 10*3/MM3 (ref 0.7–3.1)
LYMPHOCYTES NFR BLD AUTO: 11.8 % (ref 19.6–45.3)
MCH RBC QN AUTO: 30.5 PG (ref 26.6–33)
MCHC RBC AUTO-ENTMCNC: 30.2 G/DL (ref 31.5–35.7)
MCV RBC AUTO: 101 FL (ref 79–97)
MONOCYTES # BLD AUTO: 0.54 10*3/MM3 (ref 0.1–0.9)
MONOCYTES NFR BLD AUTO: 8.8 % (ref 5–12)
NEUTROPHILS # BLD AUTO: 4.65 10*3/MM3 (ref 1.7–7)
NEUTROPHILS NFR BLD AUTO: 76.1 % (ref 42.7–76)
NRBC BLD AUTO-RTO: 0 /100 WBC (ref 0–0.2)
PLATELET # BLD AUTO: 322 10*3/MM3 (ref 140–450)
POTASSIUM SERPL-SCNC: 3.9 MMOL/L (ref 3.5–5.2)
PROT SERPL-MCNC: 6.8 G/DL (ref 6–8.5)
RBC # BLD AUTO: 4.17 10*6/MM3 (ref 3.77–5.28)
SODIUM SERPL-SCNC: 143 MMOL/L (ref 136–145)
T3FREE SERPL-MCNC: 3.4 PG/ML (ref 2–4.4)
T4 FREE SERPL-MCNC: 1.07 NG/DL (ref 0.93–1.7)
TSH SERPL DL<=0.005 MIU/L-ACNC: 1.06 MIU/ML (ref 0.27–4.2)
WBC # BLD AUTO: 6.11 10*3/MM3 (ref 3.4–10.8)

## 2019-06-15 NOTE — PROGRESS NOTES
Patient traveling in [facility] needs migraine meds.   Rx sent.   Scarlet Galvez     Tolerated exercise. See scanned session report.

## 2019-06-24 NOTE — PROGRESS NOTES
Subjective   Maryam Arredondo is a 73 y.o. female.     F.u for nontoxic MNG, hypothyroidism, COPD,asthma        Patient is a 73`-year-old female who came in for follow-up. She has toxic multinodular goiter. She had a thyroid scan and uptake in February 2014 which showed increased uptake of 40% with a heterogeneous distribution of the radiopharmaceutical in both lobes of the thyroid gland. A dominant area of photopenia is present on the left lobe. She had a thyroid ultrasound which showed a multinodular goiter with cysts and solid nodules. The dominant nodule in the left is partially cystic. She had a follow-up ultrasound in August 2014 which showed the left lobe dominant nodule is smaller.     She is on Tapazole 5 mg 1.5 tab once a day. She has lost 3 lbs since 2/19.  She denies bowel changes. She denies heat or cold intolerance. She denies palpitations. She denies tremors.  She denies dysphagia.  TSH done in June 2019 was normal at 1.060     She had a thyroid ultrasound done on April 4, 2018 which showed a multinodular goiter.  There is a hypervascular 2 cm nodule in the upper right thyroid lobe which corresponds to the hypermetabolic nodule on the PET CT scan of November 15, 2017.  She wants to continued observation due to her other co-morbidities.      She had left upper lobe lung biopsy in in November 2017 which yielded adenocarcinoma.  She had PET CT scan done in November 2017 which showed a small hypermetabolic nodule in the right lobe of the thyroid gland which may be an adenoma or a small thyroid cancer.  A metastasis to the thyroid gland is considered much less likely.  She has completed radiation therapy.  She had CT scan of the chest in May 2018 which showed slight interval decrease in size of pleural-based lung tumor in the left upper lobe and moderately severe emphysema.  There is no lymphadenopathy.  She follows with Dr. Ventura.     She had a follow-up CT scan of the chest done in January 2019 which  showed a slightly decreased in size.  The pleural-based airspace consolidation in the left upper lobe showed interval decrease in size.  There are no new pulmonary nodules or mediastinal lymphadenopathy.  Stable numerous thyroid nodules.  Small hyperenhancing liver lesions are unchanged from prior imaging in 2017.    CT scan of the chest done in April 2019 showed a new 6 mm nodule on the right middle lobe, pulmonary opacification within the left apex, severe emphysema and hyperenhancing lesions throughout the liver unchanged from March 14, 2018.     She has COPD. She denies any shortness of breath or coughing.  She is on maintenance Symbicort, Spiriva, Singulair, Allegra and Pro-Air prn for COPD.  Zyrtec made her sleepy.  She is on continuous O2.  She is exercising regularly at the gym.  She follows with Dr. Anthony.      She has hypertension and history of multifocal tachycardia.  She was taken off lisinopril last May 2014. She is on diltiazem ER 90 mg/day and digoxin 0.125 mg/day. She follows with Dr. Bobo.      She had a previous right mastectomy for breast cancer. She has completed 5 years of Arimidex in 2009. She has osteopenia and was on Fosamax for unknown period of time. Her last bone mineral density was 2016 at [facility] and was normal. She is on Vit D 2000 units/day.  25 hydroxy vitamin D done in June 2019 was normal at 76.6 ng/mL.     She had a colonoscopy in April 2017 and 2 tubular adenoma with low-grade dysplasia were removed by Dr. Eddy.  She was advised a repeat colonoscopy in 2019 to be done Dr. JOAN Frankel.  She denies bowel complaints.    The following portions of the patient's history were reviewed and updated as appropriate: allergies, current medications, past family history, past medical history, past social history, past surgical history and problem list.    Review of Systems   Constitutional: Positive for fatigue.   HENT: Negative.    Eyes: Negative.    Respiratory: Negative.    Cardiovascular:  "Negative.    Gastrointestinal: Positive for abdominal distention (bloating ).   Endocrine: Negative.    Genitourinary: Negative.    Musculoskeletal: Negative.    Skin: Negative.    Allergic/Immunologic: Negative.    Neurological: Positive for light-headedness (after taking medications ).   Hematological: Bruises/bleeds easily.   Psychiatric/Behavioral: Negative.        Objective      Vitals:    06/25/19 1336   BP: 138/72   BP Location: Left arm   Patient Position: Sitting   Cuff Size: Small Adult   Pulse: 95   SpO2: 94%   Weight: 46 kg (101 lb 6.4 oz)   Height: 152.4 cm (60\")     Physical Exam  Results Encounter on 06/11/2019   Component Date Value Ref Range Status   • Glucose 06/11/2019 94  65 - 99 mg/dL Final   • BUN 06/11/2019 16  8 - 23 mg/dL Final   • Creatinine 06/11/2019 0.67  0.57 - 1.00 mg/dL Final   • eGFR Non  Am 06/11/2019 86  >60 mL/min/1.73 Final    Comment: The MDRD GFR formula is only valid for adults with stable  renal function between ages 18 and 70.     • eGFR  Am 06/11/2019 104  >60 mL/min/1.73 Final   • BUN/Creatinine Ratio 06/11/2019 23.9  7.0 - 25.0 Final   • Sodium 06/11/2019 143  136 - 145 mmol/L Final   • Potassium 06/11/2019 3.9  3.5 - 5.2 mmol/L Final   • Chloride 06/11/2019 102  98 - 107 mmol/L Final   • Total CO2 06/11/2019 28.2  22.0 - 29.0 mmol/L Final   • Calcium 06/11/2019 9.6  8.6 - 10.5 mg/dL Final   • Total Protein 06/11/2019 6.8  6.0 - 8.5 g/dL Final   • Albumin 06/11/2019 4.20  3.50 - 5.20 g/dL Final   • Globulin 06/11/2019 2.6  gm/dL Final   • A/G Ratio 06/11/2019 1.6  g/dL Final   • Total Bilirubin 06/11/2019 0.2  0.2 - 1.2 mg/dL Final   • Alkaline Phosphatase 06/11/2019 73  39 - 117 U/L Final   • AST (SGOT) 06/11/2019 14  1 - 32 U/L Final   • ALT (SGPT) 06/11/2019 14  1 - 33 U/L Final   • Free T4 06/11/2019 1.07  0.93 - 1.70 ng/dL Final   • TSH 06/11/2019 1.060  0.270 - 4.200 mIU/mL Final   • T3, Free 06/11/2019 3.4  2.0 - 4.4 pg/mL Final   • 25 Hydroxy, " Vitamin D 06/11/2019 76.6  30.0 - 100.0 ng/ml Final    Comment: Reference Range for Total Vitamin D 25(OH)  Deficiency <20.0 ng/mL  Insufficiency 21-29 ng/mL  Sufficiency  ng/mL  Toxicity >100 ng/ml     • WBC 06/11/2019 6.11  3.40 - 10.80 10*3/mm3 Final   • RBC 06/11/2019 4.17  3.77 - 5.28 10*6/mm3 Final   • Hemoglobin 06/11/2019 12.7  12.0 - 15.9 g/dL Final   • Hematocrit 06/11/2019 42.1  34.0 - 46.6 % Final   • MCV 06/11/2019 101.0* 79.0 - 97.0 fL Final   • MCH 06/11/2019 30.5  26.6 - 33.0 pg Final   • MCHC 06/11/2019 30.2* 31.5 - 35.7 g/dL Final   • RDW 06/11/2019 14.2  12.3 - 15.4 % Final   • Platelets 06/11/2019 322  140 - 450 10*3/mm3 Final   • Neutrophil Rel % 06/11/2019 76.1* 42.7 - 76.0 % Final   • Lymphocyte Rel % 06/11/2019 11.8* 19.6 - 45.3 % Final   • Monocyte Rel % 06/11/2019 8.8  5.0 - 12.0 % Final   • Eosinophil Rel % 06/11/2019 2.1  0.3 - 6.2 % Final   • Basophil Rel % 06/11/2019 0.7  0.0 - 1.5 % Final   • Neutrophils Absolute 06/11/2019 4.65  1.70 - 7.00 10*3/mm3 Final   • Lymphocytes Absolute 06/11/2019 0.72  0.70 - 3.10 10*3/mm3 Final   • Monocytes Absolute 06/11/2019 0.54  0.10 - 0.90 10*3/mm3 Final   • Eosinophils Absolute 06/11/2019 0.13  0.00 - 0.40 10*3/mm3 Final   • Basophils Absolute 06/11/2019 0.04  0.00 - 0.20 10*3/mm3 Final   • Immature Granulocyte Rel % 06/11/2019 0.5  0.0 - 0.5 % Final   • Immature Grans Absolute 06/11/2019 0.03  0.00 - 0.05 10*3/mm3 Final   • nRBC 06/11/2019 0.0  0.0 - 0.2 /100 WBC Final     Assessment/Plan   Maryam was seen today for goiter, hypothyroidism, copd and asthma.    Diagnoses and all orders for this visit:    Toxic multinodular goiter    Hyperthyroidism    Malignant neoplasm of upper lobe of left lung (CMS/HCC)    Essential hypertension    Pulmonary emphysema, unspecified emphysema type (CMS/HCC)    Vitamin D insufficiency      Continue Tapazole 5 mg 1-1/2 tablets daily.  Will defer treatment of COPD to Dr. Anthony  Follow up with Dr. Je Ventura as  scheduled.  Continue diltiazem ER and digoxin per Dr. Bobo  Continue vitamin D 2000 units/day.    Copy of my note sent to Dr. Anthony, Dr. Je Ventura, and Dr. Bobo.    RTC 4 mos

## 2019-06-25 ENCOUNTER — OFFICE VISIT (OUTPATIENT)
Dept: ENDOCRINOLOGY | Age: 74
End: 2019-06-25

## 2019-06-25 VITALS
BODY MASS INDEX: 19.91 KG/M2 | HEART RATE: 95 BPM | SYSTOLIC BLOOD PRESSURE: 138 MMHG | WEIGHT: 101.4 LBS | DIASTOLIC BLOOD PRESSURE: 72 MMHG | HEIGHT: 60 IN | OXYGEN SATURATION: 94 %

## 2019-06-25 DIAGNOSIS — J43.9 PULMONARY EMPHYSEMA, UNSPECIFIED EMPHYSEMA TYPE (HCC): ICD-10-CM

## 2019-06-25 DIAGNOSIS — I10 ESSENTIAL HYPERTENSION: ICD-10-CM

## 2019-06-25 DIAGNOSIS — C34.12 MALIGNANT NEOPLASM OF UPPER LOBE OF LEFT LUNG (HCC): ICD-10-CM

## 2019-06-25 DIAGNOSIS — E05.90 HYPERTHYROIDISM: ICD-10-CM

## 2019-06-25 DIAGNOSIS — E55.9 VITAMIN D INSUFFICIENCY: ICD-10-CM

## 2019-06-25 DIAGNOSIS — E05.20 TOXIC MULTINODULAR GOITER: Primary | ICD-10-CM

## 2019-06-25 PROCEDURE — 99214 OFFICE O/P EST MOD 30 MIN: CPT | Performed by: INTERNAL MEDICINE

## 2019-06-25 RX ORDER — FEXOFENADINE HCL 180 MG/1
180 TABLET ORAL DAILY
COMMUNITY
End: 2020-01-13

## 2019-06-25 RX ORDER — LANOLIN ALCOHOL/MO/W.PET/CERES
1000 CREAM (GRAM) TOPICAL DAILY
COMMUNITY
End: 2020-07-10

## 2019-07-01 ENCOUNTER — TELEPHONE (OUTPATIENT)
Dept: SURGERY | Facility: CLINIC | Age: 74
End: 2019-07-01

## 2019-07-08 ENCOUNTER — TELEPHONE (OUTPATIENT)
Dept: OTHER | Facility: OTHER | Age: 74
End: 2019-07-08

## 2019-07-11 ENCOUNTER — HOSPITAL ENCOUNTER (OUTPATIENT)
Dept: MAMMOGRAPHY | Facility: HOSPITAL | Age: 74
Discharge: HOME | End: 2019-07-11
Admitting: SURGERY

## 2019-07-11 DIAGNOSIS — Z85.3 HISTORY OF BREAST CANCER: ICD-10-CM

## 2019-07-11 PROCEDURE — 77063 BREAST TOMOSYNTHESIS BI: CPT

## 2019-07-11 PROCEDURE — 77067 SCR MAMMO BI INCL CAD: CPT

## 2019-07-26 ENCOUNTER — OFFICE VISIT (OUTPATIENT)
Dept: RETAIL CLINIC | Facility: CLINIC | Age: 74
End: 2019-07-26

## 2019-07-26 VITALS
OXYGEN SATURATION: 96 % | SYSTOLIC BLOOD PRESSURE: 120 MMHG | HEART RATE: 108 BPM | TEMPERATURE: 99.2 F | RESPIRATION RATE: 20 BRPM | DIASTOLIC BLOOD PRESSURE: 72 MMHG

## 2019-07-26 DIAGNOSIS — J44.9 CHRONIC OBSTRUCTIVE PULMONARY DISEASE, UNSPECIFIED COPD TYPE (HCC): Primary | ICD-10-CM

## 2019-07-26 PROCEDURE — 99213 OFFICE O/P EST LOW 20 MIN: CPT | Performed by: NURSE PRACTITIONER

## 2019-07-26 RX ORDER — AZITHROMYCIN 250 MG/1
TABLET, FILM COATED ORAL
Qty: 6 TABLET | Refills: 0 | Status: SHIPPED | OUTPATIENT
Start: 2019-07-26 | End: 2019-08-17

## 2019-07-26 NOTE — PROGRESS NOTES
Subjective   Maryam Arredondo is a 73 y.o. female.     Cough   This is a new problem. The current episode started 1 to 4 weeks ago. The problem has been unchanged. The problem occurs constantly. Associated symptoms include chills. Pertinent negatives include no fever or sore throat. Nothing aggravates the symptoms. She has tried oral steroids for the symptoms. The treatment provided mild relief. Her past medical history is significant for COPD.        The following portions of the patient's history were reviewed and updated as appropriate: allergies, current medications, past family history, past medical history, past social history, past surgical history and problem list.    Review of Systems   Constitutional: Positive for chills. Negative for fever.   HENT: Positive for congestion. Negative for sinus pressure and sore throat.    Eyes: Negative.    Respiratory: Positive for cough.    Cardiovascular: Negative.    Gastrointestinal: Negative.    Endocrine: Negative.    Genitourinary: Negative.    Musculoskeletal: Negative.    Skin: Negative.    Allergic/Immunologic: Negative.    Neurological: Negative.    Hematological: Negative.    Psychiatric/Behavioral: Negative.        Objective   Physical Exam   Constitutional: She is oriented to person, place, and time. Vital signs are normal. She appears well-developed and well-nourished.   HENT:   Head: Normocephalic and atraumatic.   Right Ear: Hearing, tympanic membrane, external ear and ear canal normal.   Left Ear: Hearing, tympanic membrane, external ear and ear canal normal.   Nose: Nose normal. Right sinus exhibits no maxillary sinus tenderness and no frontal sinus tenderness. Left sinus exhibits no maxillary sinus tenderness and no frontal sinus tenderness.   Mouth/Throat: Uvula is midline, oropharynx is clear and moist and mucous membranes are normal. No tonsillar exudate.   Eyes: Conjunctivae and lids are normal. Pupils are equal, round, and reactive to light.   Neck:  Trachea normal and normal range of motion. Neck supple.   Cardiovascular: Normal rate, regular rhythm, S1 normal, S2 normal and normal heart sounds.   Pulmonary/Chest: Effort normal and breath sounds normal. No respiratory distress.   Abdominal: Soft. Normal appearance and bowel sounds are normal. There is no tenderness.   Musculoskeletal: Normal range of motion.   Lymphadenopathy:     She has no cervical adenopathy.   Neurological: She is alert and oriented to person, place, and time. She has normal strength.   Skin: Skin is warm, dry and intact. Turgor is normal. No rash noted.   Psychiatric: She has a normal mood and affect. Her speech is normal and behavior is normal.   Vitals reviewed.        Assessment/Plan   Diagnoses and all orders for this visit:    Chronic obstructive pulmonary disease, unspecified COPD type (CMS/HCC)

## 2019-07-26 NOTE — PATIENT INSTRUCTIONS
Chronic Obstructive Pulmonary Disease Exacerbation  Chronic obstructive pulmonary disease (COPD) is a long-term (chronic) condition that affects the lungs. COPD is a general term that can be used to describe many different lung problems that cause lung swelling (inflammation) and limit airflow, including chronic bronchitis and emphysema. COPD exacerbations are episodes when breathing symptoms become much worse and require extra treatment.  COPD exacerbations are usually caused by infections. Without treatment, COPD exacerbations can be severe and even life threatening. Frequent COPD exacerbations can cause further damage to the lungs.  What are the causes?  This condition may be caused by:  · Respiratory infections, including viral and bacterial infections.  · Exposure to smoke.  · Exposure to air pollution, chemical fumes, or dust.  · Things that give you an allergic reaction (allergens).  · Not taking your usual COPD medicines as directed.  · Underlying medical problems, such as congestive heart failure or infections not involving the lungs.    In many cases, the cause (trigger) of this condition is not known.  What increases the risk?  The following factors may make you more likely to develop this condition:  · Smoking cigarettes.  · Old age.  · Frequent prior COPD exacerbations.    What are the signs or symptoms?  Symptoms of this condition include:  · Increased coughing.  · Increased production of mucus from your lungs (sputum).  · Increased wheezing.  · Increased shortness of breath.  · Rapid or labored breathing.  · Chest tightness.  · Less energy than usual.  · Sleep disruption from symptoms.  · Confusion or increased sleepiness.    Often these symptoms happen or get worse even with the use of medicines.  How is this diagnosed?  This condition is diagnosed based on:  · Your medical history.  · A physical exam.    You may also have tests, including:  · A chest X-ray.  · Blood tests.  · Lung (pulmonary)  function tests.    How is this treated?  Treatment for this condition depends on the severity and cause of the symptoms. You may need to be admitted to a hospital for treatment. Some of the treatments commonly used to treat COPD exacerbations are:  · Antibiotic medicines. These may be used for severe exacerbations caused by a lung infection, such as pneumonia.  · Bronchodilators. These are inhaled medicines that expand the air passages and allow increased airflow.  · Steroid medicines. These act to reduce inflammation in the airways. They may be given with an inhaler, taken by mouth, or given through an IV tube inserted into one of your veins.  · Supplemental oxygen therapy.  · Airway clearing techniques, such as noninvasive ventilation (NIV) and positive expiratory pressure (PEP). These provide respiratory support through a mask or other noninvasive device. An example of this would be using a continuous positive airway pressure (CPAP) machine to improve delivery of oxygen into your lungs.    Follow these instructions at home:  Medicines  · Take over-the-counter and prescription medicines only as told by your health care provider. It is important to use correct technique with inhaled medicines.  · If you were prescribed an antibiotic medicine or oral steroid, take it as told by your health care provider. Do not stop taking the medicine even if you start to feel better.  Lifestyle  · Eat a healthy diet.  · Exercise regularly.  · Get plenty of sleep.  · Avoid exposure to all substances that irritate the airway, especially to tobacco smoke.  · Wash your hands often with soap and water to reduce the risk of infection. If soap and water are not available, use hand .  · During flu season, avoid enclosed spaces that are crowded with people.  General instructions  · Drink enough fluid to keep your urine clear or pale yellow (unless you have a medical condition that requires fluid restriction).  · Use a cool mist  vaporizer. This humidifies the air and makes it easier for you to clear your chest when you cough.  · If you have a home nebulizer and oxygen, continue to use them as told by your health care provider.  · Keep all follow-up visits as told by your health care provider. This is important.  How is this prevented?  · Stay up-to-date on pneumococcal and influenza (flu) vaccines. A flu shot is recommended every year to help prevent exacerbations.  · Do not use any products that contain nicotine or tobacco, such as cigarettes and e-cigarettes. Quitting smoking is very important in preventing COPD from getting worse and in preventing exacerbations from happening as often. If you need help quitting, ask your health care provider.  · Follow all instructions for pulmonary rehabilitation after a recent exacerbation. This can help prevent future exacerbations.  · Work with your health care provider to develop and follow an action plan. This tells you what steps to take when you experience certain symptoms.  Contact a health care provider if:  · You have a worsening of your regular COPD symptoms.  Get help right away if:  · You have worsening shortness of breath, even when resting.  · You have trouble talking.  · You have severe chest pain.  · You cough up blood.  · You have a fever.  · You have weakness, vomit repeatedly, or faint.  · You feel confused.  · You are not able to sleep because of your symptoms.  · You have trouble doing daily activities.  Summary  · COPD exacerbations are episodes when breathing symptoms become much worse and require extra treatment above your normal treatment.  · Exacerbations can be severe and even life threatening. Frequent COPD exacerbations can cause further damage to your lungs.  · COPD exacerbations are usually triggered by infections such as the flu, colds, and even pneumonia.  · Treatment for this condition depends on the severity and cause of the symptoms. You may need to be admitted to a  hospital for treatment.  · Quitting smoking is very important to prevent COPD from getting worse and to prevent exacerbations from happening as often.  This information is not intended to replace advice given to you by your health care provider. Make sure you discuss any questions you have with your health care provider.  Document Released: 10/14/2008 Document Revised: 06/13/2018 Document Reviewed: 01/22/2018  [employer] Patient Education © 2019 [employer]

## 2019-07-27 NOTE — PROGRESS NOTES
Patient called by telephone with questions regarding why she is on azithromycin. She notes sputum color now whitish, previously gray. Notes she took prednisone which helped with voice hoarseness but still coughing. She notes taking z-pack as directed, sputum color less gray again, feeling better. She perseverates on reason for taking azithromycin despite explanation of what azithromycin is and is doing for her, why she is taking it. She repeats that she is feeling better but wanted to know reason for medication. She notes her medication list did not have spiriva and symbicort on handout. Reassured her that these medications are on her medication list in computer. Offered to send copy in mail, of medication list and she declined. She notes Dr. Lazcano has all her medications correct. She asked about staying off work and explained that after 24 hours on antibiotic, no longer contagious if infection is bacterial on appropriate antibiotic and that she could return to work when she felt like it. Encouraged her to f/u Kevin Seymour MD 1 week. She verbalized understanding and was appreciative of information.

## 2019-08-05 ENCOUNTER — TELEPHONE (OUTPATIENT)
Dept: FAMILY MEDICINE CLINIC | Facility: CLINIC | Age: 74
End: 2019-08-05

## 2019-08-12 ENCOUNTER — HOSPITAL ENCOUNTER (OUTPATIENT)
Dept: PET IMAGING | Facility: HOSPITAL | Age: 74
Discharge: HOME | End: 2019-08-12
Admitting: INTERNAL MEDICINE

## 2019-08-12 ENCOUNTER — LAB (OUTPATIENT)
Dept: LAB | Facility: HOSPITAL | Age: 74
End: 2019-08-12

## 2019-08-12 DIAGNOSIS — C34.12 MALIGNANT NEOPLASM OF UPPER LOBE OF LEFT LUNG (HCC): ICD-10-CM

## 2019-08-12 LAB
ANION GAP SERPL CALCULATED.3IONS-SCNC: 12.9 MMOL/L (ref 5–15)
BUN BLD-MCNC: 18 MG/DL (ref 6–20)
BUN/CREAT SERPL: 26.9 (ref 7.3–30)
CALCIUM SPEC-SCNC: 9.8 MG/DL (ref 8.5–10.2)
CHLORIDE SERPL-SCNC: 101 MMOL/L (ref 98–107)
CO2 SERPL-SCNC: 26.1 MMOL/L (ref 22–29)
CREAT BLD-MCNC: 0.67 MG/DL (ref 0.6–1.1)
CREAT BLDA-MCNC: 0.7 MG/DL (ref 0.6–1.3)
GFR SERPL CREATININE-BSD FRML MDRD: 86 ML/MIN/1.73
GLUCOSE BLD-MCNC: 77 MG/DL (ref 74–124)
POTASSIUM BLD-SCNC: 3.9 MMOL/L (ref 3.5–4.7)
SODIUM BLD-SCNC: 140 MMOL/L (ref 134–145)

## 2019-08-12 PROCEDURE — 71260 CT THORAX DX C+: CPT

## 2019-08-12 PROCEDURE — 25010000002 IOPAMIDOL 61 % SOLUTION: Performed by: INTERNAL MEDICINE

## 2019-08-12 PROCEDURE — 36415 COLL VENOUS BLD VENIPUNCTURE: CPT | Performed by: INTERNAL MEDICINE

## 2019-08-12 PROCEDURE — 80048 BASIC METABOLIC PNL TOTAL CA: CPT | Performed by: INTERNAL MEDICINE

## 2019-08-12 PROCEDURE — 82565 ASSAY OF CREATININE: CPT

## 2019-08-12 RX ORDER — METHIMAZOLE 5 MG/1
TABLET ORAL
Qty: 60 TABLET | Refills: 4 | Status: SHIPPED | OUTPATIENT
Start: 2019-08-12 | End: 2020-02-17

## 2019-08-12 RX ADMIN — IOPAMIDOL 75 ML: 612 INJECTION, SOLUTION INTRAVENOUS at 10:45

## 2019-08-17 ENCOUNTER — OFFICE VISIT (OUTPATIENT)
Dept: RETAIL CLINIC | Facility: CLINIC | Age: 74
End: 2019-08-17

## 2019-08-17 VITALS
HEART RATE: 89 BPM | OXYGEN SATURATION: 94 % | HEIGHT: 60 IN | SYSTOLIC BLOOD PRESSURE: 129 MMHG | BODY MASS INDEX: 19.83 KG/M2 | TEMPERATURE: 98.5 F | WEIGHT: 101 LBS | DIASTOLIC BLOOD PRESSURE: 65 MMHG | RESPIRATION RATE: 20 BRPM

## 2019-08-17 DIAGNOSIS — L08.9 INFECTED SKIN TEAR: Primary | ICD-10-CM

## 2019-08-17 DIAGNOSIS — T14.8XXA INFECTED SKIN TEAR: Primary | ICD-10-CM

## 2019-08-17 PROCEDURE — 99213 OFFICE O/P EST LOW 20 MIN: CPT | Performed by: PHYSICIAN ASSISTANT

## 2019-08-17 RX ORDER — CEPHALEXIN 500 MG/1
500 CAPSULE ORAL 3 TIMES DAILY
Qty: 30 CAPSULE | Refills: 0 | Status: SHIPPED | OUTPATIENT
Start: 2019-08-17 | End: 2019-11-04

## 2019-08-17 NOTE — PROGRESS NOTES
ACUTE VISIT    Patient: Maryam Arredondo    YOB: 1945    MRN: [id number]  ?  Chief Complaint   Patient presents with   • Skin Problem      ?  HPI:   74 year old female patient presents with complaint of skin tear to the right lower leg. She states this occurred 2 days ago when she hit her leg on the corner of a piece of furniture. She reports she washed it with dial soap at time of injury and has since been putting Bactroban ointment on it. She comes into the clinic with concerns of the area becoming infected. She reports redness around the injury. She denies fever. She requests a refill of her Bactroban because she likes to have it on hand at home.    This patient is an established patient.  This problem is new to this examiner.      Allergies:   Allergies   Allergen Reactions   • Codeine    • Penicillin V    • Penicillins    • Mucinex D [Pseudoephedrine-Guaifenesin Er] Nausea Only     Felt hot in chest       Medications:   Home Medications:  Current Outpatient Medications on File Prior to Visit   Medication Sig   • acyclovir (ZOVIRAX) 400 MG tablet Take 2 tablets in the morning and 2 tablets at night  For 5 days at needed   • albuterol (PROVENTIL HFA;VENTOLIN HFA) 108 (90 Base) MCG/ACT inhaler Inhale 2 puffs Every 4 (Four) Hours As Needed for Wheezing. Per MD - until current episode is resolved (Patient taking differently: Inhale 2 puffs As Needed for Wheezing. Per MD - until current episode is resolved )   • budesonide-formoterol (SYMBICORT) 160-4.5 MCG/ACT inhaler Inhale 2 puffs 2 (two) times a day.   • CARTIA  MG 24 hr capsule Take 180 mg by mouth Daily.   • Cholecalciferol (VITAMIN D3) 2000 UNITS tablet Take 1 tablet by mouth daily.   • clindamycin (CLEOCIN T) 1 % lotion Apply  topically to the appropriate area as directed 2 (Two) Times a Day.   • digoxin (LANOXIN) 125 MCG tablet Take 125 mcg by mouth Daily.   • fexofenadine (ALLEGRA) 180 MG tablet Take 180 mg by mouth Daily.   • fluticasone  (FLONASE) 50 MCG/ACT nasal spray 2 Squirts into the nostril(s) as directed by provider 2 (Two) Times a Day.   • methIMAzole (TAPAZOLE) 5 MG tablet TAKE ONE AND ONE-HALF (1 & 1/2) TABLET BY MOUTH DAILY   • metroNIDAZOLE (METROCREAM) 0.75 % cream    • montelukast (SINGULAIR) 10 MG tablet Take 1 tablet by mouth Every Morning.   • Multiple Vitamins-Minerals (PRESERVISION AREDS 2) chewable tablet Chew 2 capsules Every Morning.   • O2 (OXYGEN) Inhale Continuous.   • SPIRIVA RESPIMAT 2.5 MCG/ACT aerosol solution 2 puffs Daily.   • vitamin B-12 (CYANOCOBALAMIN) 1000 MCG tablet Take 1,000 mcg by mouth Daily.   • [DISCONTINUED] azithromycin (ZITHROMAX Z-LIZBETH) 250 MG tablet Take 2 tablets the first day, then 1 tablet daily for 4 days.     No current facility-administered medications on file prior to visit.      Current Medications:  Scheduled Meds:  PRN Meds:.    I have reviewed the patient's medical history in detail and updated the computerized patient record.  Review and summarization of old records include:    Past Medical History:   Diagnosis Date   • Allergic rhinitis    • Asthma    • Breast cancer (CMS/formerly [facility])     s/p Lumpectomy ~2000 and chemo/XRT. Then  Masectomy ~2004 for some recurrence   • COPD (chronic obstructive pulmonary disease) (CMS/HCC)    • Hyperthyroidism    • Mitral regurgitation     mild to moderate   • Osteopenia    • Oxygen dependent     2L - at night only   • Pneumothorax    • SOB (shortness of breath)    • Tachycardia    • Toxic multinodular goiter    • Vitamin D deficiency      Past Surgical History:   Procedure Laterality Date   • BREAST LUMPECTOMY Right    • BREAST LUMPECTOMY     • CARDIAC CATHETERIZATION      PS SAPHENOUS VAIN RIGHT LEG   • COLONOSCOPY      Complete   • COLONOSCOPY N/A 4/28/2017    Procedure: COLONOSCOPY WITH POLYPECTOMY(COLD BIOPSY AND HOT SNARE);  Surgeon: Luiza Eddy MD;  Location: [facility] ENDOSCOPY;  Service:    • HYSTERECTOMY     • MASTECTOMY Right 2004   • NECK SURGERY    "   2 spurs removed   • VASCULAR SURGERY      spider vein ablation     Social History     Occupational History   • Occupation: Admistrative Assistant     Employer: RETIRED   Tobacco Use   • Smoking status: Former Smoker     Packs/day: 2.00     Years: 50.00     Pack years: 100.00     Types: Cigarettes     Last attempt to quit: 3/14/1994     Years since quittin.4   • Smokeless tobacco: Never Used   • Tobacco comment: D/C 20 years ago, smoking 2 ppd before quitting   Substance and Sexual Activity   • Alcohol use: No     Comment: Social use rare   • Drug use: No   • Sexual activity: Defer     Comment: drinks decaf       Social History     Social History Narrative   • Not on file     Family History   Problem Relation Age of Onset   • Arthritis Mother    • Heart failure Mother         Congestive Heart Failure   • Lung cancer Father    • Breast cancer Paternal Grandmother    • No Known Problems Sister          Review of Systems   Constitutional: Negative.    HENT: Negative.    Eyes: Negative.    Respiratory: Negative.    Cardiovascular: Negative.    Gastrointestinal: Negative.    Genitourinary: Negative.    Musculoskeletal: Negative.    Skin: Positive for color change and wound.   Allergic/Immunologic: Negative.    Neurological: Negative.    Hematological: Negative.    Psychiatric/Behavioral: Negative.           Wt Readings from Last 1 Encounters:   19 45.8 kg (101 lb)     Ht Readings from Last 1 Encounters:   19 152.4 cm (60\")     Body mass index is 19.73 kg/m².  Facility age limit for growth percentiles is 20 years.  Vitals:    19 1314   BP: 129/65   Pulse: 89   Resp: 20   Temp: 98.5 °F (36.9 °C)   SpO2: 94%         Physical Exam   Constitutional: She is oriented to person, place, and time. She appears well-developed and well-nourished.   HENT:   Head: Normocephalic and atraumatic.   Eyes: Conjunctivae and EOM are normal. Pupils are equal, round, and reactive to light.   Cardiovascular: Normal rate, " regular rhythm, normal heart sounds and intact distal pulses.   Pulmonary/Chest: Effort normal and breath sounds normal.   Musculoskeletal: Normal range of motion.   Neurological: She is alert and oriented to person, place, and time.   Skin: Skin is warm and dry. Capillary refill takes less than 2 seconds.   Skin tear at right lower extremity at the lateral aspect of the leg adjacent to knee joint approximately 3cm in length   Psychiatric: She has a normal mood and affect. Her behavior is normal. Judgment and thought content normal.   Nursing note and vitals reviewed.          Assessment:  Maryam was seen today for skin problem.    Diagnoses and all orders for this visit:    Infected skin tear  -     cephalexin (KEFLEX) 500 MG capsule; Take 1 capsule by mouth 3 (Three) Times a Day.    Other orders  -     mupirocin (BACTROBAN) 2 % ointment; Apply  topically to the appropriate area as directed 3 (Three) Times a Day.      ?    Plan    · Advised to keep wound clean and dry  · She can continue to wash with antibacterial soap  · Start keflex TID x 10 days  · Rx for Bactroban given for patient to have on hand      Electronically signed by Rodrigo Roth PA-C, 08/17/19, 1:17 PM.

## 2019-08-19 ENCOUNTER — APPOINTMENT (OUTPATIENT)
Dept: LAB | Facility: HOSPITAL | Age: 74
End: 2019-08-19

## 2019-08-19 ENCOUNTER — OFFICE VISIT (OUTPATIENT)
Dept: ONCOLOGY | Facility: CLINIC | Age: 74
End: 2019-08-19

## 2019-08-19 VITALS
DIASTOLIC BLOOD PRESSURE: 68 MMHG | BODY MASS INDEX: 19.56 KG/M2 | RESPIRATION RATE: 14 BRPM | TEMPERATURE: 97.8 F | WEIGHT: 99.6 LBS | OXYGEN SATURATION: 94 % | HEIGHT: 60 IN | HEART RATE: 89 BPM | SYSTOLIC BLOOD PRESSURE: 122 MMHG

## 2019-08-19 DIAGNOSIS — C34.12 MALIGNANT NEOPLASM OF UPPER LOBE OF LEFT LUNG (HCC): Primary | ICD-10-CM

## 2019-08-19 PROCEDURE — G0463 HOSPITAL OUTPT CLINIC VISIT: HCPCS | Performed by: INTERNAL MEDICINE

## 2019-08-19 PROCEDURE — 99213 OFFICE O/P EST LOW 20 MIN: CPT | Performed by: INTERNAL MEDICINE

## 2019-08-19 NOTE — PROGRESS NOTES
History:     Reason for follow up:   1.  Stage IIB left upper lobe non-small cell lung cancer, high PDL-1 (80%), negative egfr/alk/ros   * status post radiation therapy completed 1/26/18     HPI:  Maryam Arredondo presents for follow-up of her lung cancer.  The patient completed radiation therapy to a left upper lobe non-small cell carcinoma January 2018.  PET scan in October showed an area of pleural based opacification in the left upper lobe SUV 3.1 favoring radiation fibrosis.  There was a hypermetabolic nodule in the right thyroid lobe.  This was previously present on PET 2017 and thyroid ultrasound has shown multinodular goiter.  She states this is followed by her endocrinologist.    She returns today for follow-up doing well.  She denies increased shortness of breath or cough, both symptoms stable and chronic.  She denies pain in the chest.    Reviewed, confirmed and updated history (past medical, social and family)   Past Medical   Past Medical History:   Diagnosis Date   • Abnormal color of sputum     grey   • Allergic rhinitis    • Asthma    • Breast cancer (CMS/HCC)     s/p Lumpectomy ~2000 and chemo/XRT. Then  Masectomy ~2004 for some recurrence   • COPD (chronic obstructive pulmonary disease) (CMS/HCC)    • Hyperthyroidism    • Laryngitis    • Mitral regurgitation     mild to moderate   • Osteopenia    • Oxygen dependent     2L - at night only   • Pneumothorax    • SOB (shortness of breath)    • Tachycardia    • Toxic multinodular goiter    • Vitamin D deficiency     and   Patient Active Problem List   Diagnosis   • Pneumothorax   • Hypoxia   • Tachycardia   • Hyperthyroidism   • Essential hypertension   • Multifocal atrial tachycardia (CMS/HCC)   • Toxic multinodular goiter   • Pulmonary emphysema (CMS/HCC)   • Vitamin D insufficiency   • Pneumonia   • COPD with acute lower respiratory infection (CMS/HCC)   • Malignant neoplasm of upper lobe of left lung (CMS/HCC)   • Infected skin tear     Social History    Social History     Socioeconomic History   • Marital status: Single     Spouse name: Not on file   • Number of children: Not on file   • Years of education: Not on file   • Highest education level: Not on file   Occupational History   • Occupation: Admistrative Assistant     Employer: RETIRED   Tobacco Use   • Smoking status: Former Smoker     Packs/day: 2.00     Years: 50.00     Pack years: 100.00     Types: Cigarettes     Last attempt to quit: 3/14/1994     Years since quittin.4   • Smokeless tobacco: Never Used   • Tobacco comment: D/C 20 years ago, smoking 2 ppd before quitting   Substance and Sexual Activity   • Alcohol use: No     Comment: Social use rare   • Drug use: No   • Sexual activity: Defer     Comment: drinks decaf      Family History  Family History   Problem Relation Age of Onset   • Arthritis Mother    • Heart failure Mother         Congestive Heart Failure   • Lung cancer Father    • Breast cancer Paternal Grandmother    • No Known Problems Sister      Allergies  Allergies   Allergen Reactions   • Codeine    • Penicillin V    • Penicillins    • Mucinex D [Pseudoephedrine-Guaifenesin Er] Nausea Only     Felt hot in chest       Medications: The current medication list was reviewed in the EMR.    Review of Systems  Review of Systems   Constitutional: Negative for activity change, appetite change, chills, diaphoresis, fatigue, fever and unexpected weight change.   HENT: Negative for congestion and sinus pressure.    Respiratory: Positive for cough and shortness of breath. Negative for chest tightness.    Cardiovascular: Negative for chest pain, palpitations and leg swelling.   Gastrointestinal: Negative for abdominal pain, blood in stool, constipation, diarrhea, nausea and vomiting.   Endocrine: Negative.    Genitourinary: Negative.    Musculoskeletal: Positive for arthralgias. Negative for joint swelling and myalgias.   Skin: Negative.    Allergic/Immunologic: Negative.    Neurological:  "Negative.    Hematological: Negative for adenopathy. Does not bruise/bleed easily.       Objective    Objective:     Vitals:    08/19/19 1112   BP: 122/68   Pulse: 89   Resp: 14   Temp: 97.8 °F (36.6 °C)   TempSrc: Oral   SpO2: 94%   Weight: 45.2 kg (99 lb 9.6 oz)   Height: 152.4 cm (60\")   PainSc: 0-No pain     Current Status 8/19/2019   ECOG score 1   GENERAL:  Well-developed, well-nourished female in no acute distress. Vital signs reviewed.   SKIN:  Warm, dry without rashes, purpura or petechiae.  EYES:    EOMs intact.  Conjunctivae normal.  HEAD:  Normocephalic.  NECK:  Supple with good range of motion; no thyromegaly or masses  LYMPHATICS:  No cervical, supraclavicular, axillary adenopathy.  RESP: Mild decrease in breath sounds, no increased work of breathing, no wheezing  CARDIAC:  Regular rate and rhythm without murmurs, rubs or gallops. Normal S1,S2. No edema  GI:  Soft, nontender, normal bowel sounds  MSK:  No clubbing or cyanosis, No joint swelling noted in hands  NEUROLOGICAL:   No focal neurological deficits.  PSYCHIATRIC:  Normal affect and mood.  Alert and Oriented x 3.       Labs and Imaging  Results for orders placed or performed during the hospital encounter of 08/12/19   POC Creatinine   Result Value Ref Range    Creatinine 0.70 0.60 - 1.30 mg/dL       CT chest 12/28/18: There is decreased size of the left apical mass 1.3 cm compared to previous 1.5 cm.  Adjacent granulated density also decreased in size from 1.7 down to 0.9 cm.  There is stable emphysema and apical scarring.  There are stable thyroid nodules.  No new lymphadenopathy in the chest.  There is a right hepatic enhancing lesion stable.  I personally reviewed the CT chest and there is stable to improved disease/scar in the left upper lobe of the lung.    CT chest 4/23/2019: Again seen are hypodense bilateral thyroid lesions, hyperenhancing lesions in the liver unchanged.  There is no mediastinal, hilar or axillary lymphadenopathy.  There " is severe emphysema.  There are stable nodules in the right middle and lower lobe since 11/3/2016.  There is a new sub-6 mm nodule in the anterior right middle lobe.  Stable masslike thickening in the left lung apex since 12/28/2018 and decreased since 10/8/2018.  There is irregular opacification in the left apex likely radiation fibrosis.    CT chest 8/12/2019: Decreased size of nodular densities in the left upper lobe.  New 10 mm subpleural nodule in the lingula.  Stable hyperenhancing hepatic nodules.    Assessment/Plan   Assessment/Plan:   This is a 72 y.o. female with:     1.  Stage IIB (T3N0M0) left upper lobe non-small cell lung cancer, high PDL-1 (80%), negative egfr/alk/ros   * Appears to be a local lung cancer based on PET/CT scan. On initial scans, it appeared she may have hilar and mediastinal adenopathy and an indeterminate liver lesion, but all PET negative. She had active infection during her initial CT scans that had resolved when PET was done. On CT, it appeared to be two lung lesions, all in one lobe, but are involving pleura- T3N0M0   * She is high PDL 1 and thus if she progressed that she could be a candidate for Keytruda therapy (not approved for adjuvant use though).    * status post definitive radiation therapy for local treatment, completed 1/26/2018. Not a surgical candidate and not a candidate for adjuvant therapy   * CT scans in October stability of the previously treated area, but with new nodularity along the left pleura, inferior to previously treated area.  PET scan felt most consistent with radiation changes.   *CT chest 12/28/18: Stable to decreased left apical mass and adjacent nodularity   *CT chest 4/23/2019: Stable findings except a new sub-6 mm nodule in the anterior right middle lobe and evolving radiation fibrosis of the left upper lung   *CT chest 8/12/2019 shows decreased size of nodularity in the left upper lobe.  There is a 1 cm nodule in the lingula.  I favor these waxing  and waning pulmonary nodules to be inflammatory over neoplastic.  I recommended CT surveillance in 4 months which was ordered today     2.  Multinodular goiter stable by CT--followed by endocrinology    3. History of right breast cancer, initially diagnosed in 2000 treated with lumpectomy, radiation, chemotherapy and endocrine therapy with local recurrence in 2005 treated with mastectomy and then Arimidex.    * Mammogram due July 2019    4. COPD.      I recommended every 4 month CT of the chest year 2 of surveillance.  CT and M.D. visit ordered 4 months.

## 2019-09-03 ENCOUNTER — OFFICE VISIT (OUTPATIENT)
Dept: FAMILY MEDICINE CLINIC | Facility: CLINIC | Age: 74
End: 2019-09-03

## 2019-09-03 VITALS
HEIGHT: 62 IN | HEART RATE: 90 BPM | OXYGEN SATURATION: 94 % | TEMPERATURE: 97.8 F | WEIGHT: 99.4 LBS | BODY MASS INDEX: 18.29 KG/M2 | SYSTOLIC BLOOD PRESSURE: 127 MMHG | DIASTOLIC BLOOD PRESSURE: 68 MMHG

## 2019-09-03 DIAGNOSIS — E53.8 B12 DEFICIENCY: ICD-10-CM

## 2019-09-03 DIAGNOSIS — I10 ESSENTIAL HYPERTENSION: Primary | ICD-10-CM

## 2019-09-03 DIAGNOSIS — Z79.899 HIGH RISK MEDICATION USE: ICD-10-CM

## 2019-09-03 DIAGNOSIS — E05.90 HYPERTHYROIDISM: ICD-10-CM

## 2019-09-03 DIAGNOSIS — Z99.81 OXYGEN DEPENDENT: ICD-10-CM

## 2019-09-03 DIAGNOSIS — J43.9 PULMONARY EMPHYSEMA, UNSPECIFIED EMPHYSEMA TYPE (HCC): ICD-10-CM

## 2019-09-03 DIAGNOSIS — E55.9 VITAMIN D INSUFFICIENCY: ICD-10-CM

## 2019-09-03 DIAGNOSIS — J44.0 COPD WITH ACUTE LOWER RESPIRATORY INFECTION (HCC): ICD-10-CM

## 2019-09-03 PROCEDURE — 99214 OFFICE O/P EST MOD 30 MIN: CPT | Performed by: FAMILY MEDICINE

## 2019-09-03 NOTE — PROGRESS NOTES
Chief Complaint   Patient presents with   • Hypothyroidism   • COPD   • Hypertension       Subjective   Maryam Arredondo is a 74 y.o. female.     PT is c/o of cough       Hypothyroidism   Pertinent negatives include no chest pain, coughing or fatigue.   COPD   There is no cough or shortness of breath. Pertinent negatives include no chest pain.   Hypertension   Pertinent negatives include no blurred vision, chest pain, palpitations or shortness of breath.   F/u COPD.  Doing well with meds.  NO SOA.  Occ cough.  On meds regularly.  I walked a quarter of a mile a day while at the beach.    F?U HTN.  No orthostasis.   F/U B12 def.  Doing well with meds.  FU high risk meds.  On digoxin once a day.         The following portions of the patient's history were reviewed and updated as appropriate: allergies, current medications, past family history, past medical history, past social history, past surgical history and problem list.    Review of Systems   Constitutional: Negative for fatigue.   Eyes: Negative for blurred vision.   Respiratory: Negative for cough, chest tightness and shortness of breath.    Cardiovascular: Negative for chest pain, palpitations and leg swelling.   Neurological: Negative for dizziness, light-headedness and headache.       Patient Active Problem List   Diagnosis   • Pneumothorax   • Hypoxia   • Tachycardia   • Hyperthyroidism   • Essential hypertension   • Multifocal atrial tachycardia (CMS/HCC)   • Toxic multinodular goiter   • Pulmonary emphysema (CMS/HCC)   • Vitamin D insufficiency   • Pneumonia   • COPD with acute lower respiratory infection (CMS/HCC)   • Malignant neoplasm of upper lobe of left lung (CMS/HCC)   • Infected skin tear   • Allergic rhinitis   • B12 deficiency   • Oxygen dependent   • Mitral regurgitation   • Breast cancer (CMS/HCC)       Allergies   Allergen Reactions   • Codeine    • Penicillin V    • Penicillins    • Mucinex D [Pseudoephedrine-Guaifenesin Er] Nausea Only      Felt hot in chest         Current Outpatient Medications:   •  acyclovir (ZOVIRAX) 400 MG tablet, Take 2 tablets in the morning and 2 tablets at night  For 5 days at needed, Disp: , Rfl:   •  albuterol (PROVENTIL HFA;VENTOLIN HFA) 108 (90 Base) MCG/ACT inhaler, Inhale 2 puffs Every 4 (Four) Hours As Needed for Wheezing. Per MD - until current episode is resolved (Patient taking differently: Inhale 2 puffs As Needed for Wheezing. Per MD - until current episode is resolved ), Disp: , Rfl:   •  budesonide-formoterol (SYMBICORT) 160-4.5 MCG/ACT inhaler, Inhale 2 puffs 2 (two) times a day., Disp: , Rfl:   •  CARTIA  MG 24 hr capsule, Take 180 mg by mouth Daily., Disp: , Rfl:   •  Cholecalciferol (VITAMIN D3) 2000 UNITS tablet, Take 1 tablet by mouth daily., Disp: , Rfl:   •  clindamycin (CLEOCIN T) 1 % lotion, Apply  topically to the appropriate area as directed 2 (Two) Times a Day., Disp: , Rfl:   •  digoxin (LANOXIN) 125 MCG tablet, Take 125 mcg by mouth Daily., Disp: , Rfl:   •  fexofenadine (ALLEGRA) 180 MG tablet, Take 180 mg by mouth Daily., Disp: , Rfl:   •  fluticasone (FLONASE) 50 MCG/ACT nasal spray, 2 Squirts into the nostril(s) as directed by provider 2 (Two) Times a Day., Disp: , Rfl:   •  methIMAzole (TAPAZOLE) 5 MG tablet, TAKE ONE AND ONE-HALF (1 & 1/2) TABLET BY MOUTH DAILY, Disp: 60 tablet, Rfl: 4  •  metroNIDAZOLE (METROCREAM) 0.75 % cream, , Disp: , Rfl:   •  montelukast (SINGULAIR) 10 MG tablet, Take 1 tablet by mouth Every Morning., Disp: 30 tablet, Rfl: 5  •  Multiple Vitamins-Minerals (PRESERVISION AREDS 2) chewable tablet, Chew 2 capsules Every Morning., Disp: , Rfl:   •  mupirocin (BACTROBAN) 2 % ointment, Apply  topically to the appropriate area as directed 3 (Three) Times a Day., Disp: 30 g, Rfl: 0  •  O2 (OXYGEN), Inhale Continuous., Disp: , Rfl:   •  SPIRIVA RESPIMAT 2.5 MCG/ACT aerosol solution, 2 puffs Daily., Disp: , Rfl:   •  vitamin B-12 (CYANOCOBALAMIN) 1000 MCG tablet, Take  1,000 mcg by mouth Daily., Disp: , Rfl:   •  cephalexin (KEFLEX) 500 MG capsule, Take 1 capsule by mouth 3 (Three) Times a Day., Disp: 30 capsule, Rfl: 0    Past Medical History:   Diagnosis Date   • Abnormal color of sputum     grey   • Acromioclavicular joint arthritis    • Acute maxillary sinusitis    • Acute upper respiratory infection    • Allergic rhinitis    • Anemia    • Asthma    • B12 deficiency    • Benign essential hypertension    • Breast cancer (CMS/HCC)     s/p Lumpectomy ~2000 and chemo/XRT. Then  Masectomy ~2004 for some recurrence   • Breast cancer (CMS/HCC)    • Cerumen impaction    • Chronic obstructive pulmonary disease (CMS/HCC)    • COPD (chronic obstructive pulmonary disease) (CMS/HCC)    • COPD exacerbation (CMS/HCC)    • Diverticulosis of colon    • Cedric-Barr infection    • Fever    • Heart disease    • High risk medication use    • High risk medication use    • Hospital discharge follow-up    • Hyperthyroidism    • Hyperthyroidism    • Hypoxia    • Laryngitis    • Leg wound, left    • Medicare annual wellness visit, subsequent    • Mitral regurgitation     mild to moderate   • Nocturnal hypoxia    • Non-small cell lung cancer (CMS/HCC)    • Non-small cell lung cancer (CMS/HCC)    • Onychomycosis of toenail    • Oral aphthae    • Osteopenia    • Osteopenia    • Other fatigue    • Oxygen dependent     2L - at night only   • Pneumonia of upper lobe of lung (CMS/HCC)    • Pneumothorax    • Post-menopausal    • Skin abrasion    • SOB (shortness of breath)    • Supraventricular tachycardia (CMS/HCC)    • Tachycardia    • Thyroid nodule    • Toxic multinodular goiter    • Vitamin B deficiency    • Vitamin D deficiency    • Vitamin D deficiency    • Wound healing well on examination        Past Surgical History:   Procedure Laterality Date   • BREAST LUMPECTOMY Right    • BREAST LUMPECTOMY     • CARDIAC CATHETERIZATION      PS SAPHENOUS VAIN RIGHT LEG   • COLONOSCOPY  04/2017    Complete. 4  polyps. Recheck in 2019.    • COLONOSCOPY N/A 2017    Procedure: COLONOSCOPY WITH POLYPECTOMY(COLD BIOPSY AND HOT SNARE);  Surgeon: Luiza Eddy MD;  Location: [facility] ENDOSCOPY;  Service:    • HYSTERECTOMY     • MASTECTOMY Right 2004   • NECK SURGERY      2 spurs removed   • VASCULAR SURGERY      spider vein ablation       Family History   Problem Relation Age of Onset   • Arthritis Mother    • Heart failure Mother         Congestive Heart Failure   • Lung cancer Father    • Breast cancer Paternal Grandmother    • No Known Problems Sister        Social History     Tobacco Use   • Smoking status: Former Smoker     Packs/day: 2.00     Years: 50.00     Pack years: 100.00     Types: Cigarettes     Last attempt to quit: 3/14/1994     Years since quittin.4   • Smokeless tobacco: Never Used   • Tobacco comment: D/C 20 years ago, smoking 2 ppd before quitting   Substance Use Topics   • Alcohol use: No     Comment: Social use rare            Objective     Vitals:    19 0836   BP: 127/68   Pulse: 90   Temp: 97.8 °F (36.6 °C)   SpO2: 94%     Body mass index is 18.18 kg/m².    Physical Exam   Constitutional: She appears well-developed and well-nourished.   Cardiovascular: Normal rate, regular rhythm and normal heart sounds. Exam reveals no gallop and no friction rub.   No murmur heard.  Pulmonary/Chest: Effort normal. No respiratory distress. She has no wheezes. She has no rales.   Bronchial breath sounds bilaterally    Vitals reviewed.      Lab Results   Component Value Date    GLUCOSE 77 2019    BUN 18 2019    CREATININE 0.70 2019    EGFRIFNONA 86 2019    EGFRIFAFRI 104 2019    BCR 26.9 2019    K 3.9 2019    CO2 26.1 2019    CALCIUM 9.8 2019    PROTENTOTREF 6.8 2019    ALBUMIN 4.20 2019    LABIL2 1.6 2019    AST 14 2019    ALT 14 2019       WBC   Date Value Ref Range Status   2019 6.11 3.40 - 10.80 10*3/mm3 Final      RBC   Date Value Ref Range Status   06/11/2019 4.17 3.77 - 5.28 10*6/mm3 Final     Hemoglobin   Date Value Ref Range Status   06/11/2019 12.7 12.0 - 15.9 g/dL Final   03/10/2019 12.1 12.0 - 15.9 g/dL Final     Hematocrit   Date Value Ref Range Status   06/11/2019 42.1 34.0 - 46.6 % Final   03/10/2019 38.5 34.0 - 46.6 % Final     MCV   Date Value Ref Range Status   06/11/2019 101.0 (H) 79.0 - 97.0 fL Final   03/10/2019 96.5 79.0 - 97.0 fL Final     MCH   Date Value Ref Range Status   06/11/2019 30.5 26.6 - 33.0 pg Final   03/10/2019 30.3 26.6 - 33.0 pg Final     MCHC   Date Value Ref Range Status   06/11/2019 30.2 (L) 31.5 - 35.7 g/dL Final   03/10/2019 31.4 (L) 31.5 - 35.7 g/dL Final     RDW   Date Value Ref Range Status   06/11/2019 14.2 12.3 - 15.4 % Final   03/10/2019 13.6 12.3 - 15.4 % Final     RDW-SD   Date Value Ref Range Status   03/10/2019 48.7 37.0 - 54.0 fl Final     MPV   Date Value Ref Range Status   03/10/2019 9.3 6.0 - 12.0 fL Final     Platelets   Date Value Ref Range Status   06/11/2019 322 140 - 450 10*3/mm3 Final   03/10/2019 247 140 - 450 10*3/mm3 Final     Neutrophil Rel %   Date Value Ref Range Status   06/11/2019 76.1 (H) 42.7 - 76.0 % Final     Neutrophil %   Date Value Ref Range Status   03/10/2019 79.6 (H) 42.7 - 76.0 % Final     Lymphocyte Rel %   Date Value Ref Range Status   06/11/2019 11.8 (L) 19.6 - 45.3 % Final     Lymphocyte %   Date Value Ref Range Status   03/10/2019 7.2 (L) 19.6 - 45.3 % Final     Monocyte Rel %   Date Value Ref Range Status   06/11/2019 8.8 5.0 - 12.0 % Final     Monocyte %   Date Value Ref Range Status   03/10/2019 10.7 5.0 - 12.0 % Final     Eosinophil Rel %   Date Value Ref Range Status   06/11/2019 2.1 0.3 - 6.2 % Final     Eosinophil %   Date Value Ref Range Status   03/10/2019 1.4 0.3 - 6.2 % Final     Basophil Rel %   Date Value Ref Range Status   06/11/2019 0.7 0.0 - 1.5 % Final     Basophil %   Date Value Ref Range Status   03/10/2019 0.5 0.0 - 1.5 %  Final     Immature Grans %   Date Value Ref Range Status   03/10/2019 0.6 (H) 0.0 - 0.5 % Final     Neutrophils Absolute   Date Value Ref Range Status   06/11/2019 4.65 1.70 - 7.00 10*3/mm3 Final     Neutrophils, Absolute   Date Value Ref Range Status   03/10/2019 5.19 1.40 - 7.00 10*3/mm3 Final     Lymphocytes Absolute   Date Value Ref Range Status   06/11/2019 0.72 0.70 - 3.10 10*3/mm3 Final     Lymphocytes, Absolute   Date Value Ref Range Status   03/10/2019 0.47 (L) 0.70 - 3.10 10*3/mm3 Final     Monocytes Absolute   Date Value Ref Range Status   06/11/2019 0.54 0.10 - 0.90 10*3/mm3 Final     Monocytes, Absolute   Date Value Ref Range Status   03/10/2019 0.70 0.10 - 0.90 10*3/mm3 Final     Eosinophils Absolute   Date Value Ref Range Status   06/11/2019 0.13 0.00 - 0.40 10*3/mm3 Final     Eosinophils, Absolute   Date Value Ref Range Status   03/10/2019 0.09 0.00 - 0.40 10*3/mm3 Final     Basophils Absolute   Date Value Ref Range Status   06/11/2019 0.04 0.00 - 0.20 10*3/mm3 Final     Basophils, Absolute   Date Value Ref Range Status   03/10/2019 0.03 0.00 - 0.20 10*3/mm3 Final     Immature Grans, Absolute   Date Value Ref Range Status   03/10/2019 0.04 0.00 - 0.05 10*3/mm3 Final     nRBC   Date Value Ref Range Status   06/11/2019 0.0 0.0 - 0.2 /100 WBC Final   03/10/2019 0.0 0.0 - 0.0 /100 WBC Final       No results found for: HGBA1C    No results found for: CGBTECSL37    TSH   Date Value Ref Range Status   06/11/2019 1.060 0.270 - 4.200 mIU/mL Final   11/08/2017 1.010 0.270 - 4.200 mIU/mL Final       No results found for: CHOL  No results found for: TRIG  No results found for: HDL  No results found for: LDL  No results found for: VLDL  No results found for: LDLHDL      Procedures    Assessment/Plan   Problems Addressed this Visit        Cardiovascular and Mediastinum    Essential hypertension - Primary       Respiratory    Pulmonary emphysema (CMS/HCC)    COPD with acute lower respiratory infection (CMS/HCC)     Oxygen dependent       Digestive    Vitamin D insufficiency    Relevant Orders    Vitamin D 1,25 Dihydroxy    B12 deficiency    Relevant Orders    Vitamin B12       Endocrine    Hyperthyroidism      Other Visit Diagnoses     High risk medication use        Relevant Orders    Comprehensive Metabolic Panel    Digoxin Level          Orders Placed This Encounter   Procedures   • Vitamin B12   • Vitamin D 1,25 Dihydroxy   • Comprehensive Metabolic Panel   • Digoxin Level       Current Outpatient Medications   Medication Sig Dispense Refill   • acyclovir (ZOVIRAX) 400 MG tablet Take 2 tablets in the morning and 2 tablets at night  For 5 days at needed     • albuterol (PROVENTIL HFA;VENTOLIN HFA) 108 (90 Base) MCG/ACT inhaler Inhale 2 puffs Every 4 (Four) Hours As Needed for Wheezing. Per MD - until current episode is resolved (Patient taking differently: Inhale 2 puffs As Needed for Wheezing. Per MD - until current episode is resolved )     • budesonide-formoterol (SYMBICORT) 160-4.5 MCG/ACT inhaler Inhale 2 puffs 2 (two) times a day.     • CARTIA  MG 24 hr capsule Take 180 mg by mouth Daily.     • Cholecalciferol (VITAMIN D3) 2000 UNITS tablet Take 1 tablet by mouth daily.     • clindamycin (CLEOCIN T) 1 % lotion Apply  topically to the appropriate area as directed 2 (Two) Times a Day.     • digoxin (LANOXIN) 125 MCG tablet Take 125 mcg by mouth Daily.     • fexofenadine (ALLEGRA) 180 MG tablet Take 180 mg by mouth Daily.     • fluticasone (FLONASE) 50 MCG/ACT nasal spray 2 Squirts into the nostril(s) as directed by provider 2 (Two) Times a Day.     • methIMAzole (TAPAZOLE) 5 MG tablet TAKE ONE AND ONE-HALF (1 & 1/2) TABLET BY MOUTH DAILY 60 tablet 4   • metroNIDAZOLE (METROCREAM) 0.75 % cream      • montelukast (SINGULAIR) 10 MG tablet Take 1 tablet by mouth Every Morning. 30 tablet 5   • Multiple Vitamins-Minerals (PRESERVISION AREDS 2) chewable tablet Chew 2 capsules Every Morning.     • mupirocin (BACTROBAN) 2  % ointment Apply  topically to the appropriate area as directed 3 (Three) Times a Day. 30 g 0   • O2 (OXYGEN) Inhale Continuous.     • SPIRIVA RESPIMAT 2.5 MCG/ACT aerosol solution 2 puffs Daily.     • vitamin B-12 (CYANOCOBALAMIN) 1000 MCG tablet Take 1,000 mcg by mouth Daily.     • cephalexin (KEFLEX) 500 MG capsule Take 1 capsule by mouth 3 (Three) Times a Day. 30 capsule 0     No current facility-administered medications for this visit.        Maryam Arredondo had no medications administered during this visit.    No Follow-up on file.    There are no Patient Instructions on file for this visit.

## 2019-09-04 LAB
1,25(OH)2D3 SERPL-MCNC: 59.4 PG/ML (ref 19.9–79.3)
ALBUMIN SERPL-MCNC: 4.1 G/DL (ref 3.5–5.2)
ALBUMIN/GLOB SERPL: 1.7 G/DL
ALP SERPL-CCNC: 74 U/L (ref 39–117)
ALT SERPL-CCNC: 15 U/L (ref 1–33)
AST SERPL-CCNC: 19 U/L (ref 1–32)
BILIRUB SERPL-MCNC: 0.2 MG/DL (ref 0.2–1.2)
BUN SERPL-MCNC: 15 MG/DL (ref 8–23)
BUN/CREAT SERPL: 23.1 (ref 7–25)
CALCIUM SERPL-MCNC: 9.6 MG/DL (ref 8.6–10.5)
CHLORIDE SERPL-SCNC: 104 MMOL/L (ref 98–107)
CHOLEST SERPL-MCNC: 191 MG/DL (ref 0–200)
CHOLEST/HDLC SERPL: 2.65 {RATIO}
CO2 SERPL-SCNC: 25.8 MMOL/L (ref 22–29)
CREAT SERPL-MCNC: 0.65 MG/DL (ref 0.57–1)
DIGOXIN SERPL-MCNC: 1 NG/ML (ref 0.6–1.2)
GLOBULIN SER CALC-MCNC: 2.4 GM/DL
GLUCOSE SERPL-MCNC: 93 MG/DL (ref 65–99)
HDLC SERPL-MCNC: 72 MG/DL (ref 40–60)
LDLC SERPL CALC-MCNC: 107 MG/DL (ref 0–100)
POTASSIUM SERPL-SCNC: 4.2 MMOL/L (ref 3.5–5.2)
PROT SERPL-MCNC: 6.5 G/DL (ref 6–8.5)
SODIUM SERPL-SCNC: 143 MMOL/L (ref 136–145)
TRIGL SERPL-MCNC: 58 MG/DL (ref 0–150)
VIT B12 SERPL-MCNC: 1228 PG/ML (ref 211–946)
VLDLC SERPL CALC-MCNC: 11.6 MG/DL

## 2019-09-30 ENCOUNTER — TELEPHONE (OUTPATIENT)
Dept: OTHER | Facility: OTHER | Age: 74
End: 2019-09-30

## 2019-09-30 NOTE — TELEPHONE ENCOUNTER
Patient is asking for a return call from [employer]tan or whoever may be covering for her while she is out. States that she is needing some samples of Symbicort and was told a while back that the office was out but she could check back in to see if they had received anymore. Please advise. Confirmed patient call back number in chart.

## 2019-09-30 NOTE — TELEPHONE ENCOUNTER
I looked to see if we had any samples of symbicort for this patient and we do not, patient wanted to know have phar. Rep. Stopped bringing samples of symbicort?

## 2019-10-13 ENCOUNTER — APPOINTMENT (OUTPATIENT)
Dept: CT IMAGING | Facility: HOSPITAL | Age: 74
End: 2019-10-13

## 2019-10-13 ENCOUNTER — HOSPITAL ENCOUNTER (EMERGENCY)
Facility: HOSPITAL | Age: 74
Discharge: HOME | End: 2019-10-13
Attending: EMERGENCY MEDICINE | Admitting: EMERGENCY MEDICINE

## 2019-10-13 VITALS
OXYGEN SATURATION: 93 % | SYSTOLIC BLOOD PRESSURE: 130 MMHG | HEIGHT: 58 IN | DIASTOLIC BLOOD PRESSURE: 72 MMHG | WEIGHT: 106 LBS | BODY MASS INDEX: 22.25 KG/M2 | HEART RATE: 113 BPM | RESPIRATION RATE: 20 BRPM | TEMPERATURE: 98.1 F

## 2019-10-13 DIAGNOSIS — S09.90XA INJURY OF HEAD, INITIAL ENCOUNTER: Primary | ICD-10-CM
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
## 2019-10-13 PROCEDURE — 99283 EMERGENCY DEPT VISIT LOW MDM: CPT

## 2019-10-13 PROCEDURE — 70450 CT HEAD/BRAIN W/O DYE: CPT

## 2019-10-13 NOTE — ED PROVIDER NOTES
EMERGENCY DEPARTMENT ENCOUNTER    Room Number:  01/01  Date of encounter:  10/13/2019  PCP: Kevin Seymour MD  Historian: Patient      HPI:  Chief Complaint: Head injury  A complete HPI/ROS/PMH/PSH/SH/FH are unobtainable due to: None    Context: Maryam Arredondo is a 74 y.o. female who presents to the ED for evaluation after tripping and falling at her exercise facility and striking the back of her head.  Patient states that her foot caught on the floor and she tripped backwards.  She did not have any loss of consciousness.  She denies significant headache, dizziness, visual changes, neck pain, back pain, numbness weakness of the arms or legs.  She denies any nausea, vomiting, arm or leg pain.  She is here today just for evaluation to make sure nothing is wrong.      PAST MEDICAL HISTORY  Active Ambulatory Problems     Diagnosis Date Noted   • Pneumothorax    • Hypoxia 03/14/2016   • Tachycardia 03/14/2016   • Hyperthyroidism 03/14/2016   • Essential hypertension 03/14/2016   • Multifocal atrial tachycardia (CMS/HCC) 03/18/2016   • Toxic multinodular goiter 03/19/2016   • Pulmonary emphysema (CMS/HCC) 10/21/2016   • Vitamin D insufficiency 10/21/2016   • Pneumonia 10/30/2017   • COPD with acute lower respiratory infection (CMS/HCC) 10/30/2017   • Malignant neoplasm of upper lobe of left lung (CMS/HCC) 11/03/2017   • Infected skin tear 08/17/2019   • Allergic rhinitis    • B12 deficiency    • Oxygen dependent    • Mitral regurgitation    • Breast cancer (CMS/[facility])      Resolved Ambulatory Problems     Diagnosis Date Noted   • COPD exacerbation (CMS/HCC) 03/14/2016   • Infection due to parainfluenza virus 1 10/31/2017     Past Medical History:   Diagnosis Date   • Abnormal color of sputum    • Acromioclavicular joint arthritis    • Acute maxillary sinusitis    • Acute upper respiratory infection    • Allergic rhinitis    • Anemia    • Asthma    • B12 deficiency    • Benign essential hypertension    • Breast cancer  (CMS/HCC)    • Breast cancer (CMS/HCC)    • Cerumen impaction    • Chronic obstructive pulmonary disease (CMS/HCC)    • COPD (chronic obstructive pulmonary disease) (CMS/HCC)    • COPD exacerbation (CMS/HCC)    • Diverticulosis of colon    • Cedric-Barr infection    • Fever    • Heart disease    • High risk medication use    • High risk medication use    • Hospital discharge follow-up    • Hyperthyroidism    • Hyperthyroidism    • Hypoxia    • Laryngitis    • Leg wound, left    • Medicare annual wellness visit, subsequent    • Mitral regurgitation    • Nocturnal hypoxia    • Non-small cell lung cancer (CMS/HCC)    • Non-small cell lung cancer (CMS/HCC)    • Onychomycosis of toenail    • Oral aphthae    • Osteopenia    • Osteopenia    • Other fatigue    • Oxygen dependent    • Pneumonia of upper lobe of lung (CMS/HCC)    • Pneumothorax    • Post-menopausal    • Skin abrasion    • SOB (shortness of breath)    • Supraventricular tachycardia (CMS/HCC)    • Tachycardia    • Thyroid nodule    • Toxic multinodular goiter    • Vitamin B deficiency    • Vitamin D deficiency    • Vitamin D deficiency    • Wound healing well on examination          PAST SURGICAL HISTORY  Past Surgical History:   Procedure Laterality Date   • BREAST LUMPECTOMY Right    • BREAST LUMPECTOMY     • CARDIAC CATHETERIZATION      PS SAPHENOUS VAIN RIGHT LEG   • COLONOSCOPY  04/2017    Complete. 4 polyps. Recheck in 2019.    • COLONOSCOPY N/A 4/28/2017    Procedure: COLONOSCOPY WITH POLYPECTOMY(COLD BIOPSY AND HOT SNARE);  Surgeon: Luiza Eddy MD;  Location: [facility] ENDOSCOPY;  Service:    • HYSTERECTOMY     • MASTECTOMY Right 2004   • NECK SURGERY      2 spurs removed   • VASCULAR SURGERY      spider vein ablation         FAMILY HISTORY  Family History   Problem Relation Age of Onset   • Arthritis Mother    • Heart failure Mother         Congestive Heart Failure   • Lung cancer Father    • Breast cancer Paternal Grandmother    • No Known  Problems Sister          SOCIAL HISTORY  Social History     Socioeconomic History   • Marital status: Single     Spouse name: Not on file   • Number of children: Not on file   • Years of education: Not on file   • Highest education level: Not on file   Occupational History   • Occupation: Admistrative Assistant     Employer: RETIRED   Tobacco Use   • Smoking status: Former Smoker     Packs/day: 2.00     Years: 50.00     Pack years: 100.00     Types: Cigarettes     Last attempt to quit: 3/14/1994     Years since quittin.6   • Smokeless tobacco: Never Used   • Tobacco comment: D/C 20 years ago, smoking 2 ppd before quitting   Substance and Sexual Activity   • Alcohol use: No     Comment: Social use rare   • Drug use: No   • Sexual activity: Defer     Comment: drinks decaf          ALLERGIES  Codeine; Penicillin v; Penicillins; and Mucinex d [pseudoephedrine-guaifenesin er]        REVIEW OF SYSTEMS  Review of Systems     All systems reviewed and negative except for those discussed in HPI.       PHYSICAL EXAM    I have reviewed the triage vital signs and nursing notes.    ED Triage Vitals   Temp Heart Rate Resp BP SpO2   10/13/19 1545 10/13/19 1545 10/13/19 1545 10/13/19 1600 10/13/19 1545   98.1 °F (36.7 °C) 113 20 130/72 93 %      Temp src Heart Rate Source Patient Position BP Location FiO2 (%)   -- -- -- -- --              Physical Exam  General: Awake, alert, no acute distress  HEENT: Mucous membranes moist, atraumatic, normocephalic, EOMI, PERRL, no scalp hematomas  Neck: Full ROM, no midline tenderness  Pulm: Symmetric, non-labored, lungs CTAB  Cardiovascular: Regular rate and rhythm, intact distal pulses  GI: Soft, non-tender, non-distended, no rebound, no guarding, bowel sounds present  MSK: Full ROM, no deformity.  Pelvis stable, full range motion of the knees and hips.  No shoulder or elbow pain with full range motion of the arms.  No thoracic or lumbar midline tenderness.  Skin: Warm, dry  Neuro: Alert  and oriented x 3, GCS 15, moving all extremities, no focal deficits  Psych: Calm, cooperative          LAB RESULTS  No results found for this or any previous visit (from the past 24 hour(s)).          RADIOLOGY  Ct Head Without Contrast    Result Date: 10/13/2019  CT HEAD WO CONTRAST-  CLINICAL HISTORY: Patient fell. Head injury.  TECHNIQUE: Transverse 3 mm thick images were acquired from the base of the skull to the vertex without IV contrast.  Radiation dose reduction techniques were utilized, including automated exposure control and exposure modulation based on body size.  COMPARISON: None  FINDINGS: The brain and ventricular system appear structurally within normal limits for a patient of this age. There is mild patchy ill-defined diminished attenuation in the white matter of both cerebral hemispheres that is compatible with sequela of mild small vessel chronic ischemic change. There is no evidence of recent or old infarct or hemorrhage. There is no mass effect. Bone window images show no evidence of skull fracture. The visualized paranasal sinuses are well aerated.      Evidence of mild small vessel chronic ischemic change as described. Otherwise unremarkable CT scan of the head.  This report was finalized on 10/13/2019 5:36 PM by Dr. Erasmo Lion M.D.        I ordered the above noted radiological studies. Reviewed by me.  See dictation for official radiology interpretation.      PROCEDURES    Procedures      MEDICATIONS GIVEN IN ER    Medications - No data to display      PROGRESS, DATA ANALYSIS, CONSULTS, AND MEDICAL DECISION MAKING    All labs have been independently reviewed by me.  All radiology studies have been reviewed by me and discussed with radiologist dictating the report.   EKG's independently viewed and interpreted by me.  Discussion below represents my analysis of pertinent findings related to patient's condition, differential diagnosis, treatment plan and final disposition.    Patient  presented today for evaluation of a head injury that occurred while at her exercise facility.  On exam she is well-appearing with no focal deficits, no obvious signs of trauma.  CT of the head was negative for any acute emergent findings.  At this time she is safe for discharge, I have advised Tylenol and ibuprofen for headache, continue current medications, have given her appropriate return precautions for worsening symptoms as needed, PCP follow-up as needed.  No indication of concussion at this time.         AS OF 5:49 PM VITALS:    BP - 130/72  HR - 113  TEMP - 98.1 °F (36.7 °C)  02 SATS - 93%        DIAGNOSIS  Final diagnoses:   Injury of head, initial encounter   Fall, initial encounter         DISPOSITION  Discharge             Jenaro Cheung MD  10/13/19 0230

## 2019-10-13 NOTE — ED NOTES
Pt fell Friday, hit head on floor.  Pt denies any injury or symptoms currently but states family is going out of town and she wants to make sure she does not have a concussion.  Denies blood thinner use. Denies LOC at time of fall.     Rafi Landon, RN  10/13/19 [id number]

## 2019-10-13 NOTE — DISCHARGE INSTRUCTIONS
Continue current medications, Tylenol and ibuprofen as needed for headache, follow-up with your primary care provider as needed, return to the emergency department for worsening symptoms as needed.

## 2019-10-17 DIAGNOSIS — E05.90 HYPERTHYROIDISM: ICD-10-CM

## 2019-10-17 DIAGNOSIS — E05.20 TOXIC MULTINODULAR GOITER: Primary | ICD-10-CM

## 2019-10-17 DIAGNOSIS — I10 ESSENTIAL HYPERTENSION: ICD-10-CM

## 2019-10-21 ENCOUNTER — RESULTS ENCOUNTER (OUTPATIENT)
Dept: ENDOCRINOLOGY | Age: 74
End: 2019-10-21

## 2019-10-21 DIAGNOSIS — E05.90 HYPERTHYROIDISM: ICD-10-CM

## 2019-10-21 DIAGNOSIS — I10 ESSENTIAL HYPERTENSION: ICD-10-CM

## 2019-10-21 DIAGNOSIS — E05.20 TOXIC MULTINODULAR GOITER: ICD-10-CM

## 2019-10-22 LAB
ALBUMIN SERPL-MCNC: 4.2 G/DL (ref 3.5–5.2)
ALBUMIN/GLOB SERPL: 1.7 G/DL
ALP SERPL-CCNC: 88 U/L (ref 39–117)
ALT SERPL-CCNC: 15 U/L (ref 1–33)
AST SERPL-CCNC: 15 U/L (ref 1–32)
BILIRUB SERPL-MCNC: 0.2 MG/DL (ref 0.2–1.2)
BUN SERPL-MCNC: 24 MG/DL (ref 8–23)
BUN/CREAT SERPL: 36.9 (ref 7–25)
CALCIUM SERPL-MCNC: 9.4 MG/DL (ref 8.6–10.5)
CHLORIDE SERPL-SCNC: 101 MMOL/L (ref 98–107)
CO2 SERPL-SCNC: 26.5 MMOL/L (ref 22–29)
CREAT SERPL-MCNC: 0.65 MG/DL (ref 0.57–1)
GLOBULIN SER CALC-MCNC: 2.5 GM/DL
GLUCOSE SERPL-MCNC: 116 MG/DL (ref 65–99)
POTASSIUM SERPL-SCNC: 4.1 MMOL/L (ref 3.5–5.2)
PROT SERPL-MCNC: 6.7 G/DL (ref 6–8.5)
SODIUM SERPL-SCNC: 141 MMOL/L (ref 136–145)
T3FREE SERPL-MCNC: 3.5 PG/ML (ref 2–4.4)
T4 FREE SERPL-MCNC: 1.09 NG/DL (ref 0.93–1.7)
TSH SERPL DL<=0.005 MIU/L-ACNC: 1.3 UIU/ML (ref 0.27–4.2)

## 2019-11-01 NOTE — PROGRESS NOTES
Subjective   Maryam Arredondo is a 74 y.o. female.     F/u for nontoxic MNG,hypothyroidism, COPD, asthma / flu vaccine Tashi        Patient is a 73`-year-old female who came in for follow-up. She has toxic multinodular goiter. She had a thyroid scan and uptake in February 2014 which showed increased uptake of 40% with a heterogeneous distribution of the radiopharmaceutical in both lobes of the thyroid gland. A dominant area of photopenia is present on the left lobe. She had a thyroid ultrasound which showed a multinodular goiter with cysts and solid nodules. The dominant nodule in the left is partially cystic. She had a follow-up ultrasound in August 2014 which showed the left lobe dominant nodule is smaller.      She is on Tapazole 5 mg 1.5 tab once a day. She has lost 4 lbs since 6/19.  She denies bowel changes. She denies heat or cold intolerance. She denies palpitations. She denies tremors.  She denies dysphagia.  TSH done in October 2019 is normal at 1.30.     She had a thyroid ultrasound done on April 4, 2018 which showed a multinodular goiter.  There is a hypervascular 2 cm nodule in the upper right thyroid lobe which corresponds to the hypermetabolic nodule on the PET CT scan of November 15, 2017.  She wants to continued observation due to her other co-morbidities.      She had left upper lobe lung biopsy in in November 2017 which yielded adenocarcinoma.  She had PET CT scan done in November 2017 which showed a small hypermetabolic nodule in the right lobe of the thyroid gland which may be an adenoma or a small thyroid cancer.  A metastasis to the thyroid gland is considered much less likely.  She has completed radiation therapy.  She had CT scan of the chest in May 2018 which showed slight interval decrease in size of pleural-based lung tumor in the left upper lobe and moderately severe emphysema.  There is no lymphadenopathy.  She follows with Dr. Ventura.     She had a follow-up CT scan of the chest done in  January 2019 which showed a slightly decreased in size.  The pleural-based airspace consolidation in the left upper lobe showed interval decrease in size.  There are no new pulmonary nodules or mediastinal lymphadenopathy.  Stable numerous thyroid nodules.  Small hyperenhancing liver lesions are unchanged from prior imaging in 2017.     CT scan of the chest done in April 2019 showed a new 6 mm nodule on the right middle lobe, pulmonary opacification within the left apex, severe emphysema and hyperenhancing lesions throughout the liver unchanged from March 14, 2018.    CT of the chest done in August 2019 showed a new 10 mm subpleural nodular density within the lingular segment costophrenic sulcus.  She is following with Dr. Ventura     She has COPD. She denies any shortness of breath or coughing.  She is on maintenance Symbicort, Spiriva, Singulair, Allegra and Pro-Air prn for COPD.  Zyrtec made her sleepy.  She is on continuous O2.  She is exercising regularly at the gym.  She follows with Dr. Anthony.      She has hypertension and history of multifocal tachycardia.  She was taken off lisinopril last May 2014. She is on diltiazem ER 90 mg/day and digoxin 0.125 mg/day. She follows with Dr. Bobo.      She had a previous right mastectomy for breast cancer. She has completed 5 years of Arimidex in 2009. She has osteopenia and was on Fosamax for unknown period of time. Her last bone mineral density was 2016 at [facility] and was normal. She is on Vit D 2000 units/day.  25 hydroxyvitamin D done in June 2019 was normal at 76.6 ng/mL.     She had a colonoscopy in April 2017 and 2 tubular adenoma with low-grade dysplasia were removed by Dr. Eddy.  She was advised a repeat colonoscopy in 2019 to be done Dr. JOAN Frankel.  She denies bowel complaints.    The following portions of the patient's history were reviewed and updated as appropriate: allergies, current medications, past family history, past medical history, past social  "history, past surgical history and problem list.    Review of Systems   Constitutional: Negative.    HENT: Negative.    Eyes: Negative.    Respiratory: Negative for cough and shortness of breath.    Cardiovascular: Negative.  Negative for chest pain and palpitations.   Gastrointestinal: Negative.    Endocrine: Negative.  Negative for cold intolerance and heat intolerance.   Genitourinary: Negative.    Musculoskeletal: Positive for arthralgias and joint swelling. Negative for myalgias.   Neurological: Negative for dizziness, weakness and numbness.   Hematological: Does not bruise/bleed easily.     ROS reviewed again  Objective      Vitals:    11/04/19 1329   BP: 124/68   BP Location: Left arm   Patient Position: Sitting   Cuff Size: Small Adult   Pulse: 97   SpO2: 98%   Weight: 44.4 kg (97 lb 12.8 oz)   Height: 147.3 cm (57.99\")     Physical Exam   Constitutional: She is oriented to person, place, and time. She appears well-developed and well-nourished. No distress.   HENT:   Head: Normocephalic.   Right Ear: External ear normal.   Left Ear: External ear normal.   Nose: Nose normal.   Mouth/Throat: Oropharynx is clear and moist. No oropharyngeal exudate.   Eyes: Conjunctivae and EOM are normal. Right eye exhibits no discharge. Left eye exhibits no discharge. No scleral icterus.   Neck: Neck supple. No JVD present. No tracheal deviation present. No thyromegaly present.   Cardiovascular: Normal rate, regular rhythm, normal heart sounds and intact distal pulses. Exam reveals no gallop and no friction rub.   No murmur heard.  Pulmonary/Chest: Effort normal and breath sounds normal. No stridor. No respiratory distress. She has no wheezes. She has no rales.   Abdominal: She exhibits no distension and no mass. There is no tenderness. There is no rebound and no guarding.   Musculoskeletal: Normal range of motion. She exhibits no edema, tenderness or deformity.   Lymphadenopathy:     She has no cervical adenopathy. "   Neurological: She is alert and oriented to person, place, and time. She displays normal reflexes. She exhibits normal muscle tone. Coordination normal.   Skin: Skin is warm and dry. No rash noted. No erythema. No pallor.   Psychiatric: She has a normal mood and affect. Her behavior is normal.     Results Encounter on 10/21/2019   Component Date Value Ref Range Status   • Glucose 10/21/2019 116* 65 - 99 mg/dL Final   • BUN 10/21/2019 24* 8 - 23 mg/dL Final   • Creatinine 10/21/2019 0.65  0.57 - 1.00 mg/dL Final   • eGFR Non  Am 10/21/2019 89  >60 mL/min/1.73 Final    Comment: The MDRD GFR formula is only valid for adults with stable  renal function between ages 18 and 70.     • eGFR  Am 10/21/2019 108  >60 mL/min/1.73 Final   • BUN/Creatinine Ratio 10/21/2019 36.9* 7.0 - 25.0 Final   • Sodium 10/21/2019 141  136 - 145 mmol/L Final   • Potassium 10/21/2019 4.1  3.5 - 5.2 mmol/L Final   • Chloride 10/21/2019 101  98 - 107 mmol/L Final   • Total CO2 10/21/2019 26.5  22.0 - 29.0 mmol/L Final   • Calcium 10/21/2019 9.4  8.6 - 10.5 mg/dL Final   • Total Protein 10/21/2019 6.7  6.0 - 8.5 g/dL Final   • Albumin 10/21/2019 4.20  3.50 - 5.20 g/dL Final   • Globulin 10/21/2019 2.5  gm/dL Final   • A/G Ratio 10/21/2019 1.7  g/dL Final   • Total Bilirubin 10/21/2019 0.2  0.2 - 1.2 mg/dL Final   • Alkaline Phosphatase 10/21/2019 88  39 - 117 U/L Final   • AST (SGOT) 10/21/2019 15  1 - 32 U/L Final   • ALT (SGPT) 10/21/2019 15  1 - 33 U/L Final   • T3, Free 10/21/2019 3.5  2.0 - 4.4 pg/mL Final   • Free T4 10/21/2019 1.09  0.93 - 1.70 ng/dL Final   • TSH 10/21/2019 1.300  0.270 - 4.200 uIU/mL Final     Assessment/Plan   Maryam was seen today for goiter, hypothyroidism, copd and asthma.    Diagnoses and all orders for this visit:    Toxic multinodular goiter    Malignant neoplasm of upper lobe of left lung (CMS/HCC)    Essential hypertension    Multifocal atrial tachycardia (CMS/HCC)    Pulmonary emphysema,  unspecified emphysema type (CMS/HCC)    Vitamin D insufficiency      Continue Tapazole 5 mg 1.5 tablets daily.  Follow-up with Dr. Ventura and Dr. Anthony with regards to pulmonary nodule.  Continue Symbicort, Spiriva, Singulair, Allegra, and Pro-Air as needed  Continue diltiazem and digoxin per Dr. Bobo.  Continue vitamin D 2000 units/day.  Call Dr. Frankel to clarify when she needs a follow-up colonoscopy.    Copy of my note sent to Dr. Seymour, Dr. Je Ventura, Dr. Anthony, Dr. Roderick Frankel and Dr. Bobo.    RTC 4 mos.

## 2019-11-04 ENCOUNTER — OFFICE VISIT (OUTPATIENT)
Dept: ENDOCRINOLOGY | Age: 74
End: 2019-11-04

## 2019-11-04 VITALS
SYSTOLIC BLOOD PRESSURE: 124 MMHG | WEIGHT: 97.8 LBS | BODY MASS INDEX: 20.53 KG/M2 | OXYGEN SATURATION: 98 % | HEIGHT: 58 IN | DIASTOLIC BLOOD PRESSURE: 68 MMHG | HEART RATE: 97 BPM

## 2019-11-04 DIAGNOSIS — E55.9 VITAMIN D INSUFFICIENCY: ICD-10-CM

## 2019-11-04 DIAGNOSIS — I10 ESSENTIAL HYPERTENSION: ICD-10-CM

## 2019-11-04 DIAGNOSIS — E05.20 TOXIC MULTINODULAR GOITER: Primary | ICD-10-CM

## 2019-11-04 DIAGNOSIS — C34.12 MALIGNANT NEOPLASM OF UPPER LOBE OF LEFT LUNG (HCC): ICD-10-CM

## 2019-11-04 DIAGNOSIS — J43.9 PULMONARY EMPHYSEMA, UNSPECIFIED EMPHYSEMA TYPE (HCC): ICD-10-CM

## 2019-11-04 DIAGNOSIS — I47.1 MULTIFOCAL ATRIAL TACHYCARDIA (HCC): ICD-10-CM

## 2019-11-04 PROCEDURE — 99214 OFFICE O/P EST MOD 30 MIN: CPT | Performed by: INTERNAL MEDICINE

## 2019-11-04 RX ORDER — MONTELUKAST SODIUM 10 MG/1
10 TABLET ORAL
COMMUNITY
Start: 2018-10-15 | End: 2019-12-03 | Stop reason: SDUPTHER

## 2019-11-05 ENCOUNTER — TELEPHONE (OUTPATIENT)
Dept: FAMILY MEDICINE CLINIC | Facility: CLINIC | Age: 74
End: 2019-11-05

## 2019-11-14 ENCOUNTER — TELEPHONE (OUTPATIENT)
Dept: FAMILY MEDICINE CLINIC | Facility: CLINIC | Age: 74
End: 2019-11-14

## 2019-11-14 NOTE — TELEPHONE ENCOUNTER
Patient called and states she has been really tired,she slept all day yesterday.  Also, her legs have been very achy yesterday and today.  She got really tired this morning after taking her medication and has slept a lot today as well.  She doesn't have a fever, but has a dry cough.  She doesn't know if she is trying to come down with the flu.  She wants to know what dr Seymour thinks she should do and is it okay if she takes the tylenol?  Her phone number is [phone].

## 2019-11-14 NOTE — TELEPHONE ENCOUNTER
Try the tylenol for the aches and pains.  If develops fever or cough then needs to be seen at urgent care over the weekend.

## 2019-11-15 ENCOUNTER — OFFICE VISIT (OUTPATIENT)
Dept: RETAIL CLINIC | Facility: CLINIC | Age: 74
End: 2019-11-15

## 2019-11-15 VITALS
TEMPERATURE: 98.1 F | DIASTOLIC BLOOD PRESSURE: 60 MMHG | HEART RATE: 104 BPM | OXYGEN SATURATION: 92 % | SYSTOLIC BLOOD PRESSURE: 128 MMHG

## 2019-11-15 DIAGNOSIS — J06.9 ACUTE URI: Primary | ICD-10-CM

## 2019-11-15 PROCEDURE — 99213 OFFICE O/P EST LOW 20 MIN: CPT | Performed by: NURSE PRACTITIONER

## 2019-11-15 RX ORDER — DOXYCYCLINE 100 MG/1
100 CAPSULE ORAL 2 TIMES DAILY
Qty: 10 CAPSULE | Refills: 0 | Status: SHIPPED | OUTPATIENT
Start: 2019-11-15 | End: 2019-12-16

## 2019-11-15 RX ORDER — CEPHALEXIN 500 MG/1
500 CAPSULE ORAL 3 TIMES DAILY
Qty: 15 CAPSULE | Refills: 0 | Status: SHIPPED | OUTPATIENT
Start: 2019-11-15 | End: 2019-11-15

## 2019-11-15 NOTE — PATIENT INSTRUCTIONS

## 2019-11-15 NOTE — PROGRESS NOTES
Subjective:     Maryam Arredondo is a 74 y.o.     Patient presents with leg pain and a headache. She feels tired too. She is taking tylenol and it does help.           The following portions of the patient's history were reviewed and updated as appropriate: allergies, current medications, past family history, past medical history, past social history, past surgical history and problem list.      Review of Systems   Constitutional: Positive for fatigue. Negative for fever.   HENT: Positive for congestion, sinus pressure and sinus pain. Negative for ear pain and sore throat.    Respiratory: Positive for cough. Negative for shortness of breath and wheezing.    Cardiovascular:        See history   Musculoskeletal:        Legs aches         Objective:      Physical Exam   HENT:   Head: Normocephalic and atraumatic.   Right Ear: Tympanic membrane and ear canal normal.   Left Ear: Tympanic membrane and ear canal normal.   Nose: Right sinus exhibits maxillary sinus tenderness and frontal sinus tenderness. Left sinus exhibits no maxillary sinus tenderness and no frontal sinus tenderness.   Cardiovascular: Normal rate, regular rhythm, S1 normal, S2 normal and normal heart sounds.   Pulmonary/Chest: Effort normal. She has decreased breath sounds.   Occasional dry cough   Lymphadenopathy:     She has no cervical adenopathy.   Vitals reviewed.          Diagnoses and all orders for this visit:    Acute URI    Other orders  -     Discontinue: cephalexin (KEFLEX) 500 MG capsule; Take 1 capsule by mouth 3 (Three) Times a Day for 5 days.  -     doxycycline (MONODOX) 100 MG capsule; Take 1 capsule by mouth 2 (Two) Times a Day.

## 2019-11-18 ENCOUNTER — TELEPHONE (OUTPATIENT)
Dept: RETAIL CLINIC | Facility: CLINIC | Age: 74
End: 2019-11-18

## 2019-11-18 NOTE — TELEPHONE ENCOUNTER
Pt. Called with question about why she needs an antibiotic. She was seen on Friday 11/15/19 and diagnosed with URI. She is confused as to why she was given an antibiotic b/c she was not told she had a URI. She is taking the medication and will be done Wednesday. She reports that she is feeling better and does not feel like she has a URI.

## 2019-11-19 ENCOUNTER — TELEPHONE (OUTPATIENT)
Dept: RETAIL CLINIC | Facility: CLINIC | Age: 74
End: 2019-11-19

## 2019-11-25 ENCOUNTER — TELEPHONE (OUTPATIENT)
Dept: FAMILY MEDICINE CLINIC | Facility: CLINIC | Age: 74
End: 2019-11-25

## 2019-11-25 RX ORDER — DIGOXIN 125 MCG
125 TABLET ORAL
Qty: 90 TABLET | Refills: 2 | Status: SHIPPED | OUTPATIENT
Start: 2019-11-25 | End: 2020-08-10

## 2019-11-25 NOTE — TELEPHONE ENCOUNTER
Pt is requesting a refill on the following medication,   digoxin (LANOXIN) 125 MCG tablet        Sig: Take 125 mcg by mouth Daily.

## 2019-12-03 ENCOUNTER — TELEPHONE (OUTPATIENT)
Dept: FAMILY MEDICINE CLINIC | Facility: CLINIC | Age: 74
End: 2019-12-03

## 2019-12-03 DIAGNOSIS — J30.9 ALLERGIC RHINITIS, UNSPECIFIED SEASONALITY, UNSPECIFIED TRIGGER: Primary | ICD-10-CM

## 2019-12-03 RX ORDER — MONTELUKAST SODIUM 10 MG/1
10 TABLET ORAL DAILY
Qty: 90 TABLET | Refills: 3 | Status: SHIPPED | OUTPATIENT
Start: 2019-12-03 | End: 2021-01-12

## 2019-12-03 NOTE — TELEPHONE ENCOUNTER
Pt called for refill on medication     montelukast (SINGULAIR) 10 MG t    TAKE 1 TAB DAILY     LOV ON 11-     [facility] [address] [place] [address] AT [facility] [address]) - [phone] PH - [phone] (Phone)  [phone] (Fax)

## 2019-12-06 ENCOUNTER — OFFICE VISIT (OUTPATIENT)
Dept: CARDIOLOGY | Facility: CLINIC | Age: 74
End: 2019-12-06

## 2019-12-06 VITALS
DIASTOLIC BLOOD PRESSURE: 64 MMHG | SYSTOLIC BLOOD PRESSURE: 120 MMHG | WEIGHT: 97.4 LBS | HEIGHT: 58 IN | HEART RATE: 98 BPM | BODY MASS INDEX: 20.45 KG/M2

## 2019-12-06 DIAGNOSIS — I34.0 NONRHEUMATIC MITRAL VALVE REGURGITATION: ICD-10-CM

## 2019-12-06 DIAGNOSIS — I47.1 MULTIFOCAL ATRIAL TACHYCARDIA (HCC): ICD-10-CM

## 2019-12-06 DIAGNOSIS — I10 ESSENTIAL HYPERTENSION: Primary | ICD-10-CM

## 2019-12-06 PROCEDURE — 99213 OFFICE O/P EST LOW 20 MIN: CPT | Performed by: INTERNAL MEDICINE

## 2019-12-06 NOTE — PROGRESS NOTES
Subjective:     Encounter Date:12/06/2019      Patient ID: Maryam Arredondo is a 74 y.o. female.    Chief Complaint:Multifocal atrial tachycardia  Hypertension   This is a chronic problem. Associated symptoms include shortness of breath.       74-year-old female who presents today for evaluation.  Overall she is actually doing very well.  She does say that 1 of her medications feel funny from time to time.  She said Monday she stopped all her medications did not have the episode and felt good.  She continues to work out she walks on a treadmill she also does some weightlifting.  She is learned to take her oxygen and work around that.    Review of Systems   Respiratory: Positive for shortness of breath.    All other systems reviewed and are negative.      Procedures         Objective:     Physical Exam   Constitutional: She is oriented to person, place, and time.   Temporal muscular wasting appears chronically ill   HENT:   Head: Normocephalic.   Eyes: Conjunctivae are normal.   Neck: Normal range of motion.   Cardiovascular: Normal rate, regular rhythm and normal heart sounds.   Pulmonary/Chest: She has decreased breath sounds.   Abdominal: Soft. Bowel sounds are normal.   Musculoskeletal: Normal range of motion. She exhibits no edema.   Neurological: She is alert and oriented to person, place, and time.   Skin: Skin is warm and dry.   Psychiatric: She has a normal mood and affect. Her behavior is normal.   Vitals reviewed.      Lab Review:       Assessment:          Diagnosis Plan   1. Essential hypertension     2. Multifocal atrial tachycardia (CMS/HCC)     3. Nonrheumatic mitral valve regurgitation            Plan:       1.  Multifocal atrial tachycardia.  Patient remains stable.    2.  Significant lung disease.  He uses her oxygen concentrator  3.  History of lung mass followed by oncology .  4.  Hypertension blood pressures good.    5. Continue same follow-up one year sooner if issues.  She is going to try  one by one to stop her medications and see if she can identify which one is making her feel bad.  If it is a cardiac medication she was told to contact my office.  If it is her Spiriva I told her to contact Dr. Lazcano.

## 2019-12-09 ENCOUNTER — HOSPITAL ENCOUNTER (OUTPATIENT)
Dept: PET IMAGING | Facility: HOSPITAL | Age: 74
Discharge: HOME | End: 2019-12-09
Admitting: INTERNAL MEDICINE

## 2019-12-09 DIAGNOSIS — C34.12 MALIGNANT NEOPLASM OF UPPER LOBE OF LEFT LUNG (HCC): ICD-10-CM

## 2019-12-09 LAB — CREAT BLDA-MCNC: 0.6 MG/DL (ref 0.6–1.3)

## 2019-12-09 PROCEDURE — 82565 ASSAY OF CREATININE: CPT

## 2019-12-09 PROCEDURE — 71260 CT THORAX DX C+: CPT

## 2019-12-09 PROCEDURE — 25010000002 IOPAMIDOL 61 % SOLUTION: Performed by: INTERNAL MEDICINE

## 2019-12-09 PROCEDURE — 74160 CT ABDOMEN W/CONTRAST: CPT

## 2019-12-09 RX ADMIN — IOPAMIDOL 85 ML: 612 INJECTION, SOLUTION INTRAVENOUS at 10:18

## 2019-12-16 ENCOUNTER — OFFICE VISIT (OUTPATIENT)
Dept: ONCOLOGY | Facility: CLINIC | Age: 74
End: 2019-12-16

## 2019-12-16 ENCOUNTER — APPOINTMENT (OUTPATIENT)
Dept: LAB | Facility: HOSPITAL | Age: 74
End: 2019-12-16

## 2019-12-16 VITALS
HEIGHT: 58 IN | SYSTOLIC BLOOD PRESSURE: 132 MMHG | HEART RATE: 105 BPM | OXYGEN SATURATION: 90 % | BODY MASS INDEX: 20.38 KG/M2 | RESPIRATION RATE: 14 BRPM | TEMPERATURE: 98.5 F | WEIGHT: 97.1 LBS | DIASTOLIC BLOOD PRESSURE: 70 MMHG

## 2019-12-16 DIAGNOSIS — C34.12 MALIGNANT NEOPLASM OF UPPER LOBE OF LEFT LUNG (HCC): Primary | ICD-10-CM

## 2019-12-16 PROCEDURE — G0463 HOSPITAL OUTPT CLINIC VISIT: HCPCS | Performed by: INTERNAL MEDICINE

## 2019-12-16 PROCEDURE — 99215 OFFICE O/P EST HI 40 MIN: CPT | Performed by: INTERNAL MEDICINE

## 2019-12-16 NOTE — PROGRESS NOTES
History:     Reason for follow up:   1.  Stage IIB left upper lobe non-small cell lung cancer, high PDL-1 (80%), negative egfr/alk/ros   * status post radiation therapy completed 1/26/18     HPI:  Maryam Arredondo presents for follow-up of her lung cancer.  The patient completed radiation therapy to a left upper lobe non-small cell carcinoma January 2018.  She returns today for follow-up.  Her primary complaint is of fatigue.  Shortness of breath and is dependent on O2 by nasal cannula.  Respiratory symptoms are stable.  She denies pain in the chest.  She reports normal appetite and stable weight.  She denies headache or neurologic changes.      Reviewed, confirmed and updated history (past medical, social and family)   Past Medical   Past Medical History:   Diagnosis Date   • Abnormal color of sputum     grey   • Acromioclavicular joint arthritis    • Acute maxillary sinusitis    • Acute upper respiratory infection    • Allergic rhinitis    • Anemia    • Asthma    • B12 deficiency    • Benign essential hypertension    • Breast cancer (CMS/HCC)     s/p Lumpectomy ~2000 and chemo/XRT. Then  Masectomy ~2004 for some recurrence   • Breast cancer (CMS/HCC)    • Cerumen impaction    • Chronic obstructive pulmonary disease (CMS/HCC)    • COPD (chronic obstructive pulmonary disease) (CMS/HCC)    • COPD exacerbation (CMS/HCC)    • Diverticulosis of colon    • Cedric-Barr infection    • Fever    • Heart disease    • High risk medication use    • High risk medication use    • Hospital discharge follow-up    • Hyperthyroidism    • Hyperthyroidism    • Hypoxia    • Laryngitis    • Leg wound, left    • Medicare annual wellness visit, subsequent    • Mitral regurgitation     mild to moderate   • Nocturnal hypoxia    • Non-small cell lung cancer (CMS/HCC)    • Non-small cell lung cancer (CMS/HCC)    • Onychomycosis of toenail    • Oral aphthae    • Osteopenia    • Osteopenia    • Other fatigue    • Oxygen dependent     2L - at  night only   • Pneumonia of upper lobe of lung (CMS/HCC)    • Pneumothorax    • Post-menopausal    • Skin abrasion    • SOB (shortness of breath)    • Supraventricular tachycardia (CMS/HCC)    • Tachycardia    • Thyroid nodule    • Toxic multinodular goiter    • Vitamin B deficiency    • Vitamin D deficiency    • Vitamin D deficiency    • Wound healing well on examination     and   Patient Active Problem List   Diagnosis   • Pneumothorax   • Hypoxia   • Tachycardia   • Hyperthyroidism   • Essential hypertension   • Multifocal atrial tachycardia (CMS/HCC)   • Toxic multinodular goiter   • Pulmonary emphysema (CMS/HCC)   • Vitamin D insufficiency   • Pneumonia   • COPD with acute lower respiratory infection (CMS/HCC)   • Malignant neoplasm of upper lobe of left lung (CMS/HCC)   • Infected skin tear   • Allergic rhinitis   • B12 deficiency   • Oxygen dependent   • Mitral regurgitation   • Breast cancer (CMS/HCC)     Social History   Social History     Socioeconomic History   • Marital status: Single     Spouse name: Not on file   • Number of children: Not on file   • Years of education: Not on file   • Highest education level: Not on file   Occupational History   • Occupation:      Employer: RETIRED   Tobacco Use   • Smoking status: Former Smoker     Packs/day: 2.00     Years: 50.00     Pack years: 100.00     Types: Cigarettes     Last attempt to quit: 3/14/1994     Years since quittin.7   • Smokeless tobacco: Never Used   • Tobacco comment: D/C 20 years ago, smoking 2 ppd before quitting   Substance and Sexual Activity   • Alcohol use: No     Comment: Social use rare   • Drug use: No   • Sexual activity: Defer     Comment: drinks decaf      Family History  Family History   Problem Relation Age of Onset   • Arthritis Mother    • Heart failure Mother         Congestive Heart Failure   • Lung cancer Father    • Breast cancer Paternal Grandmother    • No Known Problems Sister   "    Allergies  Allergies   Allergen Reactions   • Codeine    • Penicillin V    • Penicillins    • Mucinex D [Pseudoephedrine-Guaifenesin Er] Nausea Only     Felt hot in chest       Medications: The current medication list was reviewed in the EMR.    Review of Systems  Review of Systems   Constitutional: Positive for fatigue. Negative for activity change, appetite change, chills, diaphoresis, fever and unexpected weight change.   HENT: Negative for congestion and sinus pressure.    Respiratory: Positive for shortness of breath. Negative for cough and chest tightness.    Cardiovascular: Negative for chest pain, palpitations and leg swelling.   Gastrointestinal: Negative for abdominal pain, blood in stool, constipation, diarrhea, nausea and vomiting.   Endocrine: Negative.    Genitourinary: Negative.    Musculoskeletal: Positive for arthralgias. Negative for joint swelling and myalgias.   Skin: Negative.    Allergic/Immunologic: Negative.    Neurological: Negative.    Hematological: Negative for adenopathy. Does not bruise/bleed easily.       Objective    Objective:     Vitals:    12/16/19 1310   BP: 132/70   Pulse: 105   Resp: 14   Temp: 98.5 °F (36.9 °C)   TempSrc: Oral   SpO2: 90%   Weight: 44 kg (97 lb 1.6 oz)   Height: 147.3 cm (57.99\")   PainSc: 0-No pain     Current Status 12/16/2019   ECOG score 1   GENERAL:  Well-developed, well-nourished female in no acute distress. Vital signs reviewed.   SKIN:  Warm, dry without rashes, purpura or petechiae.  EYES:    EOMs intact.  Conjunctivae normal.  HEAD:  Normocephalic.  NECK:  Supple with good range of motion; no thyromegaly or masses  LYMPHATICS:  No cervical, supraclavicular, axillary adenopathy.  RESP: Mild decrease in breath sounds, no increased work of breathing, no wheezing  CARDIAC:  Regular rate and rhythm without murmurs, rubs or gallops. Normal S1,S2. No edema  GI:  Soft, nontender, normal bowel sounds  MSK:  No clubbing or cyanosis, No joint swelling noted " in hands  NEUROLOGICAL:   No focal neurological deficits.  PSYCHIATRIC:  Normal affect and mood.  Alert and Oriented x 3.   Exam unchanged-12/16/2019    Labs and Imaging  Results for orders placed or performed during the hospital encounter of 12/09/19   POC Creatinine   Result Value Ref Range    Creatinine 0.60 0.60 - 1.30 mg/dL       CT chest 12/28/18: There is decreased size of the left apical mass 1.3 cm compared to previous 1.5 cm.  Adjacent granulated density also decreased in size from 1.7 down to 0.9 cm.  There is stable emphysema and apical scarring.  There are stable thyroid nodules.  No new lymphadenopathy in the chest.  There is a right hepatic enhancing lesion stable.  I personally reviewed the CT chest and there is stable to improved disease/scar in the left upper lobe of the lung.    CT chest 4/23/2019: Again seen are hypodense bilateral thyroid lesions, hyperenhancing lesions in the liver unchanged.  There is no mediastinal, hilar or axillary lymphadenopathy.  There is severe emphysema.  There are stable nodules in the right middle and lower lobe since 11/3/2016.  There is a new sub-6 mm nodule in the anterior right middle lobe.  Stable masslike thickening in the left lung apex since 12/28/2018 and decreased since 10/8/2018.  There is irregular opacification in the left apex likely radiation fibrosis.    CT chest 8/12/2019: Decreased size of nodular densities in the left upper lobe.  New 10 mm subpleural nodule in the lingula.  Stable hyperenhancing hepatic nodules.    CT chest abdomen 12/9/2019: There is discrete masslike soft tissue thickening in the left apex increased from previous 1.2 x 1.9 cm up to 1.6 x 2.4 cm.  Nodule in the right upper lobe stable compared to 10/8/2018, nodule in the right middle lobe stable compared to 10/8/2018.  Nodule in the right lower lobe stable compared to 10/8/2018.  There is a nodule in the lingula which has increased in size from 1.0 up to 1.4 cm.  No evidence of  disease in the abdomen.    Assessment/Plan   Assessment/Plan:   This is a 72 y.o. female with:     1.  Stage IIB (T3N0M0) left upper lobe non-small cell lung cancer, high PDL-1 (80%), negative egfr/alk/ros   *The patient was previously treated with radiation therapy to the left upper lobe nodule/mass completed January 2018   *I personally reviewed CT chest from 12/9/2019- there is more masslike configuration in the apex of the left lung worrisome for recurrence of disease but radiation pneumonitis difficult to exclude.  There is a nodule in the lingula which has increased from 1 to 1.5 cm.  Again this is concerning for progression/recurrence of disease     2.  Multinodular goiter stable by CT--followed by endocrinology    3. History of right breast cancer, initially diagnosed in 2000 treated with lumpectomy, radiation, chemotherapy and endocrine therapy with local recurrence in 2005 treated with mastectomy and then Arimidex.    * Mammogram due July 2020    4. COPD, oxygen dependent    5.  Coronary artery calcifications: The patient asked me to forward my note to her cardiologist Dr. Bobo regarding incidental coronary calcifications noted by CT    I discussed the patient's CT findings today and concern for recurrence or progression of her lung cancer.  I recommended and ordered a PET scan for further evaluation and will see her back for results.  We will see if there is a hypermetabolic lesion amenable to biopsy after we have her PET results.

## 2019-12-20 ENCOUNTER — HOSPITAL ENCOUNTER (OUTPATIENT)
Dept: PET IMAGING | Facility: HOSPITAL | Age: 74
Discharge: HOME | End: 2019-12-20
Admitting: INTERNAL MEDICINE

## 2019-12-20 ENCOUNTER — HOSPITAL ENCOUNTER (OUTPATIENT)
Dept: PET IMAGING | Facility: HOSPITAL | Age: 74
Discharge: HOME | End: 2019-12-20

## 2019-12-20 DIAGNOSIS — C34.12 MALIGNANT NEOPLASM OF UPPER LOBE OF LEFT LUNG (HCC): ICD-10-CM

## 2019-12-20 LAB — GLUCOSE BLDC GLUCOMTR-MCNC: 101 MG/DL (ref 70–130)

## 2019-12-20 PROCEDURE — A9552 F18 FDG: HCPCS | Performed by: INTERNAL MEDICINE

## 2019-12-20 PROCEDURE — 82962 GLUCOSE BLOOD TEST: CPT

## 2019-12-20 PROCEDURE — 0 FLUDEOXYGLUCOSE F18 SOLUTION: Performed by: INTERNAL MEDICINE

## 2019-12-20 PROCEDURE — 78815 PET IMAGE W/CT SKULL-THIGH: CPT

## 2019-12-20 RX ADMIN — FLUDEOXYGLUCOSE F18 1 DOSE: 300 INJECTION INTRAVENOUS at 09:33

## 2019-12-27 ENCOUNTER — APPOINTMENT (OUTPATIENT)
Dept: LAB | Facility: HOSPITAL | Age: 74
End: 2019-12-27

## 2019-12-27 ENCOUNTER — OFFICE VISIT (OUTPATIENT)
Dept: ONCOLOGY | Facility: CLINIC | Age: 74
End: 2019-12-27

## 2019-12-27 VITALS
TEMPERATURE: 97.7 F | BODY MASS INDEX: 20.09 KG/M2 | SYSTOLIC BLOOD PRESSURE: 130 MMHG | DIASTOLIC BLOOD PRESSURE: 79 MMHG | HEART RATE: 109 BPM | WEIGHT: 95.7 LBS | OXYGEN SATURATION: 92 % | RESPIRATION RATE: 16 BRPM | HEIGHT: 58 IN

## 2019-12-27 DIAGNOSIS — C34.12 MALIGNANT NEOPLASM OF UPPER LOBE OF LEFT LUNG (HCC): Primary | ICD-10-CM

## 2019-12-27 PROCEDURE — 99215 OFFICE O/P EST HI 40 MIN: CPT | Performed by: INTERNAL MEDICINE

## 2019-12-27 PROCEDURE — G0463 HOSPITAL OUTPT CLINIC VISIT: HCPCS | Performed by: INTERNAL MEDICINE

## 2019-12-27 NOTE — PROGRESS NOTES
History:     Reason for follow up:   1.  Stage IIB left upper lobe non-small cell lung cancer, high PDL-1 (80%), negative egfr/alk/ros   * status post radiation therapy completed 1/26/18     HPI:  Maryam Arredondo presents for follow-up of her lung cancer.  The patient completed radiation therapy to a left upper lobe non-small cell carcinoma January 2018.  She returns today for follow-up.  Her primary complaint is of fatigue.  Shortness of breath and is dependent on O2 by nasal cannula.  Respiratory symptoms are stable.  She denies pain in the chest.  She reports normal appetite and stable weight.  She denies headache or neurologic changes.    The patient return today with her family to discuss PET results as outlined below.      Reviewed, confirmed and updated history (past medical, social and family)   Past Medical   Past Medical History:   Diagnosis Date   • Abnormal color of sputum     grey   • Acromioclavicular joint arthritis    • Acute maxillary sinusitis    • Acute upper respiratory infection    • Allergic rhinitis    • Anemia    • Asthma    • B12 deficiency    • Benign essential hypertension    • Breast cancer (CMS/HCC)     s/p Lumpectomy ~2000 and chemo/XRT. Then  Masectomy ~2004 for some recurrence   • Breast cancer (CMS/HCC)    • Cerumen impaction    • Chronic obstructive pulmonary disease (CMS/HCC)    • COPD (chronic obstructive pulmonary disease) (CMS/HCC)    • COPD exacerbation (CMS/HCC)    • Diverticulosis of colon    • Cedric-Barr infection    • Fever    • Heart disease    • High risk medication use    • High risk medication use    • Hospital discharge follow-up    • Hyperthyroidism    • Hyperthyroidism    • Hypoxia    • Laryngitis    • Leg wound, left    • Medicare annual wellness visit, subsequent    • Mitral regurgitation     mild to moderate   • Nocturnal hypoxia    • Non-small cell lung cancer (CMS/HCC)    • Non-small cell lung cancer (CMS/HCC)    • Onychomycosis of toenail    • Oral aphthae     • Osteopenia    • Osteopenia    • Other fatigue    • Oxygen dependent     2L - at night only   • Pneumonia of upper lobe of lung (CMS/HCC)    • Pneumothorax    • Post-menopausal    • Skin abrasion    • SOB (shortness of breath)    • Supraventricular tachycardia (CMS/HCC)    • Tachycardia    • Thyroid nodule    • Toxic multinodular goiter    • Vitamin B deficiency    • Vitamin D deficiency    • Vitamin D deficiency    • Wound healing well on examination     and   Patient Active Problem List   Diagnosis   • Pneumothorax   • Hypoxia   • Tachycardia   • Hyperthyroidism   • Essential hypertension   • Multifocal atrial tachycardia (CMS/HCC)   • Toxic multinodular goiter   • Pulmonary emphysema (CMS/HCC)   • Vitamin D insufficiency   • Pneumonia   • COPD with acute lower respiratory infection (CMS/HCC)   • Malignant neoplasm of upper lobe of left lung (CMS/HCC)   • Infected skin tear   • Allergic rhinitis   • B12 deficiency   • Oxygen dependent   • Mitral regurgitation   • Breast cancer (CMS/HCC)     Social History   Social History     Socioeconomic History   • Marital status: Single     Spouse name: Not on file   • Number of children: Not on file   • Years of education: Not on file   • Highest education level: Not on file   Occupational History   • Occupation:      Employer: RETIRED   Tobacco Use   • Smoking status: Former Smoker     Packs/day: 2.00     Years: 50.00     Pack years: 100.00     Types: Cigarettes     Last attempt to quit: 3/14/1994     Years since quittin.8   • Smokeless tobacco: Never Used   • Tobacco comment: D/C 20 years ago, smoking 2 ppd before quitting   Substance and Sexual Activity   • Alcohol use: No     Comment: Social use rare   • Drug use: No   • Sexual activity: Defer     Comment: drinks decaf      Family History  Family History   Problem Relation Age of Onset   • Arthritis Mother    • Heart failure Mother         Congestive Heart Failure   • Lung cancer Father   "  • Breast cancer Paternal Grandmother    • No Known Problems Sister      Allergies  Allergies   Allergen Reactions   • Codeine    • Penicillin V    • Penicillins    • Mucinex D [Pseudoephedrine-Guaifenesin Er] Nausea Only     Felt hot in chest       Medications: The current medication list was reviewed in the EMR.    Review of Systems  Review of Systems   Constitutional: Positive for fatigue. Negative for activity change, appetite change, chills, diaphoresis, fever and unexpected weight change.   HENT: Negative for congestion and sinus pressure.    Respiratory: Positive for shortness of breath. Negative for cough and chest tightness.    Cardiovascular: Negative for chest pain, palpitations and leg swelling.   Gastrointestinal: Negative for abdominal pain, blood in stool, constipation, diarrhea, nausea and vomiting.   Endocrine: Negative.    Genitourinary: Negative.    Musculoskeletal: Positive for arthralgias. Negative for joint swelling and myalgias.   Skin: Negative.    Allergic/Immunologic: Negative.    Neurological: Negative.    Hematological: Negative for adenopathy. Does not bruise/bleed easily.   Psychiatric/Behavioral: Behavioral problem:    ROS unchanged-12/27/2019    Objective    Objective:     Vitals:    12/27/19 1536   BP: 130/79   Pulse: 109   Resp: 16   Temp: 97.7 °F (36.5 °C)   TempSrc: Oral   SpO2: 92%  Comment: w/oxygen   Weight: 43.4 kg (95 lb 11.2 oz)   Height: 147.3 cm (57.99\")   PainSc: 0-No pain     Current Status 12/27/2019   ECOG score 2   GENERAL:  Well-developed, somewhat thin and chronic ill-appearing female in no acute distress. Vital signs reviewed.   SKIN:  Warm, dry without rashes, purpura or petechiae.  EYES:    EOMs intact.  Conjunctivae normal.  HEAD:  Normocephalic.  NECK:  Supple with good range of motion; no thyromegaly or masses  LYMPHATICS:  No cervical, supraclavicular, axillary adenopathy.  RESP: Mild decrease in breath sounds, no increased work of breathing, no " wheezing  CARDIAC:  Regular rate and rhythm without murmurs, rubs or gallops. Normal S1,S2. No edema  GI:  Soft, nontender, normal bowel sounds  MSK:  No clubbing or cyanosis, No joint swelling noted in hands  NEUROLOGICAL:   No focal neurological deficits.  PSYCHIATRIC:  Normal affect and mood.  Alert and Oriented x 3.       Labs and Imaging  Results for orders placed or performed during the hospital encounter of 12/20/19   POC Glucose Once   Result Value Ref Range    Glucose 101 70 - 130 mg/dL       CT chest 12/28/18: There is decreased size of the left apical mass 1.3 cm compared to previous 1.5 cm.  Adjacent granulated density also decreased in size from 1.7 down to 0.9 cm.  There is stable emphysema and apical scarring.  There are stable thyroid nodules.  No new lymphadenopathy in the chest.  There is a right hepatic enhancing lesion stable.  I personally reviewed the CT chest and there is stable to improved disease/scar in the left upper lobe of the lung.    CT chest 4/23/2019: Again seen are hypodense bilateral thyroid lesions, hyperenhancing lesions in the liver unchanged.  There is no mediastinal, hilar or axillary lymphadenopathy.  There is severe emphysema.  There are stable nodules in the right middle and lower lobe since 11/3/2016.  There is a new sub-6 mm nodule in the anterior right middle lobe.  Stable masslike thickening in the left lung apex since 12/28/2018 and decreased since 10/8/2018.  There is irregular opacification in the left apex likely radiation fibrosis.    CT chest 8/12/2019: Decreased size of nodular densities in the left upper lobe.  New 10 mm subpleural nodule in the lingula.  Stable hyperenhancing hepatic nodules.    CT chest abdomen 12/9/2019: There is discrete masslike soft tissue thickening in the left apex increased from previous 1.2 x 1.9 cm up to 1.6 x 2.4 cm.  Nodule in the right upper lobe stable compared to 10/8/2018, nodule in the right middle lobe stable compared to  10/8/2018.  Nodule in the right lower lobe stable compared to 10/8/2018.  There is a nodule in the lingula which has increased in size from 1.0 up to 1.4 cm.  No evidence of disease in the abdomen.    PET scan 12/20/2019: There is uptake in the right lobe of the thyroid which was present and stable since 10/18/2018.  There is increased uptake in the left lung apex SUV 11.1 and a smaller pleural-based nodularity laterally 1.1 cm SUV 4.7.  There is a 1.4 cm nodule in the lingula with SUV 7.9.  There is uptake in the left hilum which is similar to 10/18/2018.    Assessment/Plan   Assessment/Plan:   This is a 72 y.o. female with:     1.  Stage IIB (T3N0M0) left upper lobe non-small cell lung cancer, high PDL-1 (80%), negative egfr/alk/ros   *The patient was previously treated with radiation therapy to the left upper lobe nodule/mass completed January 2018   *I personally reviewed the PET scan from 12/20/2019.  There is significant uptake in the mass in the apex of the left lung and also a nodule in the lingula with significant uptake for its size and I think both areas are likely consistent with recurrent disease.  I am very hesitant to send the patient for a biopsy as she has very poor lung function and a pneumothorax might prove fatal for her.  There is some uptake in the left hilum but this was present in October 2018 and stable.  There is uptake in the right thyroid gland which is stable and the patient has declined biopsy of this in the past.  She wants to know if further radiation can be given to the left apex and lingular nodule- I am highly doubtful that further radiation can be given to the left apex but we will have radiation oncology review her images to see.  If she is not a candidate for further radiation the patient is not interested in chemotherapy.  Her previous tumor was positive for PDL-1 80% so she would be potentially a candidate for immunotherapy.  I did discuss with her and her family use of  immunotherapy with Keytruda outlining risk, side effects and potential benefits of the medication.  Any treatment at this point likely palliative in intent.  If she is not amenable to further radiation the patient will consider immunotherapy.  I will await radiation oncology opinion and then contact the patient to decide on further care if she is not a candidate for RT.    2.  Multinodular goiter stable by CT--followed by endocrinology.  There is uptake in the right thyroid gland similar to prior PET.  The patient declines biopsy of the hypermetabolic right thyroid lesion    3. History of right breast cancer, initially diagnosed in 2000 treated with lumpectomy, radiation, chemotherapy and endocrine therapy with local recurrence in 2005 treated with mastectomy and then Arimidex.    * Mammogram due July 2020    4. COPD, oxygen dependent    5.  Coronary artery calcifications: The patient asked me to forward my note to her cardiologist Dr. Bobo regarding incidental coronary calcifications noted by CT

## 2020-01-07 ENCOUNTER — OFFICE VISIT (OUTPATIENT)
Dept: FAMILY MEDICINE CLINIC | Facility: CLINIC | Age: 75
End: 2020-01-07

## 2020-01-07 VITALS
TEMPERATURE: 98.2 F | HEART RATE: 111 BPM | BODY MASS INDEX: 20.49 KG/M2 | WEIGHT: 95 LBS | OXYGEN SATURATION: 95 % | SYSTOLIC BLOOD PRESSURE: 129 MMHG | HEIGHT: 57 IN | DIASTOLIC BLOOD PRESSURE: 71 MMHG

## 2020-01-07 DIAGNOSIS — J43.1 PANLOBULAR EMPHYSEMA (HCC): ICD-10-CM

## 2020-01-07 DIAGNOSIS — R09.02 HYPOXIA: ICD-10-CM

## 2020-01-07 DIAGNOSIS — Z00.00 MEDICARE ANNUAL WELLNESS VISIT, SUBSEQUENT: Primary | ICD-10-CM

## 2020-01-07 DIAGNOSIS — I10 ESSENTIAL HYPERTENSION: ICD-10-CM

## 2020-01-07 DIAGNOSIS — C34.12 MALIGNANT NEOPLASM OF UPPER LOBE OF LEFT LUNG (HCC): ICD-10-CM

## 2020-01-07 PROCEDURE — G0439 PPPS, SUBSEQ VISIT: HCPCS | Performed by: FAMILY MEDICINE

## 2020-01-07 PROCEDURE — 99214 OFFICE O/P EST MOD 30 MIN: CPT | Performed by: FAMILY MEDICINE

## 2020-01-07 RX ORDER — BUDESONIDE AND FORMOTEROL FUMARATE DIHYDRATE 160; 4.5 UG/1; UG/1
2 AEROSOL RESPIRATORY (INHALATION)
Qty: 1 INHALER | Refills: 11 | Status: SHIPPED | OUTPATIENT
Start: 2020-01-07 | End: 2020-02-20 | Stop reason: SDUPTHER

## 2020-01-07 NOTE — PROGRESS NOTES
The ABCs of the Annual Wellness Visit  Subsequent Medicare Wellness Visit    Chief Complaint   Patient presents with   • Hypertension   • Hyperthyroidism   • COPD       Subjective   History of Present Illness:  Maryam Arredondo is a 74 y.o. female who presents for a Subsequent Medicare Wellness Visit.  F/U COPD.  I need a refill for symbicort.  I am stable on my breathing.  ON 2L oxygen at night.  Doing milestone still with treadmill.    F/U lung cancer Left upper lobe.  PET scan with pleural based nodule within the lingula and and aspect of L upper lobe with intense FDG uptake in keeping with malignancy and mets.  Seeing Dr Birmingham in 3 days.    F/U Vit D def.  Vit D reviewed form 4 months ago and 59.  On Vit D  a day.    HEALTH RISK ASSESSMENT    Recent Hospitalizations:  No hospitalization(s) within the last year.    Current Medical Providers:  Patient Care Team:  Kevin Seymour MD as PCP - General  Unruly Bobo MD as Consulting Physician (Cardiology)  Heather Anthony MD as Consulting Physician (Pulmonary Disease)  Laith Prado DPM as Consulting Physician (Podiatry)  Michael Christie MD as Consulting Physician (Endocrinology)  Je Ventura MD as Consulting Physician (Hematology and Oncology)  Roderick Frankel Jr., MD as Surgeon (General Surgery)  Je Castro MD as Consulting Physician (Dermatology)  Donn Cotter MD as Referring Physician (Infectious Diseases)  Brandie Bennett MD as Consulting Physician (Radiation Oncology)    Smoking Status:  Social History     Tobacco Use   Smoking Status Former Smoker   • Packs/day: 2.00   • Years: 50.00   • Pack years: 100.00   • Types: Cigarettes   • Last attempt to quit: 3/14/1994   • Years since quittin.8   Smokeless Tobacco Never Used   Tobacco Comment    D/C 20 years ago, smoking 2 ppd before quitting       Alcohol Consumption:  Social History     Substance and Sexual Activity   Alcohol Use No    Comment: Social use rare        Depression Screen:   PHQ-2/PHQ-9 Depression Screening 1/7/2020   Little interest or pleasure in doing things 0   Feeling down, depressed, or hopeless 0   Total Score 0       Fall Risk Screen:  ERIN Fall Risk Assessment was completed, and patient is at MODERATE risk for falls. Assessment completed on:1/7/2020    Health Habits and Functional and Cognitive Screening:  Functional & Cognitive Status 1/7/2020   Do you have difficulty preparing food and eating? No   Do you have difficulty bathing yourself, getting dressed or grooming yourself? No   Do you have difficulty using the toilet? No   Do you have difficulty moving around from place to place? No   Do you have trouble with steps or getting out of a bed or a chair? No   Current Diet Well Balanced Diet   Dental Exam Up to date   Eye Exam Up to date   Exercise (times per week) 2 times per week   Current Exercise Activities Include Walking   Do you need help using the phone?  No   Are you deaf or do you have serious difficulty hearing?  No   Do you need help with transportation? No   Do you need help shopping? No   Do you need help preparing meals?  No   Do you need help with housework?  No   Do you need help with laundry? No   Do you need help taking your medications? No   Do you need help managing money? No   Do you ever drive or ride in a car without wearing a seat belt? No   Have you felt unusual stress, anger or loneliness in the last month? No   Who do you live with? Alone   If you need help, do you have trouble finding someone available to you? No   Have you been bothered in the last four weeks by sexual problems? No   Do you have difficulty concentrating, remembering or making decisions? No         Does the patient have evidence of cognitive impairment? No    Asprin use counseling:Does not need ASA (and currently is not on it)    Age-appropriate Screening Schedule:  Refer to the list below for future screening recommendations based on patient's age, sex  and/or medical conditions. Orders for these recommended tests are listed in the plan section. The patient has been provided with a written plan.    Health Maintenance   Topic Date Due   • DXA SCAN  04/28/2017   • ZOSTER VACCINE (3 of 3) 11/06/2018   • MAMMOGRAM  07/11/2021   • TDAP/TD VACCINES (2 - Td) 08/03/2023   • COLONOSCOPY  04/28/2027   • INFLUENZA VACCINE  Completed          The following portions of the patient's history were reviewed and updated as appropriate: allergies, current medications, past family history, past medical history, past social history, past surgical history and problem list.    Outpatient Medications Prior to Visit   Medication Sig Dispense Refill   • albuterol (PROVENTIL HFA;VENTOLIN HFA) 108 (90 Base) MCG/ACT inhaler Inhale 2 puffs Every 4 (Four) Hours As Needed for Wheezing. Per MD - until current episode is resolved (Patient taking differently: Inhale 2 puffs As Needed for Wheezing. Per MD - until current episode is resolved )     • CARTIA  MG 24 hr capsule Take 180 mg by mouth Daily.     • Cholecalciferol (VITAMIN D3) 2000 UNITS tablet Take 1 tablet by mouth daily.     • clindamycin (CLEOCIN T) 1 % lotion Apply  topically to the appropriate area as directed 2 (Two) Times a Day.     • digoxin (LANOXIN) 125 MCG tablet Take 1 tablet by mouth Daily. 90 tablet 2   • fexofenadine (ALLEGRA) 180 MG tablet Take 180 mg by mouth Daily.     • fluticasone (FLONASE) 50 MCG/ACT nasal spray 2 Squirts into the nostril(s) as directed by provider 2 (Two) Times a Day.     • methIMAzole (TAPAZOLE) 5 MG tablet TAKE ONE AND ONE-HALF (1 & 1/2) TABLET BY MOUTH DAILY 60 tablet 4   • metroNIDAZOLE (METROCREAM) 0.75 % cream      • montelukast (SINGULAIR) 10 MG tablet Take 1 tablet by mouth Daily. 90 tablet 3   • Multiple Vitamins-Minerals (PRESERVISION AREDS 2) chewable tablet Chew 2 capsules Every Morning.     • O2 (OXYGEN) Inhale Continuous.     • SPIRIVA RESPIMAT 2.5 MCG/ACT aerosol solution 2 puffs  Daily.     • vitamin B-12 (CYANOCOBALAMIN) 1000 MCG tablet Take 1,000 mcg by mouth Daily. M,W,F     • budesonide-formoterol (SYMBICORT) 160-4.5 MCG/ACT inhaler Inhale 2 puffs 2 (two) times a day.       No facility-administered medications prior to visit.        Patient Active Problem List   Diagnosis   • Pneumothorax   • Hypoxia   • Tachycardia   • Hyperthyroidism   • Essential hypertension   • Multifocal atrial tachycardia (CMS/HCC)   • Toxic multinodular goiter   • Pulmonary emphysema (CMS/HCC)   • Vitamin D insufficiency   • Pneumonia   • COPD with acute lower respiratory infection (CMS/HCC)   • Malignant neoplasm of upper lobe of left lung (CMS/HCC)   • Infected skin tear   • Allergic rhinitis   • B12 deficiency   • Oxygen dependent   • Mitral regurgitation   • Breast cancer (CMS/HCC)   • Medicare annual wellness visit, subsequent   • Panlobular emphysema (CMS/HCC)       Advanced Care Planning:  Patient has an advance directive - a copy has not been provided. Have asked the patient to send this to us to add to record    Review of Systems   Constitutional: Negative for activity change, appetite change and fatigue.   HENT: Negative for hearing loss and postnasal drip.    Eyes: Negative for discharge and itching.   Respiratory: Positive for cough. Negative for shortness of breath.    Cardiovascular: Negative for chest pain and leg swelling.   Gastrointestinal: Negative for abdominal distention and abdominal pain.   Endocrine: Negative for cold intolerance and heat intolerance.   Genitourinary: Negative for difficulty urinating and flank pain.   Musculoskeletal: Negative for arthralgias and myalgias.   Skin: Negative for color change.   Neurological: Negative for dizziness and facial asymmetry.   Hematological: Negative for adenopathy.   Psychiatric/Behavioral: Negative for agitation and confusion.       Compared to one year ago, the patient feels her physical health is the same.  Compared to one year ago, the  "patient feels her mental health is the same.    Reviewed chart for potential of high risk medication in the elderly: yes  Reviewed chart for potential of harmful drug interactions in the elderly:yes    Objective         Vitals:    01/07/20 0848   BP: 129/71   Pulse: 111   Temp: 98.2 °F (36.8 °C)   SpO2: 95%   Weight: 43.1 kg (95 lb)   Height: 144.8 cm (57\")       Body mass index is 20.56 kg/m².  Discussed the patient's BMI with her. The BMI is in the acceptable range.    Physical Exam   Constitutional: She appears well-developed and well-nourished.   HENT:   Head: Normocephalic and atraumatic.   Right Ear: External ear normal.   Left Ear: External ear normal.   Nose: Nose normal.   Mouth/Throat: Oropharynx is clear and moist.   Eyes: Pupils are equal, round, and reactive to light. Conjunctivae and EOM are normal. Right eye exhibits no discharge. Left eye exhibits no discharge. No scleral icterus.   Neck: Normal range of motion. Neck supple. No JVD present. No tracheal deviation present. No thyromegaly present.   Cardiovascular: Normal rate, regular rhythm, normal heart sounds and intact distal pulses. Exam reveals no gallop and no friction rub.   No murmur heard.  Pulmonary/Chest: Effort normal. No respiratory distress. She has no wheezes. She has no rales. She exhibits no tenderness.   Distant but clear.   Abdominal: Soft. Bowel sounds are normal. She exhibits no distension and no mass. There is no tenderness. There is no rebound and no guarding. No hernia.   Musculoskeletal: Normal range of motion. She exhibits no edema or tenderness.   Lymphadenopathy:     She has no cervical adenopathy.   Neurological: She displays normal reflexes. No cranial nerve deficit or sensory deficit. She exhibits normal muscle tone. Coordination normal.   Skin: Skin is warm and dry.   Psychiatric: She has a normal mood and affect. Her behavior is normal. Judgment and thought content normal.   Nursing note and vitals reviewed.      Lab " Results   Component Value Date     (H) 10/21/2019        Assessment/Plan   Medicare Risks and Personalized Health Plan  CMS Preventative Services Quick Reference  Fall Risk    The above risks/problems have been discussed with the patient.  Pertinent information has been shared with the patient in the After Visit Summary.  Follow up plans and orders are seen below in the Assessment/Plan Section.    Diagnoses and all orders for this visit:    1. Medicare annual wellness visit, subsequent (Primary)    2. Essential hypertension  Comments:  Controlled.  Continue meds.      3. Malignant neoplasm of upper lobe of left lung (CMS/HCC)  Comments:  Abnormal PET.  Continue oncology/radiation onc appt.  Pt has declined chemotx tx at present, but considering other treatments(radiation=/-immunotx)    4. Panlobular emphysema (CMS/HCC)    5. Hypoxia    Other orders  -     budesonide-formoterol (SYMBICORT) 160-4.5 MCG/ACT inhaler; Inhale 2 puffs 2 (Two) Times a Day.  Dispense: 1 inhaler; Refill: 11    Continue 2 L a minute.    Follow Up:  No follow-ups on file.     An After Visit Summary and PPPS were given to the patient.     Preventive care/counseling:  IUTD.  Counseled on regular exercise.  Encouraged to keep calories above 2000 a day.

## 2020-01-08 ENCOUNTER — CONSULT (OUTPATIENT)
Dept: RADIATION ONCOLOGY | Facility: HOSPITAL | Age: 75
End: 2020-01-08

## 2020-01-08 ENCOUNTER — APPOINTMENT (OUTPATIENT)
Dept: RADIATION ONCOLOGY | Facility: HOSPITAL | Age: 75
End: 2020-01-08

## 2020-01-08 VITALS
BODY MASS INDEX: 20.56 KG/M2 | SYSTOLIC BLOOD PRESSURE: 139 MMHG | DIASTOLIC BLOOD PRESSURE: 75 MMHG | WEIGHT: 95 LBS | OXYGEN SATURATION: 96 % | HEART RATE: 106 BPM

## 2020-01-08 DIAGNOSIS — C34.12 MALIGNANT NEOPLASM OF UPPER LOBE OF LEFT LUNG (HCC): Primary | ICD-10-CM

## 2020-01-08 PROCEDURE — 77334 RADIATION TREATMENT AID(S): CPT | Performed by: RADIOLOGY

## 2020-01-08 PROCEDURE — 77293 RESPIRATOR MOTION MGMT SIMUL: CPT | Performed by: RADIOLOGY

## 2020-01-08 PROCEDURE — 99215 OFFICE O/P EST HI 40 MIN: CPT | Performed by: RADIOLOGY

## 2020-01-08 PROCEDURE — 77263 THER RADIOLOGY TX PLNG CPLX: CPT | Performed by: RADIOLOGY

## 2020-01-08 NOTE — PROGRESS NOTES
DIAGNOSIS and REASON FOR CONSULTATION:  Malignant neoplasm of upper lobe of left lung (CMS/HCC) - now with recurrent disease  - for advice and recommendations regarding the diagnosis    CHIEF COMPLAINT:  For advice and recommendations regarding Malignant neoplasm of upper lobe of left lung (CMS/HCC)  HISTORY OF PRESENT ILLNESS:  The patient is a 74 y.o. year old female who is known to us from her previous radiation therapy directed at the right breast in 2000. She developed an in breast recurrence and underwent a mastectomy in 2004.  Additionally she underwent treatment to the left lung for an adenocarcinoma from December 6, 2017 through January 26, 2018, receiving 7000 cGy in 35 treatments definitively.     Regarding the lung, in short, she has a history of a left sided lung lesion detected in March, 2016.  CAT scan on March 20, 2016 showed a 1.9 x 1.4 cm pleural-based nodule in the left lower lobe.  Close follow-up was recommended and she underwent a CT of the chest on November 3, 2016 which showed the prior mastectomy, resolution of the masslike opacity in the left lung base. She then presented with respiratory symptoms in October and underwent a CAT scan of the chest on October 31, 2017 which showed 2 new left upper lobe lesions with a 3.2 cm pleural-based mass in the left upper lobe and a 5 cm pleural-based mass at the anterior chest wall with a new 7 mm irregular nodule along a left upper lobe scar, a few borderline enlarged mediastinal and bilateral hilar nodes with the largest in the precarinal region measuring 1.5 x 0.8 cm.  In addition two hyperenhancing right hepatic liver lesions were appreciated. She underwent a CT-guided biopsy on November 2, 2017 which revealed an adenocarcinoma consistent with a lung primary, negative for the ALK , Ross 1, EGFR.     She underwent a PET scan which was completed on November 15, 2017 and showed increased uptake in the left upper lobe biopsy-proven mass with an SUV of  10, measuring 4.8 x 3.0 cm.  No hypermetabolic adenopathy was appreciated.  A small hypermetabolic nodule was appreciated in the left lobe of the thyroid gland consistent with an adenoma. She opted for radiation therapy alone and received the 7000 cGy mentioned above, given definitively.    She has continued close follow-up since then with CAT scans of the chest which have showed evolving post radiation change but no evidence of progressive disease and with mammograms which have been negative.    Her most recent CAT scan of the chest on December 9, 2019 showed the hypodense lesions scattered throughout the thyroid stable, hyperenhancement within the right hepatic lobe stable, no hilar or mediastinal lymphadenopathy and changes from the right mastectomy.  Soft tissue thickening was appreciated within the anterior aspect of the left apex measuring 1.6 x 2.4 cm which was increased from 1.2 x 1.9 cm in August, 2019.  Irregular thickening posterior laterally in the left apex was unchanged as was some nodularity within the right upper lobe and a pulmonary nodule within the right middle lobe as well as one in the right lower lobe.  A pulmonary nodule within the anteromedial aspect of the lingula had increased in size from 1 cm to 1.4 cm.    She underwent a PET scan on December 20, 2019 which showed increased uptake in the right thyroid gland as before, no mediastinal lymphadenopathy with stable hypermetabolism in the left hilum with an SUV of 4.6 with no definite lymph node visualized on CT, unchanged from October, 2018.  There was increased uptake in the left masslike thickening in the left upper lobe measuring 2.6 x 1.6 cm with an SUV of 11.1 and increased uptake in the pleural-based nodular lateral lesion measuring 1.1 cm with an SUV of 4.7, increased uptake in the pleural-based nodule in the lingula measuring 1.4 cm with an SUV of 7.9.    She returned to see the [facility] physicians and all have concerns about repeat biopsy  of any of the above-mentioned areas suspicious for recurrence.  They discussed the use of Keytruda given that she was her tumor was PDL 1+ at 80%.  I was asked to see the patient at the request of the referring provider noted above for advice and recommendations regarding this diagnosis.     Clinically, she is fatigued.  She continues to require oxygen at 2L around-the-clock. She denies any pain, dysphagia, hemoptysis, cough or other respiratory symptoms.         Performance Status: (1) Restricted in physically strenuous activity, ambulatory and able to do work of light nature  Objective   Past Medical History: she  has a past medical history of Abnormal color of sputum, Acromioclavicular joint arthritis, Acute maxillary sinusitis, Acute upper respiratory infection, Allergic rhinitis, Anemia, Asthma, B12 deficiency, Benign essential hypertension, Breast cancer (CMS/HCC), Breast cancer (CMS/HCC), Cerumen impaction, Chronic obstructive pulmonary disease (CMS/HCC), COPD (chronic obstructive pulmonary disease) (CMS/HCC), COPD exacerbation (CMS/HCC), Diverticulosis of colon, Cedric-Barr infection, Fever, Heart disease, High risk medication use, High risk medication use, Hospital discharge follow-up, Hyperthyroidism, Hyperthyroidism, Hypoxia, Laryngitis, Leg wound, left, Medicare annual wellness visit, subsequent, Mitral regurgitation, Nocturnal hypoxia, Non-small cell lung cancer (CMS/HCC), Non-small cell lung cancer (CMS/HCC), Onychomycosis of toenail, Oral aphthae, Osteopenia, Osteopenia, Other fatigue, Oxygen dependent, Pneumonia of upper lobe of lung (CMS/HCC), Pneumothorax, Post-menopausal, Skin abrasion, SOB (shortness of breath), Supraventricular tachycardia (CMS/HCC), Tachycardia, Thyroid nodule, Toxic multinodular goiter, Vitamin B deficiency, Vitamin D deficiency, Vitamin D deficiency, and Wound healing well on examination.    Past Surgical History:  she has a past surgical history that includes Breast  lumpectomy (Right); Mastectomy (Right, 2004); Colonoscopy (04/2017); Neck surgery; Hysterectomy; Vascular surgery; Breast lumpectomy; Cardiac catheterization; and Colonoscopy (N/A, 4/28/2017).    Meds:    Current Outpatient Medications:   •  albuterol (PROVENTIL HFA;VENTOLIN HFA) 108 (90 Base) MCG/ACT inhaler, Inhale 2 puffs Every 4 (Four) Hours As Needed for Wheezing. Per MD - until current episode is resolved (Patient taking differently: Inhale 2 puffs As Needed for Wheezing. Per MD - until current episode is resolved ), Disp: , Rfl:   •  budesonide-formoterol (SYMBICORT) 160-4.5 MCG/ACT inhaler, Inhale 2 puffs 2 (Two) Times a Day., Disp: 1 inhaler, Rfl: 11  •  CARTIA  MG 24 hr capsule, Take 180 mg by mouth Daily., Disp: , Rfl:   •  Cholecalciferol (VITAMIN D3) 2000 UNITS tablet, Take 1 tablet by mouth daily., Disp: , Rfl:   •  clindamycin (CLEOCIN T) 1 % lotion, Apply  topically to the appropriate area as directed 2 (Two) Times a Day., Disp: , Rfl:   •  digoxin (LANOXIN) 125 MCG tablet, Take 1 tablet by mouth Daily., Disp: 90 tablet, Rfl: 2  •  fexofenadine (ALLEGRA) 180 MG tablet, Take 180 mg by mouth Daily., Disp: , Rfl:   •  fluticasone (FLONASE) 50 MCG/ACT nasal spray, 2 Squirts into the nostril(s) as directed by provider 2 (Two) Times a Day., Disp: , Rfl:   •  methIMAzole (TAPAZOLE) 5 MG tablet, TAKE ONE AND ONE-HALF (1 & 1/2) TABLET BY MOUTH DAILY, Disp: 60 tablet, Rfl: 4  •  montelukast (SINGULAIR) 10 MG tablet, Take 1 tablet by mouth Daily., Disp: 90 tablet, Rfl: 3  •  Multiple Vitamins-Minerals (PRESERVISION AREDS 2) chewable tablet, Chew 2 capsules Every Morning., Disp: , Rfl:   •  O2 (OXYGEN), Inhale Continuous., Disp: , Rfl:   •  SPIRIVA RESPIMAT 2.5 MCG/ACT aerosol solution, 2 puffs Daily., Disp: , Rfl:   •  vitamin B-12 (CYANOCOBALAMIN) 1000 MCG tablet, Take 1,000 mcg by mouth Daily. M,W,F, Disp: , Rfl:   •  metroNIDAZOLE (METROCREAM) 0.75 % cream, , Disp: , Rfl:     Allergies:    Allergies    Allergen Reactions   • Codeine    • Penicillin V    • Penicillins    • Mucinex D [Pseudoephedrine-Guaifenesin Er] Nausea Only     Felt hot in chest       Family History:  her family history includes Arthritis in her mother; Breast cancer in her paternal grandmother; Heart failure in her mother; Lung cancer in her father; No Known Problems in her sister.    Social History:  she  reports that she quit smoking about 25 years ago. Her smoking use included cigarettes. She has a 100.00 pack-year smoking history. She has never used smokeless tobacco. She reports that she does not drink alcohol or use drugs.    Pertinent Findings on   Review of Systems   Constitutional: Positive for fatigue. Negative for appetite change, chills, diaphoresis, fever and unexpected weight change.   HENT:   Negative for hearing loss, lump/mass, mouth sores, nosebleeds, sore throat, tinnitus, trouble swallowing and voice change.    Eyes: Negative for eye problems and icterus.   Respiratory: Negative for chest tightness, cough, hemoptysis, shortness of breath and wheezing.    Cardiovascular: Positive for palpitations. Negative for chest pain and leg swelling.   Gastrointestinal: Negative for abdominal distention, abdominal pain, blood in stool, constipation, diarrhea, nausea, rectal pain and vomiting.   Endocrine: Negative for hot flashes.   Genitourinary: Negative for bladder incontinence, difficulty urinating, dyspareunia, dysuria, frequency, hematuria, menstrual problem, nocturia, pelvic pain, vaginal bleeding and vaginal discharge.    Musculoskeletal: Negative for arthralgias, back pain, flank pain, gait problem, myalgias, neck pain and neck stiffness.   Skin: Negative for itching, rash and wound.   Neurological: Negative for dizziness, extremity weakness, gait problem, headaches, light-headedness, numbness, seizures and speech difficulty.   Hematological: Negative for adenopathy. Does not bruise/bleed easily.   Psychiatric/Behavioral:  Negative for confusion, decreased concentration, depression, sleep disturbance and suicidal ideas. The patient is not nervous/anxious.    :  Vitals:    01/08/20 0942   BP: 139/75   Pulse: 106   SpO2: 96%  Comment: 2L   Weight: 43.1 kg (95 lb)   PainSc: 0-No pain       Pertinent Findings on:  Physical Exam   Constitutional: She is oriented to person, place, and time. She appears well-developed and well-nourished. She is active and cooperative. No distress.   On oxygen at 2L   HENT:   Head: Normocephalic and atraumatic.   Nose: Nose normal.   Mouth/Throat: Mucous membranes are normal. Normal dentition.   Eyes: Conjunctivae and EOM are normal.   Neck: Normal range of motion.   Pulmonary/Chest: Effort normal.   Abdominal: Normal appearance. There is no hepatosplenomegaly.   Musculoskeletal: Normal range of motion.     Vascular Status -  Her right foot exhibits no edema. Her left foot exhibits no edema.  Neurological: She is alert and oriented to person, place, and time.   Skin: Skin is warm and dry.   Psychiatric: She has a normal mood and affect. Her behavior is normal. Judgment and thought content normal.          Assessment: Malignant neoplasm of upper lobe of left lung (CMS/HCC)  Now with recurrent disease within the treatment field and also in the left lingula  This assessment comes from my review of the imaging, pathology, physician notes and other pertinent information as mentioned.    Plan:   We reviewed today the diagnostic imaging and specifically the areas of progressive disease.  The lesion in the left apex sits directly within the previous area of treatment.  The lingular lesion has not yet been treated.  She does to continue to want to be aggressive in her local treatment and is not thrilled about the possibility of proceeding on with any systemic treatment, even immunotherapy with Keytruda as discussed with Dr. Ventura.    I explained that I will certainly be able to treat the lingular lesion and should be  able to complete that stereotactically as that area has not yet been treated.  Given the small recurrence within the previous treatment field, I would also like to treat the left apical lesions stereotactically if possible.    We did discuss the recent literature regarding the concurrent benefits of administering immunotherapy along with stereotactic radiation and I did encourage her to consider that further.  I let her know that I would also make Dr. Ventura aware of our discussion and my possibility to treat both areas in the hopes that she would pursue further conversations about the immunotherapy.    We were able to start our treatment planning process with a CAT scan today.  I will be planning the lingular lesion and retreatment of the left apical lesion and once the decision about the immunotherapy is needed, we will get the treatment scheduled.    Objective   I spent greater than 60 minutes in face-to-face time with the patient and 45 minutes of that time were spent in counseling and coordination of care, including review of imaging and pathology; indications, goals, logistics, alternatives and risks - both common and rare - for my recommendations as well as surveillance and potential outcomes.    Referring Provider:  Je Ventura MD  Patient Care Team:  Kevin Seymour MD as PCP - General  [facility] as Consulting Physician (Cardiology)  Heather Anthony MD as Consulting Physician (Pulmonary Disease)  Laith Prado DPM as Consulting Physician (Podiatry)  Michael Christie MD as Consulting Physician (Endocrinology)  Je Ventura MD as Consulting Physician (Hematology and Oncology)  Roderick Frankel Jr., MD as Surgeon (General Surgery)  Je Castro MD as Consulting Physician (Dermatology)  Donn Cotter MD as Referring Physician (Infectious Diseases)  Brandie Bennett MD as Consulting Physician (Radiation Oncology)

## 2020-01-09 ENCOUNTER — TELEPHONE (OUTPATIENT)
Dept: ONCOLOGY | Facility: CLINIC | Age: 75
End: 2020-01-09

## 2020-01-09 NOTE — TELEPHONE ENCOUNTER
----- Message from Je Ventura MD sent at 1/9/2020 10:50 AM EST -----  Please see if she can come in to see me Monday 4pm no labs

## 2020-01-13 ENCOUNTER — APPOINTMENT (OUTPATIENT)
Dept: LAB | Facility: HOSPITAL | Age: 75
End: 2020-01-13

## 2020-01-13 ENCOUNTER — OFFICE VISIT (OUTPATIENT)
Dept: ONCOLOGY | Facility: CLINIC | Age: 75
End: 2020-01-13

## 2020-01-13 VITALS
TEMPERATURE: 97.4 F | OXYGEN SATURATION: 94 % | HEART RATE: 96 BPM | RESPIRATION RATE: 16 BRPM | WEIGHT: 95.6 LBS | BODY MASS INDEX: 20.62 KG/M2 | DIASTOLIC BLOOD PRESSURE: 74 MMHG | HEIGHT: 57 IN | SYSTOLIC BLOOD PRESSURE: 137 MMHG

## 2020-01-13 DIAGNOSIS — Z79.899 ENCOUNTER FOR LONG-TERM (CURRENT) USE OF OTHER MEDICATIONS: ICD-10-CM

## 2020-01-13 DIAGNOSIS — C34.90 RECURRENT NON-SMALL CELL LUNG CANCER (HCC): Primary | ICD-10-CM

## 2020-01-13 PROCEDURE — 99214 OFFICE O/P EST MOD 30 MIN: CPT | Performed by: INTERNAL MEDICINE

## 2020-01-13 RX ORDER — SODIUM CHLORIDE 9 MG/ML
250 INJECTION, SOLUTION INTRAVENOUS ONCE
Status: CANCELLED | OUTPATIENT
Start: 2020-01-21

## 2020-01-13 NOTE — PROGRESS NOTES
History:     Reason for follow up:   1.  Stage IIB left upper lobe non-small cell lung cancer, high PDL-1 (80%), negative egfr/alk/ros   * status post radiation therapy completed 1/26/18     HPI:  Maryam Arredondo presents for follow-up of her lung cancer.  The patient completed radiation therapy to a left upper lobe non-small cell carcinoma January 2018.  She returns today for follow-up.  Her primary complaint is of fatigue.  Shortness of breath and is dependent on O2 by nasal cannula.  Respiratory symptoms are stable.  She denies pain in the chest.  She reports normal appetite and stable weight.  She denies headache or neurologic changes.    The patient return today with her family to discuss further treatment of her recurrent lung cancer after meeting with Dr. Bennett.  She had no new symptoms to report today other than some increased anxiety.      Reviewed, confirmed and updated history (past medical, social and family)   Past Medical   Past Medical History:   Diagnosis Date   • Abnormal color of sputum     grey   • Acromioclavicular joint arthritis    • Acute maxillary sinusitis    • Acute upper respiratory infection    • Allergic rhinitis    • Anemia    • Asthma    • B12 deficiency    • Benign essential hypertension    • Breast cancer (CMS/HCC)     s/p Lumpectomy ~2000 and chemo/XRT. Then  Masectomy ~2004 for some recurrence   • Breast cancer (CMS/HCC)    • Cerumen impaction    • Chronic obstructive pulmonary disease (CMS/HCC)    • COPD (chronic obstructive pulmonary disease) (CMS/HCC)    • COPD exacerbation (CMS/HCC)    • Diverticulosis of colon    • Cedric-Barr infection    • Fever    • Heart disease    • High risk medication use    • High risk medication use    • Hospital discharge follow-up    • Hyperthyroidism    • Hyperthyroidism    • Hypoxia    • Laryngitis    • Leg wound, left    • Medicare annual wellness visit, subsequent    • Mitral regurgitation     mild to moderate   • Nocturnal hypoxia    •  Non-small cell lung cancer (CMS/HCC)    • Non-small cell lung cancer (CMS/HCC)    • Onychomycosis of toenail    • Oral aphthae    • Osteopenia    • Osteopenia    • Other fatigue    • Oxygen dependent     2L - at night only   • Pneumonia of upper lobe of lung (CMS/HCC)    • Pneumothorax    • Post-menopausal    • Skin abrasion    • SOB (shortness of breath)    • Supraventricular tachycardia (CMS/HCC)    • Tachycardia    • Thyroid nodule    • Toxic multinodular goiter    • Vitamin B deficiency    • Vitamin D deficiency    • Vitamin D deficiency    • Wound healing well on examination     and   Patient Active Problem List   Diagnosis   • Pneumothorax   • Hypoxia   • Tachycardia   • Hyperthyroidism   • Essential hypertension   • Multifocal atrial tachycardia (CMS/HCC)   • Toxic multinodular goiter   • Pulmonary emphysema (CMS/HCC)   • Vitamin D insufficiency   • Pneumonia   • COPD with acute lower respiratory infection (CMS/HCC)   • Malignant neoplasm of upper lobe of left lung (CMS/HCC)   • Infected skin tear   • Allergic rhinitis   • B12 deficiency   • Oxygen dependent   • Mitral regurgitation   • Breast cancer (CMS/HCC)   • Medicare annual wellness visit, subsequent   • Panlobular emphysema (CMS/HCC)     Social History   Social History     Socioeconomic History   • Marital status: Single     Spouse name: Not on file   • Number of children: Not on file   • Years of education: Not on file   • Highest education level: Not on file   Occupational History   • Occupation:      Employer: RETIRED   Tobacco Use   • Smoking status: Former Smoker     Packs/day: 2.00     Years: 50.00     Pack years: 100.00     Types: Cigarettes     Last attempt to quit: 3/14/1994     Years since quittin.8   • Smokeless tobacco: Never Used   • Tobacco comment: D/C 20 years ago, smoking 2 ppd before quitting   Substance and Sexual Activity   • Alcohol use: No     Comment: Social use rare   • Drug use: No   • Sexual  "activity: Defer     Comment: drinks decaf      Family History  Family History   Problem Relation Age of Onset   • Arthritis Mother    • Heart failure Mother         Congestive Heart Failure   • Lung cancer Father    • Breast cancer Paternal Grandmother    • No Known Problems Sister      Allergies  Allergies   Allergen Reactions   • Codeine    • Penicillin V    • Penicillins    • Mucinex D [Pseudoephedrine-Guaifenesin Er] Nausea Only     Felt hot in chest       Medications: The current medication list was reviewed in the EMR.    Review of Systems  Review of Systems   Constitutional: Positive for fatigue. Negative for activity change, appetite change, chills, diaphoresis, fever and unexpected weight change.   HENT: Negative for congestion and sinus pressure.    Respiratory: Positive for shortness of breath. Negative for cough and chest tightness.    Cardiovascular: Negative for chest pain, palpitations and leg swelling.   Gastrointestinal: Negative for abdominal pain, blood in stool, constipation, diarrhea, nausea and vomiting.   Endocrine: Negative.    Genitourinary: Negative.    Musculoskeletal: Positive for arthralgias. Negative for joint swelling and myalgias.   Skin: Negative.    Allergic/Immunologic: Negative.    Neurological: Negative.    Hematological: Negative for adenopathy. Does not bruise/bleed easily.   Psychiatric/Behavioral: Behavioral problem:  The patient is nervous/anxious.        Objective    Objective:     Vitals:    01/13/20 1558   BP: 137/74   Pulse: 96   Resp: 16   Temp: 97.4 °F (36.3 °C)   TempSrc: Oral   SpO2: 94%  Comment: with oxygen   Weight: 43.4 kg (95 lb 9.6 oz)   Height: 144.8 cm (57.01\")   PainSc: 0-No pain     Current Status 1/13/2020   ECOG score 1   GENERAL:  Well-developed, somewhat thin and chronic ill-appearing female in no acute distress. Vital signs reviewed.   SKIN:  Warm, dry without rashes, purpura or petechiae.  EYES:    EOMs intact.  Conjunctivae normal.  HEAD:  " Normocephalic.  NECK:  Supple with good range of motion; no thyromegaly or masses  LYMPHATICS:  No cervical, supraclavicular, axillary adenopathy.  RESP: Mild decrease in breath sounds, no increased work of breathing, no wheezing  CARDIAC:  Regular rate and rhythm without murmurs, rubs or gallops. Normal S1,S2. No edema  GI:  Soft, nontender, normal bowel sounds  MSK:  No clubbing or cyanosis, No joint swelling noted in hands  NEUROLOGICAL:   No focal neurological deficits.  PSYCHIATRIC:  Normal affect and mood.  Alert and Oriented x 3.   Exam unchanged- 1/13/2020  Labs and Imaging  Results for orders placed or performed during the hospital encounter of 12/20/19   POC Glucose Once   Result Value Ref Range    Glucose 101 70 - 130 mg/dL       CT chest 12/28/18: There is decreased size of the left apical mass 1.3 cm compared to previous 1.5 cm.  Adjacent granulated density also decreased in size from 1.7 down to 0.9 cm.  There is stable emphysema and apical scarring.  There are stable thyroid nodules.  No new lymphadenopathy in the chest.  There is a right hepatic enhancing lesion stable.  I personally reviewed the CT chest and there is stable to improved disease/scar in the left upper lobe of the lung.    CT chest 4/23/2019: Again seen are hypodense bilateral thyroid lesions, hyperenhancing lesions in the liver unchanged.  There is no mediastinal, hilar or axillary lymphadenopathy.  There is severe emphysema.  There are stable nodules in the right middle and lower lobe since 11/3/2016.  There is a new sub-6 mm nodule in the anterior right middle lobe.  Stable masslike thickening in the left lung apex since 12/28/2018 and decreased since 10/8/2018.  There is irregular opacification in the left apex likely radiation fibrosis.    CT chest 8/12/2019: Decreased size of nodular densities in the left upper lobe.  New 10 mm subpleural nodule in the lingula.  Stable hyperenhancing hepatic nodules.    CT chest abdomen  12/9/2019: There is discrete masslike soft tissue thickening in the left apex increased from previous 1.2 x 1.9 cm up to 1.6 x 2.4 cm.  Nodule in the right upper lobe stable compared to 10/8/2018, nodule in the right middle lobe stable compared to 10/8/2018.  Nodule in the right lower lobe stable compared to 10/8/2018.  There is a nodule in the lingula which has increased in size from 1.0 up to 1.4 cm.  No evidence of disease in the abdomen.    PET scan 12/20/2019: There is uptake in the right lobe of the thyroid which was present and stable since 10/18/2018.  There is increased uptake in the left lung apex SUV 11.1 and a smaller pleural-based nodularity laterally 1.1 cm SUV 4.7.  There is a 1.4 cm nodule in the lingula with SUV 7.9.  There is uptake in the left hilum which is similar to 10/18/2018.    Assessment/Plan   Assessment/Plan:   This is a 72 y.o. female with:     1.  Recurrent left upper lobe non-small cell lung cancer, high PDL-1 (80%), negative egfr/alk/ros   *The patient was previously treated with radiation therapy to the left upper lobe nodule/mass completed January 2018   *I personally reviewed the PET scan from 12/20/2019.  There is significant uptake in the mass in the apex of the left lung and also a nodule in the lingula with significant uptake for its size and I think both areas are likely consistent with recurrent disease.  I am very hesitant to send the patient for a biopsy as she has very poor lung function and a pneumothorax might prove fatal for her.  There is some uptake in the left hilum but this was present in October 2018 and stable.  There is uptake in the right thyroid gland which is stable and the patient has declined biopsy of this in the past.     I discussed the case with Dr. Bennett who has seen the patient back in consultation.  The recurrent disease in the lingula can be treated with stereotactic radiation and the left apex lesion can also likely be treated with focal  radiation.    I discussed with the patient and her family today rationale of giving immunotherapy in conjunction with radiation which can have synergistic effects.  I specifically discussed Keytruda and the possible risk and side effects associated with immunotherapy drugs.  The patient and family had multiple questions which I answered to the best of my ability.  Treatment is palliative in intent.  The patient wants to proceed with immunotherapy which will be scheduled later this week.    I would anticipate repeating a PET scan approximately 8 weeks after completion of radiation therapy.  The patient will check with Dr. Bennett regarding appointments for radiation.    2.  Multinodular goiter stable by CT--followed by endocrinology.  There is uptake in the right thyroid gland similar to prior PET.  The patient declines biopsy of the hypermetabolic right thyroid lesion    3. History of right breast cancer, initially diagnosed in 2000 treated with lumpectomy, radiation, chemotherapy and endocrine therapy with local recurrence in 2005 treated with mastectomy and then Arimidex.    * Mammogram due July 2020    4. COPD, oxygen dependent    5.  Coronary artery calcifications: The patient asked me to forward my note to her cardiologist Dr. Bobo regarding incidental coronary calcifications noted by CT

## 2020-01-15 ENCOUNTER — TELEPHONE (OUTPATIENT)
Dept: ONCOLOGY | Facility: CLINIC | Age: 75
End: 2020-01-15

## 2020-01-15 PROCEDURE — 77293 RESPIRATOR MOTION MGMT SIMUL: CPT | Performed by: RADIOLOGY

## 2020-01-15 PROCEDURE — 77338 DESIGN MLC DEVICE FOR IMRT: CPT | Performed by: RADIOLOGY

## 2020-01-15 PROCEDURE — 77301 RADIOTHERAPY DOSE PLAN IMRT: CPT | Performed by: RADIOLOGY

## 2020-01-15 NOTE — TELEPHONE ENCOUNTER
Provider: Dr. Ventura  Caller: Maryam Arredondo  Relationship to Patient: self  Phone Number: [phone]  Reason for Call: Has Infusion been approved by Insurance need call back, has appointment 1-21-20.

## 2020-01-16 ENCOUNTER — TELEPHONE (OUTPATIENT)
Dept: FAMILY MEDICINE CLINIC | Facility: CLINIC | Age: 75
End: 2020-01-16

## 2020-01-16 NOTE — TELEPHONE ENCOUNTER
Patient would like to know if she can stop taking her allegra & continue only her singular at nighttime which seems to be working for her

## 2020-01-21 ENCOUNTER — INFUSION (OUTPATIENT)
Dept: ONCOLOGY | Facility: HOSPITAL | Age: 75
End: 2020-01-21

## 2020-01-21 ENCOUNTER — OFFICE VISIT (OUTPATIENT)
Dept: ONCOLOGY | Facility: CLINIC | Age: 75
End: 2020-01-21

## 2020-01-21 VITALS — BODY MASS INDEX: 20.55 KG/M2 | WEIGHT: 95 LBS

## 2020-01-21 DIAGNOSIS — C34.90 RECURRENT NON-SMALL CELL LUNG CANCER (HCC): Primary | ICD-10-CM

## 2020-01-21 DIAGNOSIS — C34.90 RECURRENT NON-SMALL CELL LUNG CANCER (HCC): ICD-10-CM

## 2020-01-21 DIAGNOSIS — Z79.899 ENCOUNTER FOR LONG-TERM (CURRENT) USE OF OTHER MEDICATIONS: ICD-10-CM

## 2020-01-21 DIAGNOSIS — C34.12 MALIGNANT NEOPLASM OF UPPER LOBE OF LEFT LUNG (HCC): Primary | ICD-10-CM

## 2020-01-21 LAB
ALBUMIN SERPL-MCNC: 3.9 G/DL (ref 3.5–5.2)
ALBUMIN/GLOB SERPL: 1.1 G/DL (ref 1.1–2.4)
ALP SERPL-CCNC: 83 U/L (ref 38–116)
ALT SERPL W P-5'-P-CCNC: 13 U/L (ref 0–33)
ANION GAP SERPL CALCULATED.3IONS-SCNC: 11.5 MMOL/L (ref 5–15)
AST SERPL-CCNC: 15 U/L (ref 0–32)
BASOPHILS # BLD AUTO: 0.05 10*3/MM3 (ref 0–0.2)
BASOPHILS NFR BLD AUTO: 0.7 % (ref 0–1.5)
BILIRUB SERPL-MCNC: 0.3 MG/DL (ref 0.2–1.2)
BUN BLD-MCNC: 17 MG/DL (ref 6–20)
BUN/CREAT SERPL: 29.8 (ref 7.3–30)
CALCIUM SPEC-SCNC: 9.7 MG/DL (ref 8.5–10.2)
CHLORIDE SERPL-SCNC: 103 MMOL/L (ref 98–107)
CO2 SERPL-SCNC: 27.5 MMOL/L (ref 22–29)
CREAT BLD-MCNC: 0.57 MG/DL (ref 0.6–1.1)
DEPRECATED RDW RBC AUTO: 49.1 FL (ref 37–54)
EOSINOPHIL # BLD AUTO: 0.09 10*3/MM3 (ref 0–0.4)
EOSINOPHIL NFR BLD AUTO: 1.2 % (ref 0.3–6.2)
ERYTHROCYTE [DISTWIDTH] IN BLOOD BY AUTOMATED COUNT: 14.2 % (ref 12.3–15.4)
GFR SERPL CREATININE-BSD FRML MDRD: 103 ML/MIN/1.73
GLOBULIN UR ELPH-MCNC: 3.6 GM/DL (ref 1.8–3.5)
GLUCOSE BLD-MCNC: 93 MG/DL (ref 74–124)
HCT VFR BLD AUTO: 40.2 % (ref 34–46.6)
HGB BLD-MCNC: 12.9 G/DL (ref 12–15.9)
IMM GRANULOCYTES # BLD AUTO: 0.03 10*3/MM3 (ref 0–0.05)
IMM GRANULOCYTES NFR BLD AUTO: 0.4 % (ref 0–0.5)
LYMPHOCYTES # BLD AUTO: 0.7 10*3/MM3 (ref 0.7–3.1)
LYMPHOCYTES NFR BLD AUTO: 9.6 % (ref 19.6–45.3)
MCH RBC QN AUTO: 30.4 PG (ref 26.6–33)
MCHC RBC AUTO-ENTMCNC: 32.1 G/DL (ref 31.5–35.7)
MCV RBC AUTO: 94.8 FL (ref 79–97)
MONOCYTES # BLD AUTO: 0.69 10*3/MM3 (ref 0.1–0.9)
MONOCYTES NFR BLD AUTO: 9.5 % (ref 5–12)
NEUTROPHILS # BLD AUTO: 5.73 10*3/MM3 (ref 1.7–7)
NEUTROPHILS NFR BLD AUTO: 78.6 % (ref 42.7–76)
NRBC BLD AUTO-RTO: 0 /100 WBC (ref 0–0.2)
PLATELET # BLD AUTO: 310 10*3/MM3 (ref 140–450)
PMV BLD AUTO: 9 FL (ref 6–12)
POTASSIUM BLD-SCNC: 3.8 MMOL/L (ref 3.5–4.7)
PROT SERPL-MCNC: 7.5 G/DL (ref 6.3–8)
RBC # BLD AUTO: 4.24 10*6/MM3 (ref 3.77–5.28)
SODIUM BLD-SCNC: 142 MMOL/L (ref 134–145)
T4 FREE SERPL-MCNC: 1.15 NG/DL (ref 0.93–1.7)
TSH SERPL DL<=0.05 MIU/L-ACNC: 1 UIU/ML (ref 0.27–4.2)
WBC NRBC COR # BLD: 7.29 10*3/MM3 (ref 3.4–10.8)

## 2020-01-21 PROCEDURE — 99215 OFFICE O/P EST HI 40 MIN: CPT | Performed by: NURSE PRACTITIONER

## 2020-01-21 PROCEDURE — 25010000002 PEMBROLIZUMAB 100 MG/4ML SOLUTION 4 ML VIAL: Performed by: NURSE PRACTITIONER

## 2020-01-21 PROCEDURE — 84443 ASSAY THYROID STIM HORMONE: CPT | Performed by: INTERNAL MEDICINE

## 2020-01-21 PROCEDURE — 85025 COMPLETE CBC W/AUTO DIFF WBC: CPT

## 2020-01-21 PROCEDURE — 96413 CHEMO IV INFUSION 1 HR: CPT | Performed by: NURSE PRACTITIONER

## 2020-01-21 PROCEDURE — 77300 RADIATION THERAPY DOSE PLAN: CPT | Performed by: RADIOLOGY

## 2020-01-21 PROCEDURE — 80053 COMPREHEN METABOLIC PANEL: CPT

## 2020-01-21 PROCEDURE — 84439 ASSAY OF FREE THYROXINE: CPT | Performed by: INTERNAL MEDICINE

## 2020-01-21 RX ORDER — SODIUM CHLORIDE 9 MG/ML
250 INJECTION, SOLUTION INTRAVENOUS ONCE
Status: COMPLETED | OUTPATIENT
Start: 2020-01-21 | End: 2020-01-21

## 2020-01-21 RX ADMIN — SODIUM CHLORIDE 250 ML: 9 INJECTION, SOLUTION INTRAVENOUS at 11:27

## 2020-01-21 RX ADMIN — SODIUM CHLORIDE 200 MG: 9 INJECTION, SOLUTION INTRAVENOUS at 11:27

## 2020-01-21 NOTE — PROGRESS NOTES
Subjective     PATIENT NAME:  Maryam Arredondo  YOB: 1945  PATIENTS AGE:  74 y.o.  PATIENTS SEX:  female  DATE OF SERVICE:  01/21/2020  PROVIDER:  GUILLE Cervantes      ____________________PATIENT EDUCATION____________________    PATIENT EDUCATION:  Today I met with the patient to discuss the chemotherapy regimen recommended for treatment of her lung cancer.  The patient was given explanation of treatment premed side effects including office policy that prohibits patients to drive if sedating medications are administered, MD explanation given regarding benefits, side effects, toxicities and goals of treatment.  The patient received a Chemotherapy/Biotherapy Plan Summary including diagnosis and specific treatment plan.    SIDE EFFECTS:  Common side effects were discussed with the patient and/or significant other.  Discussion included:    The following side effects are common (occurring in greater than 30%) for patients taking Keytruda:     Anemia   Fatigue   Hyperglycemia   Hyponatremia   Hypoalbuminemia   Itching   Cough   Nausea     These side effects are less common side effects (occurring in about 10-29%) of patients receiving Keytruda®:   Rash   Decreased appetite   Hypertriglyceridemia   Increased liver enzymes   Hypocalcemia   Constipation   Diarrhea   Arthralgia   Pain in extremity   Shortness of breath   Swelling   Headache   Vomiting   Chills   Myalgia   Insomnia   Abdominal pain   Back pain   Fever   Vitiligo   Dizziness   Upper respiratory tract infection       A total of 45 minutes were spent with the patient, with 100% of time spent in education and counseling.

## 2020-01-22 PROCEDURE — 77470 SPECIAL RADIATION TREATMENT: CPT | Performed by: RADIOLOGY

## 2020-01-22 NOTE — PROGRESS NOTES
Patient had 1st  treatment yesterday. Contacted Patient today. Patient states she has ate and drank well today. Patient states she has been little tired today. No other complaints at this time.

## 2020-01-24 ENCOUNTER — RADIATION ONCOLOGY WEEKLY ASSESSMENT (OUTPATIENT)
Dept: RADIATION ONCOLOGY | Facility: HOSPITAL | Age: 75
End: 2020-01-24

## 2020-01-24 DIAGNOSIS — C34.90 RECURRENT NON-SMALL CELL LUNG CANCER (HCC): Primary | ICD-10-CM

## 2020-01-24 PROCEDURE — 77373 STRTCTC BDY RAD THER TX DLVR: CPT | Performed by: RADIOLOGY

## 2020-01-24 PROCEDURE — 77435 SBRT MANAGEMENT: CPT | Performed by: RADIOLOGY

## 2020-01-24 NOTE — PROGRESS NOTES
"  Physician Stereotactic Management Note    Diagnosis:     1. Recurrent non-small cell lung cancer (CMS/HCC)      Doing well pretreatment, no problems.    Treatment Number and Dose: 1/3 - left upper lobe    I was personally present at the machine for the delivery of the above treatment.     I provided direct supervision of the patient's set up, treatment parameters and image guidance. I provided corrections and approval of the above prior to the treatment, in real time. I was present for any adjustments required due to patient motion, target movement or equipment issues.    The ongoing images for localization, tumor tracking, gating and beam's eye view localization images will also be approved.     Notes on treatment course, films, medical progress and plan:    Doing well. Tolerated treatment without difficulty. No problems or questions.    Problem added:  None  Medications added:   None  Ancillary referrals made: None    I have reviewed and marked as \"reviewed\" the current medications, allergies and problem list in the patients EMR.    Patient's Care Team:  Patient Care Team:  Kevin Seymour MD as PCP - General  Unruly Bobo MD as Consulting Physician (Cardiology)  Heather Anthony MD as Consulting Physician (Pulmonary Disease)  Laith Prado DPM as Consulting Physician (Podiatry)  Michael Christie MD as Consulting Physician (Endocrinology)  Je Ventura MD as Consulting Physician (Hematology and Oncology)  Roderick Frankel Jr., MD as Surgeon (General Surgery)  Je Castro MD as Consulting Physician (Dermatology)  Donn Cotter MD as Referring Physician (Infectious Diseases)  Brandie Bennett MD as Consulting Physician (Radiation Oncology)      "

## 2020-01-26 ENCOUNTER — APPOINTMENT (OUTPATIENT)
Dept: CT IMAGING | Facility: HOSPITAL | Age: 75
End: 2020-01-26

## 2020-01-26 ENCOUNTER — APPOINTMENT (OUTPATIENT)
Dept: GENERAL RADIOLOGY | Facility: HOSPITAL | Age: 75
End: 2020-01-26

## 2020-01-26 ENCOUNTER — HOSPITAL ENCOUNTER (EMERGENCY)
Facility: HOSPITAL | Age: 75
Discharge: HOME | End: 2020-01-26
Attending: EMERGENCY MEDICINE | Admitting: EMERGENCY MEDICINE

## 2020-01-26 VITALS
RESPIRATION RATE: 18 BRPM | WEIGHT: 94.5 LBS | DIASTOLIC BLOOD PRESSURE: 81 MMHG | BODY MASS INDEX: 18.55 KG/M2 | HEART RATE: 102 BPM | SYSTOLIC BLOOD PRESSURE: 118 MMHG | OXYGEN SATURATION: 94 % | HEIGHT: 60 IN | TEMPERATURE: 98.4 F

## 2020-01-26 DIAGNOSIS — S81.012A LACERATION OF LEFT KNEE, INITIAL ENCOUNTER: ICD-10-CM

## 2020-01-26 DIAGNOSIS — S00.93XA CONTUSION OF HEAD, UNSPECIFIED PART OF HEAD, INITIAL ENCOUNTER: Primary | ICD-10-CM

## 2020-01-26 DIAGNOSIS — S01.81XA LACERATION OF FOREHEAD, INITIAL ENCOUNTER: ICD-10-CM

## 2020-01-26 PROCEDURE — 73560 X-RAY EXAM OF KNEE 1 OR 2: CPT

## 2020-01-26 PROCEDURE — 70450 CT HEAD/BRAIN W/O DYE: CPT

## 2020-01-26 PROCEDURE — 90715 TDAP VACCINE 7 YRS/> IM: CPT | Performed by: EMERGENCY MEDICINE

## 2020-01-26 PROCEDURE — 99283 EMERGENCY DEPT VISIT LOW MDM: CPT

## 2020-01-26 PROCEDURE — 25010000002 TDAP 5-2.5-18.5 LF-MCG/0.5 SUSPENSION: Performed by: EMERGENCY MEDICINE

## 2020-01-26 PROCEDURE — 90471 IMMUNIZATION ADMIN: CPT | Performed by: EMERGENCY MEDICINE

## 2020-01-26 RX ORDER — LIDOCAINE HYDROCHLORIDE AND EPINEPHRINE 10; 10 MG/ML; UG/ML
10 INJECTION, SOLUTION INFILTRATION; PERINEURAL ONCE
Status: COMPLETED | OUTPATIENT
Start: 2020-01-26 | End: 2020-01-26

## 2020-01-26 RX ADMIN — LIDOCAINE HYDROCHLORIDE,EPINEPHRINE BITARTRATE 10 ML: 10; .01 INJECTION, SOLUTION INFILTRATION; PERINEURAL at 14:50

## 2020-01-26 RX ADMIN — TETANUS TOXOID, REDUCED DIPHTHERIA TOXOID AND ACELLULAR PERTUSSIS VACCINE, ADSORBED 0.5 ML: 5; 2.5; 8; 8; 2.5 SUSPENSION INTRAMUSCULAR at 16:25

## 2020-01-26 NOTE — PROGRESS NOTES
Received request from primary nurse, Vel FERNANDEZ, for assistance with placement of Home Health order.  Patient to be discharged from ER s/p lac repair to left knee; requesting education for patient/family and assistance with wound/dressing care as needed.  Lu ROY placed Ambulatory Referral to Home Health per [facility] without complication.  On-call Home Health RN, Rekha, contacted to provide update as to patient referral.  Per request, facesheet, referral order, and ER provider note faxed to  Office; confirmation sheet received.  Primary RN and Lu ROY updated as to status of referral; understanding verbalized.  Je Hughes RN

## 2020-01-26 NOTE — ED NOTES
Pt comes to the ER after tripping over oxygen tubing and falling into table. Pt has injury to left knee and hit head but did not lose consciousness and is not on blood thinners.      Sejal Nina RN  01/26/20 9604

## 2020-01-26 NOTE — ED PROVIDER NOTES
EMERGENCY DEPARTMENT ENCOUNTER    CHIEF COMPLAINT  Chief Complaint: L knee laceration   History given by: Patient   History limited by: Nothing   Room Number: 08/08  PMD: Kevin Seymour MD      HPI:  Pt is a 74 y.o. female who presents complaining of L knee laceration/skin tear that occurred PTA when she tripped over her oxygen tank cord causing her to fall. Pt affirms hitting head on coffee table during fall which caused a small forehead laceration but denies LOC. Pt denies L hip and knee pain. She is unsure if tdap is UTD. Pt states she has not tried to ambulate or bend the knee since injury.       Duration:  PTA  Onset: Sudden  Timing: Constant   Location: L knee  Radiation: n/a  Quality: Laceration/skin tear  Intensity/Severity: Moderate  Progression: Unchanged   Associated Symptoms: Forehead laceration   Aggravating Factors: None  Alleviating Factors: None  Previous Episodes: None  Treatment before arrival: None    PAST MEDICAL HISTORY  Active Ambulatory Problems     Diagnosis Date Noted   • Pneumothorax    • Hypoxia 03/14/2016   • Tachycardia 03/14/2016   • Hyperthyroidism 03/14/2016   • Essential hypertension 03/14/2016   • Multifocal atrial tachycardia (CMS/HCC) 03/18/2016   • Toxic multinodular goiter 03/19/2016   • Pulmonary emphysema (CMS/HCC) 10/21/2016   • Vitamin D insufficiency 10/21/2016   • Pneumonia 10/30/2017   • COPD with acute lower respiratory infection (CMS/HCC) 10/30/2017   • Malignant neoplasm of upper lobe of left lung (CMS/HCC) 11/03/2017   • Infected skin tear 08/17/2019   • Allergic rhinitis    • B12 deficiency    • Oxygen dependent    • Mitral regurgitation    • Breast cancer (CMS/HCC)    • Medicare annual wellness visit, subsequent 01/07/2020   • Panlobular emphysema (CMS/HCC) 01/07/2020   • Recurrent non-small cell lung cancer (CMS/HCC) 01/13/2020   • Encounter for long-term (current) use of other medications      Resolved Ambulatory Problems     Diagnosis Date Noted   • COPD  exacerbation (CMS/HCC) 03/14/2016   • Infection due to parainfluenza virus 1 10/31/2017     Past Medical History:   Diagnosis Date   • Abnormal color of sputum    • Acromioclavicular joint arthritis    • Acute maxillary sinusitis    • Acute upper respiratory infection    • Anemia    • Asthma    • Benign essential hypertension    • Cerumen impaction    • Chronic obstructive pulmonary disease (CMS/HCC)    • COPD (chronic obstructive pulmonary disease) (CMS/HCC)    • Diverticulosis of colon    • Cedric-Barr infection    • Fever    • Heart disease    • High risk medication use    • High risk medication use    • Hospital discharge follow-up    • Laryngitis    • Leg wound, left    • Nocturnal hypoxia    • Non-small cell lung cancer (CMS/HCC)    • Non-small cell lung cancer (CMS/HCC)    • Onychomycosis of toenail    • Oral aphthae    • Osteopenia    • Osteopenia    • Other fatigue    • Pneumonia of upper lobe of lung (CMS/HCC)    • Post-menopausal    • Skin abrasion    • SOB (shortness of breath)    • Supraventricular tachycardia (CMS/HCC)    • Thyroid nodule    • Vitamin B deficiency    • Vitamin D deficiency    • Vitamin D deficiency    • Wound healing well on examination        PAST SURGICAL HISTORY  Past Surgical History:   Procedure Laterality Date   • BREAST LUMPECTOMY Right    • BREAST LUMPECTOMY     • CARDIAC CATHETERIZATION      PS SAPHENOUS VAIN RIGHT LEG   • COLONOSCOPY  04/2017    Complete. 4 polyps. Recheck in 2019.    • COLONOSCOPY N/A 4/28/2017    Procedure: COLONOSCOPY WITH POLYPECTOMY(COLD BIOPSY AND HOT SNARE);  Surgeon: Luiza Eddy MD;  Location: [facility];  Service:    • HYSTERECTOMY     • MASTECTOMY Right 2004   • NECK SURGERY      2 spurs removed   • VASCULAR SURGERY      spider vein ablation       FAMILY HISTORY  Family History   Problem Relation Age of Onset   • Arthritis Mother    • Heart failure Mother         Congestive Heart Failure   • Lung cancer Father    • Breast cancer  Paternal Grandmother    • No Known Problems Sister        SOCIAL HISTORY  Social History     Socioeconomic History   • Marital status: Single     Spouse name: Not on file   • Number of children: Not on file   • Years of education: Not on file   • Highest education level: Not on file   Occupational History   • Occupation:      Employer: RETIRED   Tobacco Use   • Smoking status: Former Smoker     Packs/day: 2.00     Years: 50.00     Pack years: 100.00     Types: Cigarettes     Last attempt to quit: 3/14/1994     Years since quittin.8   • Smokeless tobacco: Never Used   • Tobacco comment: D/C 20 years ago, smoking 2 ppd before quitting   Substance and Sexual Activity   • Alcohol use: No     Comment: Social use rare   • Drug use: No   • Sexual activity: Defer     Comment: drinks decaf        ALLERGIES  Codeine; Penicillin v; Penicillins; and Mucinex d [pseudoephedrine-guaifenesin er]    REVIEW OF SYSTEMS  Review of Systems   Constitutional: Negative for fever.   HENT: Negative for sore throat.    Eyes: Negative.    Respiratory: Negative for cough and shortness of breath.    Cardiovascular: Negative for chest pain.   Gastrointestinal: Negative for abdominal pain, diarrhea and vomiting.   Genitourinary: Negative for dysuria.   Musculoskeletal: Negative for neck pain.   Skin: Positive for wound (L knee and forehead lacerations). Negative for rash.   Allergic/Immunologic: Negative.    Neurological: Negative for weakness, numbness and headaches.   Hematological: Negative.    Psychiatric/Behavioral: Negative.    All other systems reviewed and are negative.      PHYSICAL EXAM  ED Triage Vitals [20 1416]   Temp Heart Rate Resp BP SpO2   98.4 °F (36.9 °C) (!) 127 18 -- 91 %      Temp src Heart Rate Source Patient Position BP Location FiO2 (%)   -- -- -- -- --       Physical Exam   Constitutional: She is oriented to person, place, and time. No distress.   HENT:   Head: Normocephalic and  atraumatic.   1/2 cm laceration to forehead, abrasion to chin   Eyes: Pupils are equal, round, and reactive to light. EOM are normal.   Neck: Normal range of motion. Neck supple.   Cardiovascular: Normal rate, regular rhythm and normal heart sounds.   Pulmonary/Chest: Effort normal and breath sounds normal. No respiratory distress.   Abdominal: Soft. There is no tenderness. There is no rebound and no guarding.   Musculoskeletal: Normal range of motion. She exhibits no edema.   L knee is non-tender, 15 cm laceration/skin tear to L knee   Neurological: She is alert and oriented to person, place, and time. She has normal sensation and normal strength.   Skin: Skin is warm and dry. No rash noted.   Psychiatric: Mood and affect normal.   Nursing note and vitals reviewed.      RADIOLOGY  XR Knee 1 or 2 View Left   Final Result   FINDINGS: There is bandaging material overlying the anteromedial left knee. There appears to be underlying soft tissue irregularity and thickening consistent with soft tissue injury. No fracture or acute osseous abnormality is evident. Small enthesophyte is present in the upper pole of the patella. Vascular calcifications are present.   CT Head Without Contrast   Final Result   Mild chronic small vessel ischemic white matter change. No   evidence for acute intracranial abnormality.       This report was finalized on 1/26/2020 3:48 PM by Dr. Ric Bishop M.D.               I ordered the above noted radiological studies. Interpreted by radiologist. Reviewed by me in PACS.       PROCEDURES  Laceration Repair  Date/Time: 1/26/2020 3:31 PM  Performed by: Ryan Leigh MD  Authorized by: Ryan Leigh MD     Consent:     Consent obtained:  Verbal    Consent given by:  Patient    Alternatives discussed:  No treatment  Anesthesia (see MAR for exact dosages):     Anesthesia method:  Local infiltration    Local anesthetic:  Lidocaine 1% WITH epi  Laceration details:     Location:  Leg    Leg  location: L knee.    Length (cm):  15  Repair type:     Repair type:  Simple  Pre-procedure details:     Preparation:  Patient was prepped and draped in usual sterile fashion  Treatment:     Area cleansed with:  Saline    Amount of cleaning:  Extensive    Irrigation solution:  Sterile saline  Skin repair:     Repair method:  Sutures and tissue adhesive    Suture size:  4-0    Suture material:  Nylon    Suture technique:  Simple interrupted    Number of sutures:  25  Post-procedure details:     Dressing: Knee immobilizer.    Patient tolerance of procedure:  Tolerated well, no immediate complications  Laceration Repair  Date/Time: 1/26/2020 4:19 PM  Performed by: Ryan Leigh MD  Authorized by: Ryan Leigh MD     Consent:     Consent obtained:  Verbal    Consent given by:  Patient    Alternatives discussed:  No treatment  Anesthesia (see MAR for exact dosages):     Anesthesia method:  None  Laceration details:     Location:  Face    Face location:  Forehead    Length (cm):  0.5  Repair type:     Repair type:  Simple  Pre-procedure details:     Preparation:  Patient was prepped and draped in usual sterile fashion  Treatment:     Area cleansed with:  Saline    Amount of cleaning:  Standard    Irrigation solution:  Sterile saline  Skin repair:     Repair method:  Tissue adhesive  Approximation:     Approximation:  Close  Post-procedure details:     Dressing:  Open (no dressing)    Patient tolerance of procedure:  Tolerated well, no immediate complications          PROGRESS AND CONSULTS  ED Course as of Jan 26 1631   Sun Jan 26, 2020   1624 4:24 PM  Patient here for fall.  Hit head and Ct negative.  Small laceration that was glued.  Knee injury was part skin tear and part laceration.  Combination of 25 stitches and skin glue to get it back together.  Knee immobilizer.  Suture removal 10-14 days.    [SL]      ED Course User Index  [SL] Ryan Leigh MD       4:25 PM: Rechecked pt who is resting  comfortably and in NAD. Tolerated laceration repair well- see procedure note. Discussed plan to discharge. RTER pre-cautions given. Pt understands and agrees with the plan, all questions answered.      MEDICAL DECISION MAKING  Results were reviewed/discussed with the patient and they were also made aware of online access. Pt also made aware that some labs, such as cultures, will not be resulted during ER visit and follow up with PMD is necessary.     MDM  Number of Diagnoses or Management Options     Amount and/or Complexity of Data Reviewed  Tests in the radiology section of CPT®: ordered and reviewed (CT head and XR L knee negative acute)  Decide to obtain previous medical records or to obtain history from someone other than the patient: yes (Epic)  Review and summarize past medical records: yes (Pt is currently undergoing radiation for lung cancer)  Independent visualization of images, tracings, or specimens: yes    Patient Progress  Patient progress: stable         DIAGNOSIS  Final diagnoses:   Contusion of head, unspecified part of head, initial encounter   Laceration of forehead, initial encounter   Laceration of left knee, initial encounter       DISPOSITION  DISCHARGE    Patient discharged in stable condition.    Reviewed implications of results, diagnosis, meds, responsibility to follow up, warning signs and symptoms of possible worsening, potential complications and reasons to return to ER.    Patient/Family voiced understanding of above instructions.    Discussed plan for discharge, as there is no emergent indication for admission. Patient referred to primary care provider for BP management due to today's BP. Pt/family is agreeable and understands need for follow up and repeat testing.  Pt is aware that discharge does not mean that nothing is wrong but it indicates no emergency is present that requires admission and they must continue care with follow-up as given below or physician of their choice.      FOLLOW-UP  Kevin Seymour MD  [address]  [facility] [facility] [id number] [phone]    Schedule an appointment as soon as possible for a visit   10-14 days for suture removal    [facility] [place]  [address]  [place] [address]             Medication List      Changed    albuterol sulfate  (90 Base) MCG/ACT inhaler  Commonly known as:  PROVENTIL HFA;VENTOLIN HFA;PROAIR HFA  Inhale 2 puffs Every 4 (Four) Hours As Needed for Wheezing. Per MD - until   current episode is resolved  What changed:  when to take this     montelukast 10 MG tablet  Commonly known as:  SINGULAIR  Take 1 tablet by mouth Daily.  What changed:  when to take this              Latest Documented Vital Signs:  As of 4:31 PM  BP- 115/64 HR- 102 Temp- 98.4 °F (36.9 °C) O2 sat- 97%    --  Documentation assistance provided by sharad Darby for . Information recorded by the sharad was done at my direction and has been verified and validated by me.                 April Darby  01/26/20 1631       Ryan Leigh MD  01/26/20 [id number]

## 2020-01-27 ENCOUNTER — APPOINTMENT (OUTPATIENT)
Dept: GENERAL RADIOLOGY | Facility: HOSPITAL | Age: 75
End: 2020-01-27

## 2020-01-27 ENCOUNTER — HOSPITAL ENCOUNTER (EMERGENCY)
Facility: HOSPITAL | Age: 75
Discharge: HOME | End: 2020-01-27
Attending: EMERGENCY MEDICINE | Admitting: EMERGENCY MEDICINE

## 2020-01-27 ENCOUNTER — RADIATION ONCOLOGY WEEKLY ASSESSMENT (OUTPATIENT)
Dept: RADIATION ONCOLOGY | Facility: HOSPITAL | Age: 75
End: 2020-01-27

## 2020-01-27 VITALS
HEART RATE: 118 BPM | SYSTOLIC BLOOD PRESSURE: 128 MMHG | RESPIRATION RATE: 20 BRPM | OXYGEN SATURATION: 93 % | DIASTOLIC BLOOD PRESSURE: 71 MMHG | TEMPERATURE: 98.4 F

## 2020-01-27 DIAGNOSIS — Z51.89 ENCOUNTER FOR WOUND RE-CHECK: ICD-10-CM

## 2020-01-27 DIAGNOSIS — S62.358A: Primary | ICD-10-CM

## 2020-01-27 DIAGNOSIS — C34.90 RECURRENT NON-SMALL CELL LUNG CANCER (HCC): Primary | ICD-10-CM

## 2020-01-27 PROCEDURE — 99283 EMERGENCY DEPT VISIT LOW MDM: CPT

## 2020-01-27 PROCEDURE — 77336 RADIATION PHYSICS CONSULT: CPT | Performed by: RADIOLOGY

## 2020-01-27 PROCEDURE — 73130 X-RAY EXAM OF HAND: CPT

## 2020-01-27 PROCEDURE — 77373 STRTCTC BDY RAD THER TX DLVR: CPT | Performed by: RADIOLOGY

## 2020-01-27 NOTE — ED PROVIDER NOTES
MD ATTESTATION NOTE    Pt presents for a wound check. Patient was seen in ED yesterday for repair of left knee laceration. Patient is complaining of left hand pain but denies other complaints.     On exam,   Patient is awake, alert and in no acute distress.  Laceration above left medial patella with sutures and blood oozing.   Large skin tear over inferior patella with oozing.  Bruising to left hand, good .      Vital signs and nursing notes reviewed.       Plan:  [place] will clean and dress the wound.  XR left hand which showed 1/5 metacarpal fracture and the patient was placed in a ulnar gutter splint.  We will consult CCP RN for Wound Care/Home Health.       The TEJINDER and I have discussed this patient's history, physical exam, and treatment plan.  I have reviewed the documentation and personally had a face to face interaction with the patient. I affirm the documentation and agree with the treatment and plan.  The attached note describes my personal findings.    --  Documentation assistance provided by sharad Martin for Dr. Ric Felix MD.  Information recorded by the scribe was done at my direction and has been verified and validated by me.       Sue Martin  01/27/20 [id number]       Ric Felix MD  01/27/20 [id number]

## 2020-01-27 NOTE — ED PROVIDER NOTES
EMERGENCY DEPARTMENT ENCOUNTER    CHIEF COMPLAINT  Chief Complaint: Wound check  History given by: patient  History limited by: nothing  Time Seen: 1746  Room Number: 42/42  PMD: Kevin Seymour MD      HPI:  Pt is a 74 y.o. female who presents for a wound check of the L knee.  Patient denies fever and chills. The pt was seen in the ED yesterday for a L knee laceration secondary to a mechanical fall. While in the ED, she had a negative XR, CT Head and received 25 4-0 Nylon sutures to the L knee. Her L knee was placed in a knee immobilizer and she was d/c with f/u to PMD. She has hx of non-small cell lung cancer and received radiation treatment earlier today without difficulties. After returning home, she noticed significant bleeding from the sutured site as the gauze was saturated. According to d/c paperwork, she was instructed to return to the ED if bleeding started. Pt has no other complaints at this time.  Past Medical History of anemia, COPD and hyperthyroidism.     Duration: today  Timing:constant  Location: L knee  Quality: bleeding  Intensity/Severity: moderate  Progression: unchanged  Associated Symptoms: none stated  Aggravating Factors: none stated  Alleviating Factors: none stated  Previous Episodes: Pt was seen in the ED yesterday where she had 25 sutures placed in the L knee  Treatment before arrival: none stated    PAST MEDICAL HISTORY  Active Ambulatory Problems     Diagnosis Date Noted   • Pneumothorax    • Hypoxia 03/14/2016   • Tachycardia 03/14/2016   • Hyperthyroidism 03/14/2016   • Essential hypertension 03/14/2016   • Multifocal atrial tachycardia (CMS/HCC) 03/18/2016   • Toxic multinodular goiter 03/19/2016   • Pulmonary emphysema (CMS/HCC) 10/21/2016   • Vitamin D insufficiency 10/21/2016   • Pneumonia 10/30/2017   • COPD with acute lower respiratory infection (CMS/HCC) 10/30/2017   • Malignant neoplasm of upper lobe of left lung (CMS/HCC) 11/03/2017   • Infected skin tear 08/17/2019    • Allergic rhinitis    • B12 deficiency    • Oxygen dependent    • Mitral regurgitation    • Breast cancer (CMS/HCC)    • Medicare annual wellness visit, subsequent 01/07/2020   • Panlobular emphysema (CMS/HCC) 01/07/2020   • Recurrent non-small cell lung cancer (CMS/HCC) 01/13/2020   • Encounter for long-term (current) use of other medications      Resolved Ambulatory Problems     Diagnosis Date Noted   • COPD exacerbation (CMS/HCC) 03/14/2016   • Infection due to parainfluenza virus 1 10/31/2017     Past Medical History:   Diagnosis Date   • Abnormal color of sputum    • Acromioclavicular joint arthritis    • Acute maxillary sinusitis    • Acute upper respiratory infection    • Anemia    • Asthma    • Benign essential hypertension    • Cerumen impaction    • Chronic obstructive pulmonary disease (CMS/HCC)    • COPD (chronic obstructive pulmonary disease) (CMS/HCC)    • Diverticulosis of colon    • Cedric-Barr infection    • Fever    • Heart disease    • High risk medication use    • High risk medication use    • Hospital discharge follow-up    • Laryngitis    • Leg wound, left    • Nocturnal hypoxia    • Non-small cell lung cancer (CMS/HCC)    • Non-small cell lung cancer (CMS/HCC)    • Onychomycosis of toenail    • Oral aphthae    • Osteopenia    • Osteopenia    • Other fatigue    • Pneumonia of upper lobe of lung (CMS/HCC)    • Post-menopausal    • Skin abrasion    • SOB (shortness of breath)    • Supraventricular tachycardia (CMS/HCC)    • Thyroid nodule    • Vitamin B deficiency    • Vitamin D deficiency    • Vitamin D deficiency    • Wound healing well on examination        PAST SURGICAL HISTORY  Past Surgical History:   Procedure Laterality Date   • BREAST LUMPECTOMY Right    • BREAST LUMPECTOMY     • CARDIAC CATHETERIZATION      PS SAPHENOUS VAIN RIGHT LEG   • COLONOSCOPY  04/2017    Complete. 4 polyps. Recheck in 2019.    • COLONOSCOPY N/A 4/28/2017    Procedure: COLONOSCOPY WITH POLYPECTOMY(COLD BIOPSY  AND HOT SNARE);  Surgeon: Luiza Eddy MD;  Location: [facility] ENDOSCOPY;  Service:    • HYSTERECTOMY     • MASTECTOMY Right 2004   • NECK SURGERY      2 spurs removed   • VASCULAR SURGERY      spider vein ablation       FAMILY HISTORY  Family History   Problem Relation Age of Onset   • Arthritis Mother    • Heart failure Mother         Congestive Heart Failure   • Lung cancer Father    • Breast cancer Paternal Grandmother    • No Known Problems Sister        SOCIAL HISTORY  Social History     Socioeconomic History   • Marital status: Single     Spouse name: Not on file   • Number of children: Not on file   • Years of education: Not on file   • Highest education level: Not on file   Occupational History   • Occupation:      Employer: RETIRED   Tobacco Use   • Smoking status: Former Smoker     Packs/day: 2.00     Years: 50.00     Pack years: 100.00     Types: Cigarettes     Last attempt to quit: 3/14/1994     Years since quittin.8   • Smokeless tobacco: Never Used   • Tobacco comment: D/C 20 years ago, smoking 2 ppd before quitting   Substance and Sexual Activity   • Alcohol use: No     Comment: Social use rare   • Drug use: No   • Sexual activity: Defer     Comment: drinks decaf          ALLERGIES  Codeine; Penicillin v; Penicillins; and Mucinex d [pseudoephedrine-guaifenesin er]    REVIEW OF SYSTEMS  Review of Systems   Constitutional: Negative for chills and fever.   HENT: Negative for sore throat.    Respiratory: Negative for shortness of breath.    Cardiovascular: Negative for chest pain.   Gastrointestinal: Negative for nausea and vomiting.   Genitourinary: Negative for dysuria.   Musculoskeletal: Negative for back pain.   Skin: Positive for wound (L knee). Negative for rash.   Neurological: Negative for dizziness.   Psychiatric/Behavioral: The patient is not nervous/anxious.        PHYSICAL EXAM  ED Triage Vitals   Temp Heart Rate Resp BP SpO2   20 1400 20 1400  01/27/20 1400 01/27/20 1708 01/27/20 1400   98.4 °F (36.9 °C) 114 18 117/74 93 %       Physical Exam   Constitutional: She is well-developed, well-nourished, and in no distress.   HENT:   Head: Normocephalic.   Mouth/Throat: Mucous membranes are normal.   Eyes: No scleral icterus.   Neck: Normal range of motion.   Cardiovascular: Normal rate, regular rhythm and normal heart sounds.   Pulses:       Radial pulses are 2+ on the left side.        Dorsalis pedis pulses are 2+ on the right side, and 2+ on the left side.        Posterior tibial pulses are 2+ on the right side, and 2+ on the left side.   Pulmonary/Chest: Effort normal and breath sounds normal.   Musculoskeletal: Normal range of motion.        Left hand: She exhibits tenderness and swelling. Normal sensation noted.        Hands:       Legs:  Neurological: She is alert.   Skin: Skin is warm and dry. Ecchymosis (L hand) noted.   Psychiatric: Mood and affect normal.   Nursing note and vitals reviewed.      RADIOLOGY  XR Hand 3+ View Left   Final Result           Fracture of the fifth metacarpal.       This report was finalized on 1/27/2020 6:34 PM by Dr. Jostin Martinez M.D.              I ordered the above noted radiological studies and reviewed the images on the PACS system.     PROCEDURES  Wound Care  Date/Time: 1/27/2020 6:21 PM  Performed by: Gena King APRN  Authorized by: Ric Felix MD     Consent:     Consent obtained:  Verbal    Consent given by:  Patient  Anesthesia (see MAR for exact dosages):     Anesthesia method:  None  Procedure details:     Wound exploration location: extremity      Wound age (days):  1  Skin layer closed with:     Dehiscence repair type: simple closure    Dressing:     Wrapped with:  Bulky dressing and Coban 4 inch  Post-procedure details:     Patient tolerance of procedure:  Tolerated well, no immediate complications  Splint - Cast - Strapping  Date/Time: 1/27/2020 6:59 PM  Performed by: Christine  GUILLE Rdz  Authorized by: Ric Felix MD     Consent:     Consent obtained:  Verbal    Consent given by:  Patient  Pre-procedure details:     Sensation:  Normal  Procedure details:     Location:  Hand    Hand:  L hand    Splint type:  Ulnar gutter    Supplies:  Ortho-Glass  Post-procedure details:     Pain:  Unchanged    Sensation:  Normal    Patient tolerance of procedure:  Tolerated well, no immediate complications        PROGRESS AND CONSULTS     1754 Reviewed pt's history and workup with Dr. Felix.  At bedside evaluation, they agree with the plan of care.    1802 XR Hand Left ordered.    1850 Patient is resting comfortably and in NAD. Patient is stable. BP- 117/74 HR- 115 Temp- 98.4 °F (36.9 °C) (Tympanic) O2 sat- 93%. Informed the patient results of XR shows fx of the 5th metacarpal on the L hand. Discussed the plan for d/c with f/u to PCP and ortho. Pt understands and agrees with the plan, all questions answered.    Reviewed implications of results, diagnosis, meds, responsibility to follow up, warning signs and symptoms of possible worsening, potential complications and reasons to return to ER with patient.  Discussed all results and noted any abnormalities with patient.  Discussed absolute need to recheck abnormalities with PCP    Discussed plan for discharge, as there is no emergent indication for admission.  Pt is agreeable and understands need for follow up and repeat testing.  Pt is aware that discharge does not mean that nothing is wrong but it indicates no emergency is present.  Pt is discharged with instructions to follow up with primary care doctor to have their blood pressure rechecked.       DIAGNOSIS  Final diagnoses:   Closed nondisplaced fracture of shaft of fifth metacarpal bone, unspecified laterality, initial encounter       FOLLOW UP   Kevin Seymour MD  [address] [facility] [address]  [facility] [address] [phone]          Singh Thomas MD  [address]  [place] [id number]  [phone]    In 1 day      Jimbo Hui MD  [address] [facility] [facility]  [address]  [place] [address]  [phone]          [facility]ARE REFERRAL [place]  [address]  [facility] [place] [address]        COURSE & MEDICAL DECISION MAKING  Pertinent Imaging studies that were ordered and reviewed are noted above.  Results were reviewed/discussed with the patient and they were also made aware of online assess.     MEDICATIONS GIVEN IN ER  Medications - No data to display    /74 (BP Location: Left arm, Patient Position: Lying)   Pulse 115   Temp 98.4 °F (36.9 °C) (Tympanic)   Resp 20   LMP  (LMP Unknown)   SpO2 93%       I personally reviewed the past medical history, past surgical history, social history, family history, current medications and allergies as they appear in this chart.  The scribe's note accurately reflects the work and decisions made by me.     Documentation assistance provided by sharad Mauricio for GUILLE Maldonado on 1/27/2020 at 7:12 PM. Information recorded by the scribe was done at my direction and has been verified and validated by me.       Hang Busch  01/27/20 1912       Gena King APRN  01/27/20 2005

## 2020-01-27 NOTE — ED NOTES
Pt reports that she fell yesterday and was seen here. Had 44 stiches placed and was told to come back to ED if there was bleeding and there was today. Controlled with dressing per pt. Pt with knee brace on per orders.      Kavya Sandhu, RN  01/27/20 1732

## 2020-01-27 NOTE — PROGRESS NOTES
"  Physician Stereotactic Management Note    Diagnosis:     1. Recurrent non-small cell lung cancer (CMS/HCC)        Doing well pretreatment, no problems.    Treatment Number and Dose:fx 2/5  Left upper lobe     I was personally present at the machine for the delivery of the above treatment.     I provided direct supervision of the patient's set up, treatment parameters and image guidance. I provided corrections and approval of the above prior to the treatment, in real time. I was present for any adjustments required due to patient motion, target movement or equipment issues.    The ongoing images for localization, tumor tracking, gating and beam's eye view localization images will also be approved.     Notes on treatment course, films, medical progress and plan:    Doing well. Tolerated treatment without difficulty. No problems or questions.    Problem added:  None  Medications added:   None  Ancillary referrals made: None    I have reviewed and marked as \"reviewed\" the current medications, allergies and problem list in the patients EMR.    Patient's Care Team:  Patient Care Team:  Kevin Seymour MD as PCP - General  Unruly Bobo MD as Consulting Physician (Cardiology)  Heather Anthony MD as Consulting Physician (Pulmonary Disease)  Laith Prado DPM as Consulting Physician (Podiatry)  Michael Christie MD as Consulting Physician (Endocrinology)  Je Ventura MD as Consulting Physician (Hematology and Oncology)  Roderick Frankel Jr., MD as Surgeon (General Surgery)  Je Castro MD as Consulting Physician (Dermatology)  Donn Cotter MD as Referring Physician (Infectious Diseases)  Brandie Bennett MD as Consulting Physician (Radiation Oncology)      "

## 2020-01-27 NOTE — ED NOTES
Pt states she was in this ED yesterday after a fall.  Pt states she had 44 stitches placed and was given a knee immobilizer.  Pt states this morning she found bright red blood on the bandaging of her knee.  Bright red bleeding noted coming through 4x4's in the knee area.  Pt denies any additional pain.       Kavya Boucher, RN  01/27/20 0025

## 2020-01-28 NOTE — DISCHARGE INSTRUCTIONS
Continue current home medications  Wear splint to hand and knee immobilizer, apply ice to both areas  Keep dressing to knee in place until Wednesday, remove dressing, clean with soap and water, apply antibiotic ointment and recover  Follow up with Hand Surgeon, call tomorrow to schedule appointment  Return to er for fever, chills, increased pain, signs of infection, or any new or worsening symptoms

## 2020-01-28 NOTE — DISCHARGE PLACEMENT REQUEST
"Maryam Garrett (74 y.o. Female)     Date of Birth Social Security Number Address Home Phone MRN    1945  [address] [place] [id number] [phone] [id number]    Mu-ism Marital Status          [facility] Single       Admission Date Admission Type Admitting Provider Attending Provider Department, Room/Bed    1/27/20 Emergency  Ric Felix MD [facility], 42/42    Discharge Date Discharge Disposition Discharge Destination                       Attending Provider:  Ric Felix MD    Allergies:  Codeine, Penicillin V, Penicillins, Mucinex D [Pseudoephedrine-guaifenesin Er]    Isolation:  None   Infection:  None   Code Status:  Prior    Ht:  152.4 cm (60\")   Wt:  42.9 kg (94 lb 8 oz)    Admission Cmt:  None   Principal Problem:  None                Active Insurance as of 1/27/2020     Primary Coverage     Payor Plan Insurance Group Employer/Plan Group    MEDICARE MEDICARE A & B      Payor Plan Address Payor Plan Phone Number Payor Plan Fax Number Effective Dates    [address] [phone]  8/1/2009 - None Entered    [facility] [id number]       Subscriber Name Subscriber Birth Date Member ID       MARYAM GARRETT 1945 [id number]           Secondary Coverage     Payor Plan Insurance Group Employer/Plan Group    [facility] SUPP [id number]     Payor Plan Address Payor Plan Phone Number Payor Plan Fax Number Effective Dates    [address]   12/1/2016 - None Entered    [facility] [id number]       Subscriber Name Subscriber Birth Date Member ID       MARYAM GARRETT 1945 [id number]                 Emergency Contacts      (Rel.) Home Phone Work Phone Mobile Phone    Patience Tripathi (Sister) [phone] -- [phone]              "

## 2020-01-28 NOTE — PROGRESS NOTES
Discharge Planning Assessment  [facility]     Patient Name: Maryam Arredondo  MRN: [id number]  Today's Date: 1/27/2020    Admit Date: 1/27/2020    Discharge Needs Assessment    No documentation.       Discharge Plan     Row Name 01/27/20 1936       Plan    Plan Comments  Spoke at length with pt and sister. Pt requesting a hospital bed, a W/C, and a BSC. [place] is ok to use for DME. DIscussed with provider, all appropriate orders and paperwork faxed to [facility]. Pt also requesting home health. List of various home health agencies to pt and family. Pt agreeable to [facility]. Pt requesting to start 2-4-2020 after her last radiation treatment. Contacted on call nurseSANDRA and informed of orders. Faxed over info via [email or web address]    Final Discharge Disposition Code  01 - home or self-care        Destination      Coordination has not been started for this encounter.      Durable Medical Equipment      Coordination has not been started for this encounter.      Dialysis/Infusion      Coordination has not been started for this encounter.      Home Medical Care      Coordination has not been started for this encounter.      Therapy      Coordination has not been started for this encounter.      Community Resources      Coordination has not been started for this encounter.          Demographic Summary    No documentation.       Functional Status    No documentation.       Psychosocial    No documentation.       Abuse/Neglect    No documentation.       Legal    No documentation.       Substance Abuse    No documentation.       Patient Forms     Row Name 01/27/20 1940       Patient Forms    Provider Choice List  Delivered home health agencies     Delivered to  Patient    Method of delivery  In person            Elena Courtney RN

## 2020-01-29 ENCOUNTER — RADIATION ONCOLOGY WEEKLY ASSESSMENT (OUTPATIENT)
Dept: RADIATION ONCOLOGY | Facility: HOSPITAL | Age: 75
End: 2020-01-29

## 2020-01-29 ENCOUNTER — TELEPHONE (OUTPATIENT)
Dept: FAMILY MEDICINE CLINIC | Facility: CLINIC | Age: 75
End: 2020-01-29

## 2020-01-29 DIAGNOSIS — C34.90 RECURRENT NON-SMALL CELL LUNG CANCER (HCC): Primary | ICD-10-CM

## 2020-01-29 DIAGNOSIS — S62.90XA: Primary | ICD-10-CM

## 2020-01-29 PROCEDURE — 77373 STRTCTC BDY RAD THER TX DLVR: CPT | Performed by: RADIOLOGY

## 2020-01-29 NOTE — PROGRESS NOTES
"  Physician Stereotactic Management Note    Diagnosis:     1. Recurrent non-small cell lung cancer (CMS/HCC)        Doing well pretreatment, no problems.    Treatment Number and Dose:fx 3/3 left upper lobe retreatments, dose 15/15Gy    I was personally present at the machine for the delivery of the above treatment.     I provided direct supervision of the patient's set up, treatment parameters and image guidance. I provided corrections and approval of the above prior to the treatment, in real time. I was present for any adjustments required due to patient motion, target movement or equipment issues.    The ongoing images for localization, tumor tracking, gating and beam's eye view localization images will also be approved.     Notes on treatment course, films, medical progress and plan:    Doing well. Tolerated treatment without difficulty. No problems or questions.    Problem added:  None  Medications added:   None  Ancillary referrals made: None    I have reviewed and marked as \"reviewed\" the current medications, allergies and problem list in the patients EMR.    Patient's Care Team:  Patient Care Team:  Kevin Seymour MD as PCP - General  [facility] as Consulting Physician (Cardiology)  Heather Anthony MD as Consulting Physician (Pulmonary Disease)  Laith Prado DPM as Consulting Physician (Podiatry)  Michael Christie MD as Consulting Physician (Endocrinology)  Je Ventura MD as Consulting Physician (Hematology and Oncology)  Roderick Frankel Jr., MD as Surgeon (General Surgery)  Je Castro MD as Consulting Physician (Dermatology)  Donn Cotter MD as Referring Physician (Infectious Diseases)  Brandie Bennett MD as Consulting Physician (Radiation Oncology)      "

## 2020-01-29 NOTE — TELEPHONE ENCOUNTER
Pt stated that while in the hospital they gave her the names Dr. Singh Thomas and Dr. Jimbo Hui for hand surgeons and wants appointment info called to her sister Patience Tripathi [phone]

## 2020-01-29 NOTE — TELEPHONE ENCOUNTER
Pt has fractured left pinky finger, pt is requesting a is in a cast and needs referral to general surgeon. She would like you recommdation for the surgeon.

## 2020-01-31 ENCOUNTER — RADIATION ONCOLOGY WEEKLY ASSESSMENT (OUTPATIENT)
Dept: RADIATION ONCOLOGY | Facility: HOSPITAL | Age: 75
End: 2020-01-31

## 2020-01-31 DIAGNOSIS — C34.90 RECURRENT NON-SMALL CELL LUNG CANCER (HCC): Primary | ICD-10-CM

## 2020-01-31 PROCEDURE — 77373 STRTCTC BDY RAD THER TX DLVR: CPT | Performed by: RADIOLOGY

## 2020-01-31 NOTE — PROGRESS NOTES
"  Physician Stereotactic Management Note    Diagnosis:     1. Recurrent non-small cell lung cancer (CMS/HCC)        Doing well pretreatment, no problems.    Treatment Number and Dose: 1/2    I was personally present at the machine for the delivery of the above treatment.     I provided direct supervision of the patient's set up, treatment parameters and image guidance. I provided corrections and approval of the above prior to the treatment, in real time. I was present for any adjustments required due to patient motion, target movement or equipment issues.    The ongoing images for localization, tumor tracking, gating and beam's eye view localization images will also be approved.     Notes on treatment course, films, medical progress and plan:    Doing well. Tolerated treatment without difficulty. No problems or questions.    Problem added:  None  Medications added:   None  Ancillary referrals made: None    I have reviewed and marked as \"reviewed\" the current medications, allergies and problem list in the patients EMR.    Patient's Care Team:  Patient Care Team:  Kevin Seymour MD as PCP - General  Unruly Bobo MD as Consulting Physician (Cardiology)  Heather Anthony MD as Consulting Physician (Pulmonary Disease)  Laith Prado DPM as Consulting Physician (Podiatry)  Michael Christie MD as Consulting Physician (Endocrinology)  Je Ventura MD as Consulting Physician (Hematology and Oncology)  Roderick Frankel Jr., MD as Surgeon (General Surgery)  Je Castro MD as Consulting Physician (Dermatology)  Donn Cotter MD as Referring Physician (Infectious Diseases)  Brandie Bennett MD as Consulting Physician (Radiation Oncology)      "

## 2020-02-01 ENCOUNTER — APPOINTMENT (OUTPATIENT)
Dept: CARDIOLOGY | Facility: HOSPITAL | Age: 75
End: 2020-02-01

## 2020-02-01 ENCOUNTER — HOSPITAL ENCOUNTER (EMERGENCY)
Facility: HOSPITAL | Age: 75
Discharge: HOME | End: 2020-02-01
Attending: EMERGENCY MEDICINE | Admitting: EMERGENCY MEDICINE

## 2020-02-01 ENCOUNTER — APPOINTMENT (OUTPATIENT)
Dept: RADIATION ONCOLOGY | Facility: HOSPITAL | Age: 75
End: 2020-02-01

## 2020-02-01 VITALS
TEMPERATURE: 98.7 F | WEIGHT: 94 LBS | HEIGHT: 60 IN | HEART RATE: 100 BPM | DIASTOLIC BLOOD PRESSURE: 64 MMHG | SYSTOLIC BLOOD PRESSURE: 120 MMHG | BODY MASS INDEX: 18.46 KG/M2 | RESPIRATION RATE: 18 BRPM | OXYGEN SATURATION: 92 %

## 2020-02-01 DIAGNOSIS — M79.89 SWELLING OF LOWER LEG: Primary | ICD-10-CM

## 2020-02-01 LAB
ALBUMIN SERPL-MCNC: 3.6 G/DL (ref 3.5–5.2)
ALBUMIN/GLOB SERPL: 1.1 G/DL
ALP SERPL-CCNC: 70 U/L (ref 39–117)
ALT SERPL W P-5'-P-CCNC: 12 U/L (ref 1–33)
ANION GAP SERPL CALCULATED.3IONS-SCNC: 12.9 MMOL/L (ref 5–15)
AST SERPL-CCNC: 15 U/L (ref 1–32)
BASOPHILS # BLD AUTO: 0.03 10*3/MM3 (ref 0–0.2)
BASOPHILS NFR BLD AUTO: 0.4 % (ref 0–1.5)
BH CV LOWER VASCULAR LEFT COMMON FEMORAL AUGMENT: NORMAL
BH CV LOWER VASCULAR LEFT COMMON FEMORAL COMPETENT: NORMAL
BH CV LOWER VASCULAR LEFT COMMON FEMORAL COMPRESS: NORMAL
BH CV LOWER VASCULAR LEFT COMMON FEMORAL PHASIC: NORMAL
BH CV LOWER VASCULAR LEFT COMMON FEMORAL SPONT: NORMAL
BH CV LOWER VASCULAR LEFT DISTAL FEMORAL COMPRESS: NORMAL
BH CV LOWER VASCULAR LEFT GASTRONEMIUS COMPRESS: NORMAL
BH CV LOWER VASCULAR LEFT GREATER SAPH AK COMPRESS: NORMAL
BH CV LOWER VASCULAR LEFT GREATER SAPH BK COMPRESS: NORMAL
BH CV LOWER VASCULAR LEFT MID FEMORAL AUGMENT: NORMAL
BH CV LOWER VASCULAR LEFT MID FEMORAL COMPETENT: NORMAL
BH CV LOWER VASCULAR LEFT MID FEMORAL COMPRESS: NORMAL
BH CV LOWER VASCULAR LEFT MID FEMORAL PHASIC: NORMAL
BH CV LOWER VASCULAR LEFT MID FEMORAL SPONT: NORMAL
BH CV LOWER VASCULAR LEFT PERONEAL COMPRESS: NORMAL
BH CV LOWER VASCULAR LEFT POPLITEAL AUGMENT: NORMAL
BH CV LOWER VASCULAR LEFT POPLITEAL COMPETENT: NORMAL
BH CV LOWER VASCULAR LEFT POPLITEAL COMPRESS: NORMAL
BH CV LOWER VASCULAR LEFT POPLITEAL PHASIC: NORMAL
BH CV LOWER VASCULAR LEFT POPLITEAL SPONT: NORMAL
BH CV LOWER VASCULAR LEFT POSTERIOR TIBIAL COMPRESS: NORMAL
BH CV LOWER VASCULAR LEFT PROFUNDA FEMORAL COMPRESS: NORMAL
BH CV LOWER VASCULAR LEFT PROXIMAL FEMORAL COMPRESS: NORMAL
BH CV LOWER VASCULAR LEFT SAPHENOFEMORAL JUNCTION COMPRESS: NORMAL
BILIRUB SERPL-MCNC: 0.2 MG/DL (ref 0.2–1.2)
BUN BLD-MCNC: 15 MG/DL (ref 8–23)
BUN/CREAT SERPL: 28.8 (ref 7–25)
CALCIUM SPEC-SCNC: 9.3 MG/DL (ref 8.6–10.5)
CHLORIDE SERPL-SCNC: 103 MMOL/L (ref 98–107)
CO2 SERPL-SCNC: 26.1 MMOL/L (ref 22–29)
CREAT BLD-MCNC: 0.52 MG/DL (ref 0.57–1)
DEPRECATED RDW RBC AUTO: 41.6 FL (ref 37–54)
EOSINOPHIL # BLD AUTO: 0.12 10*3/MM3 (ref 0–0.4)
EOSINOPHIL NFR BLD AUTO: 1.6 % (ref 0.3–6.2)
ERYTHROCYTE [DISTWIDTH] IN BLOOD BY AUTOMATED COUNT: 12.9 % (ref 12.3–15.4)
GFR SERPL CREATININE-BSD FRML MDRD: 115 ML/MIN/1.73
GLOBULIN UR ELPH-MCNC: 3.4 GM/DL
GLUCOSE BLD-MCNC: 93 MG/DL (ref 65–99)
HCT VFR BLD AUTO: 34.1 % (ref 34–46.6)
HGB BLD-MCNC: 11.4 G/DL (ref 12–15.9)
HOLD SPECIMEN: NORMAL
HOLD SPECIMEN: NORMAL
IMM GRANULOCYTES # BLD AUTO: 0.03 10*3/MM3 (ref 0–0.05)
IMM GRANULOCYTES NFR BLD AUTO: 0.4 % (ref 0–0.5)
LYMPHOCYTES # BLD AUTO: 0.54 10*3/MM3 (ref 0.7–3.1)
LYMPHOCYTES NFR BLD AUTO: 7 % (ref 19.6–45.3)
MCH RBC QN AUTO: 30.4 PG (ref 26.6–33)
MCHC RBC AUTO-ENTMCNC: 33.4 G/DL (ref 31.5–35.7)
MCV RBC AUTO: 90.9 FL (ref 79–97)
MONOCYTES # BLD AUTO: 0.59 10*3/MM3 (ref 0.1–0.9)
MONOCYTES NFR BLD AUTO: 7.7 % (ref 5–12)
NEUTROPHILS # BLD AUTO: 6.36 10*3/MM3 (ref 1.7–7)
NEUTROPHILS NFR BLD AUTO: 82.9 % (ref 42.7–76)
NRBC BLD AUTO-RTO: 0 /100 WBC (ref 0–0.2)
PLATELET # BLD AUTO: 312 10*3/MM3 (ref 140–450)
PMV BLD AUTO: 9.5 FL (ref 6–12)
POTASSIUM BLD-SCNC: 4.1 MMOL/L (ref 3.5–5.2)
PROT SERPL-MCNC: 7 G/DL (ref 6–8.5)
RBC # BLD AUTO: 3.75 10*6/MM3 (ref 3.77–5.28)
SODIUM BLD-SCNC: 142 MMOL/L (ref 136–145)
WBC NRBC COR # BLD: 7.67 10*3/MM3 (ref 3.4–10.8)
WHOLE BLOOD HOLD SPECIMEN: NORMAL
WHOLE BLOOD HOLD SPECIMEN: NORMAL

## 2020-02-01 PROCEDURE — 93971 EXTREMITY STUDY: CPT

## 2020-02-01 PROCEDURE — 85025 COMPLETE CBC W/AUTO DIFF WBC: CPT | Performed by: EMERGENCY MEDICINE

## 2020-02-01 PROCEDURE — 80053 COMPREHEN METABOLIC PANEL: CPT | Performed by: EMERGENCY MEDICINE

## 2020-02-01 PROCEDURE — 99283 EMERGENCY DEPT VISIT LOW MDM: CPT

## 2020-02-01 RX ORDER — SODIUM CHLORIDE 0.9 % (FLUSH) 0.9 %
10 SYRINGE (ML) INJECTION AS NEEDED
Status: DISCONTINUED | OUTPATIENT
Start: 2020-02-01 | End: 2020-02-01 | Stop reason: HOSPADM

## 2020-02-01 NOTE — ED TRIAGE NOTES
Pt being sent for rule out DVT in left leg for swelling. Pt has lung cancer, not currently on chemo. On radiation.

## 2020-02-02 NOTE — ED NOTES
Left knee rewrapped with pertroleum gauze, 4x4, kerlex, ace wrap and then pt's existing wrap.      Tatianna Tello, RN  02/01/20 2109

## 2020-02-02 NOTE — ED PROVIDER NOTES
EMERGENCY DEPARTMENT ENCOUNTER    Room Number:  34/34  Date of encounter:  2/1/2020  PCP: Kevin Seymour MD  Historian: Patient       HPI:  Chief Complaint: ankle swelling  A complete HPI/ROS/PMH/PSH/SH/FH are unobtainable due to: None    Context: Maryam Arredondo is a 74 y.o. female who presents to the ED c/o constant gradually worsening L ankle pain and swelling since earlier today.  She was seen in the ED following the fall 6 days ago.  Imaging showed a broken finger but was otherwise negative for fracture.  She denies fever, SOA, CP, abd pain, and chills and has no other complaints at this time.      PAST MEDICAL HISTORY  Active Ambulatory Problems     Diagnosis Date Noted   • Pneumothorax    • Hypoxia 03/14/2016   • Tachycardia 03/14/2016   • Hyperthyroidism 03/14/2016   • Essential hypertension 03/14/2016   • Multifocal atrial tachycardia (CMS/HCC) 03/18/2016   • Toxic multinodular goiter 03/19/2016   • Pulmonary emphysema (CMS/[facility]) 10/21/2016   • Vitamin D insufficiency 10/21/2016   • Pneumonia 10/30/2017   • COPD with acute lower respiratory infection (CMS/HCC) 10/30/2017   • Malignant neoplasm of upper lobe of left lung (CMS/HCC) 11/03/2017   • Infected skin tear 08/17/2019   • Allergic rhinitis    • B12 deficiency    • Oxygen dependent    • Mitral regurgitation    • Breast cancer (CMS/HCC)    • Medicare annual wellness visit, subsequent 01/07/2020   • Panlobular emphysema (CMS/HCC) 01/07/2020   • Recurrent non-small cell lung cancer (CMS/[facility]) 01/13/2020   • Encounter for long-term (current) use of other medications      Resolved Ambulatory Problems     Diagnosis Date Noted   • COPD exacerbation (CMS/HCC) 03/14/2016   • Infection due to parainfluenza virus 1 10/31/2017     Past Medical History:   Diagnosis Date   • Abnormal color of sputum    • Acromioclavicular joint arthritis    • Acute maxillary sinusitis    • Acute upper respiratory infection    • Anemia    • Asthma    • Benign essential  hypertension    • Cerumen impaction    • Chronic obstructive pulmonary disease (CMS/HCC)    • COPD (chronic obstructive pulmonary disease) (CMS/HCC)    • Diverticulosis of colon    • Cedric-Barr infection    • Fever    • Heart disease    • High risk medication use    • High risk medication use    • Hospital discharge follow-up    • Laryngitis    • Leg wound, left    • Nocturnal hypoxia    • Non-small cell lung cancer (CMS/HCC)    • Non-small cell lung cancer (CMS/HCC)    • Onychomycosis of toenail    • Oral aphthae    • Osteopenia    • Osteopenia    • Other fatigue    • Pneumonia of upper lobe of lung (CMS/HCC)    • Post-menopausal    • Skin abrasion    • SOB (shortness of breath)    • Supraventricular tachycardia (CMS/HCC)    • Thyroid nodule    • Vitamin B deficiency    • Vitamin D deficiency    • Vitamin D deficiency    • Wound healing well on examination          PAST SURGICAL HISTORY  Past Surgical History:   Procedure Laterality Date   • BREAST LUMPECTOMY Right    • BREAST LUMPECTOMY     • CARDIAC CATHETERIZATION      PS SAPHENOUS VAIN RIGHT LEG   • COLONOSCOPY  04/2017    Complete. 4 polyps. Recheck in 2019.    • COLONOSCOPY N/A 4/28/2017    Procedure: COLONOSCOPY WITH POLYPECTOMY(COLD BIOPSY AND HOT SNARE);  Surgeon: Luiza Eddy MD;  Location: [facility] ENDOSCOPY;  Service:    • HYSTERECTOMY     • MASTECTOMY Right 2004   • NECK SURGERY      2 spurs removed   • VASCULAR SURGERY      spider vein ablation         FAMILY HISTORY  Family History   Problem Relation Age of Onset   • Arthritis Mother    • Heart failure Mother         Congestive Heart Failure   • Lung cancer Father    • Breast cancer Paternal Grandmother    • No Known Problems Sister          SOCIAL HISTORY  Social History     Socioeconomic History   • Marital status: Single     Spouse name: Not on file   • Number of children: Not on file   • Years of education: Not on file   • Highest education level: Not on file   Occupational History   •  Occupation:      Employer: RETIRED   Tobacco Use   • Smoking status: Former Smoker     Packs/day: 2.00     Years: 50.00     Pack years: 100.00     Types: Cigarettes     Last attempt to quit: 3/14/1994     Years since quittin.9   • Smokeless tobacco: Never Used   • Tobacco comment: D/C 20 years ago, smoking 2 ppd before quitting   Substance and Sexual Activity   • Alcohol use: No     Comment: Social use rare   • Drug use: No   • Sexual activity: Defer     Comment: drinks decaf          ALLERGIES  Codeine; Penicillin v; Penicillins; and Mucinex d [pseudoephedrine-guaifenesin er]       REVIEW OF SYSTEMS  Review of Systems   Constitutional: Negative for fever.   HENT: Negative for sore throat.    Eyes: Negative.    Respiratory: Negative for cough and shortness of breath.    Cardiovascular: Negative for chest pain.   Gastrointestinal: Negative for abdominal pain, diarrhea and vomiting.   Genitourinary: Negative for dysuria.   Musculoskeletal: Positive for joint swelling (L ankle). Negative for neck pain.   Skin: Negative for rash.   Allergic/Immunologic: Negative.    Neurological: Negative for weakness, numbness and headaches.   Hematological: Negative.    Psychiatric/Behavioral: Negative.    All other systems reviewed and are negative.          PHYSICAL EXAM    I have reviewed the triage vital signs and nursing notes.    ED Triage Vitals   Temp Heart Rate Resp BP SpO2   20 1818 20 1801 20 18020 18120 180   98.7 °F (37.1 °C) 109 18 120/64 90 %      Temp src Heart Rate Source Patient Position BP Location FiO2 (%)   20 1818 20 1801 20 18120 181 --   Tympanic Monitor Lying Left arm        Physical Exam  GENERAL: not distressed  HENT: nares patent  EYES: no scleral icterus  CV: regular rhythm, regular rate  RESPIRATORY: normal effort, CTAB  ABDOMEN: soft, nontender  MUSCULOSKELETAL: no deformity, 2+ edema of LLE but no edema or RLE,  ACE wrap around L knee and L knee immobilizer in place.    NEURO: Strength, sensation, and coordination are grossly intact.  Speech and mentation are unremarkable  SKIN: warm, dry. Sutured laceration to L knee with some bruising but no signs of infection.  Bruising over LLE.        LAB RESULTS  Recent Results (from the past 24 hour(s))   Light Blue Top    Collection Time: 02/01/20  6:24 PM   Result Value Ref Range    Extra Tube hold for add-on    Green Top (Gel)    Collection Time: 02/01/20  6:24 PM   Result Value Ref Range    Extra Tube Hold for add-ons.    Lavender Top    Collection Time: 02/01/20  6:24 PM   Result Value Ref Range    Extra Tube hold for add-on    Gold Top - SST    Collection Time: 02/01/20  6:24 PM   Result Value Ref Range    Extra Tube Hold for add-ons.    Comprehensive Metabolic Panel    Collection Time: 02/01/20  6:24 PM   Result Value Ref Range    Glucose 93 65 - 99 mg/dL    BUN 15 8 - 23 mg/dL    Creatinine 0.52 (L) 0.57 - 1.00 mg/dL    Sodium 142 136 - 145 mmol/L    Potassium 4.1 3.5 - 5.2 mmol/L    Chloride 103 98 - 107 mmol/L    CO2 26.1 22.0 - 29.0 mmol/L    Calcium 9.3 8.6 - 10.5 mg/dL    Total Protein 7.0 6.0 - 8.5 g/dL    Albumin 3.60 3.50 - 5.20 g/dL    ALT (SGPT) 12 1 - 33 U/L    AST (SGOT) 15 1 - 32 U/L    Alkaline Phosphatase 70 39 - 117 U/L    Total Bilirubin 0.2 0.2 - 1.2 mg/dL    eGFR Non African Amer 115 >60 mL/min/1.73    Globulin 3.4 gm/dL    A/G Ratio 1.1 g/dL    BUN/Creatinine Ratio 28.8 (H) 7.0 - 25.0    Anion Gap 12.9 5.0 - 15.0 mmol/L   CBC Auto Differential    Collection Time: 02/01/20  6:24 PM   Result Value Ref Range    WBC 7.67 3.40 - 10.80 10*3/mm3    RBC 3.75 (L) 3.77 - 5.28 10*6/mm3    Hemoglobin 11.4 (L) 12.0 - 15.9 g/dL    Hematocrit 34.1 34.0 - 46.6 %    MCV 90.9 79.0 - 97.0 fL    MCH 30.4 26.6 - 33.0 pg    MCHC 33.4 31.5 - 35.7 g/dL    RDW 12.9 12.3 - 15.4 %    RDW-SD 41.6 37.0 - 54.0 fl    MPV 9.5 6.0 - 12.0 fL    Platelets 312 140 - 450 10*3/mm3     Neutrophil % 82.9 (H) 42.7 - 76.0 %    Lymphocyte % 7.0 (L) 19.6 - 45.3 %    Monocyte % 7.7 5.0 - 12.0 %    Eosinophil % 1.6 0.3 - 6.2 %    Basophil % 0.4 0.0 - 1.5 %    Immature Grans % 0.4 0.0 - 0.5 %    Neutrophils, Absolute 6.36 1.70 - 7.00 10*3/mm3    Lymphocytes, Absolute 0.54 (L) 0.70 - 3.10 10*3/mm3    Monocytes, Absolute 0.59 0.10 - 0.90 10*3/mm3    Eosinophils, Absolute 0.12 0.00 - 0.40 10*3/mm3    Basophils, Absolute 0.03 0.00 - 0.20 10*3/mm3    Immature Grans, Absolute 0.03 0.00 - 0.05 10*3/mm3    nRBC 0.0 0.0 - 0.2 /100 WBC   Duplex Venous Lower Extremity LEFT    Collection Time: 02/01/20  7:47 PM   Result Value Ref Range    Right Common Femoral Spont Y     Right Common Femoral Phasic Y     Right Common Femoral Augment Y     Right Common Femoral Competent Y     Right Common Femoral Compress C     Left Common Femoral Spont Y     Left Common Femoral Phasic Y     Left Common Femoral Augment Y     Left Common Femoral Competent Y     Left Common Femoral Compress C     Left Saphenofemoral Junction Compress C     Left Profunda Femoral Compress C     Left Proximal Femoral Compress C     Left Mid Femoral Spont Y     Left Mid Femoral Phasic Y     Left Mid Femoral Augment Y     Left Mid Femoral Competent Y     Left Mid Femoral Compress C     Left Distal Femoral Compress C     Left Popliteal Spont Y     Left Popliteal Phasic Y     Left Popliteal Augment Y     Left Popliteal Competent Y     Left Popliteal Compress C     Left Posterior Tibial Compress C     Left Peroneal Compress C     Left GastronemiusSoleal Compress C     Left Greater Saph AK Compress C     Left Greater Saph BK Compress C        Ordered the above labs and independently reviewed the results.    PROCEDURES  Procedures      MEDICATIONS GIVEN IN ER    Medications   sodium chloride 0.9 % flush 10 mL (has no administration in time range)         PROGRESS, DATA ANALYSIS, CONSULTS, AND MEDICAL DECISION MAKING    2035: Rechecked the patient who is resting  comfortably and in NAD. Patient is stable. Informed the patient of workup which was negative for DVT. Discussed the plan for discharge with instructions to f/u with PCP for further evaluation and management. Strict RTER warnings given. Pt understands and agrees with the plan, all questions answered.      All labs have been independently reviewed by me.  All radiology studies have been reviewed by me and discussed with radiologist dictating the report.   EKG's independently viewed and interpreted by me.  Discussion below represents my analysis of pertinent findings related to patient's condition, differential diagnosis, treatment plan and final disposition.      ED Course as of Feb 01 2042   Sat Feb 01, 2020 2029 Doppler ultrasound was negative for DVT or SVT    [WH]   2041 Old records reviewed.  Patient was seen here in the ER on 1/26/2020 after injuring her left knee.  She had a laceration which was repaired.  X-ray of her left knee showed a soft tissue injury without fracture.  Patient was seen in urgent care earlier today with left leg swelling and was sent to the ER for further evaluation.    [WH]      ED Course User Index  [] Jaime Stoner MD       AS OF 8:42 PM VITALS:    BP - 120/64  HR - 102  TEMP - 98.7 °F (37.1 °C) (Tympanic)  O2 SATS - 97%      DIAGNOSIS  Final diagnoses:   Swelling of lower leg         DISPOSITION  DISCHARGE    Patient discharged in stable condition.    Reviewed implications of results, diagnosis, meds, responsibility to follow up, warning signs and symptoms of possible worsening, potential complications and reasons to return to ER.    Patient/Family voiced understanding of above instructions.    Discussed plan for discharge, as there is no emergent indication for admission. Patient referred to primary care provider for BP management due to today's BP. Pt/family is agreeable and understands need for follow up and repeat testing.  Pt is aware that discharge does not mean that  nothing is wrong but it indicates no emergency is present that requires admission and they must continue care with follow-up as given below or physician of their choice.     FOLLOW-UP  Kevin Seymour MD  [id number] [id number]  [phone]    Go to   As needed         Medication List      Changed    albuterol sulfate  (90 Base) MCG/ACT inhaler  Commonly known as:  PROVENTIL HFA;VENTOLIN HFA;PROAIR HFA  Inhale 2 puffs Every 4 (Four) Hours As Needed for Wheezing. Per MD - until   current episode is resolved  What changed:  when to take this     montelukast 10 MG tablet  Commonly known as:  SINGULAIR  Take 1 tablet by mouth Daily.  What changed:  when to take this            --  Documentation assistance provided by sharad Kimbrough for Dr. Herbie MD.  Information recorded by the scribe was done at my direction and has been verified and validated by me.         Mina Kimbrough  02/01/20 [id number]       Jaime Stoner MD  02/01/20 [id number]

## 2020-02-02 NOTE — DISCHARGE INSTRUCTIONS
Elevate your left leg as often as possible.  Follow-up with your primary care doctor as needed.  Return to emergency department for worsening swelling, fever, difficulty breathing, or other concern.

## 2020-02-03 ENCOUNTER — DOCUMENTATION (OUTPATIENT)
Dept: RADIATION ONCOLOGY | Facility: HOSPITAL | Age: 75
End: 2020-02-03

## 2020-02-03 ENCOUNTER — RADIATION ONCOLOGY WEEKLY ASSESSMENT (OUTPATIENT)
Dept: RADIATION ONCOLOGY | Facility: HOSPITAL | Age: 75
End: 2020-02-03

## 2020-02-03 DIAGNOSIS — C34.12 MALIGNANT NEOPLASM OF UPPER LOBE OF LEFT LUNG (HCC): ICD-10-CM

## 2020-02-03 DIAGNOSIS — C34.90 RECURRENT NON-SMALL CELL LUNG CANCER (HCC): Primary | ICD-10-CM

## 2020-02-03 PROCEDURE — 77373 STRTCTC BDY RAD THER TX DLVR: CPT | Performed by: RADIOLOGY

## 2020-02-03 NOTE — PROGRESS NOTES
"  Physician Stereotactic Management Note    Diagnosis:    1. Recurrent non-small cell lung cancer (CMS/HCC)        Doing well pretreatment, no problems.    Treatment Number and Dose: 2/2 left lower lobe    I was personally present at the machine for the delivery of the above treatment.     I provided direct supervision of the patient's set up, treatment parameters and image guidance. I provided corrections and approval of the above prior to the treatment, in real time. I was present for any adjustments required due to patient motion, target movement or equipment issues.    The ongoing images for localization, tumor tracking, gating and beam's eye view localization images will also be approved.     Notes on treatment course, films, medical progress and plan:    Doing well. Tolerated treatment without difficulty. No problems or questions. Will schedule follow up scan in 10-12 weeks and see back thereafter.    Problem added:  None  Medications added:   None  Ancillary referrals made: None    I have reviewed and marked as \"reviewed\" the current medications, allergies and problem list in the patients EMR.    Patient's Care Team:  Patient Care Team:  Kevin Seymour MD as PCP - General  Unruly Bobo MD as Consulting Physician (Cardiology)  Heather Anthony MD as Consulting Physician (Pulmonary Disease)  Laith Prado DPM as Consulting Physician (Podiatry)  Michael Christie MD as Consulting Physician (Endocrinology)  Je Ventura MD as Consulting Physician (Hematology and Oncology)  Roderick Frankel Jr., MD as Surgeon (General Surgery)  Je Castro MD as Consulting Physician (Dermatology)  Donn Cotter MD as Referring Physician (Infectious Diseases)  Brandie Bennett MD as Consulting Physician (Radiation Oncology)      "

## 2020-02-04 ENCOUNTER — TELEPHONE (OUTPATIENT)
Dept: FAMILY MEDICINE CLINIC | Facility: CLINIC | Age: 75
End: 2020-02-04

## 2020-02-04 NOTE — TELEPHONE ENCOUNTER
Zoie with [facility] called requesting a order for OT and PT for Laceration of left knee, please call with order. Thanks.

## 2020-02-05 NOTE — PROGRESS NOTES
Patient: Maryam Arredondo  YOB: 1945    RADIATION THERAPY TREATMENT SUMMARY  Diagnosis:    Recurrent non-small cell lung cancer (CMS/HCC)     Patient Care Team:  Unruly Bobo MD as Consulting Physician (Cardiology)  Heather Anthony MD as Consulting Physician (Pulmonary Disease)  Michael Christie MD as Consulting Physician (Endocrinology)  Je Ventura MD as Consulting Physician (Hematology and Oncology)  Roderick Frankel Jr., MD as Surgeon (General Surgery)  Je Castro MD as Consulting Physician (Dermatology)  Brandie Bennett MD as Consulting Physician (Radiation Oncology)    Maryam Arredondo has recently completed the course of radiation therapy prescribed for the above-mentioned diagnosis.  Below, please find the specifics of the course of treatment delivered:      Radiation Details:    Dates of treatment: 1/24/2020 - 1/29/2020 and 1/31/2020 - 2/3/2020.    Treatment Site - LEFT UPPER LUNG  Treatment Intent - Palliative  Total Dose in cGy - 1500  Number of Treatments - 3  Dose per fraction - 500 cGy per fraction  Fractions per day - 1 fx/day  Fractions per week - 3 fx/week  Treatment Type - Stereotactic, Rapid Arc  Energy - 6 MVP  Normalization - Volumetric Normalization-- see below  Imaging/Field Verification - IGRT using CBCT daily  Additional comments: - 100% covers 80% PTV LUPPER  CBCT MATCH TO GTV L UPPER AND PTV L UPPER GREAT VESSELS    Treatment Site - LEFT LOWER LUNG  Treatment Intent - Curative  Total Dose in cGy - 3000  Number of Treatments - 2  Dose per fraction - See below  Fractions per day - 1 fx/day  Fractions per week - 3 fx/week  Treatment Type - Stereotactic, Rapid Arc  Energy - 6 MVP  Normalization - Volumetric Normalization-- see below  Imaging/Field Verification - IGRT using CBCT daily  Additional comments: - 15Gy x 2 fx 100% covers 95% PTV  CBCT match to GTV L LOWER AND PTV WATCH HEART    Tolerance and Toxicities: she tolerated the treatments well ,  requiring no treatment breaks. she completed the treatments in 5 elapsed days.    Follow-up Plans: Will schedule follow up scan in 10-12 weeks and see back thereafter.

## 2020-02-06 ENCOUNTER — OFFICE VISIT (OUTPATIENT)
Dept: FAMILY MEDICINE CLINIC | Facility: CLINIC | Age: 75
End: 2020-02-06

## 2020-02-06 VITALS
DIASTOLIC BLOOD PRESSURE: 58 MMHG | TEMPERATURE: 97.4 F | BODY MASS INDEX: 18.36 KG/M2 | HEIGHT: 60 IN | HEART RATE: 108 BPM | SYSTOLIC BLOOD PRESSURE: 106 MMHG | OXYGEN SATURATION: 90 %

## 2020-02-06 DIAGNOSIS — J43.1 PANLOBULAR EMPHYSEMA (HCC): ICD-10-CM

## 2020-02-06 DIAGNOSIS — Z48.02 ENCOUNTER FOR REMOVAL OF SUTURES: ICD-10-CM

## 2020-02-06 DIAGNOSIS — M62.3 IMMOBILITY SYNDROME: ICD-10-CM

## 2020-02-06 DIAGNOSIS — S62.307A UNSPECIFIED FRACTURE OF FIFTH METACARPAL BONE, LEFT HAND, INITIAL ENCOUNTER FOR CLOSED FRACTURE: Primary | ICD-10-CM

## 2020-02-06 DIAGNOSIS — S81.012S: ICD-10-CM

## 2020-02-06 PROCEDURE — 99213 OFFICE O/P EST LOW 20 MIN: CPT | Performed by: FAMILY MEDICINE

## 2020-02-06 NOTE — PROGRESS NOTES
Chief Complaint   Patient presents with   • Suture / Staple Removal   F/u lacerations.    Subjective   Maryam Arredondo is a 74 y.o. female.     History of Present Illness   F/U lacerations due to fall over oxygen tubing.  Had L forehead 0.5 cm lac and 15 cm L knee lac.  ER report reviewed.  L hand with 5th metacarpal fracture.  L knee films 2 views iwht no fracture.  CT head with mild chronic small vessel white matter change.  Sutures placed 12 days ago.    C/o trouble with mobility.  Needs help with PT at home.  Using walker.    F/U COPD.  Stable on inhalers and Oxygen at present.      The following portions of the patient's history were reviewed and updated as appropriate: allergies, current medications, past family history, past medical history, past social history, past surgical history and problem list.    Review of Systems   Constitutional: Negative for appetite change and fatigue.   HENT: Negative for nosebleeds and sore throat.    Eyes: Negative for blurred vision and visual disturbance.   Respiratory: Negative for shortness of breath and wheezing.    Cardiovascular: Negative for chest pain and leg swelling.   Gastrointestinal: Negative for abdominal distention and abdominal pain.   Endocrine: Negative for cold intolerance and polyuria.   Genitourinary: Negative for dysuria and hematuria.   Musculoskeletal: Negative for arthralgias and myalgias.   Skin: Negative for color change and rash.   Neurological: Positive for weakness. Negative for confusion.   Psychiatric/Behavioral: Negative for agitation and depressed mood.       Patient Active Problem List   Diagnosis   • Pneumothorax   • Hypoxia   • Tachycardia   • Hyperthyroidism   • Essential hypertension   • Multifocal atrial tachycardia (CMS/HCC)   • Toxic multinodular goiter   • Pulmonary emphysema (CMS/HCC)   • Vitamin D insufficiency   • Pneumonia   • COPD with acute lower respiratory infection (CMS/HCC)   • Malignant neoplasm of upper lobe of left lung  (CMS/[facility])   • Infected skin tear   • Allergic rhinitis   • B12 deficiency   • Oxygen dependent   • Mitral regurgitation   • Breast cancer (CMS/[facility])   • Medicare annual wellness visit, subsequent   • Panlobular emphysema (CMS/[facility])   • Recurrent non-small cell lung cancer (CMS/[facility])   • Encounter for long-term (current) use of other medications   • Unspecified fracture of fifth metacarpal bone, left hand, initial encounter for closed fracture   • Immobility syndrome   • Encounter for removal of sutures   • Laceration of skin of knee without complication, left, sequela       Allergies   Allergen Reactions   • Codeine    • Penicillin V    • Penicillins    • Mucinex D [Pseudoephedrine-Guaifenesin Er] Nausea Only     Felt hot in chest         Current Outpatient Medications:   •  albuterol (PROVENTIL HFA;VENTOLIN HFA) 108 (90 Base) MCG/ACT inhaler, Inhale 2 puffs Every 4 (Four) Hours As Needed for Wheezing. Per MD - until current episode is resolved (Patient taking differently: Inhale 2 puffs As Needed for Wheezing. Per MD - until current episode is resolved ), Disp: , Rfl:   •  budesonide-formoterol (SYMBICORT) 160-4.5 MCG/ACT inhaler, Inhale 2 puffs 2 (Two) Times a Day., Disp: 1 inhaler, Rfl: 11  •  CARTIA  MG 24 hr capsule, Take 180 mg by mouth Daily., Disp: , Rfl:   •  cephalexin (KEFLEX) 500 MG capsule, Take 1 capsule by mouth 2 (Two) Times a Day., Disp: 10 capsule, Rfl: 0  •  Cholecalciferol (VITAMIN D3) 2000 UNITS tablet, Take 1 tablet by mouth daily., Disp: , Rfl:   •  clindamycin (CLEOCIN T) 1 % lotion, Apply  topically to the appropriate area as directed 2 (Two) Times a Day., Disp: , Rfl:   •  digoxin (LANOXIN) 125 MCG tablet, Take 1 tablet by mouth Daily., Disp: 90 tablet, Rfl: 2  •  fluticasone (FLONASE) 50 MCG/ACT nasal spray, 2 Squirts into the nostril(s) as directed by provider 2 (Two) Times a Day., Disp: , Rfl:   •  methIMAzole (TAPAZOLE) 5 MG tablet, TAKE ONE AND ONE-HALF (1 & 1/2) TABLET BY MOUTH  DAILY, Disp: 60 tablet, Rfl: 4  •  metroNIDAZOLE (METROCREAM) 0.75 % cream, , Disp: , Rfl:   •  montelukast (SINGULAIR) 10 MG tablet, Take 1 tablet by mouth Daily. (Patient taking differently: Take 10 mg by mouth Every Night.), Disp: 90 tablet, Rfl: 3  •  Multiple Vitamins-Minerals (PRESERVISION AREDS 2) chewable tablet, Chew 2 capsules Every Morning., Disp: , Rfl:   •  O2 (OXYGEN), Inhale Continuous., Disp: , Rfl:   •  SPIRIVA RESPIMAT 2.5 MCG/ACT aerosol solution, 2 puffs Daily., Disp: , Rfl:   •  vitamin B-12 (CYANOCOBALAMIN) 1000 MCG tablet, Take 1,000 mcg by mouth Daily. M,W,F, Disp: , Rfl:     Past Medical History:   Diagnosis Date   • Abnormal color of sputum     grey   • Acromioclavicular joint arthritis    • Acute maxillary sinusitis    • Acute upper respiratory infection    • Allergic rhinitis    • Anemia    • Asthma    • B12 deficiency    • Benign essential hypertension    • Breast cancer (CMS/HCC)     s/p Lumpectomy ~2000 and chemo/XRT. Then  Masectomy ~2004 for some recurrence   • Breast cancer (CMS/HCC)    • Cerumen impaction    • Chronic obstructive pulmonary disease (CMS/HCC)    • COPD (chronic obstructive pulmonary disease) (CMS/HCC)    • COPD exacerbation (CMS/HCC)    • Diverticulosis of colon    • Cedric-Barr infection    • Fever    • Heart disease    • High risk medication use    • High risk medication use    • Hospital discharge follow-up    • Hyperthyroidism    • Hyperthyroidism    • Hypoxia    • Laryngitis    • Leg wound, left    • Medicare annual wellness visit, subsequent    • Mitral regurgitation     mild to moderate   • Nocturnal hypoxia    • Non-small cell lung cancer (CMS/HCC)    • Non-small cell lung cancer (CMS/HCC)    • Onychomycosis of toenail    • Oral aphthae    • Osteopenia    • Osteopenia    • Other fatigue    • Oxygen dependent     2L - at night only   • Pneumonia of upper lobe of lung (CMS/HCC)    • Pneumothorax    • Post-menopausal    • Skin abrasion    • SOB (shortness of  breath)    • Supraventricular tachycardia (CMS/HCC)    • Tachycardia    • Thyroid nodule    • Toxic multinodular goiter    • Vitamin B deficiency    • Vitamin D deficiency    • Vitamin D deficiency    • Wound healing well on examination        Past Surgical History:   Procedure Laterality Date   • BREAST LUMPECTOMY Right    • BREAST LUMPECTOMY     • CARDIAC CATHETERIZATION      PS SAPHENOUS VAIN RIGHT LEG   • COLONOSCOPY  2017    Complete. 4 polyps. Recheck in 2019.    • COLONOSCOPY N/A 2017    Procedure: COLONOSCOPY WITH POLYPECTOMY(COLD BIOPSY AND HOT SNARE);  Surgeon: Luiza Eddy MD;  Location: [facility] ENDOSCOPY;  Service:    • HYSTERECTOMY     • MASTECTOMY Right    • NECK SURGERY      2 spurs removed   • VASCULAR SURGERY      spider vein ablation       Family History   Problem Relation Age of Onset   • Arthritis Mother    • Heart failure Mother         Congestive Heart Failure   • Lung cancer Father    • Breast cancer Paternal Grandmother    • No Known Problems Sister        Social History     Tobacco Use   • Smoking status: Former Smoker     Packs/day: 2.00     Years: 50.00     Pack years: 100.00     Types: Cigarettes     Last attempt to quit: 3/14/1994     Years since quittin.9   • Smokeless tobacco: Never Used   • Tobacco comment: D/C 20 years ago, smoking 2 ppd before quitting   Substance Use Topics   • Alcohol use: No     Comment: Social use rare            Objective     Vitals:    20 1301   BP: 106/58   Pulse: 108   Temp: 97.4 °F (36.3 °C)   SpO2: 90%     Body mass index is 18.36 kg/m².    Physical Exam   Constitutional: She appears well-developed and well-nourished.   HENT:   Head: Normocephalic and atraumatic.   Mouth/Throat: Oropharynx is clear and moist.   Eyes: Pupils are equal, round, and reactive to light. No scleral icterus.   Neck: No thyromegaly present.   Cardiovascular: Normal rate and regular rhythm. Exam reveals no gallop and no friction rub.   No murmur  heard.  Pulmonary/Chest: Effort normal. No respiratory distress. She has no wheezes. She has no rales. She exhibits no tenderness.   Distant but clear   Abdominal: Soft. Bowel sounds are normal. She exhibits no distension. There is no tenderness.   Musculoskeletal: Normal range of motion. She exhibits no edema or deformity.   Lymphadenopathy:     She has no cervical adenopathy.   Neurological: No cranial nerve deficit. She exhibits normal muscle tone.   Skin: Skin is warm and dry. No rash noted. She is not diaphoretic.   Well healed lac over L eyebrow.  L knee with well approximated lac over L patella with overlying abrasion.  Sutures in place.     Vitals reviewed.      Lab Results   Component Value Date    GLUCOSE 93 02/01/2020    BUN 15 02/01/2020    CREATININE 0.52 (L) 02/01/2020    EGFRIFNONA 115 02/01/2020    EGFRIFAFRI 108 10/21/2019    BCR 28.8 (H) 02/01/2020    K 4.1 02/01/2020    CO2 26.1 02/01/2020    CALCIUM 9.3 02/01/2020    PROTENTOTREF 6.7 10/21/2019    ALBUMIN 3.60 02/01/2020    LABIL2 1.7 10/21/2019    AST 15 02/01/2020    ALT 12 02/01/2020       WBC   Date Value Ref Range Status   02/01/2020 7.67 3.40 - 10.80 10*3/mm3 Final   06/11/2019 6.11 3.40 - 10.80 10*3/mm3 Final     RBC   Date Value Ref Range Status   02/01/2020 3.75 (L) 3.77 - 5.28 10*6/mm3 Final   06/11/2019 4.17 3.77 - 5.28 10*6/mm3 Final     Hemoglobin   Date Value Ref Range Status   02/01/2020 11.4 (L) 12.0 - 15.9 g/dL Final     Hematocrit   Date Value Ref Range Status   02/01/2020 34.1 34.0 - 46.6 % Final     MCV   Date Value Ref Range Status   02/01/2020 90.9 79.0 - 97.0 fL Final     MCH   Date Value Ref Range Status   02/01/2020 30.4 26.6 - 33.0 pg Final     MCHC   Date Value Ref Range Status   02/01/2020 33.4 31.5 - 35.7 g/dL Final     RDW   Date Value Ref Range Status   02/01/2020 12.9 12.3 - 15.4 % Final     RDW-SD   Date Value Ref Range Status   02/01/2020 41.6 37.0 - 54.0 fl Final     MPV   Date Value Ref Range Status    02/01/2020 9.5 6.0 - 12.0 fL Final     Platelets   Date Value Ref Range Status   02/01/2020 312 140 - 450 10*3/mm3 Final     Neutrophil %   Date Value Ref Range Status   02/01/2020 82.9 (H) 42.7 - 76.0 % Final     Lymphocyte %   Date Value Ref Range Status   02/01/2020 7.0 (L) 19.6 - 45.3 % Final     Monocyte %   Date Value Ref Range Status   02/01/2020 7.7 5.0 - 12.0 % Final     Eosinophil %   Date Value Ref Range Status   02/01/2020 1.6 0.3 - 6.2 % Final     Basophil %   Date Value Ref Range Status   02/01/2020 0.4 0.0 - 1.5 % Final     Immature Grans %   Date Value Ref Range Status   02/01/2020 0.4 0.0 - 0.5 % Final     Neutrophils, Absolute   Date Value Ref Range Status   02/01/2020 6.36 1.70 - 7.00 10*3/mm3 Final     Lymphocytes, Absolute   Date Value Ref Range Status   02/01/2020 0.54 (L) 0.70 - 3.10 10*3/mm3 Final     Monocytes, Absolute   Date Value Ref Range Status   02/01/2020 0.59 0.10 - 0.90 10*3/mm3 Final     Eosinophils, Absolute   Date Value Ref Range Status   02/01/2020 0.12 0.00 - 0.40 10*3/mm3 Final     Basophils, Absolute   Date Value Ref Range Status   02/01/2020 0.03 0.00 - 0.20 10*3/mm3 Final     Immature Grans, Absolute   Date Value Ref Range Status   02/01/2020 0.03 0.00 - 0.05 10*3/mm3 Final     nRBC   Date Value Ref Range Status   02/01/2020 0.0 0.0 - 0.2 /100 WBC Final       No results found for: HGBA1C    Lab Results   Component Value Date    KAZBGXYN08 1,228 (H) 09/03/2019       TSH   Date Value Ref Range Status   01/21/2020 1.000 0.270 - 4.200 uIU/mL Final       No results found for: CHOL  Lab Results   Component Value Date    TRIG 58 09/03/2019     Lab Results   Component Value Date    HDL 72 (H) 09/03/2019     Lab Results   Component Value Date     (H) 09/03/2019     Lab Results   Component Value Date    VLDL 11.6 09/03/2019     No results found for: LDLHDL      Suture Removal  Date/Time: 2/6/2020 2:20 PM  Performed by: Kevin Seymour MD  Authorized by: Kevin Seymour  MD SHARIFA   Body area: lower extremity  Location details: left knee  Wound Appearance: clean  Sutures Removed: 25  Post-removal: Steri-Strips applied  Patient tolerance: Patient tolerated the procedure well with no immediate complications  Comments: after verbal informed consent.          Assessment/Plan   Problems Addressed this Visit        Respiratory    Panlobular emphysema (CMS/HCC)    Relevant Orders    Ambulatory Referral to Home Health       Musculoskeletal and Integument    Unspecified fracture of fifth metacarpal bone, left hand, initial encounter for closed fracture - Primary    Immobility syndrome    Relevant Orders    Ambulatory Referral to Home Health       Other    Encounter for removal of sutures    Relevant Orders    Suture Removal    Laceration of skin of knee without complication, left, sequela    Relevant Orders    Ambulatory Referral to Home Health      To hand surgery.  Start PT/OT.      Orders Placed This Encounter   Procedures   • Suture Removal     This order was created via procedure documentation   • Ambulatory Referral to Home Health     Referral Priority:   Routine     Referral Type:   Home Health     Referral Reason:   Specialty Services Required     Requested Specialty:   Home Health Services     Number of Visits Requested:   1       Current Outpatient Medications   Medication Sig Dispense Refill   • albuterol (PROVENTIL HFA;VENTOLIN HFA) 108 (90 Base) MCG/ACT inhaler Inhale 2 puffs Every 4 (Four) Hours As Needed for Wheezing. Per MD - until current episode is resolved (Patient taking differently: Inhale 2 puffs As Needed for Wheezing. Per MD - until current episode is resolved )     • budesonide-formoterol (SYMBICORT) 160-4.5 MCG/ACT inhaler Inhale 2 puffs 2 (Two) Times a Day. 1 inhaler 11   • CARTIA  MG 24 hr capsule Take 180 mg by mouth Daily.     • cephalexin (KEFLEX) 500 MG capsule Take 1 capsule by mouth 2 (Two) Times a Day. 10 capsule 0   • Cholecalciferol (VITAMIN D3) 2000  UNITS tablet Take 1 tablet by mouth daily.     • clindamycin (CLEOCIN T) 1 % lotion Apply  topically to the appropriate area as directed 2 (Two) Times a Day.     • digoxin (LANOXIN) 125 MCG tablet Take 1 tablet by mouth Daily. 90 tablet 2   • fluticasone (FLONASE) 50 MCG/ACT nasal spray 2 Squirts into the nostril(s) as directed by provider 2 (Two) Times a Day.     • methIMAzole (TAPAZOLE) 5 MG tablet TAKE ONE AND ONE-HALF (1 & 1/2) TABLET BY MOUTH DAILY 60 tablet 4   • metroNIDAZOLE (METROCREAM) 0.75 % cream      • montelukast (SINGULAIR) 10 MG tablet Take 1 tablet by mouth Daily. (Patient taking differently: Take 10 mg by mouth Every Night.) 90 tablet 3   • Multiple Vitamins-Minerals (PRESERVISION AREDS 2) chewable tablet Chew 2 capsules Every Morning.     • O2 (OXYGEN) Inhale Continuous.     • SPIRIVA RESPIMAT 2.5 MCG/ACT aerosol solution 2 puffs Daily.     • vitamin B-12 (CYANOCOBALAMIN) 1000 MCG tablet Take 1,000 mcg by mouth Daily. M,W,F       No current facility-administered medications for this visit.        Maryam Maria Elena had no medications administered during this visit.    Return in about 6 weeks (around 3/19/2020).    There are no Patient Instructions on file for this visit.

## 2020-02-10 ENCOUNTER — INFUSION (OUTPATIENT)
Dept: ONCOLOGY | Facility: HOSPITAL | Age: 75
End: 2020-02-10

## 2020-02-10 ENCOUNTER — OFFICE VISIT (OUTPATIENT)
Dept: ONCOLOGY | Facility: CLINIC | Age: 75
End: 2020-02-10

## 2020-02-10 VITALS
SYSTOLIC BLOOD PRESSURE: 124 MMHG | HEART RATE: 112 BPM | DIASTOLIC BLOOD PRESSURE: 67 MMHG | TEMPERATURE: 98.3 F | RESPIRATION RATE: 18 BRPM | OXYGEN SATURATION: 92 %

## 2020-02-10 DIAGNOSIS — Z79.899 ENCOUNTER FOR LONG-TERM (CURRENT) USE OF OTHER MEDICATIONS: ICD-10-CM

## 2020-02-10 DIAGNOSIS — C34.90 RECURRENT NON-SMALL CELL LUNG CANCER (HCC): ICD-10-CM

## 2020-02-10 DIAGNOSIS — C34.90 RECURRENT NON-SMALL CELL LUNG CANCER (HCC): Primary | ICD-10-CM

## 2020-02-10 LAB
ALBUMIN SERPL-MCNC: 3.7 G/DL (ref 3.5–5.2)
ALBUMIN/GLOB SERPL: 1.1 G/DL (ref 1.1–2.4)
ALP SERPL-CCNC: 73 U/L (ref 38–116)
ALT SERPL W P-5'-P-CCNC: 17 U/L (ref 0–33)
ANION GAP SERPL CALCULATED.3IONS-SCNC: 10.7 MMOL/L (ref 5–15)
AST SERPL-CCNC: 15 U/L (ref 0–32)
BASOPHILS # BLD AUTO: 0.04 10*3/MM3 (ref 0–0.2)
BASOPHILS NFR BLD AUTO: 0.5 % (ref 0–1.5)
BILIRUB SERPL-MCNC: 0.2 MG/DL (ref 0.2–1.2)
BUN BLD-MCNC: 14 MG/DL (ref 6–20)
BUN/CREAT SERPL: 24.6 (ref 7.3–30)
CALCIUM SPEC-SCNC: 9.7 MG/DL (ref 8.5–10.2)
CHLORIDE SERPL-SCNC: 102 MMOL/L (ref 98–107)
CO2 SERPL-SCNC: 28.3 MMOL/L (ref 22–29)
CREAT BLD-MCNC: 0.57 MG/DL (ref 0.6–1.1)
DEPRECATED RDW RBC AUTO: 50.6 FL (ref 37–54)
EOSINOPHIL # BLD AUTO: 0.07 10*3/MM3 (ref 0–0.4)
EOSINOPHIL NFR BLD AUTO: 0.9 % (ref 0.3–6.2)
ERYTHROCYTE [DISTWIDTH] IN BLOOD BY AUTOMATED COUNT: 14.3 % (ref 12.3–15.4)
GFR SERPL CREATININE-BSD FRML MDRD: 103 ML/MIN/1.73
GLOBULIN UR ELPH-MCNC: 3.4 GM/DL (ref 1.8–3.5)
GLUCOSE BLD-MCNC: 116 MG/DL (ref 74–124)
HCT VFR BLD AUTO: 37.9 % (ref 34–46.6)
HGB BLD-MCNC: 11.7 G/DL (ref 12–15.9)
IMM GRANULOCYTES # BLD AUTO: 0.05 10*3/MM3 (ref 0–0.05)
IMM GRANULOCYTES NFR BLD AUTO: 0.6 % (ref 0–0.5)
LYMPHOCYTES # BLD AUTO: 0.42 10*3/MM3 (ref 0.7–3.1)
LYMPHOCYTES NFR BLD AUTO: 5.3 % (ref 19.6–45.3)
MCH RBC QN AUTO: 29.9 PG (ref 26.6–33)
MCHC RBC AUTO-ENTMCNC: 30.9 G/DL (ref 31.5–35.7)
MCV RBC AUTO: 96.9 FL (ref 79–97)
MONOCYTES # BLD AUTO: 0.66 10*3/MM3 (ref 0.1–0.9)
MONOCYTES NFR BLD AUTO: 8.3 % (ref 5–12)
NEUTROPHILS # BLD AUTO: 6.73 10*3/MM3 (ref 1.7–7)
NEUTROPHILS NFR BLD AUTO: 84.4 % (ref 42.7–76)
NRBC BLD AUTO-RTO: 0 /100 WBC (ref 0–0.2)
PLATELET # BLD AUTO: 362 10*3/MM3 (ref 140–450)
PMV BLD AUTO: 8.7 FL (ref 6–12)
POTASSIUM BLD-SCNC: 4.4 MMOL/L (ref 3.5–4.7)
PROT SERPL-MCNC: 7.1 G/DL (ref 6.3–8)
RBC # BLD AUTO: 3.91 10*6/MM3 (ref 3.77–5.28)
SODIUM BLD-SCNC: 141 MMOL/L (ref 134–145)
WBC NRBC COR # BLD: 7.97 10*3/MM3 (ref 3.4–10.8)

## 2020-02-10 PROCEDURE — 85025 COMPLETE CBC W/AUTO DIFF WBC: CPT

## 2020-02-10 PROCEDURE — 96413 CHEMO IV INFUSION 1 HR: CPT

## 2020-02-10 PROCEDURE — 80053 COMPREHEN METABOLIC PANEL: CPT

## 2020-02-10 PROCEDURE — 25010000002 PEMBROLIZUMAB 100 MG/4ML SOLUTION 4 ML VIAL: Performed by: INTERNAL MEDICINE

## 2020-02-10 PROCEDURE — 99213 OFFICE O/P EST LOW 20 MIN: CPT | Performed by: INTERNAL MEDICINE

## 2020-02-10 RX ORDER — SODIUM CHLORIDE 9 MG/ML
250 INJECTION, SOLUTION INTRAVENOUS ONCE
Status: COMPLETED | OUTPATIENT
Start: 2020-02-10 | End: 2020-02-10

## 2020-02-10 RX ORDER — SODIUM CHLORIDE 9 MG/ML
250 INJECTION, SOLUTION INTRAVENOUS ONCE
Status: CANCELLED | OUTPATIENT
Start: 2020-03-02

## 2020-02-10 RX ORDER — SODIUM CHLORIDE 9 MG/ML
250 INJECTION, SOLUTION INTRAVENOUS ONCE
Status: CANCELLED | OUTPATIENT
Start: 2020-02-10

## 2020-02-10 RX ADMIN — SODIUM CHLORIDE 200 MG: 9 INJECTION, SOLUTION INTRAVENOUS at 13:04

## 2020-02-10 RX ADMIN — SODIUM CHLORIDE 250 ML: 9 INJECTION, SOLUTION INTRAVENOUS at 13:04

## 2020-02-10 NOTE — PROGRESS NOTES
History:     Reason for follow up:   1.  Stage IIB left upper lobe non-small cell lung cancer, high PDL-1 (80%), negative egfr/alk/ros   * status post radiation therapy completed 1/26/18     HPI:  Maryam Arredondo presents for follow-up of her lung cancer.      Since I saw the patient last she completed radiation therapy to recurrent hypermetabolic lung lesions and initiated Keytruda 3 weeks ago.  She did well with the infusion reporting no skin rash, nausea, diarrhea or other concerns.  She did experience a fall after tripping on her oxygen tubing which resulted in a large laceration on the left leg requiring sutures, left hand fractures and a laceration on the forehead.  She feels like her breathing is a little worse secondary to the damp weather and she is using her inhaler more frequently.      Reviewed, confirmed and updated history (past medical, social and family)   Past Medical   Past Medical History:   Diagnosis Date   • Abnormal color of sputum     grey   • Acromioclavicular joint arthritis    • Acute maxillary sinusitis    • Acute upper respiratory infection    • Allergic rhinitis    • Anemia    • Asthma    • B12 deficiency    • Benign essential hypertension    • Breast cancer (CMS/HCC)     s/p Lumpectomy ~2000 and chemo/XRT. Then  Masectomy ~2004 for some recurrence   • Breast cancer (CMS/HCC)    • Cerumen impaction    • Chronic obstructive pulmonary disease (CMS/HCC)    • COPD (chronic obstructive pulmonary disease) (CMS/HCC)    • COPD exacerbation (CMS/HCC)    • Diverticulosis of colon    • Cedric-Barr infection    • Fever    • Heart disease    • High risk medication use    • High risk medication use    • Hospital discharge follow-up    • Hyperthyroidism    • Hyperthyroidism    • Hypoxia    • Laryngitis    • Leg wound, left    • Medicare annual wellness visit, subsequent    • Mitral regurgitation     mild to moderate   • Nocturnal hypoxia    • Non-small cell lung cancer (CMS/HCC)    • Non-small cell  lung cancer (CMS/HCC)    • Onychomycosis of toenail    • Oral aphthae    • Osteopenia    • Osteopenia    • Other fatigue    • Oxygen dependent     2L - at night only   • Pneumonia of upper lobe of lung (CMS/HCC)    • Pneumothorax    • Post-menopausal    • Skin abrasion    • SOB (shortness of breath)    • Supraventricular tachycardia (CMS/HCC)    • Tachycardia    • Thyroid nodule    • Toxic multinodular goiter    • Vitamin B deficiency    • Vitamin D deficiency    • Vitamin D deficiency    • Wound healing well on examination     and   Patient Active Problem List   Diagnosis   • Pneumothorax   • Hypoxia   • Tachycardia   • Hyperthyroidism   • Essential hypertension   • Multifocal atrial tachycardia (CMS/HCC)   • Toxic multinodular goiter   • Pulmonary emphysema (CMS/HCC)   • Vitamin D insufficiency   • Pneumonia   • COPD with acute lower respiratory infection (CMS/HCC)   • Malignant neoplasm of upper lobe of left lung (CMS/HCC)   • Infected skin tear   • Allergic rhinitis   • B12 deficiency   • Oxygen dependent   • Mitral regurgitation   • Breast cancer (CMS/HCC)   • Medicare annual wellness visit, subsequent   • Panlobular emphysema (CMS/HCC)   • Recurrent non-small cell lung cancer (CMS/HCC)   • Encounter for long-term (current) use of other medications   • Unspecified fracture of fifth metacarpal bone, left hand, initial encounter for closed fracture   • Immobility syndrome   • Encounter for removal of sutures   • Laceration of skin of knee without complication, left, sequela     Social History   Social History     Socioeconomic History   • Marital status: Single     Spouse name: Not on file   • Number of children: Not on file   • Years of education: Not on file   • Highest education level: Not on file   Occupational History   • Occupation:      Employer: RETIRED   Tobacco Use   • Smoking status: Former Smoker     Packs/day: 2.00     Years: 50.00     Pack years: 100.00     Types: Cigarettes      Last attempt to quit: 3/14/1994     Years since quittin.9   • Smokeless tobacco: Never Used   • Tobacco comment: D/C 20 years ago, smoking 2 ppd before quitting   Substance and Sexual Activity   • Alcohol use: No     Comment: Social use rare   • Drug use: No   • Sexual activity: Defer     Comment: drinks decaf      Family History  Family History   Problem Relation Age of Onset   • Arthritis Mother    • Heart failure Mother         Congestive Heart Failure   • Lung cancer Father    • Breast cancer Paternal Grandmother    • No Known Problems Sister      Allergies  Allergies   Allergen Reactions   • Codeine Unknown - High Severity     Patient is unaware of the reaction to this medication     • Penicillin V Unknown - High Severity     Reaction unknown to patient     • Penicillins Unknown - High Severity     Pt is unaware of the reaction to this medication     • Mucinex D [Pseudoephedrine-Guaifenesin Er] Nausea Only     Felt hot in chest       Medications: The current medication list was reviewed in the EMR.    Review of Systems  Review of Systems   Constitutional: Positive for fatigue. Negative for activity change, appetite change, chills, diaphoresis, fever and unexpected weight change.   HENT: Negative for congestion and sinus pressure.    Respiratory: Positive for shortness of breath. Negative for cough and chest tightness.    Cardiovascular: Negative for chest pain, palpitations and leg swelling.   Gastrointestinal: Negative for abdominal pain, blood in stool, constipation, diarrhea, nausea and vomiting.   Endocrine: Negative.    Genitourinary: Negative.    Musculoskeletal: Positive for arthralgias and gait problem. Negative for joint swelling and myalgias.   Skin: Negative.    Allergic/Immunologic: Negative.    Hematological: Negative for adenopathy. Does not bruise/bleed easily.   Psychiatric/Behavioral: Agitation:  Behavioral problem:  The patient is nervous/anxious.        Objective    Objective:      Vitals:    02/10/20 1211   BP: 124/67   Pulse: 112   Resp: 18   Temp: 98.3 °F (36.8 °C)   TempSrc: Oral   SpO2: 92%  Comment: 2L   Weight: Comment: pt unable to stand for a weight   Height: Comment: pt unable to stand for a height   PainSc: 0-No pain     Current Status 2/10/2020   ECOG score 2   GENERAL:  Well-developed, somewhat thin and chronic ill-appearing female in no acute distress. Vital signs reviewed.   SKIN: Laceration left forehead  EYES:    EOMs intact.  Conjunctivae normal.  HEAD:  Normocephalic.  NECK:  Supple with good range of motion; no thyromegaly or masses  LYMPHATICS:  No cervical, supraclavicular, axillary adenopathy.  RESP: Mild decrease in breath sounds, no increased work of breathing, no wheezing  CARDIAC:  Regular rate and rhythm without murmurs, rubs or gallops. Normal S1,S2. No edema  GI:  Soft, nontender, normal bowel sounds  MSK:  No clubbing or cyanosis, left hand fingers in a splint.  The left knee is in a immobilizer  NEUROLOGICAL:   No focal neurological deficits.  PSYCHIATRIC:  Normal affect and mood.  Alert and Oriented x 3.     Labs and Imaging  Results for orders placed or performed in visit on 02/10/20   Comprehensive metabolic panel   Result Value Ref Range    Glucose 116 74 - 124 mg/dL    BUN 14 6 - 20 mg/dL    Creatinine 0.57 (L) 0.60 - 1.10 mg/dL    Sodium 141 134 - 145 mmol/L    Potassium 4.4 3.5 - 4.7 mmol/L    Chloride 102 98 - 107 mmol/L    CO2 28.3 22.0 - 29.0 mmol/L    Calcium 9.7 8.5 - 10.2 mg/dL    Total Protein 7.1 6.3 - 8.0 g/dL    Albumin 3.70 3.50 - 5.20 g/dL    ALT (SGPT) 17 0 - 33 U/L    AST (SGOT) 15 0 - 32 U/L    Alkaline Phosphatase 73 38 - 116 U/L    Total Bilirubin 0.2 0.2 - 1.2 mg/dL    eGFR Non African Amer 103 >60 mL/min/1.73    Globulin 3.4 1.8 - 3.5 gm/dL    A/G Ratio 1.1 1.1 - 2.4 g/dL    BUN/Creatinine Ratio 24.6 7.3 - 30.0    Anion Gap 10.7 5.0 - 15.0 mmol/L   CBC Auto Differential   Result Value Ref Range    WBC 7.97 3.40 - 10.80 10*3/mm3     RBC 3.91 3.77 - 5.28 10*6/mm3    Hemoglobin 11.7 (L) 12.0 - 15.9 g/dL    Hematocrit 37.9 34.0 - 46.6 %    MCV 96.9 79.0 - 97.0 fL    MCH 29.9 26.6 - 33.0 pg    MCHC 30.9 (L) 31.5 - 35.7 g/dL    RDW 14.3 12.3 - 15.4 %    RDW-SD 50.6 37.0 - 54.0 fl    MPV 8.7 6.0 - 12.0 fL    Platelets 362 140 - 450 10*3/mm3    Neutrophil % 84.4 (H) 42.7 - 76.0 %    Lymphocyte % 5.3 (L) 19.6 - 45.3 %    Monocyte % 8.3 5.0 - 12.0 %    Eosinophil % 0.9 0.3 - 6.2 %    Basophil % 0.5 0.0 - 1.5 %    Immature Grans % 0.6 (H) 0.0 - 0.5 %    Neutrophils, Absolute 6.73 1.70 - 7.00 10*3/mm3    Lymphocytes, Absolute 0.42 (L) 0.70 - 3.10 10*3/mm3    Monocytes, Absolute 0.66 0.10 - 0.90 10*3/mm3    Eosinophils, Absolute 0.07 0.00 - 0.40 10*3/mm3    Basophils, Absolute 0.04 0.00 - 0.20 10*3/mm3    Immature Grans, Absolute 0.05 0.00 - 0.05 10*3/mm3    nRBC 0.0 0.0 - 0.2 /100 WBC       CT chest 12/28/18: There is decreased size of the left apical mass 1.3 cm compared to previous 1.5 cm.  Adjacent granulated density also decreased in size from 1.7 down to 0.9 cm.  There is stable emphysema and apical scarring.  There are stable thyroid nodules.  No new lymphadenopathy in the chest.  There is a right hepatic enhancing lesion stable.  I personally reviewed the CT chest and there is stable to improved disease/scar in the left upper lobe of the lung.    CT chest 4/23/2019: Again seen are hypodense bilateral thyroid lesions, hyperenhancing lesions in the liver unchanged.  There is no mediastinal, hilar or axillary lymphadenopathy.  There is severe emphysema.  There are stable nodules in the right middle and lower lobe since 11/3/2016.  There is a new sub-6 mm nodule in the anterior right middle lobe.  Stable masslike thickening in the left lung apex since 12/28/2018 and decreased since 10/8/2018.  There is irregular opacification in the left apex likely radiation fibrosis.    CT chest 8/12/2019: Decreased size of nodular densities in the left upper lobe.   New 10 mm subpleural nodule in the lingula.  Stable hyperenhancing hepatic nodules.    CT chest abdomen 12/9/2019: There is discrete masslike soft tissue thickening in the left apex increased from previous 1.2 x 1.9 cm up to 1.6 x 2.4 cm.  Nodule in the right upper lobe stable compared to 10/8/2018, nodule in the right middle lobe stable compared to 10/8/2018.  Nodule in the right lower lobe stable compared to 10/8/2018.  There is a nodule in the lingula which has increased in size from 1.0 up to 1.4 cm.  No evidence of disease in the abdomen.    PET scan 12/20/2019: There is uptake in the right lobe of the thyroid which was present and stable since 10/18/2018.  There is increased uptake in the left lung apex SUV 11.1 and a smaller pleural-based nodularity laterally 1.1 cm SUV 4.7.  There is a 1.4 cm nodule in the lingula with SUV 7.9.  There is uptake in the left hilum which is similar to 10/18/2018.    Assessment/Plan   Assessment/Plan:   This is a 72 y.o. female with:     1.  Recurrent left upper lobe non-small cell lung cancer, high PDL-1 (80%), negative egfr/alk/ros   *The patient was previously treated with radiation therapy to the left upper lobe nodule/mass completed January 2018   *I personally reviewed the PET scan from 12/20/2019.  There is significant uptake in the mass in the apex of the left lung and also a nodule in the lingula with significant uptake for its size and I think both areas are likely consistent with recurrent disease.  I am very hesitant to send the patient for a biopsy as she has very poor lung function and a pneumothorax might prove fatal for her.  There is some uptake in the left hilum but this was present in October 2018 and stable.  There is uptake in the right thyroid gland which is stable and the patient has declined biopsy of this in the past.     The patient underwent additional radiation to the hypermetabolic lesions and also initiated Keytruda on 1/21/2020.    We will proceed  with the second dose of Keytruda today.  Dr. Bennett has already ordered a CT of the chest to be done in May for follow-up.    2.  Multinodular goiter stable by CT--followed by endocrinology.  There is uptake in the right thyroid gland similar to prior PET.  The patient declines biopsy of the hypermetabolic right thyroid lesion    3. History of right breast cancer, initially diagnosed in 2000 treated with lumpectomy, radiation, chemotherapy and endocrine therapy with local recurrence in 2005 treated with mastectomy and then Arimidex.    * Mammogram due July 2020    4. COPD, oxygen dependent    5.  Coronary artery calcifications: The patient asked me to forward my note to her cardiologist Dr. Bobo regarding incidental coronary calcifications noted by CT

## 2020-02-11 ENCOUNTER — TELEPHONE (OUTPATIENT)
Dept: ONCOLOGY | Facility: CLINIC | Age: 75
End: 2020-02-11

## 2020-02-11 NOTE — TELEPHONE ENCOUNTER
PT CALLING ABOUT UPCOMING APPTS. PT HAS LAB SCHEDULED FOR HER APPT ON 3/23 BUT NOT HER 3/2 APPT. MESSAGED APPT DESK TO ADD APPT ON FOR LABS PRIOR TO THAT INFUSION SAME DAY. PT V/U.

## 2020-02-11 NOTE — TELEPHONE ENCOUNTER
Pt I scheduled for an infusion on 3/2/20 & 3/23/20 she wants to know if she needs to have labs scheduled before these infusions ?     Pt contact # [phone]

## 2020-02-17 RX ORDER — METHIMAZOLE 5 MG/1
TABLET ORAL
Qty: 135 TABLET | Refills: 1 | Status: SHIPPED | OUTPATIENT
Start: 2020-02-17 | End: 2020-09-02

## 2020-02-18 ENCOUNTER — TELEPHONE (OUTPATIENT)
Dept: FAMILY MEDICINE CLINIC | Facility: CLINIC | Age: 75
End: 2020-02-18

## 2020-02-18 DIAGNOSIS — J44.0 COPD WITH ACUTE LOWER RESPIRATORY INFECTION (HCC): Primary | ICD-10-CM

## 2020-02-18 RX ORDER — TIOTROPIUM BROMIDE INHALATION SPRAY 3.12 UG/1
2 SPRAY, METERED RESPIRATORY (INHALATION) DAILY
Qty: 4 INHALER | Refills: 5 | Status: SHIPPED | OUTPATIENT
Start: 2020-02-18 | End: 2020-08-25

## 2020-02-20 ENCOUNTER — OFFICE VISIT (OUTPATIENT)
Dept: FAMILY MEDICINE CLINIC | Facility: CLINIC | Age: 75
End: 2020-02-20

## 2020-02-20 ENCOUNTER — TELEPHONE (OUTPATIENT)
Dept: FAMILY MEDICINE CLINIC | Facility: CLINIC | Age: 75
End: 2020-02-20

## 2020-02-20 VITALS
TEMPERATURE: 98.2 F | DIASTOLIC BLOOD PRESSURE: 76 MMHG | HEIGHT: 60 IN | OXYGEN SATURATION: 93 % | HEART RATE: 104 BPM | BODY MASS INDEX: 18.1 KG/M2 | SYSTOLIC BLOOD PRESSURE: 120 MMHG | WEIGHT: 92.2 LBS

## 2020-02-20 DIAGNOSIS — R30.0 DYSURIA: Primary | ICD-10-CM

## 2020-02-20 DIAGNOSIS — J43.9 PULMONARY EMPHYSEMA, UNSPECIFIED EMPHYSEMA TYPE (HCC): ICD-10-CM

## 2020-02-20 DIAGNOSIS — N76.0 ACUTE VAGINITIS: ICD-10-CM

## 2020-02-20 PROCEDURE — 99214 OFFICE O/P EST MOD 30 MIN: CPT | Performed by: FAMILY MEDICINE

## 2020-02-20 RX ORDER — BUDESONIDE AND FORMOTEROL FUMARATE DIHYDRATE 160; 4.5 UG/1; UG/1
2 AEROSOL RESPIRATORY (INHALATION)
Qty: 1 INHALER | Refills: 11
Start: 2020-02-20 | End: 2021-01-12

## 2020-02-20 RX ORDER — FLUCONAZOLE 150 MG/1
TABLET ORAL
Qty: 2 TABLET | Refills: 0 | Status: SHIPPED | OUTPATIENT
Start: 2020-02-20 | End: 2020-03-05

## 2020-02-20 NOTE — TELEPHONE ENCOUNTER
Pt called and stated you can reach her at the following number [phone] regarding her U/A results.

## 2020-02-20 NOTE — PROGRESS NOTES
Chief Complaint   Patient presents with   • burning while urinating   • Vaginal Itching     symptoms started about three days        Subjective   Maryam Arredondo is a 74 y.o. female.     History of Present Illness   C/o burning with urination.  Going on 3 days.  Burns when it hits my labia. No better.  Increased severity.    C/o vaginal itching for 3 days.  I finished an antibiotic 3 weeks ago.    F?u COPD.  Has had years.  On O2 at all times.    Doing well with symbicort.  Needs refills.    The following portions of the patient's history were reviewed and updated as appropriate: allergies, current medications, past family history, past medical history, past social history, past surgical history and problem list.    Review of Systems   Constitutional: Negative for appetite change and fatigue.   HENT: Negative for nosebleeds and sore throat.    Eyes: Negative for blurred vision and visual disturbance.   Respiratory: Negative for shortness of breath and wheezing.    Cardiovascular: Negative for chest pain and leg swelling.   Gastrointestinal: Negative for abdominal distention and abdominal pain.   Endocrine: Negative for cold intolerance and polyuria.   Genitourinary: Positive for dysuria. Negative for hematuria.   Musculoskeletal: Negative for arthralgias and myalgias.   Skin: Negative for color change and rash.   Neurological: Negative for weakness and confusion.   Psychiatric/Behavioral: Negative for agitation and depressed mood.       Patient Active Problem List   Diagnosis   • Pneumothorax   • Hypoxia   • Tachycardia   • Hyperthyroidism   • Essential hypertension   • Multifocal atrial tachycardia (CMS/HCC)   • Toxic multinodular goiter   • Pulmonary emphysema (CMS/HCC)   • Vitamin D insufficiency   • Pneumonia   • COPD with acute lower respiratory infection (CMS/HCC)   • Malignant neoplasm of upper lobe of left lung (CMS/HCC)   • Infected skin tear   • Allergic rhinitis   • B12 deficiency   • Oxygen dependent   •  Mitral regurgitation   • Breast cancer (CMS/[facility])   • Medicare annual wellness visit, subsequent   • Panlobular emphysema (CMS/[facility])   • Recurrent non-small cell lung cancer (CMS/[facility])   • Encounter for long-term (current) use of other medications   • Unspecified fracture of fifth metacarpal bone, left hand, initial encounter for closed fracture   • Immobility syndrome   • Encounter for removal of sutures   • Laceration of skin of knee without complication, left, sequela   • Dysuria   • Acute vaginitis       Allergies   Allergen Reactions   • Codeine Unknown - High Severity     Patient is unaware of the reaction to this medication     • Penicillin V Unknown - High Severity     Reaction unknown to patient     • Penicillins Unknown - High Severity     Pt is unaware of the reaction to this medication     • Mucinex D [Pseudoephedrine-Guaifenesin Er] Nausea Only     Felt hot in chest         Current Outpatient Medications:   •  albuterol (PROVENTIL HFA;VENTOLIN HFA) 108 (90 Base) MCG/ACT inhaler, Inhale 2 puffs Every 4 (Four) Hours As Needed for Wheezing. Per MD - until current episode is resolved (Patient taking differently: Inhale 2 puffs As Needed for Wheezing. Per MD - until current episode is resolved ), Disp: , Rfl:   •  budesonide-formoterol (SYMBICORT) 160-4.5 MCG/ACT inhaler, Inhale 2 puffs 2 (Two) Times a Day., Disp: 1 inhaler, Rfl: 11  •  CARTIA  MG 24 hr capsule, Take 180 mg by mouth Daily., Disp: , Rfl:   •  Cholecalciferol (VITAMIN D3) 2000 UNITS tablet, Take 1 tablet by mouth daily., Disp: , Rfl:   •  clindamycin (CLEOCIN T) 1 % lotion, Apply  topically to the appropriate area as directed 2 (Two) Times a Day., Disp: , Rfl:   •  digoxin (LANOXIN) 125 MCG tablet, Take 1 tablet by mouth Daily., Disp: 90 tablet, Rfl: 2  •  fluticasone (FLONASE) 50 MCG/ACT nasal spray, 2 Squirts into the nostril(s) as directed by provider 2 (Two) Times a Day., Disp: , Rfl:   •  methIMAzole (TAPAZOLE) 5 MG tablet, TAKE ONE  AND ONE-HALF TABLET BY MOUTH DAILY, Disp: 135 tablet, Rfl: 1  •  metroNIDAZOLE (METROCREAM) 0.75 % cream, , Disp: , Rfl:   •  montelukast (SINGULAIR) 10 MG tablet, Take 1 tablet by mouth Daily. (Patient taking differently: Take 10 mg by mouth Every Night.), Disp: 90 tablet, Rfl: 3  •  Multiple Vitamins-Minerals (PRESERVISION AREDS 2) chewable tablet, Chew 2 capsules Every Morning., Disp: , Rfl:   •  O2 (OXYGEN), Inhale Continuous., Disp: , Rfl:   •  SPIRIVA RESPIMAT 2.5 MCG/ACT aerosol solution inhaler, Inhale 2 puffs Daily., Disp: 4 inhaler, Rfl: 5  •  vitamin B-12 (CYANOCOBALAMIN) 1000 MCG tablet, Take 1,000 mcg by mouth Daily. M,W,F, Disp: , Rfl:   •  fluconazole (DIFLUCAN) 150 MG tablet, One now and then one 48 hours later., Disp: 2 tablet, Rfl: 0    Past Medical History:   Diagnosis Date   • Abnormal color of sputum     grey   • Acromioclavicular joint arthritis    • Acute maxillary sinusitis    • Acute upper respiratory infection    • Allergic rhinitis    • Anemia    • Asthma    • B12 deficiency    • Benign essential hypertension    • Breast cancer (CMS/HCC)     s/p Lumpectomy ~2000 and chemo/XRT. Then  Masectomy ~2004 for some recurrence   • Breast cancer (CMS/HCC)    • Cerumen impaction    • Chronic obstructive pulmonary disease (CMS/HCC)    • COPD (chronic obstructive pulmonary disease) (CMS/HCC)    • COPD exacerbation (CMS/HCC)    • Diverticulosis of colon    • Cedric-Barr infection    • Fever    • Heart disease    • High risk medication use    • High risk medication use    • Hospital discharge follow-up    • Hyperthyroidism    • Hyperthyroidism    • Hypoxia    • Laryngitis    • Leg wound, left    • Medicare annual wellness visit, subsequent    • Mitral regurgitation     mild to moderate   • Nocturnal hypoxia    • Non-small cell lung cancer (CMS/HCC)    • Non-small cell lung cancer (CMS/HCC)    • Onychomycosis of toenail    • Oral aphthae    • Osteopenia    • Osteopenia    • Other fatigue    • Oxygen  dependent     2L - at night only   • Pneumonia of upper lobe of lung (CMS/HCC)    • Pneumothorax    • Post-menopausal    • Skin abrasion    • SOB (shortness of breath)    • Supraventricular tachycardia (CMS/HCC)    • Tachycardia    • Thyroid nodule    • Toxic multinodular goiter    • Vitamin B deficiency    • Vitamin D deficiency    • Vitamin D deficiency    • Wound healing well on examination        Past Surgical History:   Procedure Laterality Date   • BREAST LUMPECTOMY Right    • BREAST LUMPECTOMY     • CARDIAC CATHETERIZATION      PS SAPHENOUS VAIN RIGHT LEG   • COLONOSCOPY  2017    Complete. 4 polyps. Recheck in 2019.    • COLONOSCOPY N/A 2017    Procedure: COLONOSCOPY WITH POLYPECTOMY(COLD BIOPSY AND HOT SNARE);  Surgeon: Luiza Eddy MD;  Location: [facility] ENDOSCOPY;  Service:    • HYSTERECTOMY     • MASTECTOMY Right    • NECK SURGERY      2 spurs removed   • VASCULAR SURGERY      spider vein ablation       Family History   Problem Relation Age of Onset   • Arthritis Mother    • Heart failure Mother         Congestive Heart Failure   • Lung cancer Father    • Breast cancer Paternal Grandmother    • No Known Problems Sister        Social History     Tobacco Use   • Smoking status: Former Smoker     Packs/day: 2.00     Years: 50.00     Pack years: 100.00     Types: Cigarettes     Last attempt to quit: 3/14/1994     Years since quittin.9   • Smokeless tobacco: Never Used   • Tobacco comment: D/C 20 years ago, smoking 2 ppd before quitting   Substance Use Topics   • Alcohol use: No     Comment: Social use rare            Objective     Vitals:    20 1017   BP: 120/76   Pulse: 104   Temp: 98.2 °F (36.8 °C)   SpO2: 93%     Body mass index is 18.01 kg/m².    Physical Exam   Constitutional: She appears well-developed and well-nourished.   HENT:   Head: Normocephalic and atraumatic.   Mouth/Throat: Oropharynx is clear and moist.   Eyes: Pupils are equal, round, and reactive to light. No  scleral icterus.   Neck: No thyromegaly present.   Cardiovascular: Normal rate and regular rhythm. Exam reveals no gallop and no friction rub.   No murmur heard.  Pulmonary/Chest: Effort normal. No respiratory distress. She has no wheezes. She has no rales. She exhibits no tenderness.   Abdominal: Soft. Bowel sounds are normal. She exhibits no distension. There is no tenderness.   Musculoskeletal: Normal range of motion. She exhibits no edema or deformity.   Lymphadenopathy:     She has no cervical adenopathy.   Neurological: No cranial nerve deficit. She exhibits normal muscle tone.   Skin: Skin is warm and dry. No rash noted. She is not diaphoretic.   Vitals reviewed.      Lab Results   Component Value Date    GLUCOSE 116 02/10/2020    BUN 14 02/10/2020    CREATININE 0.57 (L) 02/10/2020    EGFRIFNONA 103 02/10/2020    EGFRIFAFRI 108 10/21/2019    BCR 24.6 02/10/2020    K 4.4 02/10/2020    CO2 28.3 02/10/2020    CALCIUM 9.7 02/10/2020    PROTENTOTREF 6.7 10/21/2019    ALBUMIN 3.70 02/10/2020    LABIL2 1.7 10/21/2019    AST 15 02/10/2020    ALT 17 02/10/2020       WBC   Date Value Ref Range Status   02/10/2020 7.97 3.40 - 10.80 10*3/mm3 Final   06/11/2019 6.11 3.40 - 10.80 10*3/mm3 Final     RBC   Date Value Ref Range Status   02/10/2020 3.91 3.77 - 5.28 10*6/mm3 Final   06/11/2019 4.17 3.77 - 5.28 10*6/mm3 Final     Hemoglobin   Date Value Ref Range Status   02/10/2020 11.7 (L) 12.0 - 15.9 g/dL Final     Hematocrit   Date Value Ref Range Status   02/10/2020 37.9 34.0 - 46.6 % Final     MCV   Date Value Ref Range Status   02/10/2020 96.9 79.0 - 97.0 fL Final     MCH   Date Value Ref Range Status   02/10/2020 29.9 26.6 - 33.0 pg Final     MCHC   Date Value Ref Range Status   02/10/2020 30.9 (L) 31.5 - 35.7 g/dL Final     RDW   Date Value Ref Range Status   02/10/2020 14.3 12.3 - 15.4 % Final     RDW-SD   Date Value Ref Range Status   02/10/2020 50.6 37.0 - 54.0 fl Final     MPV   Date Value Ref Range Status    02/10/2020 8.7 6.0 - 12.0 fL Final     Platelets   Date Value Ref Range Status   02/10/2020 362 140 - 450 10*3/mm3 Final     Neutrophil %   Date Value Ref Range Status   02/10/2020 84.4 (H) 42.7 - 76.0 % Final     Lymphocyte %   Date Value Ref Range Status   02/10/2020 5.3 (L) 19.6 - 45.3 % Final     Monocyte %   Date Value Ref Range Status   02/10/2020 8.3 5.0 - 12.0 % Final     Eosinophil %   Date Value Ref Range Status   02/10/2020 0.9 0.3 - 6.2 % Final     Basophil %   Date Value Ref Range Status   02/10/2020 0.5 0.0 - 1.5 % Final     Immature Grans %   Date Value Ref Range Status   02/10/2020 0.6 (H) 0.0 - 0.5 % Final     Neutrophils, Absolute   Date Value Ref Range Status   02/10/2020 6.73 1.70 - 7.00 10*3/mm3 Final     Lymphocytes, Absolute   Date Value Ref Range Status   02/10/2020 0.42 (L) 0.70 - 3.10 10*3/mm3 Final     Monocytes, Absolute   Date Value Ref Range Status   02/10/2020 0.66 0.10 - 0.90 10*3/mm3 Final     Eosinophils, Absolute   Date Value Ref Range Status   02/10/2020 0.07 0.00 - 0.40 10*3/mm3 Final     Basophils, Absolute   Date Value Ref Range Status   02/10/2020 0.04 0.00 - 0.20 10*3/mm3 Final     Immature Grans, Absolute   Date Value Ref Range Status   02/10/2020 0.05 0.00 - 0.05 10*3/mm3 Final     nRBC   Date Value Ref Range Status   02/10/2020 0.0 0.0 - 0.2 /100 WBC Final       No results found for: HGBA1C    Lab Results   Component Value Date    JDBQSBWZ82 1,228 (H) 09/03/2019       TSH   Date Value Ref Range Status   01/21/2020 1.000 0.270 - 4.200 uIU/mL Final       No results found for: CHOL  Lab Results   Component Value Date    TRIG 58 09/03/2019     Lab Results   Component Value Date    HDL 72 (H) 09/03/2019     Lab Results   Component Value Date     (H) 09/03/2019     Lab Results   Component Value Date    VLDL 11.6 09/03/2019     No results found for: LDLHDL      Procedures    Assessment/Plan   Problems Addressed this Visit        Respiratory    Pulmonary emphysema  (CMS/[facility])    Relevant Medications    budesonide-formoterol (SYMBICORT) 160-4.5 MCG/ACT inhaler       Nervous and Auditory    Dysuria - Primary    Relevant Orders    Urine Culture - Urine, Urine, Clean Catch       Genitourinary    Acute vaginitis    Relevant Medications    fluconazole (DIFLUCAN) 150 MG tablet          Orders Placed This Encounter   Procedures   • Urine Culture - Urine, Urine, Clean Catch       Current Outpatient Medications   Medication Sig Dispense Refill   • albuterol (PROVENTIL HFA;VENTOLIN HFA) 108 (90 Base) MCG/ACT inhaler Inhale 2 puffs Every 4 (Four) Hours As Needed for Wheezing. Per MD - until current episode is resolved (Patient taking differently: Inhale 2 puffs As Needed for Wheezing. Per MD - until current episode is resolved )     • budesonide-formoterol (SYMBICORT) 160-4.5 MCG/ACT inhaler Inhale 2 puffs 2 (Two) Times a Day. 1 inhaler 11   • CARTIA  MG 24 hr capsule Take 180 mg by mouth Daily.     • Cholecalciferol (VITAMIN D3) 2000 UNITS tablet Take 1 tablet by mouth daily.     • clindamycin (CLEOCIN T) 1 % lotion Apply  topically to the appropriate area as directed 2 (Two) Times a Day.     • digoxin (LANOXIN) 125 MCG tablet Take 1 tablet by mouth Daily. 90 tablet 2   • fluticasone (FLONASE) 50 MCG/ACT nasal spray 2 Squirts into the nostril(s) as directed by provider 2 (Two) Times a Day.     • methIMAzole (TAPAZOLE) 5 MG tablet TAKE ONE AND ONE-HALF TABLET BY MOUTH DAILY 135 tablet 1   • metroNIDAZOLE (METROCREAM) 0.75 % cream      • montelukast (SINGULAIR) 10 MG tablet Take 1 tablet by mouth Daily. (Patient taking differently: Take 10 mg by mouth Every Night.) 90 tablet 3   • Multiple Vitamins-Minerals (PRESERVISION AREDS 2) chewable tablet Chew 2 capsules Every Morning.     • O2 (OXYGEN) Inhale Continuous.     • SPIRIVA RESPIMAT 2.5 MCG/ACT aerosol solution inhaler Inhale 2 puffs Daily. 4 inhaler 5   • vitamin B-12 (CYANOCOBALAMIN) 1000 MCG tablet Take 1,000 mcg by mouth Daily.  M,W,F     • fluconazole (DIFLUCAN) 150 MG tablet One now and then one 48 hours later. 2 tablet 0     No current facility-administered medications for this visit.        Maryam Arredondo had no medications administered during this visit.    No follow-ups on file.    There are no Patient Instructions on file for this visit.

## 2020-02-22 LAB
BACTERIA UR CULT: NO GROWTH
BACTERIA UR CULT: NORMAL

## 2020-02-24 ENCOUNTER — TELEPHONE (OUTPATIENT)
Dept: FAMILY MEDICINE CLINIC | Facility: CLINIC | Age: 75
End: 2020-02-24

## 2020-02-24 NOTE — TELEPHONE ENCOUNTER
Calling about urine test from last week.    Also wants to know if steri-strips on knee can get wet?  Would like some bathing instructions.    Please return call, tried to get results for her and I lost the call.

## 2020-02-25 ENCOUNTER — TELEPHONE (OUTPATIENT)
Dept: FAMILY MEDICINE CLINIC | Facility: CLINIC | Age: 75
End: 2020-02-25

## 2020-03-02 ENCOUNTER — TELEPHONE (OUTPATIENT)
Dept: ONCOLOGY | Facility: HOSPITAL | Age: 75
End: 2020-03-02

## 2020-03-02 ENCOUNTER — LAB (OUTPATIENT)
Dept: LAB | Facility: HOSPITAL | Age: 75
End: 2020-03-02

## 2020-03-02 ENCOUNTER — INFUSION (OUTPATIENT)
Dept: ONCOLOGY | Facility: HOSPITAL | Age: 75
End: 2020-03-02

## 2020-03-02 VITALS
SYSTOLIC BLOOD PRESSURE: 127 MMHG | TEMPERATURE: 97.2 F | OXYGEN SATURATION: 93 % | DIASTOLIC BLOOD PRESSURE: 68 MMHG | HEART RATE: 103 BPM | WEIGHT: 92 LBS | BODY MASS INDEX: 17.97 KG/M2

## 2020-03-02 DIAGNOSIS — Z79.899 ENCOUNTER FOR LONG-TERM (CURRENT) USE OF OTHER MEDICATIONS: ICD-10-CM

## 2020-03-02 DIAGNOSIS — Z79.899 ENCOUNTER FOR LONG-TERM (CURRENT) USE OF OTHER MEDICATIONS: Primary | ICD-10-CM

## 2020-03-02 DIAGNOSIS — C34.90 RECURRENT NON-SMALL CELL LUNG CANCER (HCC): ICD-10-CM

## 2020-03-02 LAB
ALBUMIN SERPL-MCNC: 3.6 G/DL (ref 3.5–5.2)
ALBUMIN/GLOB SERPL: 1.1 G/DL (ref 1.1–2.4)
ALP SERPL-CCNC: 76 U/L (ref 38–116)
ALT SERPL W P-5'-P-CCNC: 20 U/L (ref 0–33)
ANION GAP SERPL CALCULATED.3IONS-SCNC: 12.7 MMOL/L (ref 5–15)
AST SERPL-CCNC: 20 U/L (ref 0–32)
BASOPHILS # BLD AUTO: 0.06 10*3/MM3 (ref 0–0.2)
BASOPHILS NFR BLD AUTO: 0.7 % (ref 0–1.5)
BILIRUB SERPL-MCNC: <0.2 MG/DL (ref 0.2–1.2)
BUN BLD-MCNC: 27 MG/DL (ref 6–20)
BUN/CREAT SERPL: 39.1 (ref 7.3–30)
CALCIUM SPEC-SCNC: 9.2 MG/DL (ref 8.5–10.2)
CHLORIDE SERPL-SCNC: 102 MMOL/L (ref 98–107)
CO2 SERPL-SCNC: 25.3 MMOL/L (ref 22–29)
CREAT BLD-MCNC: 0.69 MG/DL (ref 0.6–1.1)
DEPRECATED RDW RBC AUTO: 50.8 FL (ref 37–54)
EOSINOPHIL # BLD AUTO: 0.16 10*3/MM3 (ref 0–0.4)
EOSINOPHIL NFR BLD AUTO: 1.7 % (ref 0.3–6.2)
ERYTHROCYTE [DISTWIDTH] IN BLOOD BY AUTOMATED COUNT: 14.5 % (ref 12.3–15.4)
GFR SERPL CREATININE-BSD FRML MDRD: 83 ML/MIN/1.73
GLOBULIN UR ELPH-MCNC: 3.3 GM/DL (ref 1.8–3.5)
GLUCOSE BLD-MCNC: 130 MG/DL (ref 74–124)
HCT VFR BLD AUTO: 37.9 % (ref 34–46.6)
HGB BLD-MCNC: 12.3 G/DL (ref 12–15.9)
IMM GRANULOCYTES # BLD AUTO: 0.06 10*3/MM3 (ref 0–0.05)
IMM GRANULOCYTES NFR BLD AUTO: 0.7 % (ref 0–0.5)
LYMPHOCYTES # BLD AUTO: 0.63 10*3/MM3 (ref 0.7–3.1)
LYMPHOCYTES NFR BLD AUTO: 6.8 % (ref 19.6–45.3)
MCH RBC QN AUTO: 30.8 PG (ref 26.6–33)
MCHC RBC AUTO-ENTMCNC: 32.5 G/DL (ref 31.5–35.7)
MCV RBC AUTO: 94.8 FL (ref 79–97)
MONOCYTES # BLD AUTO: 0.65 10*3/MM3 (ref 0.1–0.9)
MONOCYTES NFR BLD AUTO: 7.1 % (ref 5–12)
NEUTROPHILS # BLD AUTO: 7.64 10*3/MM3 (ref 1.7–7)
NEUTROPHILS NFR BLD AUTO: 83 % (ref 42.7–76)
NRBC BLD AUTO-RTO: 0 /100 WBC (ref 0–0.2)
PLATELET # BLD AUTO: 294 10*3/MM3 (ref 140–450)
PMV BLD AUTO: 9.3 FL (ref 6–12)
POTASSIUM BLD-SCNC: 4.6 MMOL/L (ref 3.5–4.7)
PROT SERPL-MCNC: 6.9 G/DL (ref 6.3–8)
RBC # BLD AUTO: 4 10*6/MM3 (ref 3.77–5.28)
SODIUM BLD-SCNC: 140 MMOL/L (ref 134–145)
T4 FREE SERPL-MCNC: 1.09 NG/DL (ref 0.93–1.7)
TSH SERPL DL<=0.05 MIU/L-ACNC: 1.5 UIU/ML (ref 0.27–4.2)
WBC NRBC COR # BLD: 9.2 10*3/MM3 (ref 3.4–10.8)

## 2020-03-02 PROCEDURE — 80053 COMPREHEN METABOLIC PANEL: CPT

## 2020-03-02 PROCEDURE — 96413 CHEMO IV INFUSION 1 HR: CPT

## 2020-03-02 PROCEDURE — 85025 COMPLETE CBC W/AUTO DIFF WBC: CPT

## 2020-03-02 PROCEDURE — 25010000002 PEMBROLIZUMAB 100 MG/4ML SOLUTION 4 ML VIAL: Performed by: INTERNAL MEDICINE

## 2020-03-02 PROCEDURE — 84439 ASSAY OF FREE THYROXINE: CPT | Performed by: INTERNAL MEDICINE

## 2020-03-02 PROCEDURE — 84443 ASSAY THYROID STIM HORMONE: CPT | Performed by: INTERNAL MEDICINE

## 2020-03-02 PROCEDURE — 36415 COLL VENOUS BLD VENIPUNCTURE: CPT

## 2020-03-02 RX ORDER — SODIUM CHLORIDE 9 MG/ML
250 INJECTION, SOLUTION INTRAVENOUS ONCE
Status: COMPLETED | OUTPATIENT
Start: 2020-03-02 | End: 2020-03-02

## 2020-03-02 RX ADMIN — SODIUM CHLORIDE 200 MG: 9 INJECTION, SOLUTION INTRAVENOUS at 16:19

## 2020-03-02 RX ADMIN — SODIUM CHLORIDE 250 ML: 900 INJECTION, SOLUTION INTRAVENOUS at 16:15

## 2020-03-02 NOTE — TELEPHONE ENCOUNTER
Pt has recently been treated for yeast infection. Told her sister it was fine to have treatment.     ----- Message from Akua Atkinson sent at 3/2/2020 10:02 AM EST -----  Regarding: yeast infection  Pt states she has a yeast infection, wants to know if this would be any cause of concern with today's treatment.    [phone] If someone could please give her a call.    Thank you!    Zarina

## 2020-03-02 NOTE — NURSING NOTE
"Pt here for Keytruda accompanied by her sister. Pt and sister both state the she has been experiencing quite a bit of fatigue since starting Keytruda and it does not improve during her off weeks from treatment. Also states she gets an \"on edge\" feeling, \"almost like anxiety\" from time to time. Reviewed Keytruda side effects and discussed strategies for fatigue. She is continuing her exercises prescribed by PT/OT and continues to have home PT/OT since her fall in January.   "

## 2020-03-04 ENCOUNTER — RESULTS ENCOUNTER (OUTPATIENT)
Dept: ENDOCRINOLOGY | Age: 75
End: 2020-03-04

## 2020-03-04 DIAGNOSIS — E05.20 TOXIC MULTINODULAR GOITER: ICD-10-CM

## 2020-03-05 ENCOUNTER — OFFICE VISIT (OUTPATIENT)
Dept: ENDOCRINOLOGY | Age: 75
End: 2020-03-05

## 2020-03-05 VITALS
DIASTOLIC BLOOD PRESSURE: 68 MMHG | HEART RATE: 107 BPM | SYSTOLIC BLOOD PRESSURE: 124 MMHG | OXYGEN SATURATION: 96 % | BODY MASS INDEX: 18.06 KG/M2 | WEIGHT: 92 LBS | HEIGHT: 60 IN

## 2020-03-05 DIAGNOSIS — E55.9 VITAMIN D INSUFFICIENCY: ICD-10-CM

## 2020-03-05 DIAGNOSIS — E05.20 TOXIC MULTINODULAR GOITER: Primary | ICD-10-CM

## 2020-03-05 DIAGNOSIS — I10 ESSENTIAL HYPERTENSION: ICD-10-CM

## 2020-03-05 DIAGNOSIS — J43.9 PULMONARY EMPHYSEMA, UNSPECIFIED EMPHYSEMA TYPE (HCC): ICD-10-CM

## 2020-03-05 DIAGNOSIS — I47.1 MULTIFOCAL ATRIAL TACHYCARDIA (HCC): ICD-10-CM

## 2020-03-05 PROCEDURE — 99214 OFFICE O/P EST MOD 30 MIN: CPT | Performed by: INTERNAL MEDICINE

## 2020-03-11 ENCOUNTER — TELEPHONE (OUTPATIENT)
Dept: FAMILY MEDICINE CLINIC | Facility: CLINIC | Age: 75
End: 2020-03-11

## 2020-03-11 NOTE — TELEPHONE ENCOUNTER
Patient called concerning the white strips covering where she had her stitches removed.  She said there are two little strips left that are still over her knee where she had the stiches.  She said Dr Seymour told her not to get them wet.  She wants to take a shower so she can wash her hair good and wants to know if it's okay at this point to do that?   Her phone number is [phone].

## 2020-03-12 ENCOUNTER — TELEPHONE (OUTPATIENT)
Dept: FAMILY MEDICINE CLINIC | Facility: CLINIC | Age: 75
End: 2020-03-12

## 2020-03-16 ENCOUNTER — TELEPHONE (OUTPATIENT)
Dept: ONCOLOGY | Facility: CLINIC | Age: 75
End: 2020-03-16

## 2020-03-16 ENCOUNTER — TELEPHONE (OUTPATIENT)
Dept: FAMILY MEDICINE CLINIC | Facility: CLINIC | Age: 75
End: 2020-03-16

## 2020-03-16 DIAGNOSIS — B37.9 YEAST INFECTION: Primary | ICD-10-CM

## 2020-03-16 RX ORDER — FLUCONAZOLE 150 MG/1
TABLET ORAL
Qty: 2 TABLET | Refills: 0 | Status: SHIPPED | OUTPATIENT
Start: 2020-03-16 | End: 2020-05-13 | Stop reason: SDUPTHER

## 2020-03-16 RX ORDER — DILTIAZEM HYDROCHLORIDE 180 MG/1
180 CAPSULE, EXTENDED RELEASE ORAL DAILY
Qty: 90 CAPSULE | Refills: 3 | Status: SHIPPED | OUTPATIENT
Start: 2020-03-16 | End: 2020-09-29

## 2020-03-16 NOTE — TELEPHONE ENCOUNTER
PT HAS YEAST INFECTION AND IS WANTING TO KNOW WHAT OVER THE COUNTER CREAM SHE CAN USE, NAME BRAND AND HOW MANY DAYS TO USE IT FOR. PT WANTS A CALL BACK TODAY.

## 2020-03-16 NOTE — TELEPHONE ENCOUNTER
Pt is fatigued more than normal. Temp 97.3, /68, HR 85. Last Keytruda 3/2/2020. Pt is to monitor and call if her fatigue increases. Keytruda can cause fatigue. D/W Jeanette Sparks NP. No new orders at this time.

## 2020-03-20 ENCOUNTER — TELEPHONE (OUTPATIENT)
Dept: ONCOLOGY | Facility: CLINIC | Age: 75
End: 2020-03-20

## 2020-03-20 NOTE — TELEPHONE ENCOUNTER
PT CALLING C/O LOWER STOMACH CRAMPING. FEELS LIKE SHE HAS TO GO TO THE BATHROOM AND HAVE BM AND JUST HAS BROWN WATERY STOOL COME OUT. SHE IS DRINKING PLENTY OF FLUIDS. DENIES FEVER. ADVISED PT TO TRY DRINKING GATORADE AND THAT SHE COULD TRY PEPTO A/O IMODIUM. PT HAS APPT ON Monday TO SEE NP AND GET TREATMENT. PT V/U.

## 2020-03-20 NOTE — TELEPHONE ENCOUNTER
Sine Monday patient has had stomach pain and diarrhea watery. Patient doesn't know what to do wants to know if she needs to come in. Patient has an appointment for infusion on Monday       Call patient back at [phone]

## 2020-03-23 ENCOUNTER — APPOINTMENT (OUTPATIENT)
Dept: ONCOLOGY | Facility: HOSPITAL | Age: 75
End: 2020-03-23

## 2020-03-23 ENCOUNTER — TELEPHONE (OUTPATIENT)
Dept: FAMILY MEDICINE CLINIC | Facility: CLINIC | Age: 75
End: 2020-03-23

## 2020-03-23 DIAGNOSIS — C34.90 RECURRENT NON-SMALL CELL LUNG CANCER (HCC): ICD-10-CM

## 2020-03-23 DIAGNOSIS — Z79.899 ENCOUNTER FOR LONG-TERM (CURRENT) USE OF OTHER MEDICATIONS: ICD-10-CM

## 2020-03-23 NOTE — TELEPHONE ENCOUNTER
Pt ask that you return her call.    Ph#: [phone]  Last Wed pt had stomach cramping, went to bathroom pt had clear diarrhea x 2 days. Pt took Ducolax and Miralax it did help some, pt had a very small bowel.   Pt wants to know what to do

## 2020-03-23 NOTE — TELEPHONE ENCOUNTER
Cramping in stomach, no diarrhea but constipation. Miralax and stool softener helped. Says there was some blood in there originally but it is gone now. She is staying hydrated and wants to know what to do if she has more blood in her stool. I told her it was fine to continue with miralax and stool softener

## 2020-03-23 NOTE — TELEPHONE ENCOUNTER
OK to continue miralax and stool softener.  If has more than 1/2 cup of blood in toilet, then needs to be seen in ER.  Otherwise, it is okay.

## 2020-03-24 ENCOUNTER — TELEPHONE (OUTPATIENT)
Dept: ONCOLOGY | Facility: CLINIC | Age: 75
End: 2020-03-24

## 2020-03-24 NOTE — TELEPHONE ENCOUNTER
----- Message from GUILLE Lucas sent at 3/23/2020  3:46 PM EDT -----  Regarding: FW: push out apt  Can you put her with an np as he suggests below?  Is he rounding during that time?      ----- Message -----  From: Je Ventura MD  Sent: 3/23/2020   3:44 PM EDT  To: GUILLE Lucas  Subject: RE: push out apt                                 She can see a NP at one month  ----- Message -----  From: Jeanette Sparks APRN  Sent: 3/23/2020   3:43 PM EDT  To: Je Ventura MD  Subject: FW: push out apt                                 Is this ok?  ----- Message -----  From: Josey Dahl  Sent: 3/23/2020   2:27 PM EDT  To: GUILLE Lucas  Subject: FW: push out apt                                 Hello, I called pt to r/s out for a month and she will be on vacation, the following week DR Ventura is out. So I had to r/s for 5/4 which is 6 weeks, is that going to be ok? Thanks  ----- Message -----  From: Jeanette Sparks APRN  Sent: 3/23/2020   1:23 PM EDT  To: Josey Dahl  Subject: push out apt                                     Push out apt with this patient for 1 month, schedule back with Dr Ventura for labs and keytruda.      ----- Message -----  From: Mindy German  Sent: 3/23/2020   1:04 PM EDT  To: GUILLE Lucas    Pt cancelled 3/23/2020 appt due to current situation -when should be reschedule?  Please let Josey know

## 2020-03-26 ENCOUNTER — TELEPHONE (OUTPATIENT)
Dept: FAMILY MEDICINE CLINIC | Facility: CLINIC | Age: 75
End: 2020-03-26

## 2020-03-26 NOTE — TELEPHONE ENCOUNTER
As long as she keep hydrated with fluids(any are fine) no concern.  Can get imodium and take as needed as well.  If develops fever, then to ER.

## 2020-03-26 NOTE — TELEPHONE ENCOUNTER
"Pt is still having diarrhea and slight pink in her Eryn. Pt wants a call back to talk about OTC solution . She has has 2 bottles of Gatorade  32 oz and she has an ensure  Boost oz this morning and 2 pieces of toast.     Her biggest concern with the\"water\" that is coming out when she uses the bathroom.     Pt has no fever.     Please advise.  "

## 2020-04-02 ENCOUNTER — TELEPHONE (OUTPATIENT)
Dept: FAMILY MEDICINE CLINIC | Facility: CLINIC | Age: 75
End: 2020-04-02

## 2020-04-02 NOTE — TELEPHONE ENCOUNTER
Called pt and let her know what dr. blackwell recommended.      Pt states she has been having a cough, and taking delsym. She is stating nothing is coming up. Pt has no fever. Pt just wanted you to know due to her lung condition. Pt just want to share about the cough.     Pt wanted to know if she would need antibiotic.  Pt states when she coughs it clears.

## 2020-04-02 NOTE — TELEPHONE ENCOUNTER
Patient states she has had diaherra for a couple weeks now. Whenever she eats, it goes right thru her. She said she's talked about this with Dr Seymour before and she doesn't have a fever or anything.  She wants to know if there is anything other then Pepto Bismol that she could take to slow it down?  Phone number is [phone].

## 2020-04-03 DIAGNOSIS — J44.0 COPD WITH ACUTE LOWER RESPIRATORY INFECTION (HCC): ICD-10-CM

## 2020-04-03 DIAGNOSIS — J44.0 COPD WITH ACUTE LOWER RESPIRATORY INFECTION (HCC): Primary | ICD-10-CM

## 2020-04-03 RX ORDER — BENZONATATE 200 MG/1
200 CAPSULE ORAL 3 TIMES DAILY PRN
Qty: 30 CAPSULE | Refills: 0 | Status: SHIPPED | OUTPATIENT
Start: 2020-04-03 | End: 2020-04-13

## 2020-04-03 NOTE — TELEPHONE ENCOUNTER
Called pt back and let her know that Dr. blackwell has sent something into the pharmacy. Pt is aware.

## 2020-04-06 ENCOUNTER — TELEPHONE (OUTPATIENT)
Dept: ONCOLOGY | Facility: CLINIC | Age: 75
End: 2020-04-06

## 2020-04-06 ENCOUNTER — TELEPHONE (OUTPATIENT)
Dept: ONCOLOGY | Facility: HOSPITAL | Age: 75
End: 2020-04-06

## 2020-04-06 RX ORDER — DIPHENOXYLATE HYDROCHLORIDE AND ATROPINE SULFATE 2.5; .025 MG/1; MG/1
TABLET ORAL
Qty: 30 TABLET | Refills: 0 | Status: CANCELLED | OUTPATIENT
Start: 2020-04-06

## 2020-04-06 RX ORDER — DIPHENOXYLATE HYDROCHLORIDE AND ATROPINE SULFATE 2.5; .025 MG/1; MG/1
1 TABLET ORAL EVERY 8 HOURS PRN
Qty: 30 TABLET | Refills: 0 | Status: SHIPPED | OUTPATIENT
Start: 2020-04-06 | End: 2020-07-08

## 2020-04-06 NOTE — TELEPHONE ENCOUNTER
Pt having about 4-5 loose stools a day. Imodium not helping. Per , try lomotil every 8 hours and push fluids and let us know if it doesn't improve.

## 2020-04-06 NOTE — TELEPHONE ENCOUNTER
Patients sister called bc pt is still having loose stools every time she goes and isnt eating or drinking well. Has no idea however how many times she is going a day or if she is taking anything. Tried calling pt, no answer. Left v/m for her to call office back.

## 2020-04-08 NOTE — TELEPHONE ENCOUNTER
The pt is requesting the inhaler pending for a refill. It is asking for directions, it looks as though it's been about 2 years since this was ordered.   Can I refill this? Also, if so what directions for use?

## 2020-04-09 ENCOUNTER — TELEPHONE (OUTPATIENT)
Dept: FAMILY MEDICINE CLINIC | Facility: CLINIC | Age: 75
End: 2020-04-09

## 2020-04-09 RX ORDER — ALBUTEROL SULFATE 90 UG/1
2 AEROSOL, METERED RESPIRATORY (INHALATION) EVERY 4 HOURS PRN
Qty: 1 INHALER | Refills: 11 | Status: SHIPPED | OUTPATIENT
Start: 2020-04-09 | End: 2021-01-01

## 2020-04-09 RX ORDER — ALBUTEROL SULFATE 90 UG/1
2 AEROSOL, METERED RESPIRATORY (INHALATION) EVERY 4 HOURS PRN
Qty: 1 INHALER | Refills: 11 | Status: SHIPPED | OUTPATIENT
Start: 2020-04-09 | End: 2020-04-09 | Stop reason: SDUPTHER

## 2020-04-09 RX ORDER — ALBUTEROL SULFATE 90 UG/1
2 AEROSOL, METERED RESPIRATORY (INHALATION) EVERY 4 HOURS PRN
Qty: 1 INHALER | Refills: 11
Start: 2020-04-09 | End: 2020-04-09 | Stop reason: SDUPTHER

## 2020-04-09 NOTE — TELEPHONE ENCOUNTER
Can you please resend the following medication   albuterol sulfate  (90 Base) MCG/ACT inhaler        Sig: Inhale 2 puffs Every 4 (Four) Hours As Needed for Wheezing. Per MD - until current episode is resolved          The escribe did not go through.

## 2020-04-10 ENCOUNTER — TELEPHONE (OUTPATIENT)
Dept: ONCOLOGY | Facility: CLINIC | Age: 75
End: 2020-04-10

## 2020-04-14 ENCOUNTER — TELEPHONE (OUTPATIENT)
Dept: FAMILY MEDICINE CLINIC | Facility: CLINIC | Age: 75
End: 2020-04-14

## 2020-04-20 ENCOUNTER — TELEPHONE (OUTPATIENT)
Dept: ONCOLOGY | Facility: CLINIC | Age: 75
End: 2020-04-20

## 2020-04-20 DIAGNOSIS — T45.1X5A CHEMOTHERAPY-INDUCED DIARRHEA: Primary | ICD-10-CM

## 2020-04-20 DIAGNOSIS — K52.1 CHEMOTHERAPY-INDUCED DIARRHEA: Primary | ICD-10-CM

## 2020-04-20 RX ORDER — PREDNISONE 10 MG
TABLET, DOSE PACK ORAL
Qty: 1 EACH | Refills: 0 | Status: SHIPPED | OUTPATIENT
Start: 2020-04-20 | End: 2020-04-20 | Stop reason: SDUPTHER

## 2020-04-20 RX ORDER — PREDNISONE 10 MG/1
TABLET ORAL
Qty: 1 EACH | Refills: 0 | Status: SHIPPED | OUTPATIENT
Start: 2020-04-20 | End: 2020-05-12

## 2020-04-20 RX ORDER — PREDNISONE 10 MG
TABLET, DOSE PACK ORAL
Qty: 1 EACH | Refills: 0 | Status: SHIPPED | OUTPATIENT
Start: 2020-04-20 | End: 2020-04-20

## 2020-04-20 NOTE — TELEPHONE ENCOUNTER
Pt calling stating she is out of Lomotil and is still having diarrhea stool.  Reports cramps, gas and diarrhea.  Reports each movement is a moderate amount of stool and that she averages about 3 diarrhea stools a day.  Denies fever.  Pt asked if she could come in and leave a specimen for C-diff.  Pt stated she could not do that due to transportation.  Asked pt to try to leave specimen when she comes in next week for appointment.  Pt agreeable.  Informed patient that we would send in a refill for Lomotil.  Pt refused Lomotil, stating it didn't help.  Pt agreeable to a prednisone dose daniel.  10 mg prednisone dose daniel sent to [facility] pharmacy.  Pt states she will pick it up when she can.  Pt has apt on 4/27.  Instructed to call office with any questions or concerns.  Order placed for stool for c-diff for 4/27.

## 2020-04-21 ENCOUNTER — TELEPHONE (OUTPATIENT)
Dept: RADIATION ONCOLOGY | Facility: HOSPITAL | Age: 75
End: 2020-04-21

## 2020-04-21 NOTE — TELEPHONE ENCOUNTER
Pt called radiation center this morning to ask when he CT of the chest was scheduled. It was scheduled for 5/8/20.   I spoke with the ordering provider, Dr. Bennett, who would like for the CT scan to be moved out a month later d/t COVID-19 if the patient is doing okay.   I informed the patient of this information and she states she is in no distress and is doing fine.   CT Chest will be rescheduled for 6/8/20.

## 2020-04-21 NOTE — TELEPHONE ENCOUNTER
PT CALLED LAST FRI. PT HAS APPT 4/27 NP AND KEYTRUDA. PT SAID SHE WAS JUST JOKING ABOUT US GUARANTEEING HER THAT SHE WON'T CATCH THE VIRUS. SHE SAID SHE WILL BE THERE FOR HER APPT.     PT QUESTIONING HER RX FOR PREDNISONE AND THE FACT THAT SHE IS STILL HAVING DIARRHEA. WENT OVER LM RN TRIAGE NOTE FROM YESTERDAY. EXPLAINED TO HER THAT SHE WOULD TAKE THE RX FOR PREDNISONE AS DIRECTED. ALSO TOLD HER THAT SHE COULD TAKE HER RX FOR LOMOTIL A/O IMODIUM IF SHE NEEDED TO. PT V/U.

## 2020-04-23 ENCOUNTER — TELEPHONE (OUTPATIENT)
Dept: FAMILY MEDICINE CLINIC | Facility: CLINIC | Age: 75
End: 2020-04-23

## 2020-04-23 ENCOUNTER — OFFICE VISIT (OUTPATIENT)
Dept: FAMILY MEDICINE CLINIC | Facility: CLINIC | Age: 75
End: 2020-04-23

## 2020-04-23 VITALS
OXYGEN SATURATION: 98 % | WEIGHT: 90.4 LBS | TEMPERATURE: 97.6 F | BODY MASS INDEX: 17.75 KG/M2 | HEART RATE: 93 BPM | SYSTOLIC BLOOD PRESSURE: 128 MMHG | HEIGHT: 60 IN | DIASTOLIC BLOOD PRESSURE: 63 MMHG

## 2020-04-23 DIAGNOSIS — J43.9 PULMONARY EMPHYSEMA, UNSPECIFIED EMPHYSEMA TYPE (HCC): ICD-10-CM

## 2020-04-23 DIAGNOSIS — I10 ESSENTIAL HYPERTENSION: Primary | ICD-10-CM

## 2020-04-23 PROCEDURE — 99442 PR PHYS/QHP TELEPHONE EVALUATION 11-20 MIN: CPT | Performed by: FAMILY MEDICINE

## 2020-04-23 NOTE — PROGRESS NOTES
"F/U COPD.      Subjective   Maryam Arredondo is a 74 y.o. female.     History of Present Illness   Telephone visit for 12 minutes.   F/U COPD.  Doing well with meds.  No change in meds with Dr Anthony. \"I really feel good\"  F/U HTN  No orhtostasis.  Doing wel lwiht meds.          The following portions of the patient's history were reviewed and updated as appropriate: allergies, current medications, past family history, past medical history, past social history, past surgical history and problem list.    Review of Systems   Constitutional: Negative for appetite change and fatigue.   HENT: Negative for nosebleeds and sore throat.    Eyes: Negative for blurred vision and visual disturbance.   Respiratory: Negative for shortness of breath and wheezing.    Cardiovascular: Negative for chest pain and leg swelling.   Gastrointestinal: Negative for abdominal distention and abdominal pain.   Endocrine: Negative for cold intolerance and polyuria.   Genitourinary: Negative for dysuria and hematuria.   Musculoskeletal: Negative for arthralgias and myalgias.   Skin: Negative for color change and rash.   Neurological: Negative for weakness and confusion.   Psychiatric/Behavioral: Negative for agitation and depressed mood.       Patient Active Problem List   Diagnosis   • Pneumothorax   • Hypoxia   • Tachycardia   • Hyperthyroidism   • Essential hypertension   • Multifocal atrial tachycardia (CMS/HCC)   • Toxic multinodular goiter   • Pulmonary emphysema (CMS/HCC)   • Vitamin D insufficiency   • Pneumonia   • COPD with acute lower respiratory infection (CMS/HCC)   • Malignant neoplasm of upper lobe of left lung (CMS/HCC)   • Infected skin tear   • Allergic rhinitis   • B12 deficiency   • Oxygen dependent   • Mitral regurgitation   • Breast cancer (CMS/HCC)   • Medicare annual wellness visit, subsequent   • Panlobular emphysema (CMS/HCC)   • Recurrent non-small cell lung cancer (CMS/HCC)   • Encounter for long-term (current) use of " other medications   • Unspecified fracture of fifth metacarpal bone, left hand, initial encounter for closed fracture   • Immobility syndrome   • Encounter for removal of sutures   • Laceration of skin of knee without complication, left, sequela   • Dysuria   • Acute vaginitis       Allergies   Allergen Reactions   • Codeine Unknown - High Severity     Patient is unaware of the reaction to this medication     • Penicillin V Unknown - High Severity     Reaction unknown to patient     • Penicillins Unknown - High Severity     Pt is unaware of the reaction to this medication     • Mucinex D [Pseudoephedrine-Guaifenesin Er] Nausea Only     Felt hot in chest         Current Outpatient Medications:   •  albuterol sulfate  (90 Base) MCG/ACT inhaler, Inhale 2 puffs Every 4 (Four) Hours As Needed for Wheezing. Per MD - until current episode is resolved, Disp: 1 inhaler, Rfl: 11  •  budesonide-formoterol (SYMBICORT) 160-4.5 MCG/ACT inhaler, Inhale 2 puffs 2 (Two) Times a Day., Disp: 1 inhaler, Rfl: 11  •  CARTIA  MG 24 hr capsule, Take 1 capsule by mouth Daily., Disp: 90 capsule, Rfl: 3  •  Cholecalciferol (VITAMIN D3) 2000 UNITS tablet, Take 1 tablet by mouth daily., Disp: , Rfl:   •  clindamycin (CLEOCIN T) 1 % lotion, Apply  topically to the appropriate area as directed 2 (Two) Times a Day., Disp: , Rfl:   •  digoxin (LANOXIN) 125 MCG tablet, Take 1 tablet by mouth Daily., Disp: 90 tablet, Rfl: 2  •  diphenoxylate-atropine (LOMOTIL) 2.5-0.025 MG per tablet, Take 1 tablet by mouth Every 8 (Eight) Hours As Needed for Diarrhea., Disp: 30 tablet, Rfl: 0  •  fluconazole (DIFLUCAN) 150 MG tablet, Take one now and one in 48 hours, Disp: 2 tablet, Rfl: 0  •  fluticasone (FLONASE) 50 MCG/ACT nasal spray, 2 Squirts into the nostril(s) as directed by provider 2 (Two) Times a Day., Disp: , Rfl:   •  methIMAzole (TAPAZOLE) 5 MG tablet, TAKE ONE AND ONE-HALF TABLET BY MOUTH DAILY, Disp: 135 tablet, Rfl: 1  •  metroNIDAZOLE  (METROCREAM) 0.75 % cream, , Disp: , Rfl:   •  montelukast (SINGULAIR) 10 MG tablet, Take 1 tablet by mouth Daily. (Patient taking differently: Take 10 mg by mouth Every Night.), Disp: 90 tablet, Rfl: 3  •  Multiple Vitamins-Minerals (PRESERVISION AREDS 2) chewable tablet, Chew 2 capsules Every Morning., Disp: , Rfl:   •  Nutritional Supplements (ENSURE ACTIVE PO), Take  by mouth. Ensure or boost, Disp: , Rfl:   •  Nutritional Supplements (JUICE PLUS FIBRE PO), Take  by mouth. 12 gummies, Disp: , Rfl:   •  O2 (OXYGEN), Inhale Continuous., Disp: , Rfl:   •  Pembrolizumab (KEYTRUDA IV), Infuse  into a venous catheter. Every 2-3 weeks, Disp: , Rfl:   •  predniSONE (DELTASONE) 10 MG (21) tablet pack, Use as directed on package.  Take with food, Disp: 1 each, Rfl: 0  •  SPIRIVA RESPIMAT 2.5 MCG/ACT aerosol solution inhaler, Inhale 2 puffs Daily., Disp: 4 inhaler, Rfl: 5  •  vitamin B-12 (CYANOCOBALAMIN) 1000 MCG tablet, Take 1,000 mcg by mouth Daily. M,W,F, Disp: , Rfl:     Past Medical History:   Diagnosis Date   • Abnormal color of sputum     grey   • Acromioclavicular joint arthritis    • Acute maxillary sinusitis    • Acute upper respiratory infection    • Allergic rhinitis    • Anemia    • Asthma    • B12 deficiency    • Benign essential hypertension    • Breast cancer (CMS/HCC)     s/p Lumpectomy ~2000 and chemo/XRT. Then  Masectomy ~2004 for some recurrence   • Breast cancer (CMS/HCC)    • Cerumen impaction    • Chronic obstructive pulmonary disease (CMS/HCC)    • COPD (chronic obstructive pulmonary disease) (CMS/HCC)    • COPD exacerbation (CMS/HCC)    • Diverticulosis of colon    • Cedric-Barr infection    • Fever    • Heart disease    • High risk medication use    • High risk medication use    • Hospital discharge follow-up    • Hyperthyroidism    • Hyperthyroidism    • Hypoxia    • Laryngitis    • Leg wound, left    • Medicare annual wellness visit, subsequent    • Mitral regurgitation     mild to moderate    • Nocturnal hypoxia    • Non-small cell lung cancer (CMS/HCC)    • Non-small cell lung cancer (CMS/HCC)    • Onychomycosis of toenail    • Oral aphthae    • Osteopenia    • Osteopenia    • Other fatigue    • Oxygen dependent     2L - at night only   • Pneumonia of upper lobe of lung (CMS/HCC)    • Pneumothorax    • Post-menopausal    • Skin abrasion    • SOB (shortness of breath)    • Supraventricular tachycardia (CMS/HCC)    • Tachycardia    • Thyroid nodule    • Toxic multinodular goiter    • Vitamin B deficiency    • Vitamin D deficiency    • Vitamin D deficiency    • Wound healing well on examination        Past Surgical History:   Procedure Laterality Date   • BREAST LUMPECTOMY Right    • BREAST LUMPECTOMY     • CARDIAC CATHETERIZATION      PS SAPHENOUS VAIN RIGHT LEG   • COLONOSCOPY  2017    Complete. 4 polyps. Recheck in 2019.    • COLONOSCOPY N/A 2017    Procedure: COLONOSCOPY WITH POLYPECTOMY(COLD BIOPSY AND HOT SNARE);  Surgeon: Luiza Eddy MD;  Location: [facility] ENDOSCOPY;  Service:    • HYSTERECTOMY     • MASTECTOMY Right    • NECK SURGERY      2 spurs removed   • VASCULAR SURGERY      spider vein ablation       Family History   Problem Relation Age of Onset   • Arthritis Mother    • Heart failure Mother         Congestive Heart Failure   • Lung cancer Father    • Breast cancer Paternal Grandmother    • No Known Problems Sister        Social History     Tobacco Use   • Smoking status: Former Smoker     Packs/day: 2.00     Years: 50.00     Pack years: 100.00     Types: Cigarettes     Last attempt to quit: 3/14/1994     Years since quittin.1   • Smokeless tobacco: Never Used   • Tobacco comment: D/C 20 years ago, smoking 2 ppd before quitting   Substance Use Topics   • Alcohol use: No     Comment: Social use rare            Objective     Vitals:    20 1119   BP: 128/63   Pulse: 93   Temp: 97.6 °F (36.4 °C)   SpO2: 98%     Body mass index is 17.66 kg/m².    Physical Exam    Constitutional: She is oriented to person, place, and time.   Pulmonary/Chest: Breath sounds normal.   Neurological: She is alert and oriented to person, place, and time.   Psychiatric: She has a normal mood and affect.       Lab Results   Component Value Date    GLUCOSE 130 (H) 03/02/2020    BUN 27 (H) 03/02/2020    CREATININE 0.69 03/02/2020    EGFRIFNONA 83 03/02/2020    EGFRIFAFRI 108 10/21/2019    BCR 39.1 (H) 03/02/2020    K 4.6 03/02/2020    CO2 25.3 03/02/2020    CALCIUM 9.2 03/02/2020    PROTENTOTREF 6.7 10/21/2019    ALBUMIN 3.60 03/02/2020    LABIL2 1.7 10/21/2019    AST 20 03/02/2020    ALT 20 03/02/2020       WBC   Date Value Ref Range Status   03/02/2020 9.20 3.40 - 10.80 10*3/mm3 Final   06/11/2019 6.11 3.40 - 10.80 10*3/mm3 Final     RBC   Date Value Ref Range Status   03/02/2020 4.00 3.77 - 5.28 10*6/mm3 Final   06/11/2019 4.17 3.77 - 5.28 10*6/mm3 Final     Hemoglobin   Date Value Ref Range Status   03/02/2020 12.3 12.0 - 15.9 g/dL Final     Hematocrit   Date Value Ref Range Status   03/02/2020 37.9 34.0 - 46.6 % Final     MCV   Date Value Ref Range Status   03/02/2020 94.8 79.0 - 97.0 fL Final     MCH   Date Value Ref Range Status   03/02/2020 30.8 26.6 - 33.0 pg Final     MCHC   Date Value Ref Range Status   03/02/2020 32.5 31.5 - 35.7 g/dL Final     RDW   Date Value Ref Range Status   03/02/2020 14.5 12.3 - 15.4 % Final     RDW-SD   Date Value Ref Range Status   03/02/2020 50.8 37.0 - 54.0 fl Final     MPV   Date Value Ref Range Status   03/02/2020 9.3 6.0 - 12.0 fL Final     Platelets   Date Value Ref Range Status   03/02/2020 294 140 - 450 10*3/mm3 Final     Neutrophil %   Date Value Ref Range Status   03/02/2020 83.0 (H) 42.7 - 76.0 % Final     Lymphocyte %   Date Value Ref Range Status   03/02/2020 6.8 (L) 19.6 - 45.3 % Final     Monocyte %   Date Value Ref Range Status   03/02/2020 7.1 5.0 - 12.0 % Final     Eosinophil %   Date Value Ref Range Status   03/02/2020 1.7 0.3 - 6.2 % Final      Basophil %   Date Value Ref Range Status   03/02/2020 0.7 0.0 - 1.5 % Final     Immature Grans %   Date Value Ref Range Status   03/02/2020 0.7 (H) 0.0 - 0.5 % Final     Neutrophils, Absolute   Date Value Ref Range Status   03/02/2020 7.64 (H) 1.70 - 7.00 10*3/mm3 Final     Lymphocytes, Absolute   Date Value Ref Range Status   03/02/2020 0.63 (L) 0.70 - 3.10 10*3/mm3 Final     Monocytes, Absolute   Date Value Ref Range Status   03/02/2020 0.65 0.10 - 0.90 10*3/mm3 Final     Eosinophils, Absolute   Date Value Ref Range Status   03/02/2020 0.16 0.00 - 0.40 10*3/mm3 Final     Basophils, Absolute   Date Value Ref Range Status   03/02/2020 0.06 0.00 - 0.20 10*3/mm3 Final     Immature Grans, Absolute   Date Value Ref Range Status   03/02/2020 0.06 (H) 0.00 - 0.05 10*3/mm3 Final     nRBC   Date Value Ref Range Status   03/02/2020 0.0 0.0 - 0.2 /100 WBC Final       No results found for: HGBA1C    Lab Results   Component Value Date    ZAERNWIV99 1,228 (H) 09/03/2019       TSH   Date Value Ref Range Status   03/02/2020 1.500 0.270 - 4.200 uIU/mL Final       No results found for: CHOL  Lab Results   Component Value Date    TRIG 58 09/03/2019     Lab Results   Component Value Date    HDL 72 (H) 09/03/2019     Lab Results   Component Value Date     (H) 09/03/2019     Lab Results   Component Value Date    VLDL 11.6 09/03/2019     No results found for: LDLHDL      Procedures    Assessment/Plan   Problems Addressed this Visit        Cardiovascular and Mediastinum    Essential hypertension - Primary       Respiratory    Pulmonary emphysema (CMS/HCC)        Continue bp meds.    Continue symbicort and spiriva.    No orders of the defined types were placed in this encounter.      Current Outpatient Medications   Medication Sig Dispense Refill   • albuterol sulfate  (90 Base) MCG/ACT inhaler Inhale 2 puffs Every 4 (Four) Hours As Needed for Wheezing. Per MD - until current episode is resolved 1 inhaler 11   •  budesonide-formoterol (SYMBICORT) 160-4.5 MCG/ACT inhaler Inhale 2 puffs 2 (Two) Times a Day. 1 inhaler 11   • CARTIA  MG 24 hr capsule Take 1 capsule by mouth Daily. 90 capsule 3   • Cholecalciferol (VITAMIN D3) 2000 UNITS tablet Take 1 tablet by mouth daily.     • clindamycin (CLEOCIN T) 1 % lotion Apply  topically to the appropriate area as directed 2 (Two) Times a Day.     • digoxin (LANOXIN) 125 MCG tablet Take 1 tablet by mouth Daily. 90 tablet 2   • diphenoxylate-atropine (LOMOTIL) 2.5-0.025 MG per tablet Take 1 tablet by mouth Every 8 (Eight) Hours As Needed for Diarrhea. 30 tablet 0   • fluconazole (DIFLUCAN) 150 MG tablet Take one now and one in 48 hours 2 tablet 0   • fluticasone (FLONASE) 50 MCG/ACT nasal spray 2 Squirts into the nostril(s) as directed by provider 2 (Two) Times a Day.     • methIMAzole (TAPAZOLE) 5 MG tablet TAKE ONE AND ONE-HALF TABLET BY MOUTH DAILY 135 tablet 1   • metroNIDAZOLE (METROCREAM) 0.75 % cream      • montelukast (SINGULAIR) 10 MG tablet Take 1 tablet by mouth Daily. (Patient taking differently: Take 10 mg by mouth Every Night.) 90 tablet 3   • Multiple Vitamins-Minerals (PRESERVISION AREDS 2) chewable tablet Chew 2 capsules Every Morning.     • Nutritional Supplements (ENSURE ACTIVE PO) Take  by mouth. Ensure or boost     • Nutritional Supplements (JUICE PLUS FIBRE PO) Take  by mouth. 12 gummies     • O2 (OXYGEN) Inhale Continuous.     • Pembrolizumab (KEYTRUDA IV) Infuse  into a venous catheter. Every 2-3 weeks     • predniSONE (DELTASONE) 10 MG (21) tablet pack Use as directed on package.  Take with food 1 each 0   • SPIRIVA RESPIMAT 2.5 MCG/ACT aerosol solution inhaler Inhale 2 puffs Daily. 4 inhaler 5   • vitamin B-12 (CYANOCOBALAMIN) 1000 MCG tablet Take 1,000 mcg by mouth Daily. M,W,F       No current facility-administered medications for this visit.        Maryam Arredondo had no medications administered during this visit.    Return in about 3 months (around  7/23/2020).    There are no Patient Instructions on file for this visit.

## 2020-04-23 NOTE — TELEPHONE ENCOUNTER
PT REQUESTED TO KNOW IF SHE NEEDS TO HOLD HER MEMBERSHIP UNTIL THE 1ST OF July FOR HER GYM.    PLEASE ADVISE

## 2020-04-27 ENCOUNTER — INFUSION (OUTPATIENT)
Dept: ONCOLOGY | Facility: HOSPITAL | Age: 75
End: 2020-04-27

## 2020-04-27 ENCOUNTER — APPOINTMENT (OUTPATIENT)
Dept: ONCOLOGY | Facility: HOSPITAL | Age: 75
End: 2020-04-27

## 2020-04-27 ENCOUNTER — OFFICE VISIT (OUTPATIENT)
Dept: ONCOLOGY | Facility: CLINIC | Age: 75
End: 2020-04-27

## 2020-04-27 VITALS
SYSTOLIC BLOOD PRESSURE: 123 MMHG | BODY MASS INDEX: 17.24 KG/M2 | HEART RATE: 116 BPM | HEIGHT: 60 IN | RESPIRATION RATE: 16 BRPM | WEIGHT: 87.8 LBS | TEMPERATURE: 98.1 F | OXYGEN SATURATION: 95 % | DIASTOLIC BLOOD PRESSURE: 59 MMHG

## 2020-04-27 DIAGNOSIS — C34.90 RECURRENT NON-SMALL CELL LUNG CANCER (HCC): ICD-10-CM

## 2020-04-27 DIAGNOSIS — C34.12 MALIGNANT NEOPLASM OF UPPER LOBE OF LEFT LUNG (HCC): Primary | ICD-10-CM

## 2020-04-27 DIAGNOSIS — Z79.899 ENCOUNTER FOR LONG-TERM (CURRENT) USE OF OTHER MEDICATIONS: ICD-10-CM

## 2020-04-27 LAB
ALBUMIN SERPL-MCNC: 3.4 G/DL (ref 3.5–5.2)
ALBUMIN/GLOB SERPL: 1.1 G/DL (ref 1.1–2.4)
ALP SERPL-CCNC: 68 U/L (ref 38–116)
ALT SERPL W P-5'-P-CCNC: 11 U/L (ref 0–33)
ANION GAP SERPL CALCULATED.3IONS-SCNC: 12.4 MMOL/L (ref 5–15)
AST SERPL-CCNC: 15 U/L (ref 0–32)
BASOPHILS # BLD AUTO: 0.05 10*3/MM3 (ref 0–0.2)
BASOPHILS NFR BLD AUTO: 0.3 % (ref 0–1.5)
BILIRUB SERPL-MCNC: 0.3 MG/DL (ref 0.2–1.2)
BUN BLD-MCNC: 17 MG/DL (ref 6–20)
BUN/CREAT SERPL: 32.1 (ref 7.3–30)
CALCIUM SPEC-SCNC: 9.2 MG/DL (ref 8.5–10.2)
CHLORIDE SERPL-SCNC: 101 MMOL/L (ref 98–107)
CO2 SERPL-SCNC: 24.6 MMOL/L (ref 22–29)
CREAT BLD-MCNC: 0.53 MG/DL (ref 0.6–1.1)
DEPRECATED RDW RBC AUTO: 50.6 FL (ref 37–54)
EOSINOPHIL # BLD AUTO: 0.07 10*3/MM3 (ref 0–0.4)
EOSINOPHIL NFR BLD AUTO: 0.5 % (ref 0.3–6.2)
ERYTHROCYTE [DISTWIDTH] IN BLOOD BY AUTOMATED COUNT: 14.7 % (ref 12.3–15.4)
GFR SERPL CREATININE-BSD FRML MDRD: 112 ML/MIN/1.73
GLOBULIN UR ELPH-MCNC: 3.2 GM/DL (ref 1.8–3.5)
GLUCOSE BLD-MCNC: 92 MG/DL (ref 74–124)
HCT VFR BLD AUTO: 38.8 % (ref 34–46.6)
HGB BLD-MCNC: 12.1 G/DL (ref 12–15.9)
IMM GRANULOCYTES # BLD AUTO: 0.15 10*3/MM3 (ref 0–0.05)
IMM GRANULOCYTES NFR BLD AUTO: 1 % (ref 0–0.5)
LYMPHOCYTES # BLD AUTO: 0.87 10*3/MM3 (ref 0.7–3.1)
LYMPHOCYTES NFR BLD AUTO: 5.6 % (ref 19.6–45.3)
MCH RBC QN AUTO: 29.3 PG (ref 26.6–33)
MCHC RBC AUTO-ENTMCNC: 31.2 G/DL (ref 31.5–35.7)
MCV RBC AUTO: 93.9 FL (ref 79–97)
MONOCYTES # BLD AUTO: 1.03 10*3/MM3 (ref 0.1–0.9)
MONOCYTES NFR BLD AUTO: 6.7 % (ref 5–12)
NEUTROPHILS # BLD AUTO: 13.31 10*3/MM3 (ref 1.7–7)
NEUTROPHILS NFR BLD AUTO: 85.9 % (ref 42.7–76)
NRBC BLD AUTO-RTO: 0 /100 WBC (ref 0–0.2)
PLATELET # BLD AUTO: 393 10*3/MM3 (ref 140–450)
PMV BLD AUTO: 8.3 FL (ref 6–12)
POTASSIUM BLD-SCNC: 3.8 MMOL/L (ref 3.5–4.7)
PROT SERPL-MCNC: 6.6 G/DL (ref 6.3–8)
RBC # BLD AUTO: 4.13 10*6/MM3 (ref 3.77–5.28)
SODIUM BLD-SCNC: 138 MMOL/L (ref 134–145)
WBC NRBC COR # BLD: 15.48 10*3/MM3 (ref 3.4–10.8)

## 2020-04-27 PROCEDURE — 99213 OFFICE O/P EST LOW 20 MIN: CPT | Performed by: NURSE PRACTITIONER

## 2020-04-27 PROCEDURE — 85025 COMPLETE CBC W/AUTO DIFF WBC: CPT

## 2020-04-27 PROCEDURE — 80053 COMPREHEN METABOLIC PANEL: CPT

## 2020-04-27 PROCEDURE — 36415 COLL VENOUS BLD VENIPUNCTURE: CPT

## 2020-04-27 NOTE — PROGRESS NOTES
History:     Reason for follow up:   1.  Stage IIB left upper lobe non-small cell lung cancer, high PDL-1 (80%), negative egfr/alk/ros   * status post radiation therapy completed 1/26/18     HPI:  Maryam Arredondo presents for follow-up of her lung cancer.      The patient's last Keytruda was on 3/2/2020.  We had spaced her appointments out due to concerns of the coronavirus pandemic.  The patient returns today for evaluation and schedule Keytruda however she wishes not to proceed.  She states that she struggled with diarrhea though she is unsure how long this lasted.  She called into the office and had been using Imodium at home but did not want to trial the Lomotil.  We ended up proceeding on with prednisone Dosepak on 4/20/2020 which the patient states caused her lower extremity swelling and her primary care told her that she should not take steroids in the future.  She has lost approximately 8 pounds in weight since initiation of Keytruda, down 5 since the last visit here.  She attributes a lot of this to the diarrhea.  She is only weighing and 87 pounds today.  She states she was not able to tolerate boost or Ensure.  She thinks this will be improving now that the diarrhea has stopped.  States she had formed stool the day before yesterday.    She denies worsening shortness of breath, fevers, nausea, vomiting, or new pain.      Reviewed, confirmed and updated history (past medical, social and family)   Past Medical   Past Medical History:   Diagnosis Date   • Abnormal color of sputum     grey   • Acromioclavicular joint arthritis    • Acute maxillary sinusitis    • Acute upper respiratory infection    • Allergic rhinitis    • Anemia    • Asthma    • B12 deficiency    • Benign essential hypertension    • Breast cancer (CMS/HCC)     s/p Lumpectomy ~2000 and chemo/XRT. Then  Masectomy ~2004 for some recurrence   • Breast cancer (CMS/HCC)    • Cerumen impaction    • Chronic obstructive pulmonary disease (CMS/HCC)    •  COPD (chronic obstructive pulmonary disease) (CMS/HCC)    • COPD exacerbation (CMS/HCC)    • Diverticulosis of colon    • Cedric-Barr infection    • Fever    • Heart disease    • High risk medication use    • High risk medication use    • Hospital discharge follow-up    • Hyperthyroidism    • Hyperthyroidism    • Hypoxia    • Laryngitis    • Leg wound, left    • Medicare annual wellness visit, subsequent    • Mitral regurgitation     mild to moderate   • Nocturnal hypoxia    • Non-small cell lung cancer (CMS/HCC)    • Non-small cell lung cancer (CMS/HCC)    • Onychomycosis of toenail    • Oral aphthae    • Osteopenia    • Osteopenia    • Other fatigue    • Oxygen dependent     2L - at night only   • Pneumonia of upper lobe of lung (CMS/HCC)    • Pneumothorax    • Post-menopausal    • Skin abrasion    • SOB (shortness of breath)    • Supraventricular tachycardia (CMS/HCC)    • Tachycardia    • Thyroid nodule    • Toxic multinodular goiter    • Vitamin B deficiency    • Vitamin D deficiency    • Vitamin D deficiency    • Wound healing well on examination     and   Patient Active Problem List   Diagnosis   • Pneumothorax   • Hypoxia   • Tachycardia   • Hyperthyroidism   • Essential hypertension   • Multifocal atrial tachycardia (CMS/HCC)   • Toxic multinodular goiter   • Pulmonary emphysema (CMS/HCC)   • Vitamin D insufficiency   • Pneumonia   • COPD with acute lower respiratory infection (CMS/HCC)   • Malignant neoplasm of upper lobe of left lung (CMS/HCC)   • Infected skin tear   • Allergic rhinitis   • B12 deficiency   • Oxygen dependent   • Mitral regurgitation   • Breast cancer (CMS/HCC)   • Medicare annual wellness visit, subsequent   • Panlobular emphysema (CMS/HCC)   • Recurrent non-small cell lung cancer (CMS/HCC)   • Encounter for long-term (current) use of other medications   • Unspecified fracture of fifth metacarpal bone, left hand, initial encounter for closed fracture   • Immobility syndrome   •  Encounter for removal of sutures   • Laceration of skin of knee without complication, left, sequela   • Dysuria   • Acute vaginitis     Social History   Social History     Socioeconomic History   • Marital status: Single     Spouse name: Not on file   • Number of children: Not on file   • Years of education: Not on file   • Highest education level: Not on file   Occupational History   • Occupation:      Employer: RETIRED   Tobacco Use   • Smoking status: Former Smoker     Packs/day: 2.00     Years: 50.00     Pack years: 100.00     Types: Cigarettes     Last attempt to quit: 3/14/1994     Years since quittin.1   • Smokeless tobacco: Never Used   • Tobacco comment: D/C 20 years ago, smoking 2 ppd before quitting   Substance and Sexual Activity   • Alcohol use: No     Comment: Social use rare   • Drug use: No   • Sexual activity: Defer     Comment: drinks decaf      Family History  Family History   Problem Relation Age of Onset   • Arthritis Mother    • Heart failure Mother         Congestive Heart Failure   • Lung cancer Father    • Breast cancer Paternal Grandmother    • No Known Problems Sister      Allergies  Allergies   Allergen Reactions   • Codeine Unknown - High Severity     Patient is unaware of the reaction to this medication     • Penicillin V Unknown - High Severity     Reaction unknown to patient     • Penicillins Unknown - High Severity     Pt is unaware of the reaction to this medication     • Mucinex D [Pseudoephedrine-Guaifenesin Er] Nausea Only     Felt hot in chest       Medications: The current medication list was reviewed in the EMR.    Review of Systems  Review of Systems   Constitutional: Positive for appetite change, fatigue and unexpected weight change. Negative for activity change, chills, diaphoresis and fever.   HENT: Negative for congestion and sinus pressure.    Respiratory: Positive for shortness of breath. Negative for cough and chest tightness.   "  Cardiovascular: Negative for chest pain, palpitations and leg swelling.   Gastrointestinal: Positive for diarrhea. Negative for abdominal pain, blood in stool, constipation, nausea and vomiting.   Endocrine: Negative.    Genitourinary: Negative.    Musculoskeletal: Positive for arthralgias and gait problem. Negative for joint swelling and myalgias.   Skin: Negative.    Allergic/Immunologic: Negative.    Hematological: Negative for adenopathy. Does not bruise/bleed easily.   Psychiatric/Behavioral: Agitation:  Behavioral problem:  The patient is nervous/anxious.        Objective    Objective:     Vitals:    04/27/20 1250   BP: 123/59   Pulse: 116   Resp: 16   Temp: 98.1 °F (36.7 °C)   TempSrc: Oral   SpO2: 95%   Weight: 39.8 kg (87 lb 12.8 oz)   Height: 152.4 cm (60\")   PainSc: 0-No pain     Current Status 2/10/2020   ECOG score 2   GENERAL:  Well-developed, somewhat thin and chronic ill-appearing female in no acute distress. Vital signs reviewed.   SKIN: Warm, dry, no visible rash.  EYES:    EOMs intact.  Conjunctivae normal.  HEAD:  Normocephalic.  NECK:  Supple with good range of motion; no JVD.  RESP: Oxygen in place, normal respiratory effort.  MSK:  No clubbing or cyanosis, using walker for assistance with ambulation.  NEUROLOGICAL:   No focal neurological deficits.  PSYCHIATRIC:  Normal affect and mood.  Alert and Oriented x 3.     Labs and Imaging  Results for orders placed or performed in visit on 04/27/20   Comprehensive metabolic panel   Result Value Ref Range    Glucose 92 74 - 124 mg/dL    BUN 17 6 - 20 mg/dL    Creatinine 0.53 (L) 0.60 - 1.10 mg/dL    Sodium 138 134 - 145 mmol/L    Potassium 3.8 3.5 - 4.7 mmol/L    Chloride 101 98 - 107 mmol/L    CO2 24.6 22.0 - 29.0 mmol/L    Calcium 9.2 8.5 - 10.2 mg/dL    Total Protein 6.6 6.3 - 8.0 g/dL    Albumin 3.40 (L) 3.50 - 5.20 g/dL    ALT (SGPT) 11 0 - 33 U/L    AST (SGOT) 15 0 - 32 U/L    Alkaline Phosphatase 68 38 - 116 U/L    Total Bilirubin 0.3 0.2 - " 1.2 mg/dL    eGFR Non African Amer 112 >60 mL/min/1.73    Globulin 3.2 1.8 - 3.5 gm/dL    A/G Ratio 1.1 1.1 - 2.4 g/dL    BUN/Creatinine Ratio 32.1 (H) 7.3 - 30.0    Anion Gap 12.4 5.0 - 15.0 mmol/L   CBC Auto Differential   Result Value Ref Range    WBC 15.48 (H) 3.40 - 10.80 10*3/mm3    RBC 4.13 3.77 - 5.28 10*6/mm3    Hemoglobin 12.1 12.0 - 15.9 g/dL    Hematocrit 38.8 34.0 - 46.6 %    MCV 93.9 79.0 - 97.0 fL    MCH 29.3 26.6 - 33.0 pg    MCHC 31.2 (L) 31.5 - 35.7 g/dL    RDW 14.7 12.3 - 15.4 %    RDW-SD 50.6 37.0 - 54.0 fl    MPV 8.3 6.0 - 12.0 fL    Platelets 393 140 - 450 10*3/mm3    Neutrophil % 85.9 (H) 42.7 - 76.0 %    Lymphocyte % 5.6 (L) 19.6 - 45.3 %    Monocyte % 6.7 5.0 - 12.0 %    Eosinophil % 0.5 0.3 - 6.2 %    Basophil % 0.3 0.0 - 1.5 %    Immature Grans % 1.0 (H) 0.0 - 0.5 %    Neutrophils, Absolute 13.31 (H) 1.70 - 7.00 10*3/mm3    Lymphocytes, Absolute 0.87 0.70 - 3.10 10*3/mm3    Monocytes, Absolute 1.03 (H) 0.10 - 0.90 10*3/mm3    Eosinophils, Absolute 0.07 0.00 - 0.40 10*3/mm3    Basophils, Absolute 0.05 0.00 - 0.20 10*3/mm3    Immature Grans, Absolute 0.15 (H) 0.00 - 0.05 10*3/mm3    nRBC 0.0 0.0 - 0.2 /100 WBC       CT chest 12/28/18: There is decreased size of the left apical mass 1.3 cm compared to previous 1.5 cm.  Adjacent granulated density also decreased in size from 1.7 down to 0.9 cm.  There is stable emphysema and apical scarring.  There are stable thyroid nodules.  No new lymphadenopathy in the chest.  There is a right hepatic enhancing lesion stable.  I personally reviewed the CT chest and there is stable to improved disease/scar in the left upper lobe of the lung.    CT chest 4/23/2019: Again seen are hypodense bilateral thyroid lesions, hyperenhancing lesions in the liver unchanged.  There is no mediastinal, hilar or axillary lymphadenopathy.  There is severe emphysema.  There are stable nodules in the right middle and lower lobe since 11/3/2016.  There is a new sub-6 mm nodule  in the anterior right middle lobe.  Stable masslike thickening in the left lung apex since 12/28/2018 and decreased since 10/8/2018.  There is irregular opacification in the left apex likely radiation fibrosis.    CT chest 8/12/2019: Decreased size of nodular densities in the left upper lobe.  New 10 mm subpleural nodule in the lingula.  Stable hyperenhancing hepatic nodules.    CT chest abdomen 12/9/2019: There is discrete masslike soft tissue thickening in the left apex increased from previous 1.2 x 1.9 cm up to 1.6 x 2.4 cm.  Nodule in the right upper lobe stable compared to 10/8/2018, nodule in the right middle lobe stable compared to 10/8/2018.  Nodule in the right lower lobe stable compared to 10/8/2018.  There is a nodule in the lingula which has increased in size from 1.0 up to 1.4 cm.  No evidence of disease in the abdomen.    PET scan 12/20/2019: There is uptake in the right lobe of the thyroid which was present and stable since 10/18/2018.  There is increased uptake in the left lung apex SUV 11.1 and a smaller pleural-based nodularity laterally 1.1 cm SUV 4.7.  There is a 1.4 cm nodule in the lingula with SUV 7.9.  There is uptake in the left hilum which is similar to 10/18/2018.    Assessment/Plan   Assessment/Plan:   This is a 72 y.o. female with:     1.  Recurrent left upper lobe non-small cell lung cancer, high PDL-1 (80%), negative egfr/alk/ros   *The patient was previously treated with radiation therapy to the left upper lobe nodule/mass completed January 2018   *I personally reviewed the PET scan from 12/20/2019.  There is significant uptake in the mass in the apex of the left lung and also a nodule in the lingula with significant uptake for its size and I think both areas are likely consistent with recurrent disease.  I am very hesitant to send the patient for a biopsy as she has very poor lung function and a pneumothorax might prove fatal for her.  There is some uptake in the left hilum but this  was present in October 2018 and stable.  There is uptake in the right thyroid gland which is stable and the patient has declined biopsy of this in the past.     The patient underwent additional radiation to the hypermetabolic lesions and also initiated Keytruda on 1/21/2020.  She last received her third treatment of Keytruda on 3/2/2020.    Patient had diarrhea a week ago though it is unknown how long this has been going on prior to calling our office.  She did use Imodium however did not want to try the Lomotil.  We ended up giving her a steroid Dosepak which she completed a few days ago.  The diarrhea resolved and she had some hard stool on Saturday.  She denies abdominal pain except for cramping during the diarrhea.  She denies overt blood but states she had blood-tinged stool one time.  She denies mucus in the stool during this.  She denies fevers or any other infectious type symptoms during the time.  She has been staying at home or at her sister's house over the past 2 months.  She does not wish to proceed on with Keytruda.  This was reviewed with Dr. Ventura today.  Patient understands not proceeding with any treatment there is a risk for the cancer to grow.  She states she wants to be back more to her normal self and with possibly open to treatment in the future but not with chemotherapy.      Radiation oncology had moved her CT scans to the first part of June and therefore she would like to follow-up in our office following the scans.  We will have her see Dr. Ventura at that time.    Patient is aware that diarrhea may recur at which time she is to notify our office.        2.  Multinodular goiter stable by CT--followed by endocrinology.  There is uptake in the right thyroid gland similar to prior PET.  The patient declines biopsy of the hypermetabolic right thyroid lesion    3. History of right breast cancer, initially diagnosed in 2000 treated with lumpectomy, radiation, chemotherapy and endocrine therapy  with local recurrence in 2005 treated with mastectomy and then Arimidex.    * Mammogram due July 2020    4. COPD, oxygen dependent    5.  Coronary artery calcifications: The patient asked me to forward my note to her cardiologist Dr. Bobo regarding incidental coronary calcifications noted by CT

## 2020-05-07 ENCOUNTER — APPOINTMENT (OUTPATIENT)
Dept: PET IMAGING | Facility: HOSPITAL | Age: 75
End: 2020-05-07

## 2020-05-08 ENCOUNTER — APPOINTMENT (OUTPATIENT)
Dept: PET IMAGING | Facility: HOSPITAL | Age: 75
End: 2020-05-08

## 2020-05-12 ENCOUNTER — OFFICE VISIT (OUTPATIENT)
Dept: FAMILY MEDICINE CLINIC | Facility: CLINIC | Age: 75
End: 2020-05-12

## 2020-05-12 VITALS
TEMPERATURE: 97.1 F | OXYGEN SATURATION: 98 % | SYSTOLIC BLOOD PRESSURE: 122 MMHG | WEIGHT: 85.2 LBS | DIASTOLIC BLOOD PRESSURE: 61 MMHG | HEIGHT: 60 IN | BODY MASS INDEX: 16.73 KG/M2

## 2020-05-12 DIAGNOSIS — J43.1 PANLOBULAR EMPHYSEMA (HCC): ICD-10-CM

## 2020-05-12 DIAGNOSIS — J43.9 PULMONARY EMPHYSEMA, UNSPECIFIED EMPHYSEMA TYPE (HCC): ICD-10-CM

## 2020-05-12 DIAGNOSIS — I10 ESSENTIAL HYPERTENSION: ICD-10-CM

## 2020-05-12 DIAGNOSIS — C34.12 MALIGNANT NEOPLASM OF UPPER LOBE OF LEFT LUNG (HCC): Primary | ICD-10-CM

## 2020-05-12 DIAGNOSIS — C34.90 RECURRENT NON-SMALL CELL LUNG CANCER (HCC): ICD-10-CM

## 2020-05-12 PROCEDURE — 99442 PR PHYS/QHP TELEPHONE EVALUATION 11-20 MIN: CPT | Performed by: FAMILY MEDICINE

## 2020-05-12 NOTE — PROGRESS NOTES
"Fu nonsmall cell lung ca    Subjective   Maryam Arredondo is a 74 y.o. female.     History of Present Illness   Telephone visit due to covid risk.  Consent obtained.  18 minute visit.  Fu nonsmall cell lung cancer.  Seeing dr garcia.  Reviewed note from 4/27/20 with Jeanette Sparks np.  Pt currently declines continued keytruda tx.  Due to GI side effects. Repeat ct scan upcoming in June.  FU copd.  Doing well with symbicort/ spiriva.  No hange in breathing.  FU HTN.  No orthostasis.  Doing well with cartia.  Rare edema in legs that clears \"in a day or so\".  \"One day one leg, one day the next\".     The following portions of the patient's history were reviewed and updated as appropriate: allergies, current medications, past family history, past medical history, past social history, past surgical history and problem list.    Review of Systems   Constitutional: Negative for appetite change and fatigue.   HENT: Negative for nosebleeds and sore throat.    Eyes: Negative for blurred vision and visual disturbance.   Respiratory: Positive for cough. Negative for shortness of breath and wheezing.    Cardiovascular: Negative for chest pain and leg swelling.   Gastrointestinal: Negative for abdominal distention and abdominal pain.   Endocrine: Negative for cold intolerance and polyuria.   Genitourinary: Negative for dysuria and hematuria.   Musculoskeletal: Negative for arthralgias and myalgias.   Skin: Negative for color change and rash.   Neurological: Negative for weakness and confusion.   Psychiatric/Behavioral: Negative for agitation and depressed mood.       Patient Active Problem List   Diagnosis   • Pneumothorax   • Hypoxia   • Tachycardia   • Hyperthyroidism   • Essential hypertension   • Multifocal atrial tachycardia (CMS/HCC)   • Toxic multinodular goiter   • Pulmonary emphysema (CMS/HCC)   • Vitamin D insufficiency   • Pneumonia   • COPD with acute lower respiratory infection (CMS/HCC)   • Malignant neoplasm of upper " lobe of left lung (CMS/HCC)   • Infected skin tear   • Allergic rhinitis   • B12 deficiency   • Oxygen dependent   • Mitral regurgitation   • Breast cancer (CMS/HCC)   • Medicare annual wellness visit, subsequent   • Panlobular emphysema (CMS/HCC)   • Recurrent non-small cell lung cancer (CMS/[facility])   • Encounter for long-term (current) use of other medications   • Unspecified fracture of fifth metacarpal bone, left hand, initial encounter for closed fracture   • Immobility syndrome   • Encounter for removal of sutures   • Laceration of skin of knee without complication, left, sequela   • Dysuria   • Acute vaginitis       Allergies   Allergen Reactions   • Codeine Unknown - High Severity     Patient is unaware of the reaction to this medication     • Penicillin V Unknown - High Severity     Reaction unknown to patient     • Penicillins Unknown - High Severity     Pt is unaware of the reaction to this medication     • Mucinex D [Pseudoephedrine-Guaifenesin Er] Nausea Only     Felt hot in chest         Current Outpatient Medications:   •  albuterol sulfate  (90 Base) MCG/ACT inhaler, Inhale 2 puffs Every 4 (Four) Hours As Needed for Wheezing. Per MD - until current episode is resolved, Disp: 1 inhaler, Rfl: 11  •  budesonide-formoterol (SYMBICORT) 160-4.5 MCG/ACT inhaler, Inhale 2 puffs 2 (Two) Times a Day., Disp: 1 inhaler, Rfl: 11  •  CARTIA  MG 24 hr capsule, Take 1 capsule by mouth Daily., Disp: 90 capsule, Rfl: 3  •  Cholecalciferol (VITAMIN D3) 2000 UNITS tablet, Take 1 tablet by mouth daily., Disp: , Rfl:   •  clindamycin (CLEOCIN T) 1 % lotion, Apply  topically to the appropriate area as directed 2 (Two) Times a Day., Disp: , Rfl:   •  digoxin (LANOXIN) 125 MCG tablet, Take 1 tablet by mouth Daily., Disp: 90 tablet, Rfl: 2  •  diphenoxylate-atropine (LOMOTIL) 2.5-0.025 MG per tablet, Take 1 tablet by mouth Every 8 (Eight) Hours As Needed for Diarrhea., Disp: 30 tablet, Rfl: 0  •  fluconazole  (DIFLUCAN) 150 MG tablet, Take one now and one in 48 hours, Disp: 2 tablet, Rfl: 0  •  fluticasone (FLONASE) 50 MCG/ACT nasal spray, 2 Squirts into the nostril(s) as directed by provider 2 (Two) Times a Day., Disp: , Rfl:   •  methIMAzole (TAPAZOLE) 5 MG tablet, TAKE ONE AND ONE-HALF TABLET BY MOUTH DAILY, Disp: 135 tablet, Rfl: 1  •  metroNIDAZOLE (METROCREAM) 0.75 % cream, , Disp: , Rfl:   •  montelukast (SINGULAIR) 10 MG tablet, Take 1 tablet by mouth Daily. (Patient taking differently: Take 10 mg by mouth Every Night.), Disp: 90 tablet, Rfl: 3  •  Multiple Vitamins-Minerals (PRESERVISION AREDS 2) chewable tablet, Chew 2 capsules Every Morning., Disp: , Rfl:   •  Nutritional Supplements (ENSURE ACTIVE PO), Take  by mouth. Ensure or boost, Disp: , Rfl:   •  Nutritional Supplements (JUICE PLUS FIBRE PO), Take  by mouth. 12 gummies, Disp: , Rfl:   •  O2 (OXYGEN), Inhale Continuous., Disp: , Rfl:   •  Pembrolizumab (KEYTRUDA IV), Infuse  into a venous catheter. Every 2-3 weeks, Disp: , Rfl:   •  SPIRIVA RESPIMAT 2.5 MCG/ACT aerosol solution inhaler, Inhale 2 puffs Daily., Disp: 4 inhaler, Rfl: 5  •  vitamin B-12 (CYANOCOBALAMIN) 1000 MCG tablet, Take 1,000 mcg by mouth Daily. M,W,F, Disp: , Rfl:     Past Medical History:   Diagnosis Date   • Abnormal color of sputum     grey   • Acromioclavicular joint arthritis    • Acute maxillary sinusitis    • Acute upper respiratory infection    • Allergic rhinitis    • Anemia    • Asthma    • B12 deficiency    • Benign essential hypertension    • Breast cancer (CMS/HCC)     s/p Lumpectomy ~2000 and chemo/XRT. Then  Masectomy ~2004 for some recurrence   • Breast cancer (CMS/HCC)    • Cerumen impaction    • Chronic obstructive pulmonary disease (CMS/HCC)    • COPD (chronic obstructive pulmonary disease) (CMS/HCC)    • COPD exacerbation (CMS/HCC)    • Diverticulosis of colon    • Cedric-Barr infection    • Fever    • Heart disease    • High risk medication use    • High risk  medication use    • Hospital discharge follow-up    • Hyperthyroidism    • Hyperthyroidism    • Hypoxia    • Laryngitis    • Leg wound, left    • Medicare annual wellness visit, subsequent    • Mitral regurgitation     mild to moderate   • Nocturnal hypoxia    • Non-small cell lung cancer (CMS/HCC)    • Non-small cell lung cancer (CMS/HCC)    • Onychomycosis of toenail    • Oral aphthae    • Osteopenia    • Osteopenia    • Other fatigue    • Oxygen dependent     2L - at night only   • Pneumonia of upper lobe of lung (CMS/HCC)    • Pneumothorax    • Post-menopausal    • Skin abrasion    • SOB (shortness of breath)    • Supraventricular tachycardia (CMS/HCC)    • Tachycardia    • Thyroid nodule    • Toxic multinodular goiter    • Vitamin B deficiency    • Vitamin D deficiency    • Vitamin D deficiency    • Wound healing well on examination        Past Surgical History:   Procedure Laterality Date   • BREAST LUMPECTOMY Right    • BREAST LUMPECTOMY     • CARDIAC CATHETERIZATION      PS SAPHENOUS VAIN RIGHT LEG   • COLONOSCOPY  2017    Complete. 4 polyps. Recheck in 2019.    • COLONOSCOPY N/A 2017    Procedure: COLONOSCOPY WITH POLYPECTOMY(COLD BIOPSY AND HOT SNARE);  Surgeon: Luiza Eddy MD;  Location: [facility] ENDOSCOPY;  Service:    • HYSTERECTOMY     • MASTECTOMY Right    • NECK SURGERY      2 spurs removed   • VASCULAR SURGERY      spider vein ablation       Family History   Problem Relation Age of Onset   • Arthritis Mother    • Heart failure Mother         Congestive Heart Failure   • Lung cancer Father    • Breast cancer Paternal Grandmother    • No Known Problems Sister        Social History     Tobacco Use   • Smoking status: Former Smoker     Packs/day: 2.00     Years: 50.00     Pack years: 100.00     Types: Cigarettes     Last attempt to quit: 3/14/1994     Years since quittin.1   • Smokeless tobacco: Never Used   • Tobacco comment: D/C 20 years ago, smoking 2 ppd before quitting    Substance Use Topics   • Alcohol use: No     Comment: Social use rare            Objective     Vitals:    05/12/20 1449   BP: 122/61   Temp: 97.1 °F (36.2 °C)   SpO2: 98%     Body mass index is 16.64 kg/m².    Physical Exam   Constitutional: She is oriented to person, place, and time.   Pulmonary/Chest: Breath sounds normal.   Neurological: She is alert and oriented to person, place, and time.   Psychiatric: She has a normal mood and affect. Judgment and thought content normal.       Lab Results   Component Value Date    GLUCOSE 92 04/27/2020    BUN 17 04/27/2020    CREATININE 0.53 (L) 04/27/2020    EGFRIFNONA 112 04/27/2020    EGFRIFAFRI 108 10/21/2019    BCR 32.1 (H) 04/27/2020    K 3.8 04/27/2020    CO2 24.6 04/27/2020    CALCIUM 9.2 04/27/2020    PROTENTOTREF 6.7 10/21/2019    ALBUMIN 3.40 (L) 04/27/2020    LABIL2 1.7 10/21/2019    AST 15 04/27/2020    ALT 11 04/27/2020       WBC   Date Value Ref Range Status   04/27/2020 15.48 (H) 3.40 - 10.80 10*3/mm3 Final   06/11/2019 6.11 3.40 - 10.80 10*3/mm3 Final     RBC   Date Value Ref Range Status   04/27/2020 4.13 3.77 - 5.28 10*6/mm3 Final   06/11/2019 4.17 3.77 - 5.28 10*6/mm3 Final     Hemoglobin   Date Value Ref Range Status   04/27/2020 12.1 12.0 - 15.9 g/dL Final     Hematocrit   Date Value Ref Range Status   04/27/2020 38.8 34.0 - 46.6 % Final     MCV   Date Value Ref Range Status   04/27/2020 93.9 79.0 - 97.0 fL Final     MCH   Date Value Ref Range Status   04/27/2020 29.3 26.6 - 33.0 pg Final     MCHC   Date Value Ref Range Status   04/27/2020 31.2 (L) 31.5 - 35.7 g/dL Final     RDW   Date Value Ref Range Status   04/27/2020 14.7 12.3 - 15.4 % Final     RDW-SD   Date Value Ref Range Status   04/27/2020 50.6 37.0 - 54.0 fl Final     MPV   Date Value Ref Range Status   04/27/2020 8.3 6.0 - 12.0 fL Final     Platelets   Date Value Ref Range Status   04/27/2020 393 140 - 450 10*3/mm3 Final     Neutrophil %   Date Value Ref Range Status   04/27/2020 85.9 (H)  42.7 - 76.0 % Final     Lymphocyte %   Date Value Ref Range Status   04/27/2020 5.6 (L) 19.6 - 45.3 % Final     Monocyte %   Date Value Ref Range Status   04/27/2020 6.7 5.0 - 12.0 % Final     Eosinophil %   Date Value Ref Range Status   04/27/2020 0.5 0.3 - 6.2 % Final     Basophil %   Date Value Ref Range Status   04/27/2020 0.3 0.0 - 1.5 % Final     Immature Grans %   Date Value Ref Range Status   04/27/2020 1.0 (H) 0.0 - 0.5 % Final     Neutrophils, Absolute   Date Value Ref Range Status   04/27/2020 13.31 (H) 1.70 - 7.00 10*3/mm3 Final     Lymphocytes, Absolute   Date Value Ref Range Status   04/27/2020 0.87 0.70 - 3.10 10*3/mm3 Final     Monocytes, Absolute   Date Value Ref Range Status   04/27/2020 1.03 (H) 0.10 - 0.90 10*3/mm3 Final     Eosinophils, Absolute   Date Value Ref Range Status   04/27/2020 0.07 0.00 - 0.40 10*3/mm3 Final     Basophils, Absolute   Date Value Ref Range Status   04/27/2020 0.05 0.00 - 0.20 10*3/mm3 Final     Immature Grans, Absolute   Date Value Ref Range Status   04/27/2020 0.15 (H) 0.00 - 0.05 10*3/mm3 Final     nRBC   Date Value Ref Range Status   04/27/2020 0.0 0.0 - 0.2 /100 WBC Final       No results found for: HGBA1C    Lab Results   Component Value Date    EUFZQXAD01 1,228 (H) 09/03/2019       TSH   Date Value Ref Range Status   03/02/2020 1.500 0.270 - 4.200 uIU/mL Final       No results found for: CHOL  Lab Results   Component Value Date    TRIG 58 09/03/2019     Lab Results   Component Value Date    HDL 72 (H) 09/03/2019     Lab Results   Component Value Date     (H) 09/03/2019     Lab Results   Component Value Date    VLDL 11.6 09/03/2019     No results found for: LDLHDL      Procedures    Assessment/Plan   Problems Addressed this Visit        Cardiovascular and Mediastinum    Essential hypertension       Respiratory    Pulmonary emphysema (CMS/HCC)    Malignant neoplasm of upper lobe of left lung (CMS/HCC) - Primary    Panlobular emphysema (CMS/HCC)    Recurrent  non-small cell lung cancer (CMS/HCC)      Continue cartia.  Get ct scan of chest in June with f/u with Dr Ventura on treatment options for lung cancer.  Continu spiriva and symbicort.    No orders of the defined types were placed in this encounter.      Current Outpatient Medications   Medication Sig Dispense Refill   • albuterol sulfate  (90 Base) MCG/ACT inhaler Inhale 2 puffs Every 4 (Four) Hours As Needed for Wheezing. Per MD - until current episode is resolved 1 inhaler 11   • budesonide-formoterol (SYMBICORT) 160-4.5 MCG/ACT inhaler Inhale 2 puffs 2 (Two) Times a Day. 1 inhaler 11   • CARTIA  MG 24 hr capsule Take 1 capsule by mouth Daily. 90 capsule 3   • Cholecalciferol (VITAMIN D3) 2000 UNITS tablet Take 1 tablet by mouth daily.     • clindamycin (CLEOCIN T) 1 % lotion Apply  topically to the appropriate area as directed 2 (Two) Times a Day.     • digoxin (LANOXIN) 125 MCG tablet Take 1 tablet by mouth Daily. 90 tablet 2   • diphenoxylate-atropine (LOMOTIL) 2.5-0.025 MG per tablet Take 1 tablet by mouth Every 8 (Eight) Hours As Needed for Diarrhea. 30 tablet 0   • fluconazole (DIFLUCAN) 150 MG tablet Take one now and one in 48 hours 2 tablet 0   • fluticasone (FLONASE) 50 MCG/ACT nasal spray 2 Squirts into the nostril(s) as directed by provider 2 (Two) Times a Day.     • methIMAzole (TAPAZOLE) 5 MG tablet TAKE ONE AND ONE-HALF TABLET BY MOUTH DAILY 135 tablet 1   • metroNIDAZOLE (METROCREAM) 0.75 % cream      • montelukast (SINGULAIR) 10 MG tablet Take 1 tablet by mouth Daily. (Patient taking differently: Take 10 mg by mouth Every Night.) 90 tablet 3   • Multiple Vitamins-Minerals (PRESERVISION AREDS 2) chewable tablet Chew 2 capsules Every Morning.     • Nutritional Supplements (ENSURE ACTIVE PO) Take  by mouth. Ensure or boost     • Nutritional Supplements (JUICE PLUS FIBRE PO) Take  by mouth. 12 gummies     • O2 (OXYGEN) Inhale Continuous.     • Pembrolizumab (KEYTRUDA IV) Infuse  into a  venous catheter. Every 2-3 weeks     • SPIRIVA RESPIMAT 2.5 MCG/ACT aerosol solution inhaler Inhale 2 puffs Daily. 4 inhaler 5   • vitamin B-12 (CYANOCOBALAMIN) 1000 MCG tablet Take 1,000 mcg by mouth Daily. M,W,F       No current facility-administered medications for this visit.        Maryam Nyeisham had no medications administered during this visit.    Return in about 3 months (around 8/12/2020).    There are no Patient Instructions on file for this visit.

## 2020-05-13 ENCOUNTER — PATIENT MESSAGE (OUTPATIENT)
Dept: FAMILY MEDICINE CLINIC | Facility: CLINIC | Age: 75
End: 2020-05-13

## 2020-05-13 DIAGNOSIS — B37.9 YEAST INFECTION: ICD-10-CM

## 2020-05-13 RX ORDER — FLUCONAZOLE 150 MG/1
150 TABLET ORAL ONCE
Qty: 1 TABLET | Refills: 0 | Status: SHIPPED | OUTPATIENT
Start: 2020-05-13 | End: 2020-05-13

## 2020-05-13 NOTE — TELEPHONE ENCOUNTER
Tell her to start taking gas X before each meal to help with gas/bloating.  Call in diflucan 150 one pill now.  Disp 1 for her yeast infection.

## 2020-05-20 ENCOUNTER — TELEPHONE (OUTPATIENT)
Dept: SURGERY | Facility: CLINIC | Age: 75
End: 2020-05-20

## 2020-05-20 NOTE — TELEPHONE ENCOUNTER
Patient called in stating she got a reminder letter reminding her that it was time for her mammogram. Patient said she has to much going in and isn't able to have her mammo done at this time. She states that she is scheduled for a CT of her chest on 06/09/20.

## 2020-05-26 ENCOUNTER — TELEPHONE (OUTPATIENT)
Dept: ONCOLOGY | Facility: CLINIC | Age: 75
End: 2020-05-26

## 2020-05-26 NOTE — TELEPHONE ENCOUNTER
Pt called to see what her appt will be for on June 11th. I informed since she is having scans on June 9th Dr. Ventura will be reviewing scan results. Pt is ok with that but would like her daughter to attend the appt so she hear too. Note placed on appt line for daughter to attend. Pt v/u

## 2020-06-04 DIAGNOSIS — L08.9 INFECTED SKIN TEAR: Primary | ICD-10-CM

## 2020-06-04 DIAGNOSIS — T14.8XXA INFECTED SKIN TEAR: Primary | ICD-10-CM

## 2020-06-09 ENCOUNTER — HOSPITAL ENCOUNTER (OUTPATIENT)
Dept: PET IMAGING | Facility: HOSPITAL | Age: 75
Discharge: HOME | End: 2020-06-09
Admitting: RADIOLOGY

## 2020-06-09 DIAGNOSIS — C34.90 RECURRENT NON-SMALL CELL LUNG CANCER (HCC): ICD-10-CM

## 2020-06-09 LAB — CREAT BLDA-MCNC: 0.6 MG/DL (ref 0.6–1.3)

## 2020-06-09 PROCEDURE — 82565 ASSAY OF CREATININE: CPT

## 2020-06-09 PROCEDURE — 25010000002 IOPAMIDOL 61 % SOLUTION: Performed by: RADIOLOGY

## 2020-06-09 PROCEDURE — 71260 CT THORAX DX C+: CPT

## 2020-06-09 RX ADMIN — IOPAMIDOL 75 ML: 612 INJECTION, SOLUTION INTRAVENOUS at 11:41

## 2020-06-11 ENCOUNTER — OFFICE VISIT (OUTPATIENT)
Dept: ONCOLOGY | Facility: CLINIC | Age: 75
End: 2020-06-11

## 2020-06-11 ENCOUNTER — LAB (OUTPATIENT)
Dept: LAB | Facility: HOSPITAL | Age: 75
End: 2020-06-11

## 2020-06-11 VITALS
HEIGHT: 60 IN | OXYGEN SATURATION: 95 % | RESPIRATION RATE: 18 BRPM | DIASTOLIC BLOOD PRESSURE: 88 MMHG | HEART RATE: 99 BPM | TEMPERATURE: 98.9 F | WEIGHT: 88 LBS | BODY MASS INDEX: 17.28 KG/M2 | SYSTOLIC BLOOD PRESSURE: 140 MMHG

## 2020-06-11 DIAGNOSIS — C34.12 MALIGNANT NEOPLASM OF UPPER LOBE OF LEFT LUNG (HCC): Primary | ICD-10-CM

## 2020-06-11 DIAGNOSIS — C34.90 RECURRENT NON-SMALL CELL LUNG CANCER (HCC): Primary | ICD-10-CM

## 2020-06-11 LAB
ALBUMIN SERPL-MCNC: 3.6 G/DL (ref 3.5–5.2)
ALBUMIN/GLOB SERPL: 1.1 G/DL (ref 1.1–2.4)
ALP SERPL-CCNC: 67 U/L (ref 38–116)
ALT SERPL W P-5'-P-CCNC: 10 U/L (ref 0–33)
ANION GAP SERPL CALCULATED.3IONS-SCNC: 10.4 MMOL/L (ref 5–15)
AST SERPL-CCNC: 15 U/L (ref 0–32)
BASOPHILS # BLD AUTO: 0.04 10*3/MM3 (ref 0–0.2)
BASOPHILS NFR BLD AUTO: 0.6 % (ref 0–1.5)
BILIRUB SERPL-MCNC: 0.2 MG/DL (ref 0.2–1.2)
BUN BLD-MCNC: 17 MG/DL (ref 6–20)
BUN/CREAT SERPL: 31.5 (ref 7.3–30)
CALCIUM SPEC-SCNC: 9.6 MG/DL (ref 8.5–10.2)
CHLORIDE SERPL-SCNC: 103 MMOL/L (ref 98–107)
CO2 SERPL-SCNC: 27.6 MMOL/L (ref 22–29)
CREAT BLD-MCNC: 0.54 MG/DL (ref 0.6–1.1)
DEPRECATED RDW RBC AUTO: 53.1 FL (ref 37–54)
EOSINOPHIL # BLD AUTO: 0.08 10*3/MM3 (ref 0–0.4)
EOSINOPHIL NFR BLD AUTO: 1.1 % (ref 0.3–6.2)
ERYTHROCYTE [DISTWIDTH] IN BLOOD BY AUTOMATED COUNT: 15 % (ref 12.3–15.4)
GFR SERPL CREATININE-BSD FRML MDRD: 110 ML/MIN/1.73
GLOBULIN UR ELPH-MCNC: 3.3 GM/DL (ref 1.8–3.5)
GLUCOSE BLD-MCNC: 93 MG/DL (ref 74–124)
HCT VFR BLD AUTO: 36.5 % (ref 34–46.6)
HGB BLD-MCNC: 11.3 G/DL (ref 12–15.9)
IMM GRANULOCYTES # BLD AUTO: 0.03 10*3/MM3 (ref 0–0.05)
IMM GRANULOCYTES NFR BLD AUTO: 0.4 % (ref 0–0.5)
LYMPHOCYTES # BLD AUTO: 0.63 10*3/MM3 (ref 0.7–3.1)
LYMPHOCYTES NFR BLD AUTO: 9.1 % (ref 19.6–45.3)
MCH RBC QN AUTO: 29.8 PG (ref 26.6–33)
MCHC RBC AUTO-ENTMCNC: 31 G/DL (ref 31.5–35.7)
MCV RBC AUTO: 96.3 FL (ref 79–97)
MONOCYTES # BLD AUTO: 0.55 10*3/MM3 (ref 0.1–0.9)
MONOCYTES NFR BLD AUTO: 7.9 % (ref 5–12)
NEUTROPHILS # BLD AUTO: 5.63 10*3/MM3 (ref 1.7–7)
NEUTROPHILS NFR BLD AUTO: 80.9 % (ref 42.7–76)
NRBC BLD AUTO-RTO: 0 /100 WBC (ref 0–0.2)
PLATELET # BLD AUTO: 281 10*3/MM3 (ref 140–450)
PMV BLD AUTO: 8.7 FL (ref 6–12)
POTASSIUM BLD-SCNC: 4 MMOL/L (ref 3.5–4.7)
PROT SERPL-MCNC: 6.9 G/DL (ref 6.3–8)
RBC # BLD AUTO: 3.79 10*6/MM3 (ref 3.77–5.28)
SODIUM BLD-SCNC: 141 MMOL/L (ref 134–145)
WBC NRBC COR # BLD: 6.96 10*3/MM3 (ref 3.4–10.8)

## 2020-06-11 PROCEDURE — 85025 COMPLETE CBC W/AUTO DIFF WBC: CPT

## 2020-06-11 PROCEDURE — 36415 COLL VENOUS BLD VENIPUNCTURE: CPT

## 2020-06-11 PROCEDURE — 80053 COMPREHEN METABOLIC PANEL: CPT

## 2020-06-11 PROCEDURE — 99213 OFFICE O/P EST LOW 20 MIN: CPT | Performed by: INTERNAL MEDICINE

## 2020-06-11 NOTE — PROGRESS NOTES
History:     Reason for follow up:   1.  Stage IIB left upper lobe non-small cell lung cancer, high PDL-1 (80%), negative egfr/alk/ros   * status post radiation therapy completed 1/26/18  2.  Recurrent disease involving pleural-based nodule in the lingula and pleural-based mass left upper lobe; both areas treated with radiation therapy completed 2/3/2020  3.  Patient received 3 doses of Keytruda February/March 2020 but stopped secondary to diarrhea and weight loss     HPI:  Maryam Arredondo presents for follow-up of her lung cancer.  The patient returned today for follow-up accompanied by her sister.  She states she is overall doing quite well after finally recuperating from diarrhea which she experienced with Keytruda back in March.  The Keytruda finally resolved after a treatment with a short course of steroids.  The steroids however because lower extremity edema.  The patient is currently stable with her breathing, no pain in the chest and stool back to normal.  She states she feels better than she has in some time.          Reviewed, confirmed and updated history (past medical, social and family)   Past Medical   Past Medical History:   Diagnosis Date   • Abnormal color of sputum     grey   • Acromioclavicular joint arthritis    • Acute maxillary sinusitis    • Acute upper respiratory infection    • Allergic rhinitis    • Anemia    • Asthma    • B12 deficiency    • Benign essential hypertension    • Breast cancer (CMS/HCC)     s/p Lumpectomy ~2000 and chemo/XRT. Then  Masectomy ~2004 for some recurrence   • Breast cancer (CMS/HCC)    • Cerumen impaction    • Chronic obstructive pulmonary disease (CMS/HCC)    • COPD (chronic obstructive pulmonary disease) (CMS/HCC)    • COPD exacerbation (CMS/HCC)    • Diverticulosis of colon    • Cedric-Barr infection    • Fever    • Heart disease    • High risk medication use    • High risk medication use    • Hospital discharge follow-up    • Hyperthyroidism    •  Hyperthyroidism    • Hypoxia    • Laryngitis    • Leg wound, left    • Medicare annual wellness visit, subsequent    • Mitral regurgitation     mild to moderate   • Nocturnal hypoxia    • Non-small cell lung cancer (CMS/HCC)    • Non-small cell lung cancer (CMS/HCC)    • Onychomycosis of toenail    • Oral aphthae    • Osteopenia    • Osteopenia    • Other fatigue    • Oxygen dependent     2L - at night only   • Pneumonia of upper lobe of lung    • Pneumothorax    • Post-menopausal    • Skin abrasion    • SOB (shortness of breath)    • Supraventricular tachycardia (CMS/HCC)    • Tachycardia    • Thyroid nodule    • Toxic multinodular goiter    • Vitamin B deficiency    • Vitamin D deficiency    • Vitamin D deficiency    • Wound healing well on examination     and   Patient Active Problem List   Diagnosis   • Pneumothorax   • Hypoxia   • Tachycardia   • Hyperthyroidism   • Essential hypertension   • Multifocal atrial tachycardia (CMS/HCC)   • Toxic multinodular goiter   • Pulmonary emphysema (CMS/HCC)   • Vitamin D insufficiency   • Pneumonia   • COPD with acute lower respiratory infection (CMS/HCC)   • Malignant neoplasm of upper lobe of left lung (CMS/HCC)   • Infected skin tear   • Allergic rhinitis   • B12 deficiency   • Oxygen dependent   • Mitral regurgitation   • Breast cancer (CMS/HCC)   • Medicare annual wellness visit, subsequent   • Panlobular emphysema (CMS/HCC)   • Recurrent non-small cell lung cancer (CMS/HCC)   • Encounter for long-term (current) use of other medications   • Unspecified fracture of fifth metacarpal bone, left hand, initial encounter for closed fracture   • Immobility syndrome   • Encounter for removal of sutures   • Laceration of skin of knee without complication, left, sequela   • Dysuria   • Acute vaginitis     Social History   Social History     Socioeconomic History   • Marital status: Single     Spouse name: Not on file   • Number of children: Not on file   • Years of education:  Not on file   • Highest education level: Not on file   Occupational History   • Occupation:      Employer: RETIRED   Tobacco Use   • Smoking status: Former Smoker     Packs/day: 2.00     Years: 50.00     Pack years: 100.00     Types: Cigarettes     Last attempt to quit: 3/14/1994     Years since quittin.2   • Smokeless tobacco: Never Used   • Tobacco comment: D/C 20 years ago, smoking 2 ppd before quitting   Substance and Sexual Activity   • Alcohol use: No     Comment: Social use rare   • Drug use: No   • Sexual activity: Defer     Comment: drinks decaf      Family History  Family History   Problem Relation Age of Onset   • Arthritis Mother    • Heart failure Mother         Congestive Heart Failure   • Lung cancer Father    • Breast cancer Paternal Grandmother    • No Known Problems Sister      Allergies  Allergies   Allergen Reactions   • Codeine Unknown - High Severity     Patient is unaware of the reaction to this medication     • Penicillin V Unknown - High Severity     Reaction unknown to patient     • Penicillins Unknown - High Severity     Pt is unaware of the reaction to this medication     • Mucinex D [Pseudoephedrine-Guaifenesin Er] Nausea Only     Felt hot in chest       Medications: The current medication list was reviewed in the EMR.    Review of Systems  Review of Systems   Constitutional: Positive for fatigue. Negative for activity change, appetite change, chills, diaphoresis, fever and unexpected weight change.   HENT: Negative for congestion and sinus pressure.    Respiratory: Positive for shortness of breath. Negative for cough and chest tightness.    Cardiovascular: Negative for chest pain, palpitations and leg swelling.   Gastrointestinal: Negative for abdominal pain, blood in stool, constipation, diarrhea, nausea and vomiting.   Endocrine: Negative.    Genitourinary: Negative.    Musculoskeletal: Positive for arthralgias and gait problem. Negative for joint swelling  "and myalgias.   Skin: Negative.    Allergic/Immunologic: Negative.    Hematological: Negative for adenopathy. Does not bruise/bleed easily.   Psychiatric/Behavioral: Agitation:  Behavioral problem:  The patient is nervous/anxious.    ROS unchanged-6/11/20    Objective    Objective:     Vitals:    06/11/20 0922   BP: 140/88   Pulse: 99   Resp: 18   Temp: 98.9 °F (37.2 °C)   TempSrc: Skin   SpO2: 95%   Weight: 39.9 kg (88 lb)   Height: 152.4 cm (60\")   PainSc: 0-No pain     Current Status 6/11/2020   ECOG score 0   GENERAL:  Well-developed, somewhat thin and chronic ill-appearing female in no acute distress. Vital signs reviewed.   SKIN: Laceration left forehead  EYES:    EOMs intact.  Conjunctivae normal.  HEAD:  Normocephalic.  NECK:  Supple with good range of motion; no thyromegaly or masses  LYMPHATICS:  No cervical, supraclavicular, axillary adenopathy.  RESP: Mild decrease in breath sounds, no increased work of breathing, no wheezing.  She is on O2 by nasal cannula  CARDIAC:  Regular rate and rhythm without murmurs, rubs or gallops. Normal S1,S2. No edema  GI:  Soft, nontender, normal bowel sounds  MSK:  No clubbing or cyanosis,.  No edema   NEUROLOGICAL:   No focal neurological deficits.  PSYCHIATRIC:  Normal affect and mood.  Alert and Oriented x 3.     Labs and Imaging  Results for orders placed or performed in visit on 06/11/20   CBC Auto Differential   Result Value Ref Range    WBC 6.96 3.40 - 10.80 10*3/mm3    RBC 3.79 3.77 - 5.28 10*6/mm3    Hemoglobin 11.3 (L) 12.0 - 15.9 g/dL    Hematocrit 36.5 34.0 - 46.6 %    MCV 96.3 79.0 - 97.0 fL    MCH 29.8 26.6 - 33.0 pg    MCHC 31.0 (L) 31.5 - 35.7 g/dL    RDW 15.0 12.3 - 15.4 %    RDW-SD 53.1 37.0 - 54.0 fl    MPV 8.7 6.0 - 12.0 fL    Platelets 281 140 - 450 10*3/mm3    Neutrophil % 80.9 (H) 42.7 - 76.0 %    Lymphocyte % 9.1 (L) 19.6 - 45.3 %    Monocyte % 7.9 5.0 - 12.0 %    Eosinophil % 1.1 0.3 - 6.2 %    Basophil % 0.6 0.0 - 1.5 %    Immature Grans % 0.4 0.0 " - 0.5 %    Neutrophils, Absolute 5.63 1.70 - 7.00 10*3/mm3    Lymphocytes, Absolute 0.63 (L) 0.70 - 3.10 10*3/mm3    Monocytes, Absolute 0.55 0.10 - 0.90 10*3/mm3    Eosinophils, Absolute 0.08 0.00 - 0.40 10*3/mm3    Basophils, Absolute 0.04 0.00 - 0.20 10*3/mm3    Immature Grans, Absolute 0.03 0.00 - 0.05 10*3/mm3    nRBC 0.0 0.0 - 0.2 /100 WBC       CT chest 12/28/18: There is decreased size of the left apical mass 1.3 cm compared to previous 1.5 cm.  Adjacent granulated density also decreased in size from 1.7 down to 0.9 cm.  There is stable emphysema and apical scarring.  There are stable thyroid nodules.  No new lymphadenopathy in the chest.  There is a right hepatic enhancing lesion stable.  I personally reviewed the CT chest and there is stable to improved disease/scar in the left upper lobe of the lung.    CT chest 4/23/2019: Again seen are hypodense bilateral thyroid lesions, hyperenhancing lesions in the liver unchanged.  There is no mediastinal, hilar or axillary lymphadenopathy.  There is severe emphysema.  There are stable nodules in the right middle and lower lobe since 11/3/2016.  There is a new sub-6 mm nodule in the anterior right middle lobe.  Stable masslike thickening in the left lung apex since 12/28/2018 and decreased since 10/8/2018.  There is irregular opacification in the left apex likely radiation fibrosis.    CT chest 8/12/2019: Decreased size of nodular densities in the left upper lobe.  New 10 mm subpleural nodule in the lingula.  Stable hyperenhancing hepatic nodules.    CT chest abdomen 12/9/2019: There is discrete masslike soft tissue thickening in the left apex increased from previous 1.2 x 1.9 cm up to 1.6 x 2.4 cm.  Nodule in the right upper lobe stable compared to 10/8/2018, nodule in the right middle lobe stable compared to 10/8/2018.  Nodule in the right lower lobe stable compared to 10/8/2018.  There is a nodule in the lingula which has increased in size from 1.0 up to 1.4 cm.   No evidence of disease in the abdomen.    PET scan 12/20/2019: There is uptake in the right lobe of the thyroid which was present and stable since 10/18/2018.  There is increased uptake in the left lung apex SUV 11.1 and a smaller pleural-based nodularity laterally 1.1 cm SUV 4.7.  There is a 1.4 cm nodule in the lingula with SUV 7.9.  There is uptake in the left hilum which is similar to 10/18/2018.     CT chest 6/9/2020: Stable exam compared to 12/9/2019 with mass in the left apex 2.3 x 1.6 cm and surrounding parenchymal scarring, stable soft tissue thickening lateral left apex, stable pulmonary nodules    Assessment/Plan   Assessment/Plan:   This is a 74 y.o. female with:     1.  Recurrent left upper lobe non-small cell lung cancer, high PDL-1 (80%), negative egfr/alk/ros   *The patient was previously treated with radiation therapy to the left upper lobe nodule/mass completed January 2018   *PET scan from 12/20/2019.  There is significant uptake in the mass in the apex of the left lung and also a nodule in the lingula with significant uptake for its size and I think both areas are likely consistent with recurrent disease.  Did not send the patient for a biopsy as she has very poor lung function and a pneumothorax might prove fatal for her.  There is some uptake in the left hilum but this was present in October 2018 and stable.  There is uptake in the right thyroid gland which is stable and the patient has declined biopsy of this in the past.     The patient underwent additional radiation to the hypermetabolic lesions and also initiated Keytruda on 1/21/2020.    Keytruda stopped after 3 doses secondary to significant diarrhea.  Diarrhea resolved with a short course of steroids.    CT chest reviewed today showed stable findings.  4-month follow-up CT ordered.    2.  Multinodular goiter stable by CT--followed by endocrinology.  There is uptake in the right thyroid gland similar to prior PET.  The patient declines  biopsy of the hypermetabolic right thyroid lesion    3. History of right breast cancer, initially diagnosed in 2000 treated with lumpectomy, radiation, chemotherapy and endocrine therapy with local recurrence in 2005 treated with mastectomy and then Arimidex.    * Mammogram due July 2020    4. COPD, oxygen dependent

## 2020-06-12 ENCOUNTER — DOCUMENTATION (OUTPATIENT)
Dept: RADIATION ONCOLOGY | Facility: HOSPITAL | Age: 75
End: 2020-06-12

## 2020-06-12 DIAGNOSIS — C34.90 RECURRENT NON-SMALL CELL LUNG CANCER (HCC): ICD-10-CM

## 2020-06-12 DIAGNOSIS — C34.12 MALIGNANT NEOPLASM OF UPPER LOBE OF LEFT LUNG (HCC): Primary | ICD-10-CM

## 2020-06-12 NOTE — PROGRESS NOTES
Left message regarding CT chest as I will be out of the office next week. Relayed stable results, no new nodules/LAD and all looks good post treatment. I recommended follow up scan again in 3 months and will order that today. Will see back thereafter.

## 2020-06-24 DIAGNOSIS — B37.31 YEAST INFECTION INVOLVING THE VAGINA AND SURROUNDING AREA: Primary | ICD-10-CM

## 2020-06-24 DIAGNOSIS — N76.0 ACUTE VAGINITIS: Primary | ICD-10-CM

## 2020-06-24 RX ORDER — FLUCONAZOLE 150 MG/1
TABLET ORAL
Qty: 2 TABLET | Refills: 0 | Status: SHIPPED | OUTPATIENT
Start: 2020-06-24 | End: 2020-06-24 | Stop reason: ALTCHOICE

## 2020-07-08 ENCOUNTER — OFFICE VISIT (OUTPATIENT)
Dept: ENDOCRINOLOGY | Age: 75
End: 2020-07-08

## 2020-07-08 VITALS
DIASTOLIC BLOOD PRESSURE: 78 MMHG | HEIGHT: 60 IN | OXYGEN SATURATION: 95 % | SYSTOLIC BLOOD PRESSURE: 126 MMHG | HEART RATE: 104 BPM | WEIGHT: 90.2 LBS | BODY MASS INDEX: 17.71 KG/M2

## 2020-07-08 DIAGNOSIS — E55.9 VITAMIN D INSUFFICIENCY: ICD-10-CM

## 2020-07-08 DIAGNOSIS — E05.20 TOXIC MULTINODULAR GOITER: Primary | ICD-10-CM

## 2020-07-08 DIAGNOSIS — I10 ESSENTIAL HYPERTENSION: ICD-10-CM

## 2020-07-08 DIAGNOSIS — E05.90 HYPERTHYROIDISM: ICD-10-CM

## 2020-07-08 DIAGNOSIS — C34.12 MALIGNANT NEOPLASM OF UPPER LOBE OF LEFT LUNG (HCC): ICD-10-CM

## 2020-07-08 PROCEDURE — 99214 OFFICE O/P EST MOD 30 MIN: CPT | Performed by: INTERNAL MEDICINE

## 2020-07-08 RX ORDER — ACYCLOVIR 400 MG/1
TABLET ORAL
COMMUNITY
Start: 2020-06-15 | End: 2020-10-28

## 2020-07-09 LAB
25(OH)D3+25(OH)D2 SERPL-MCNC: 46.7 NG/ML (ref 30–100)
ALBUMIN SERPL-MCNC: 4.1 G/DL (ref 3.5–5.2)
ALBUMIN/GLOB SERPL: 1.5 G/DL
ALP SERPL-CCNC: 75 U/L (ref 39–117)
ALT SERPL-CCNC: 12 U/L (ref 1–33)
AST SERPL-CCNC: 13 U/L (ref 1–32)
BILIRUB SERPL-MCNC: <0.2 MG/DL (ref 0–1.2)
BUN SERPL-MCNC: 26 MG/DL (ref 8–23)
BUN/CREAT SERPL: 52 (ref 7–25)
CALCIUM SERPL-MCNC: 9.3 MG/DL (ref 8.6–10.5)
CHLORIDE SERPL-SCNC: 104 MMOL/L (ref 98–107)
CO2 SERPL-SCNC: 29.1 MMOL/L (ref 22–29)
CREAT SERPL-MCNC: 0.5 MG/DL (ref 0.57–1)
FSH SERPL-ACNC: 46.9 MIU/ML
GLOBULIN SER CALC-MCNC: 2.8 GM/DL
GLUCOSE SERPL-MCNC: 78 MG/DL (ref 65–99)
LH SERPL-ACNC: 23.4 MIU/ML
POTASSIUM SERPL-SCNC: 4.3 MMOL/L (ref 3.5–5.2)
PROT SERPL-MCNC: 6.9 G/DL (ref 6–8.5)
SODIUM SERPL-SCNC: 140 MMOL/L (ref 136–145)
T3FREE SERPL-MCNC: 3.5 PG/ML (ref 2–4.4)
T4 FREE SERPL-MCNC: 1.04 NG/DL (ref 0.93–1.7)
TSH SERPL DL<=0.005 MIU/L-ACNC: 1.28 UIU/ML (ref 0.27–4.2)

## 2020-07-10 ENCOUNTER — TELEPHONE (OUTPATIENT)
Dept: ENDOCRINOLOGY | Age: 75
End: 2020-07-10

## 2020-07-10 RX ORDER — LANOLIN ALCOHOL/MO/W.PET/CERES
1000 CREAM (GRAM) TOPICAL DAILY
Status: SHIPPED | COMMUNITY
Start: 2020-07-10 | End: 2021-03-16

## 2020-08-10 ENCOUNTER — TELEPHONE (OUTPATIENT)
Dept: FAMILY MEDICINE CLINIC | Facility: CLINIC | Age: 75
End: 2020-08-10

## 2020-08-10 RX ORDER — FLUTICASONE PROPIONATE 50 MCG
2 SPRAY, SUSPENSION (ML) NASAL 2 TIMES DAILY
Qty: 16 G | Refills: 3 | Status: SHIPPED | OUTPATIENT
Start: 2020-08-10 | End: 2020-08-12 | Stop reason: SDUPTHER

## 2020-08-10 RX ORDER — DIGOXIN 125 MCG
TABLET ORAL
Qty: 90 TABLET | Refills: 1 | Status: SHIPPED | OUTPATIENT
Start: 2020-08-10 | End: 2021-02-05

## 2020-08-12 ENCOUNTER — OFFICE VISIT (OUTPATIENT)
Dept: FAMILY MEDICINE CLINIC | Facility: CLINIC | Age: 75
End: 2020-08-12

## 2020-08-12 VITALS
SYSTOLIC BLOOD PRESSURE: 139 MMHG | TEMPERATURE: 91.4 F | BODY MASS INDEX: 17.67 KG/M2 | HEART RATE: 111 BPM | DIASTOLIC BLOOD PRESSURE: 73 MMHG | HEIGHT: 60 IN | WEIGHT: 90 LBS | OXYGEN SATURATION: 90 %

## 2020-08-12 DIAGNOSIS — J43.1 PANLOBULAR EMPHYSEMA (HCC): ICD-10-CM

## 2020-08-12 DIAGNOSIS — J30.1 SEASONAL ALLERGIC RHINITIS DUE TO POLLEN: ICD-10-CM

## 2020-08-12 DIAGNOSIS — B37.31 VAGINAL CANDIDIASIS: Primary | ICD-10-CM

## 2020-08-12 DIAGNOSIS — N76.0 ACUTE VAGINITIS: ICD-10-CM

## 2020-08-12 PROCEDURE — 99214 OFFICE O/P EST MOD 30 MIN: CPT | Performed by: FAMILY MEDICINE

## 2020-08-12 RX ORDER — FLUTICASONE PROPIONATE 50 MCG
2 SPRAY, SUSPENSION (ML) NASAL 2 TIMES DAILY
Qty: 48 G | Refills: 3 | Status: SHIPPED | OUTPATIENT
Start: 2020-08-12 | End: 2021-01-01

## 2020-08-12 NOTE — PROGRESS NOTES
Chief Complaint   Patient presents with   • Hypertension       Subjective   Maryam Arredondo is a 74 y.o. female.     History of Present Illness   F/U vaginal itching.  I do well with the clotrimazole.   It does well and clears it up.  FU AR.  Does well with flonase.  Needs rx.    F/U COPD.   On 2 L a minute .  On inhalers reguallry.      The following portions of the patient's history were reviewed and updated as appropriate: allergies, current medications, past family history, past medical history, past social history, past surgical history and problem list.    Review of Systems   Constitutional: Negative for appetite change and fatigue.   HENT: Negative for nosebleeds and sore throat.    Eyes: Negative for blurred vision and visual disturbance.   Respiratory: Negative for shortness of breath and wheezing.    Cardiovascular: Negative for chest pain and leg swelling.   Gastrointestinal: Negative for abdominal distention and abdominal pain.   Endocrine: Negative for cold intolerance and polyuria.   Genitourinary: Negative for dysuria and hematuria.   Musculoskeletal: Negative for arthralgias and myalgias.   Skin: Negative for color change and rash.   Neurological: Negative for weakness and confusion.   Psychiatric/Behavioral: Negative for agitation and depressed mood.       Patient Active Problem List   Diagnosis   • Pneumothorax   • Hypoxia   • Tachycardia   • Hyperthyroidism   • Essential hypertension   • Multifocal atrial tachycardia (CMS/HCC)   • Toxic multinodular goiter   • Pulmonary emphysema (CMS/HCC)   • Vitamin D insufficiency   • Pneumonia   • COPD with acute lower respiratory infection (CMS/HCC)   • Malignant neoplasm of upper lobe of left lung (CMS/HCC)   • Infected skin tear   • Allergic rhinitis   • B12 deficiency   • Oxygen dependent   • Mitral regurgitation   • Breast cancer (CMS/HCC)   • Medicare annual wellness visit, subsequent   • Panlobular emphysema (CMS/HCC)   • Recurrent non-small cell lung  cancer (CMS/HCC)   • Encounter for long-term (current) use of other medications   • Unspecified fracture of fifth metacarpal bone, left hand, initial encounter for closed fracture   • Immobility syndrome   • Encounter for removal of sutures   • Laceration of skin of knee without complication, left, sequela   • Dysuria   • Acute vaginitis   • Vaginal candidiasis       Allergies   Allergen Reactions   • Codeine Unknown - High Severity     Patient is unaware of the reaction to this medication     • Penicillin V Unknown - High Severity     Reaction unknown to patient     • Penicillins Unknown - High Severity     Pt is unaware of the reaction to this medication     • Mucinex D [Pseudoephedrine-Guaifenesin Er] Nausea Only     Felt hot in chest         Current Outpatient Medications:   •  acyclovir (ZOVIRAX) 400 MG tablet, , Disp: , Rfl:   •  albuterol sulfate  (90 Base) MCG/ACT inhaler, Inhale 2 puffs Every 4 (Four) Hours As Needed for Wheezing. Per MD - until current episode is resolved, Disp: 1 inhaler, Rfl: 11  •  budesonide-formoterol (SYMBICORT) 160-4.5 MCG/ACT inhaler, Inhale 2 puffs 2 (Two) Times a Day., Disp: 1 inhaler, Rfl: 11  •  CARTIA  MG 24 hr capsule, Take 1 capsule by mouth Daily., Disp: 90 capsule, Rfl: 3  •  Cholecalciferol (VITAMIN D3) 2000 UNITS tablet, Take 1 tablet by mouth daily., Disp: , Rfl:   •  clindamycin (CLEOCIN T) 1 % lotion, Apply  topically to the appropriate area as directed 2 (Two) Times a Day., Disp: , Rfl:   •  digoxin (LANOXIN) 125 MCG tablet, TAKE ONE TABLET BY MOUTH DAILY, Disp: 90 tablet, Rfl: 1  •  fluticasone (FLONASE) 50 MCG/ACT nasal spray, 2 sprays into the nostril(s) as directed by provider 2 (Two) Times a Day., Disp: 48 g, Rfl: 3  •  methIMAzole (TAPAZOLE) 5 MG tablet, TAKE ONE AND ONE-HALF TABLET BY MOUTH DAILY, Disp: 135 tablet, Rfl: 1  •  montelukast (SINGULAIR) 10 MG tablet, Take 1 tablet by mouth Daily. (Patient taking differently: Take 10 mg by mouth Every  Night.), Disp: 90 tablet, Rfl: 3  •  Multiple Vitamins-Minerals (PRESERVISION AREDS 2) chewable tablet, Chew 2 capsules Every Morning., Disp: , Rfl:   •  Nutritional Supplements (ENSURE ACTIVE PO), Take  by mouth. Ensure or boost, Disp: , Rfl:   •  Nutritional Supplements (JUICE PLUS FIBRE PO), Take  by mouth. 12 gummies, Disp: , Rfl:   •  O2 (OXYGEN), Inhale Continuous., Disp: , Rfl:   •  SPIRIVA RESPIMAT 2.5 MCG/ACT aerosol solution inhaler, Inhale 2 puffs Daily., Disp: 4 inhaler, Rfl: 5  •  vitamin B-12 (CYANOCOBALAMIN) 1000 MCG tablet, Take 1 tablet by mouth Daily. M,W,F, Disp: , Rfl:   •  Clotrimazole 2 % vaginal cream, 1 applicator qhs per vagina for 3 days, Disp: 21 g, Rfl: 3    Past Medical History:   Diagnosis Date   • Abnormal color of sputum     grey   • Acromioclavicular joint arthritis    • Acute maxillary sinusitis    • Acute upper respiratory infection    • Allergic rhinitis    • Anemia    • Asthma    • B12 deficiency    • Benign essential hypertension    • Breast cancer (CMS/HCC)     s/p Lumpectomy ~2000 and chemo/XRT. Then  Masectomy ~2004 for some recurrence   • Breast cancer (CMS/HCC)    • Cerumen impaction    • Chronic obstructive pulmonary disease (CMS/HCC)    • COPD (chronic obstructive pulmonary disease) (CMS/HCC)    • COPD exacerbation (CMS/HCC)    • Diverticulosis of colon    • Cedric-Barr infection    • Fever    • Heart disease    • High risk medication use    • High risk medication use    • Hospital discharge follow-up    • Hyperthyroidism    • Hyperthyroidism    • Hypoxia    • Laryngitis    • Leg wound, left    • Medicare annual wellness visit, subsequent    • Mitral regurgitation     mild to moderate   • Nocturnal hypoxia    • Non-small cell lung cancer (CMS/HCC)    • Non-small cell lung cancer (CMS/HCC)    • Onychomycosis of toenail    • Oral aphthae    • Osteopenia    • Osteopenia    • Other fatigue    • Oxygen dependent     2L - at night only   • Pneumonia of upper lobe of lung    •  Pneumothorax    • Post-menopausal    • Skin abrasion    • SOB (shortness of breath)    • Supraventricular tachycardia (CMS/HCC)    • Tachycardia    • Thyroid nodule    • Toxic multinodular goiter    • Vitamin B deficiency    • Vitamin D deficiency    • Vitamin D deficiency    • Wound healing well on examination        Past Surgical History:   Procedure Laterality Date   • BREAST LUMPECTOMY Right    • BREAST LUMPECTOMY     • CARDIAC CATHETERIZATION      PS SAPHENOUS VAIN RIGHT LEG   • COLONOSCOPY  2017    Complete. 4 polyps. Recheck in 2019.    • COLONOSCOPY N/A 2017    Procedure: COLONOSCOPY WITH POLYPECTOMY(COLD BIOPSY AND HOT SNARE);  Surgeon: Luiza Eddy MD;  Location: [facility] ENDOSCOPY;  Service:    • HYSTERECTOMY     • MASTECTOMY Right    • NECK SURGERY      2 spurs removed   • VASCULAR SURGERY      spider vein ablation       Family History   Problem Relation Age of Onset   • Arthritis Mother    • Heart failure Mother         Congestive Heart Failure   • Lung cancer Father    • Breast cancer Paternal Grandmother    • No Known Problems Sister        Social History     Tobacco Use   • Smoking status: Former Smoker     Packs/day: 2.00     Years: 50.00     Pack years: 100.00     Types: Cigarettes     Last attempt to quit: 3/14/1994     Years since quittin.4   • Smokeless tobacco: Never Used   • Tobacco comment: D/C 20 years ago, smoking 2 ppd before quitting   Substance Use Topics   • Alcohol use: No     Comment: Social use rare            Objective     Vitals:    20 1248   BP: 139/73   Pulse: 111   Temp: (!) 91.4 °F (33 °C)   SpO2: 90%     Body mass index is 17.58 kg/m².    Physical Exam   Constitutional: She appears well-developed and well-nourished.   HENT:   Head: Normocephalic and atraumatic.   Mouth/Throat: Oropharynx is clear and moist.   Eyes: Pupils are equal, round, and reactive to light. No scleral icterus.   Neck: No thyromegaly present.   Cardiovascular: Normal rate and  regular rhythm. Exam reveals no gallop and no friction rub.   No murmur heard.  Pulmonary/Chest: Effort normal. No respiratory distress. She has no wheezes. She has no rales. She exhibits no tenderness.   Distant but clear.     Abdominal: Soft. Bowel sounds are normal. She exhibits no distension. There is no tenderness.   Musculoskeletal: Normal range of motion. She exhibits no edema or deformity.   Lymphadenopathy:     She has no cervical adenopathy.   Neurological: No cranial nerve deficit. She exhibits normal muscle tone.   Skin: Skin is warm and dry. No rash noted. She is not diaphoretic.   Vitals reviewed.      Lab Results   Component Value Date    GLUCOSE 93 06/11/2020    BUN 26 (H) 07/08/2020    CREATININE 0.50 (L) 07/08/2020    EGFRIFNONA 120 07/08/2020    EGFRIFAFRI 146 07/08/2020    BCR 52.0 (H) 07/08/2020    K 4.3 07/08/2020    CO2 29.1 (H) 07/08/2020    CALCIUM 9.3 07/08/2020    PROTENTOTREF 6.9 07/08/2020    ALBUMIN 4.10 07/08/2020    LABIL2 1.5 07/08/2020    AST 13 07/08/2020    ALT 12 07/08/2020       WBC   Date Value Ref Range Status   06/11/2020 6.96 3.40 - 10.80 10*3/mm3 Final   06/11/2019 6.11 3.40 - 10.80 10*3/mm3 Final     RBC   Date Value Ref Range Status   06/11/2020 3.79 3.77 - 5.28 10*6/mm3 Final   06/11/2019 4.17 3.77 - 5.28 10*6/mm3 Final     Hemoglobin   Date Value Ref Range Status   06/11/2020 11.3 (L) 12.0 - 15.9 g/dL Final     Hematocrit   Date Value Ref Range Status   06/11/2020 36.5 34.0 - 46.6 % Final     MCV   Date Value Ref Range Status   06/11/2020 96.3 79.0 - 97.0 fL Final     MCH   Date Value Ref Range Status   06/11/2020 29.8 26.6 - 33.0 pg Final     MCHC   Date Value Ref Range Status   06/11/2020 31.0 (L) 31.5 - 35.7 g/dL Final     RDW   Date Value Ref Range Status   06/11/2020 15.0 12.3 - 15.4 % Final     RDW-SD   Date Value Ref Range Status   06/11/2020 53.1 37.0 - 54.0 fl Final     MPV   Date Value Ref Range Status   06/11/2020 8.7 6.0 - 12.0 fL Final     Platelets   Date  Value Ref Range Status   06/11/2020 281 140 - 450 10*3/mm3 Final     Neutrophil %   Date Value Ref Range Status   06/11/2020 80.9 (H) 42.7 - 76.0 % Final     Lymphocyte %   Date Value Ref Range Status   06/11/2020 9.1 (L) 19.6 - 45.3 % Final     Monocyte %   Date Value Ref Range Status   06/11/2020 7.9 5.0 - 12.0 % Final     Eosinophil %   Date Value Ref Range Status   06/11/2020 1.1 0.3 - 6.2 % Final     Basophil %   Date Value Ref Range Status   06/11/2020 0.6 0.0 - 1.5 % Final     Immature Grans %   Date Value Ref Range Status   06/11/2020 0.4 0.0 - 0.5 % Final     Neutrophils, Absolute   Date Value Ref Range Status   06/11/2020 5.63 1.70 - 7.00 10*3/mm3 Final     Lymphocytes, Absolute   Date Value Ref Range Status   06/11/2020 0.63 (L) 0.70 - 3.10 10*3/mm3 Final     Monocytes, Absolute   Date Value Ref Range Status   06/11/2020 0.55 0.10 - 0.90 10*3/mm3 Final     Eosinophils, Absolute   Date Value Ref Range Status   06/11/2020 0.08 0.00 - 0.40 10*3/mm3 Final     Basophils, Absolute   Date Value Ref Range Status   06/11/2020 0.04 0.00 - 0.20 10*3/mm3 Final     Immature Grans, Absolute   Date Value Ref Range Status   06/11/2020 0.03 0.00 - 0.05 10*3/mm3 Final     nRBC   Date Value Ref Range Status   06/11/2020 0.0 0.0 - 0.2 /100 WBC Final       No results found for: HGBA1C    Lab Results   Component Value Date    XSMBOHHU09 1,228 (H) 09/03/2019       TSH   Date Value Ref Range Status   07/08/2020 1.280 0.270 - 4.200 uIU/mL Final   03/02/2020 1.500 0.270 - 4.200 uIU/mL Final       No results found for: CHOL  Lab Results   Component Value Date    TRIG 58 09/03/2019     Lab Results   Component Value Date    HDL 72 (H) 09/03/2019     Lab Results   Component Value Date     (H) 09/03/2019     Lab Results   Component Value Date    VLDL 11.6 09/03/2019     No results found for: LDLHDL      Procedures    Assessment/Plan   Problems Addressed this Visit        Respiratory    Allergic rhinitis    Relevant Medications     fluticasone (FLONASE) 50 MCG/ACT nasal spray    Panlobular emphysema (CMS/HCC)    Relevant Medications    fluticasone (FLONASE) 50 MCG/ACT nasal spray       Genitourinary    Acute vaginitis    Relevant Medications    Clotrimazole 2 % vaginal cream    Vaginal candidiasis - Primary        Contniue inhalers.    No crowds or gym until 2021.    No orders of the defined types were placed in this encounter.      Current Outpatient Medications   Medication Sig Dispense Refill   • acyclovir (ZOVIRAX) 400 MG tablet      • albuterol sulfate  (90 Base) MCG/ACT inhaler Inhale 2 puffs Every 4 (Four) Hours As Needed for Wheezing. Per MD - until current episode is resolved 1 inhaler 11   • budesonide-formoterol (SYMBICORT) 160-4.5 MCG/ACT inhaler Inhale 2 puffs 2 (Two) Times a Day. 1 inhaler 11   • CARTIA  MG 24 hr capsule Take 1 capsule by mouth Daily. 90 capsule 3   • Cholecalciferol (VITAMIN D3) 2000 UNITS tablet Take 1 tablet by mouth daily.     • clindamycin (CLEOCIN T) 1 % lotion Apply  topically to the appropriate area as directed 2 (Two) Times a Day.     • digoxin (LANOXIN) 125 MCG tablet TAKE ONE TABLET BY MOUTH DAILY 90 tablet 1   • fluticasone (FLONASE) 50 MCG/ACT nasal spray 2 sprays into the nostril(s) as directed by provider 2 (Two) Times a Day. 48 g 3   • methIMAzole (TAPAZOLE) 5 MG tablet TAKE ONE AND ONE-HALF TABLET BY MOUTH DAILY 135 tablet 1   • montelukast (SINGULAIR) 10 MG tablet Take 1 tablet by mouth Daily. (Patient taking differently: Take 10 mg by mouth Every Night.) 90 tablet 3   • Multiple Vitamins-Minerals (PRESERVISION AREDS 2) chewable tablet Chew 2 capsules Every Morning.     • Nutritional Supplements (ENSURE ACTIVE PO) Take  by mouth. Ensure or boost     • Nutritional Supplements (JUICE PLUS FIBRE PO) Take  by mouth. 12 gummies     • O2 (OXYGEN) Inhale Continuous.     • SPIRIVA RESPIMAT 2.5 MCG/ACT aerosol solution inhaler Inhale 2 puffs Daily. 4 inhaler 5   • vitamin B-12  (CYANOCOBALAMIN) 1000 MCG tablet Take 1 tablet by mouth Daily. M,W,F     • Clotrimazole 2 % vaginal cream 1 applicator qhs per vagina for 3 days 21 g 3     No current facility-administered medications for this visit.        Maryam Arredondo had no medications administered during this visit.    Return in about 4 months (around 12/12/2020).    There are no Patient Instructions on file for this visit.

## 2020-08-13 DIAGNOSIS — E05.90 HYPERTHYROIDISM: Primary | ICD-10-CM

## 2020-08-13 DIAGNOSIS — M85.9 DISORDER OF BONE DENSITY AND STRUCTURE, UNSPECIFIED: ICD-10-CM

## 2020-08-13 DIAGNOSIS — S62.307A UNSPECIFIED FRACTURE OF FIFTH METACARPAL BONE, LEFT HAND, INITIAL ENCOUNTER FOR CLOSED FRACTURE: ICD-10-CM

## 2020-08-25 ENCOUNTER — TELEPHONE (OUTPATIENT)
Dept: FAMILY MEDICINE CLINIC | Facility: CLINIC | Age: 75
End: 2020-08-25

## 2020-08-25 DIAGNOSIS — J44.0 COPD WITH ACUTE LOWER RESPIRATORY INFECTION (HCC): ICD-10-CM

## 2020-08-25 RX ORDER — TIOTROPIUM BROMIDE INHALATION SPRAY 3.12 UG/1
SPRAY, METERED RESPIRATORY (INHALATION)
Qty: 4 G | Refills: 3 | Status: SHIPPED | OUTPATIENT
Start: 2020-08-25 | End: 2020-11-29

## 2020-08-25 NOTE — TELEPHONE ENCOUNTER
Spoke to patient about DEXA scan. Patient is scheduled for 9/10 for this and would like to know if Dr. Seymour would like patient to go ahead and have this scan done or wait until COVID calms down. Patient states she trust Dr. Seymour's advice and will do whatever he wants her to do. She would like a call back regarding this or a message through [employer].

## 2020-08-26 NOTE — TELEPHONE ENCOUNTER
JESSICA to tell patient that she can reschedule for 3/2021 as per Dr. Seymour to limit all contact at this time

## 2020-09-02 RX ORDER — METHIMAZOLE 5 MG/1
TABLET ORAL
Qty: 135 TABLET | Refills: 0 | Status: SHIPPED | OUTPATIENT
Start: 2020-09-02 | End: 2020-11-13

## 2020-09-09 ENCOUNTER — TELEPHONE (OUTPATIENT)
Dept: FAMILY MEDICINE CLINIC | Facility: CLINIC | Age: 75
End: 2020-09-09

## 2020-09-10 ENCOUNTER — APPOINTMENT (OUTPATIENT)
Dept: BONE DENSITY | Facility: HOSPITAL | Age: 75
End: 2020-09-10

## 2020-09-25 ENCOUNTER — TELEPHONE (OUTPATIENT)
Dept: ONCOLOGY | Facility: CLINIC | Age: 75
End: 2020-09-25

## 2020-09-25 NOTE — TELEPHONE ENCOUNTER
Caller: PHILL GARRETT    Relationship to patient: SELF    Best call back number: [phone]    PT HAS A CHEST SCAN SCHEDULED ON 9/28/20, AND A FOLLOW UP WITH DR MAE ON 10/1/20 WHERE HE WILL GO OVER THE RESULTS. PT IS ASKING IF HER SISTER ANGÉLICA BENÍTEZ CAN COME TO THE FOLLOW UP APPT ON 10/1/20 WITH HER. PT STATES SHE WOULD LIKE TO HAVE A FAMILY MEMBER WITH HER FOR SUPPORT.

## 2020-09-28 ENCOUNTER — HOSPITAL ENCOUNTER (OUTPATIENT)
Dept: PET IMAGING | Facility: HOSPITAL | Age: 75
Discharge: HOME | End: 2020-09-28
Admitting: INTERNAL MEDICINE

## 2020-09-28 DIAGNOSIS — C34.12 MALIGNANT NEOPLASM OF UPPER LOBE OF LEFT LUNG (HCC): ICD-10-CM

## 2020-09-28 LAB — CREAT BLDA-MCNC: 0.7 MG/DL (ref 0.6–1.3)

## 2020-09-28 PROCEDURE — 25010000002 IOPAMIDOL 61 % SOLUTION: Performed by: INTERNAL MEDICINE

## 2020-09-28 PROCEDURE — 71260 CT THORAX DX C+: CPT

## 2020-09-28 PROCEDURE — 82565 ASSAY OF CREATININE: CPT

## 2020-09-28 RX ADMIN — IOPAMIDOL 75 ML: 612 INJECTION, SOLUTION INTRAVENOUS at 11:39

## 2020-09-29 ENCOUNTER — DOCUMENTATION (OUTPATIENT)
Dept: RADIATION ONCOLOGY | Facility: HOSPITAL | Age: 75
End: 2020-09-29

## 2020-09-29 DIAGNOSIS — C34.90 RECURRENT NON-SMALL CELL LUNG CANCER (HCC): Primary | ICD-10-CM

## 2020-09-29 RX ORDER — DILTIAZEM HYDROCHLORIDE 120 MG/1
120 CAPSULE, COATED, EXTENDED RELEASE ORAL DAILY
Qty: 90 CAPSULE | Refills: 3 | Status: SHIPPED | OUTPATIENT
Start: 2020-09-29 | End: 2021-01-01 | Stop reason: SDUPTHER

## 2020-09-29 NOTE — PROGRESS NOTES
Reviewed CT chest and discussed results with patient by phone. Areas we have treated are stable, no new lesions or LAD. She is doing well, no new respiratory complaints.  I recommended repeat CT chest in 4-5 months and she was agreeable. She will see Dr. Ventura later this week and we can adjust that imaging if he desires. Will see back thereafter.

## 2020-10-01 ENCOUNTER — LAB (OUTPATIENT)
Dept: LAB | Facility: HOSPITAL | Age: 75
End: 2020-10-01

## 2020-10-01 ENCOUNTER — OFFICE VISIT (OUTPATIENT)
Dept: ONCOLOGY | Facility: CLINIC | Age: 75
End: 2020-10-01

## 2020-10-01 VITALS
RESPIRATION RATE: 20 BRPM | HEART RATE: 98 BPM | TEMPERATURE: 97.3 F | OXYGEN SATURATION: 94 % | HEIGHT: 60 IN | WEIGHT: 92.8 LBS | DIASTOLIC BLOOD PRESSURE: 65 MMHG | BODY MASS INDEX: 18.22 KG/M2 | SYSTOLIC BLOOD PRESSURE: 122 MMHG

## 2020-10-01 DIAGNOSIS — C34.90 RECURRENT NON-SMALL CELL LUNG CANCER (HCC): ICD-10-CM

## 2020-10-01 DIAGNOSIS — C34.90 RECURRENT NON-SMALL CELL LUNG CANCER (HCC): Primary | ICD-10-CM

## 2020-10-01 DIAGNOSIS — C34.12 MALIGNANT NEOPLASM OF UPPER LOBE OF LEFT LUNG (HCC): Primary | ICD-10-CM

## 2020-10-01 LAB
BASOPHILS # BLD AUTO: 0.07 10*3/MM3 (ref 0–0.2)
BASOPHILS NFR BLD AUTO: 0.9 % (ref 0–1.5)
DEPRECATED RDW RBC AUTO: 45 FL (ref 37–54)
EOSINOPHIL # BLD AUTO: 0.08 10*3/MM3 (ref 0–0.4)
EOSINOPHIL NFR BLD AUTO: 1.1 % (ref 0.3–6.2)
ERYTHROCYTE [DISTWIDTH] IN BLOOD BY AUTOMATED COUNT: 13.3 % (ref 12.3–15.4)
HCT VFR BLD AUTO: 38.4 % (ref 34–46.6)
HGB BLD-MCNC: 12.3 G/DL (ref 12–15.9)
IMM GRANULOCYTES # BLD AUTO: 0.05 10*3/MM3 (ref 0–0.05)
IMM GRANULOCYTES NFR BLD AUTO: 0.7 % (ref 0–0.5)
LYMPHOCYTES # BLD AUTO: 0.95 10*3/MM3 (ref 0.7–3.1)
LYMPHOCYTES NFR BLD AUTO: 12.7 % (ref 19.6–45.3)
MCH RBC QN AUTO: 29.6 PG (ref 26.6–33)
MCHC RBC AUTO-ENTMCNC: 32 G/DL (ref 31.5–35.7)
MCV RBC AUTO: 92.5 FL (ref 79–97)
MONOCYTES # BLD AUTO: 0.63 10*3/MM3 (ref 0.1–0.9)
MONOCYTES NFR BLD AUTO: 8.4 % (ref 5–12)
NEUTROPHILS NFR BLD AUTO: 5.68 10*3/MM3 (ref 1.7–7)
NEUTROPHILS NFR BLD AUTO: 76.2 % (ref 42.7–76)
NRBC BLD AUTO-RTO: 0 /100 WBC (ref 0–0.2)
PLATELET # BLD AUTO: 270 10*3/MM3 (ref 140–450)
PMV BLD AUTO: 9.1 FL (ref 6–12)
RBC # BLD AUTO: 4.15 10*6/MM3 (ref 3.77–5.28)
WBC # BLD AUTO: 7.46 10*3/MM3 (ref 3.4–10.8)

## 2020-10-01 PROCEDURE — 99213 OFFICE O/P EST LOW 20 MIN: CPT | Performed by: INTERNAL MEDICINE

## 2020-10-01 PROCEDURE — 36415 COLL VENOUS BLD VENIPUNCTURE: CPT

## 2020-10-01 PROCEDURE — 85025 COMPLETE CBC W/AUTO DIFF WBC: CPT

## 2020-10-01 NOTE — PROGRESS NOTES
History:     Reason for follow up:   1.  Stage IIB left upper lobe non-small cell lung cancer, high PDL-1 (80%), negative egfr/alk/ros   * status post radiation therapy completed 1/26/18  2.  Recurrent disease involving pleural-based nodule in the lingula and pleural-based mass left upper lobe; both areas treated with radiation therapy completed 2/3/2020  3.  Patient received 3 doses of Keytruda February/March 2020 but stopped secondary to diarrhea and weight loss     HPI:  Maryam Arredondo presents for follow-up of her lung cancer.  She was felt to have progressive disease early part 2020 and took 3 doses of Keytruda complicated by diarrhea and weight loss after which she discontinued therapy.  She is under observation.  She returned doing quite well.  She denies increasing shortness of breath, cough or chest pain.  No further diarrhea has developed.  She had a CT of the chest performed outlined below with stable results.          Reviewed, confirmed and updated history (past medical, social and family)   Past Medical   Past Medical History:   Diagnosis Date   • Abnormal color of sputum     grey   • Acromioclavicular joint arthritis    • Acute maxillary sinusitis    • Acute upper respiratory infection    • Allergic rhinitis    • Anemia    • Asthma    • B12 deficiency    • Benign essential hypertension    • Breast cancer (CMS/HCC)     s/p Lumpectomy ~2000 and chemo/XRT. Then  Masectomy ~2004 for some recurrence   • Breast cancer (CMS/HCC)    • Cerumen impaction    • Chronic obstructive pulmonary disease (CMS/HCC)    • COPD (chronic obstructive pulmonary disease) (CMS/HCC)    • COPD exacerbation (CMS/HCC)    • Diverticulosis of colon    • Cedric-Barr infection    • Fever    • Heart disease    • High risk medication use    • High risk medication use    • Hospital discharge follow-up    • Hyperthyroidism    • Hyperthyroidism    • Hypoxia    • Laryngitis    • Leg wound, left    • Medicare annual wellness visit,  subsequent    • Mitral regurgitation     mild to moderate   • Nocturnal hypoxia    • Non-small cell lung cancer (CMS/HCC)    • Non-small cell lung cancer (CMS/HCC)    • Onychomycosis of toenail    • Oral aphthae    • Osteopenia    • Osteopenia    • Other fatigue    • Oxygen dependent     2L - at night only   • Pneumonia of upper lobe of lung    • Pneumothorax    • Post-menopausal    • Skin abrasion    • SOB (shortness of breath)    • Supraventricular tachycardia (CMS/HCC)    • Tachycardia    • Thyroid nodule    • Toxic multinodular goiter    • Vitamin B deficiency    • Vitamin D deficiency    • Vitamin D deficiency    • Wound healing well on examination     and   Patient Active Problem List   Diagnosis   • Pneumothorax   • Hypoxia   • Tachycardia   • Hyperthyroidism   • Essential hypertension   • Multifocal atrial tachycardia (CMS/HCC)   • Toxic multinodular goiter   • Pulmonary emphysema (CMS/HCC)   • Vitamin D insufficiency   • Pneumonia   • COPD with acute lower respiratory infection (CMS/HCC)   • Malignant neoplasm of upper lobe of left lung (CMS/HCC)   • Infected skin tear   • Allergic rhinitis   • B12 deficiency   • Oxygen dependent   • Mitral regurgitation   • Breast cancer (CMS/HCC)   • Medicare annual wellness visit, subsequent   • Panlobular emphysema (CMS/HCC)   • Recurrent non-small cell lung cancer (CMS/HCC)   • Encounter for long-term (current) use of other medications   • Unspecified fracture of fifth metacarpal bone, left hand, initial encounter for closed fracture   • Immobility syndrome   • Encounter for removal of sutures   • Laceration of skin of knee without complication, left, sequela   • Dysuria   • Acute vaginitis   • Vaginal candidiasis     Social History   Social History     Socioeconomic History   • Marital status: Single     Spouse name: Not on file   • Number of children: Not on file   • Years of education: Not on file   • Highest education level: Not on file   Occupational History   •  Occupation:      Employer: RETIRED   Tobacco Use   • Smoking status: Former Smoker     Packs/day: 2.00     Years: 50.00     Pack years: 100.00     Types: Cigarettes     Quit date: 3/14/1994     Years since quittin.5   • Smokeless tobacco: Never Used   • Tobacco comment: D/C 20 years ago, smoking 2 ppd before quitting   Substance and Sexual Activity   • Alcohol use: No     Comment: Social use rare   • Drug use: No   • Sexual activity: Defer     Comment: drinks decaf      Family History  Family History   Problem Relation Age of Onset   • Arthritis Mother    • Heart failure Mother         Congestive Heart Failure   • Lung cancer Father    • Breast cancer Paternal Grandmother    • No Known Problems Sister      Allergies  Allergies   Allergen Reactions   • Codeine Unknown - High Severity     Patient is unaware of the reaction to this medication     • Penicillin V Unknown - High Severity     Reaction unknown to patient     • Penicillins Unknown - High Severity     Pt is unaware of the reaction to this medication     • Mucinex D [Pseudoephedrine-Guaifenesin Er] Nausea Only     Felt hot in chest       Medications: The current medication list was reviewed in the EMR.    Review of Systems  Review of Systems   Constitutional: Positive for fatigue. Negative for activity change, appetite change, chills, diaphoresis, fever and unexpected weight change.   HENT: Negative for congestion and sinus pressure.    Respiratory: Positive for shortness of breath. Negative for cough and chest tightness.    Cardiovascular: Negative for chest pain, palpitations and leg swelling.   Gastrointestinal: Negative for abdominal pain, blood in stool, constipation, diarrhea, nausea and vomiting.   Endocrine: Negative.    Genitourinary: Negative.    Musculoskeletal: Positive for arthralgias and gait problem. Negative for joint swelling and myalgias.   Skin: Negative.    Allergic/Immunologic: Negative.    Hematological:  "Negative for adenopathy. Does not bruise/bleed easily.   Psychiatric/Behavioral: Agitation:  Behavioral problem:  The patient is nervous/anxious.    ROS unchanged- 10/1/2020    Objective    Objective:     Vitals:    10/01/20 1131   BP: 122/65   Pulse: 98   Resp: 20   Temp: 97.3 °F (36.3 °C)   TempSrc: Temporal   SpO2: 94%  Comment: with oxygen   Weight: 42.1 kg (92 lb 12.8 oz)   Height: 152.4 cm (60\")   PainSc: 0-No pain     Current Status 10/1/2020   ECOG score 0   GENERAL:  Well-developed, somewhat thin and chronic ill-appearing female in no acute distress. Vital signs reviewed.   SKIN: No rash or induration, bruising on the forearm  EYES:    EOMs intact.  Conjunctivae normal.  HEAD:  Normocephalic.  NECK:  Supple with good range of motion; no thyromegaly or masses  LYMPHATICS:  No cervical, supraclavicular, axillary adenopathy.  RESP: Mild decrease in breath sounds, no increased work of breathing, no wheezing.  She is on O2 by nasal cannula  CARDIAC:  Regular rate and rhythm without murmurs, rubs or gallops. Normal S1,S2. No edema  GI:  Soft, nontender, normal bowel sounds  MSK:  No clubbing or cyanosis,.  No edema   NEUROLOGICAL:   No focal neurological deficits.  PSYCHIATRIC:  Normal affect and mood.  Alert and Oriented x 3.     Labs and Imaging  Results for orders placed or performed in visit on 10/01/20   CBC Auto Differential    Specimen: Blood   Result Value Ref Range    WBC 7.46 3.40 - 10.80 10*3/mm3    RBC 4.15 3.77 - 5.28 10*6/mm3    Hemoglobin 12.3 12.0 - 15.9 g/dL    Hematocrit 38.4 34.0 - 46.6 %    MCV 92.5 79.0 - 97.0 fL    MCH 29.6 26.6 - 33.0 pg    MCHC 32.0 31.5 - 35.7 g/dL    RDW 13.3 12.3 - 15.4 %    RDW-SD 45.0 37.0 - 54.0 fl    MPV 9.1 6.0 - 12.0 fL    Platelets 270 140 - 450 10*3/mm3    Neutrophil % 76.2 (H) 42.7 - 76.0 %    Lymphocyte % 12.7 (L) 19.6 - 45.3 %    Monocyte % 8.4 5.0 - 12.0 %    Eosinophil % 1.1 0.3 - 6.2 %    Basophil % 0.9 0.0 - 1.5 %    Immature Grans % 0.7 (H) 0.0 - 0.5 % "    Neutrophils, Absolute 5.68 1.70 - 7.00 10*3/mm3    Lymphocytes, Absolute 0.95 0.70 - 3.10 10*3/mm3    Monocytes, Absolute 0.63 0.10 - 0.90 10*3/mm3    Eosinophils, Absolute 0.08 0.00 - 0.40 10*3/mm3    Basophils, Absolute 0.07 0.00 - 0.20 10*3/mm3    Immature Grans, Absolute 0.05 0.00 - 0.05 10*3/mm3    nRBC 0.0 0.0 - 0.2 /100 WBC       CT chest 12/28/18: There is decreased size of the left apical mass 1.3 cm compared to previous 1.5 cm.  Adjacent granulated density also decreased in size from 1.7 down to 0.9 cm.  There is stable emphysema and apical scarring.  There are stable thyroid nodules.  No new lymphadenopathy in the chest.  There is a right hepatic enhancing lesion stable.  I personally reviewed the CT chest and there is stable to improved disease/scar in the left upper lobe of the lung.    CT chest 4/23/2019: Again seen are hypodense bilateral thyroid lesions, hyperenhancing lesions in the liver unchanged.  There is no mediastinal, hilar or axillary lymphadenopathy.  There is severe emphysema.  There are stable nodules in the right middle and lower lobe since 11/3/2016.  There is a new sub-6 mm nodule in the anterior right middle lobe.  Stable masslike thickening in the left lung apex since 12/28/2018 and decreased since 10/8/2018.  There is irregular opacification in the left apex likely radiation fibrosis.    CT chest 8/12/2019: Decreased size of nodular densities in the left upper lobe.  New 10 mm subpleural nodule in the lingula.  Stable hyperenhancing hepatic nodules.    CT chest abdomen 12/9/2019: There is discrete masslike soft tissue thickening in the left apex increased from previous 1.2 x 1.9 cm up to 1.6 x 2.4 cm.  Nodule in the right upper lobe stable compared to 10/8/2018, nodule in the right middle lobe stable compared to 10/8/2018.  Nodule in the right lower lobe stable compared to 10/8/2018.  There is a nodule in the lingula which has increased in size from 1.0 up to 1.4 cm.  No evidence  of disease in the abdomen.    PET scan 12/20/2019: There is uptake in the right lobe of the thyroid which was present and stable since 10/18/2018.  There is increased uptake in the left lung apex SUV 11.1 and a smaller pleural-based nodularity laterally 1.1 cm SUV 4.7.  There is a 1.4 cm nodule in the lingula with SUV 7.9.  There is uptake in the left hilum which is similar to 10/18/2018.     CT chest 6/9/2020: Stable exam compared to 12/9/2019 with mass in the left apex 2.3 x 1.6 cm and surrounding parenchymal scarring, stable soft tissue thickening lateral left apex, stable pulmonary nodules    CT chest 9/28/2020: There is a stable pleural-based mass in the left apex 2 cm, stable right lower lobe nodule tiny, no new nodules or lymphadenopathy    Assessment/Plan   Assessment/Plan:   This is a 75 y.o. female with:     1.  Recurrent left upper lobe non-small cell lung cancer, high PDL-1 (80%), negative egfr/alk/ros   *The patient was previously treated with radiation therapy to the left upper lobe nodule/mass completed January 2018   *PET scan from 12/20/2019.  There is significant uptake in the mass in the apex of the left lung and also a nodule in the lingula with significant uptake for its size and I think both areas are likely consistent with recurrent disease.  We opted to not send the patient for a biopsy as she has very poor lung function and a pneumothorax might prove fatal for her.  There is some uptake in the left hilum but this was present in October 2018 and stable.  There is uptake in the right thyroid gland which is stable and the patient has declined biopsy of this in the past.     The patient underwent additional radiation to the hypermetabolic lesions and also initiated Keytruda on 1/21/2020.    Keytruda stopped after 3 doses secondary to significant diarrhea.  Diarrhea resolved with a short course of steroids.    CT chest reviewed today showed stable findings.  Dr. Bennett has ordered a 4 to 5-month  follow-up scan.  I will see her back in 5 months.    2.  Multinodular goiter stable by CT--followed by endocrinology.  There is uptake in the right thyroid gland similar to prior PET.  The patient declines biopsy of the hypermetabolic right thyroid lesion    3. History of right breast cancer, initially diagnosed in 2000 treated with lumpectomy, radiation, chemotherapy and endocrine therapy with local recurrence in 2005 treated with mastectomy and then Arimidex.       4. COPD, oxygen dependent

## 2020-10-28 ENCOUNTER — TELEPHONE (OUTPATIENT)
Dept: FAMILY MEDICINE CLINIC | Facility: CLINIC | Age: 75
End: 2020-10-28

## 2020-11-02 NOTE — PROGRESS NOTES
Subjective   Maryam Arredondo is a 75 y.o. female.     F/u for nontoxic MNG, hypothyroidism, COPD, asthma      Patient is a 73`-year-old female who consented to a phone visit.  Total time 43 minutes    She has toxic multinodular goiter. She had a thyroid scan and uptake in February 2014 which showed increased uptake of 40% with a heterogeneous distribution of the radiopharmaceutical in both lobes of the thyroid gland. A dominant area of photopenia is present on the left lobe. She had a thyroid ultrasound which showed a multinodular goiter with cysts and solid nodules. The dominant nodule in the left is partially cystic. She had an ultrasound in August 2014 which showed the left lobe dominant nodule is smaller.      She is on Tapazole 5 mg 1.5 tab once a day.   Diarrhea has resolved since Keytruda was discontinued.  She denies heat or cold intolerance. She denies palpitations. She denies tremors.  She denies dysphagia.      She had a thyroid ultrasound done on April 4, 2018 which showed a multinodular goiter.  There is a hypervascular 2 cm nodule in the upper right thyroid lobe which corresponds to the hypermetabolic nodule on the PET CT scan of November 15, 2017.  She wants continued observation due to her other co-morbidities.      She had left upper lobe lung biopsy in in November 2017 which yielded adenocarcinoma.  She had PET CT scan done in November 2017 which showed a small hypermetabolic nodule in the right lobe of the thyroid gland which may be an adenoma or a small thyroid cancer.  A metastasis to the thyroid gland is considered much less likely.  She has completed radiation therapy.  She had CT scan of the chest in May 2018 which showed slight interval decrease in size of pleural-based lung tumor in the left upper lobe and moderately severe emphysema.  There is no lymphadenopathy.  She was taken off Keytruda because of diarrhea.  She follows with Dr. Ventura.     CT of the chest done in September 2020 showed  stable 2 cm left pleural-based mass.    She has COPD. She denies any shortness of breath or coughing.  She is on maintenance Symbicort, Spiriva, Singulair,  and Pro-Air prn for COPD.  Zyrtec made her sleepy.  She is on continuous O2.  She follows with Dr. Anthony.      She has hypertension and history of multifocal tachycardia.  She was taken off lisinopril last May 2014. She is on diltiazem  mg/day and digoxin 0.125 mg/day.  She denies chest pain or palpitations.  She follows with Dr. Bobo.      She had a previous right mastectomy for breast cancer. She has completed 5 years of Arimidex in 2009. She has osteopenia and was on Fosamax for unknown period of time. Her last bone mineral density was June 24, 2016 [facility] showed osteopenia in both hips and normal lumbar spine bone density.  She is on Vit D 2000 units 3 times a week.       She had a colonoscopy in April 2017 and 2 tubular adenoma with low-grade dysplasia were removed by Dr. Eddy.  She was advised a repeat colonoscopy in 2019.   She does not want to have a colonoscopy done at this time but will consider Cologuard.    The following portions of the patient's history were reviewed and updated as appropriate: allergies, current medications, past family history, past medical history, past social history, past surgical history and problem list.    Review of Systems   Constitutional: Negative.    HENT: Negative for rhinorrhea.    Eyes: Negative.    Respiratory: Negative for shortness of breath.    Cardiovascular: Negative for chest pain and palpitations.   Gastrointestinal: Negative for abdominal distention, constipation and diarrhea.   Endocrine: Negative for cold intolerance and heat intolerance.   Genitourinary: Negative.    Musculoskeletal: Negative.    Neurological: Negative for weakness and numbness.   Hematological: Negative for adenopathy. Does not bruise/bleed easily.     Objective      Vitals:    11/10/20 1239   BP: 124/68   Pulse: 96   Temp:  97.8 °F (36.6 °C)   SpO2: 98%   Weight: 42.5 kg (93 lb 9.6 oz)     Physical Exam  Lab on 10/01/2020   Component Date Value Ref Range Status   • WBC 10/01/2020 7.46  3.40 - 10.80 10*3/mm3 Final   • RBC 10/01/2020 4.15  3.77 - 5.28 10*6/mm3 Final   • Hemoglobin 10/01/2020 12.3  12.0 - 15.9 g/dL Final   • Hematocrit 10/01/2020 38.4  34.0 - 46.6 % Final   • MCV 10/01/2020 92.5  79.0 - 97.0 fL Final   • MCH 10/01/2020 29.6  26.6 - 33.0 pg Final   • MCHC 10/01/2020 32.0  31.5 - 35.7 g/dL Final   • RDW 10/01/2020 13.3  12.3 - 15.4 % Final   • RDW-SD 10/01/2020 45.0  37.0 - 54.0 fl Final   • MPV 10/01/2020 9.1  6.0 - 12.0 fL Final   • Platelets 10/01/2020 270  140 - 450 10*3/mm3 Final   • Neutrophil % 10/01/2020 76.2* 42.7 - 76.0 % Final   • Lymphocyte % 10/01/2020 12.7* 19.6 - 45.3 % Final   • Monocyte % 10/01/2020 8.4  5.0 - 12.0 % Final   • Eosinophil % 10/01/2020 1.1  0.3 - 6.2 % Final   • Basophil % 10/01/2020 0.9  0.0 - 1.5 % Final   • Immature Grans % 10/01/2020 0.7* 0.0 - 0.5 % Final   • Neutrophils, Absolute 10/01/2020 5.68  1.70 - 7.00 10*3/mm3 Final   • Lymphocytes, Absolute 10/01/2020 0.95  0.70 - 3.10 10*3/mm3 Final   • Monocytes, Absolute 10/01/2020 0.63  0.10 - 0.90 10*3/mm3 Final   • Eosinophils, Absolute 10/01/2020 0.08  0.00 - 0.40 10*3/mm3 Final   • Basophils, Absolute 10/01/2020 0.07  0.00 - 0.20 10*3/mm3 Final   • Immature Grans, Absolute 10/01/2020 0.05  0.00 - 0.05 10*3/mm3 Final   • nRBC 10/01/2020 0.0  0.0 - 0.2 /100 WBC Final     Assessment/Plan   Diagnoses and all orders for this visit:    1. Toxic multinodular goiter (Primary)  -     TSH; Future  -     T4, Free; Future  -     Comprehensive Metabolic Panel; Future    2. Multifocal atrial tachycardia (CMS/HCC)    3. Essential hypertension  -     Comprehensive Metabolic Panel; Future    4. Malignant neoplasm of upper lobe of left lung (CMS/HCC)    5. Pulmonary emphysema, unspecified emphysema type (CMS/HCC)    6. Vitamin D insufficiency  -      Comprehensive Metabolic Panel; Future      Continue Tapazole 5 mg 1.5 tablets daily.  Check thyroid function tests.  Continue diltiazem and digoxin per cardiologist.  Follow-up with Dr. Je Ventura as scheduled.  Continue vitamin D 2000 units 3 times a week.  Suggest follow-up bone density to be done at [facility] to compare with previous bone density.  Patient advised to talk to Dr. Seymour about Cologuard    Copy of my note sent to Dr. Seymour    Follow-up in 4 months

## 2020-11-03 ENCOUNTER — TELEPHONE (OUTPATIENT)
Dept: FAMILY MEDICINE CLINIC | Facility: CLINIC | Age: 75
End: 2020-11-03

## 2020-11-03 ENCOUNTER — TELEMEDICINE - AUDIO (OUTPATIENT)
Dept: FAMILY MEDICINE CLINIC | Facility: CLINIC | Age: 75
End: 2020-11-03

## 2020-11-03 VITALS
BODY MASS INDEX: 17.97 KG/M2 | WEIGHT: 92 LBS | HEART RATE: 97 BPM | SYSTOLIC BLOOD PRESSURE: 106 MMHG | RESPIRATION RATE: 14 BRPM | DIASTOLIC BLOOD PRESSURE: 70 MMHG

## 2020-11-03 DIAGNOSIS — J30.1 SEASONAL ALLERGIC RHINITIS DUE TO POLLEN: Primary | ICD-10-CM

## 2020-11-03 PROCEDURE — 99213 OFFICE O/P EST LOW 20 MIN: CPT | Performed by: FAMILY MEDICINE

## 2020-11-03 RX ORDER — IPRATROPIUM BROMIDE 42 UG/1
2 SPRAY, METERED NASAL 3 TIMES DAILY
Qty: 15 ML | Refills: 12 | Status: SHIPPED | OUTPATIENT
Start: 2020-11-03 | End: 2021-01-01

## 2020-11-03 NOTE — PROGRESS NOTES
C/o nasal congestion    Subjective   Maryam Arredondo is a 75 y.o. female.     History of Present Illness   Telemedicine visit due to covid.  Consent obtained.  No video access per pt report.  8 minute visit.    C/o nasal congestion for a few days.  No help with singulair.  No fever.  No ill contacts.  Known COPD but no cough or increased breathing difficulty other than nasal congestion.    The following portions of the patient's history were reviewed and updated as appropriate: allergies, current medications, past family history, past medical history, past social history, past surgical history and problem list.    Review of Systems   Constitutional: Negative for appetite change and fatigue.   HENT: Positive for congestion. Negative for nosebleeds and sore throat.    Eyes: Negative for blurred vision and visual disturbance.   Respiratory: Negative for shortness of breath and wheezing.    Cardiovascular: Negative for chest pain and leg swelling.   Gastrointestinal: Negative for abdominal distention and abdominal pain.   Endocrine: Negative for cold intolerance and polyuria.   Genitourinary: Negative for dysuria and hematuria.   Musculoskeletal: Negative for arthralgias and myalgias.   Skin: Negative for color change and rash.   Neurological: Negative for weakness and confusion.   Psychiatric/Behavioral: Negative for agitation and depressed mood.       Patient Active Problem List   Diagnosis   • Pneumothorax   • Hypoxia   • Tachycardia   • Hyperthyroidism   • Essential hypertension   • Multifocal atrial tachycardia (CMS/HCC)   • Toxic multinodular goiter   • Pulmonary emphysema (CMS/HCC)   • Vitamin D insufficiency   • Pneumonia   • COPD with acute lower respiratory infection (CMS/HCC)   • Malignant neoplasm of upper lobe of left lung (CMS/HCC)   • Infected skin tear   • Allergic rhinitis   • B12 deficiency   • Oxygen dependent   • Mitral regurgitation   • Breast cancer (CMS/HCC)   • Medicare annual wellness visit,  subsequent   • Panlobular emphysema (CMS/HCC)   • Recurrent non-small cell lung cancer (CMS/HCC)   • Encounter for long-term (current) use of other medications   • Unspecified fracture of fifth metacarpal bone, left hand, initial encounter for closed fracture   • Immobility syndrome   • Encounter for removal of sutures   • Laceration of skin of knee without complication, left, sequela   • Dysuria   • Acute vaginitis   • Vaginal candidiasis       Allergies   Allergen Reactions   • Codeine Unknown - High Severity     Patient is unaware of the reaction to this medication     • Penicillin V Unknown - High Severity     Reaction unknown to patient     • Penicillins Unknown - High Severity     Pt is unaware of the reaction to this medication     • Mucinex D [Pseudoephedrine-Guaifenesin Er] Nausea Only     Felt hot in chest         Current Outpatient Medications:   •  albuterol sulfate  (90 Base) MCG/ACT inhaler, Inhale 2 puffs Every 4 (Four) Hours As Needed for Wheezing. Per MD - until current episode is resolved, Disp: 1 inhaler, Rfl: 11  •  budesonide-formoterol (SYMBICORT) 160-4.5 MCG/ACT inhaler, Inhale 2 puffs 2 (Two) Times a Day., Disp: 1 inhaler, Rfl: 11  •  Cholecalciferol (VITAMIN D3) 2000 UNITS tablet, Take 1 tablet by mouth daily., Disp: , Rfl:   •  digoxin (LANOXIN) 125 MCG tablet, TAKE ONE TABLET BY MOUTH DAILY, Disp: 90 tablet, Rfl: 1  •  dilTIAZem CD (Cartia XT) 120 MG 24 hr capsule, Take 1 capsule by mouth Daily., Disp: 90 capsule, Rfl: 3  •  fluticasone (FLONASE) 50 MCG/ACT nasal spray, 2 sprays into the nostril(s) as directed by provider 2 (Two) Times a Day., Disp: 48 g, Rfl: 3  •  ipratropium (ATROVENT) 0.06 % nasal spray, 2 sprays into the nostril(s) as directed by provider 3 (Three) Times a Day., Disp: 15 mL, Rfl: 12  •  methIMAzole (TAPAZOLE) 5 MG tablet, TAKE ONE AND ONE-HALF (1 & 1/2) TABLET BY MOUTH DAILY, Disp: 135 tablet, Rfl: 0  •  metroNIDAZOLE (METROCREAM) 0.75 % cream, Apply  topically  to the appropriate area as directed 2 (Two) Times a Day., Disp: , Rfl:   •  montelukast (SINGULAIR) 10 MG tablet, Take 1 tablet by mouth Daily. (Patient taking differently: Take 10 mg by mouth Every Night.), Disp: 90 tablet, Rfl: 3  •  Multiple Vitamins-Minerals (PRESERVISION AREDS 2) chewable tablet, Chew 2 capsules Every Morning., Disp: , Rfl:   •  Nutritional Supplements (ENSURE ACTIVE PO), Take  by mouth. Ensure or boost, Disp: , Rfl:   •  Nutritional Supplements (JUICE PLUS FIBRE PO), Take  by mouth. 12 gummies, Disp: , Rfl:   •  O2 (OXYGEN), Inhale Continuous., Disp: , Rfl:   •  SPIRIVA RESPIMAT 2.5 MCG/ACT aerosol solution inhaler, INHALE TWO PUFFS BY MOUTH DAILY, Disp: 4 g, Rfl: 3  •  vitamin B-12 (CYANOCOBALAMIN) 1000 MCG tablet, Take 1 tablet by mouth Daily. M,W,F, Disp: , Rfl:     Past Medical History:   Diagnosis Date   • Abnormal color of sputum     grey   • Acromioclavicular joint arthritis    • Acute maxillary sinusitis    • Acute upper respiratory infection    • Allergic rhinitis    • Anemia    • Asthma    • B12 deficiency    • Benign essential hypertension    • Breast cancer (CMS/HCC)     s/p Lumpectomy ~2000 and chemo/XRT. Then  Masectomy ~2004 for some recurrence   • Breast cancer (CMS/HCC)    • Cerumen impaction    • Chronic obstructive pulmonary disease (CMS/HCC)    • COPD (chronic obstructive pulmonary disease) (CMS/HCC)    • COPD exacerbation (CMS/HCC)    • Diverticulosis of colon    • Cedric-Barr infection    • Fever    • Heart disease    • High risk medication use    • High risk medication use    • Hospital discharge follow-up    • Hyperthyroidism    • Hyperthyroidism    • Hypoxia    • Laryngitis    • Leg wound, left    • Medicare annual wellness visit, subsequent    • Mitral regurgitation     mild to moderate   • Nocturnal hypoxia    • Non-small cell lung cancer (CMS/HCC)    • Non-small cell lung cancer (CMS/HCC)    • Onychomycosis of toenail    • Oral aphthae    • Osteopenia    •  Osteopenia    • Other fatigue    • Oxygen dependent     2L - at night only   • Pneumonia of upper lobe of lung    • Pneumothorax    • Post-menopausal    • Skin abrasion    • SOB (shortness of breath)    • Supraventricular tachycardia (CMS/HCC)    • Tachycardia    • Thyroid nodule    • Toxic multinodular goiter    • Vitamin B deficiency    • Vitamin D deficiency    • Vitamin D deficiency    • Wound healing well on examination        Past Surgical History:   Procedure Laterality Date   • BREAST LUMPECTOMY Right    • BREAST LUMPECTOMY     • CARDIAC CATHETERIZATION      PS SAPHENOUS VAIN RIGHT LEG   • COLONOSCOPY  2017    Complete. 4 polyps. Recheck in 2019.    • COLONOSCOPY N/A 2017    Procedure: COLONOSCOPY WITH POLYPECTOMY(COLD BIOPSY AND HOT SNARE);  Surgeon: Luiza Eddy MD;  Location: [facility] ENDOSCOPY;  Service:    • HYSTERECTOMY     • MASTECTOMY Right    • NECK SURGERY      2 spurs removed   • VASCULAR SURGERY      spider vein ablation       Family History   Problem Relation Age of Onset   • Arthritis Mother    • Heart failure Mother         Congestive Heart Failure   • Lung cancer Father    • Breast cancer Paternal Grandmother    • No Known Problems Sister        Social History     Tobacco Use   • Smoking status: Former Smoker     Packs/day: 2.00     Years: 50.00     Pack years: 100.00     Types: Cigarettes     Quit date: 3/14/1994     Years since quittin.6   • Smokeless tobacco: Never Used   • Tobacco comment: D/C 20 years ago, smoking 2 ppd before quitting   Substance Use Topics   • Alcohol use: No     Comment: Social use rare            Objective     Vitals:    20 1437   BP: 106/70   Pulse: 97   Resp: 14     Body mass index is 17.97 kg/m².    Physical Exam  Constitutional:       Appearance: She is well-developed.   Pulmonary:      Breath sounds: Normal breath sounds.   Psychiatric:         Behavior: Behavior normal.         Thought Content: Thought content normal.          Judgment: Judgment normal.         Lab Results   Component Value Date    GLUCOSE 93 06/11/2020    BUN 26 (H) 07/08/2020    CREATININE 0.70 09/28/2020    EGFRIFNONA 120 07/08/2020    EGFRIFAFRI 146 07/08/2020    BCR 52.0 (H) 07/08/2020    K 4.3 07/08/2020    CO2 29.1 (H) 07/08/2020    CALCIUM 9.3 07/08/2020    PROTENTOTREF 6.9 07/08/2020    ALBUMIN 4.10 07/08/2020    LABIL2 1.5 07/08/2020    AST 13 07/08/2020    ALT 12 07/08/2020       WBC   Date Value Ref Range Status   10/01/2020 7.46 3.40 - 10.80 10*3/mm3 Final   06/11/2019 6.11 3.40 - 10.80 10*3/mm3 Final     RBC   Date Value Ref Range Status   10/01/2020 4.15 3.77 - 5.28 10*6/mm3 Final   06/11/2019 4.17 3.77 - 5.28 10*6/mm3 Final     Hemoglobin   Date Value Ref Range Status   10/01/2020 12.3 12.0 - 15.9 g/dL Final     Hematocrit   Date Value Ref Range Status   10/01/2020 38.4 34.0 - 46.6 % Final     MCV   Date Value Ref Range Status   10/01/2020 92.5 79.0 - 97.0 fL Final     MCH   Date Value Ref Range Status   10/01/2020 29.6 26.6 - 33.0 pg Final     MCHC   Date Value Ref Range Status   10/01/2020 32.0 31.5 - 35.7 g/dL Final     RDW   Date Value Ref Range Status   10/01/2020 13.3 12.3 - 15.4 % Final     RDW-SD   Date Value Ref Range Status   10/01/2020 45.0 37.0 - 54.0 fl Final     MPV   Date Value Ref Range Status   10/01/2020 9.1 6.0 - 12.0 fL Final     Platelets   Date Value Ref Range Status   10/01/2020 270 140 - 450 10*3/mm3 Final     Neutrophil %   Date Value Ref Range Status   10/01/2020 76.2 (H) 42.7 - 76.0 % Final     Lymphocyte %   Date Value Ref Range Status   10/01/2020 12.7 (L) 19.6 - 45.3 % Final     Monocyte %   Date Value Ref Range Status   10/01/2020 8.4 5.0 - 12.0 % Final     Eosinophil %   Date Value Ref Range Status   10/01/2020 1.1 0.3 - 6.2 % Final     Basophil %   Date Value Ref Range Status   10/01/2020 0.9 0.0 - 1.5 % Final     Immature Grans %   Date Value Ref Range Status   10/01/2020 0.7 (H) 0.0 - 0.5 % Final     Neutrophils, Absolute    Date Value Ref Range Status   10/01/2020 5.68 1.70 - 7.00 10*3/mm3 Final     Lymphocytes, Absolute   Date Value Ref Range Status   10/01/2020 0.95 0.70 - 3.10 10*3/mm3 Final     Monocytes, Absolute   Date Value Ref Range Status   10/01/2020 0.63 0.10 - 0.90 10*3/mm3 Final     Eosinophils, Absolute   Date Value Ref Range Status   10/01/2020 0.08 0.00 - 0.40 10*3/mm3 Final     Basophils, Absolute   Date Value Ref Range Status   10/01/2020 0.07 0.00 - 0.20 10*3/mm3 Final     Immature Grans, Absolute   Date Value Ref Range Status   10/01/2020 0.05 0.00 - 0.05 10*3/mm3 Final     nRBC   Date Value Ref Range Status   10/01/2020 0.0 0.0 - 0.2 /100 WBC Final       No results found for: HGBA1C    Lab Results   Component Value Date    VKKAJRDO50 1,228 (H) 09/03/2019       TSH   Date Value Ref Range Status   07/08/2020 1.280 0.270 - 4.200 uIU/mL Final   03/02/2020 1.500 0.270 - 4.200 uIU/mL Final       No results found for: CHOL  Lab Results   Component Value Date    TRIG 58 09/03/2019     Lab Results   Component Value Date    HDL 72 (H) 09/03/2019     Lab Results   Component Value Date     (H) 09/03/2019     Lab Results   Component Value Date    VLDL 11.6 09/03/2019     No results found for: LDLHDL      Procedures    Assessment/Plan   Problems Addressed this Visit        Respiratory    Allergic rhinitis - Primary    Relevant Medications    ipratropium (ATROVENT) 0.06 % nasal spray      Diagnoses       Codes Comments    Seasonal allergic rhinitis due to pollen    -  Primary ICD-10-CM: J30.1  ICD-9-CM: 477.0         If fever develops or difficulty breathing, then to ER.    No orders of the defined types were placed in this encounter.      Current Outpatient Medications   Medication Sig Dispense Refill   • albuterol sulfate  (90 Base) MCG/ACT inhaler Inhale 2 puffs Every 4 (Four) Hours As Needed for Wheezing. Per MD - until current episode is resolved 1 inhaler 11   • budesonide-formoterol (SYMBICORT) 160-4.5  MCG/ACT inhaler Inhale 2 puffs 2 (Two) Times a Day. 1 inhaler 11   • Cholecalciferol (VITAMIN D3) 2000 UNITS tablet Take 1 tablet by mouth daily.     • digoxin (LANOXIN) 125 MCG tablet TAKE ONE TABLET BY MOUTH DAILY 90 tablet 1   • dilTIAZem CD (Cartia XT) 120 MG 24 hr capsule Take 1 capsule by mouth Daily. 90 capsule 3   • fluticasone (FLONASE) 50 MCG/ACT nasal spray 2 sprays into the nostril(s) as directed by provider 2 (Two) Times a Day. 48 g 3   • ipratropium (ATROVENT) 0.06 % nasal spray 2 sprays into the nostril(s) as directed by provider 3 (Three) Times a Day. 15 mL 12   • methIMAzole (TAPAZOLE) 5 MG tablet TAKE ONE AND ONE-HALF (1 & 1/2) TABLET BY MOUTH DAILY 135 tablet 0   • metroNIDAZOLE (METROCREAM) 0.75 % cream Apply  topically to the appropriate area as directed 2 (Two) Times a Day.     • montelukast (SINGULAIR) 10 MG tablet Take 1 tablet by mouth Daily. (Patient taking differently: Take 10 mg by mouth Every Night.) 90 tablet 3   • Multiple Vitamins-Minerals (PRESERVISION AREDS 2) chewable tablet Chew 2 capsules Every Morning.     • Nutritional Supplements (ENSURE ACTIVE PO) Take  by mouth. Ensure or boost     • Nutritional Supplements (JUICE PLUS FIBRE PO) Take  by mouth. 12 gummies     • O2 (OXYGEN) Inhale Continuous.     • SPIRIVA RESPIMAT 2.5 MCG/ACT aerosol solution inhaler INHALE TWO PUFFS BY MOUTH DAILY 4 g 3   • vitamin B-12 (CYANOCOBALAMIN) 1000 MCG tablet Take 1 tablet by mouth Daily. M,W,F       No current facility-administered medications for this visit.        Maryam Maria Elena had no medications administered during this visit.    No follow-ups on file.    There are no Patient Instructions on file for this visit.

## 2020-11-10 ENCOUNTER — TELEPHONE (OUTPATIENT)
Dept: FAMILY MEDICINE CLINIC | Facility: CLINIC | Age: 75
End: 2020-11-10

## 2020-11-10 ENCOUNTER — OFFICE VISIT (OUTPATIENT)
Dept: ENDOCRINOLOGY | Age: 75
End: 2020-11-10

## 2020-11-10 VITALS
OXYGEN SATURATION: 98 % | DIASTOLIC BLOOD PRESSURE: 68 MMHG | HEART RATE: 96 BPM | BODY MASS INDEX: 18.28 KG/M2 | TEMPERATURE: 97.8 F | WEIGHT: 93.6 LBS | SYSTOLIC BLOOD PRESSURE: 124 MMHG

## 2020-11-10 DIAGNOSIS — E05.20 TOXIC MULTINODULAR GOITER: Primary | ICD-10-CM

## 2020-11-10 DIAGNOSIS — I10 ESSENTIAL HYPERTENSION: ICD-10-CM

## 2020-11-10 DIAGNOSIS — E55.9 VITAMIN D INSUFFICIENCY: ICD-10-CM

## 2020-11-10 DIAGNOSIS — J43.9 PULMONARY EMPHYSEMA, UNSPECIFIED EMPHYSEMA TYPE (HCC): ICD-10-CM

## 2020-11-10 DIAGNOSIS — I47.1 MULTIFOCAL ATRIAL TACHYCARDIA (HCC): ICD-10-CM

## 2020-11-10 DIAGNOSIS — C34.12 MALIGNANT NEOPLASM OF UPPER LOBE OF LEFT LUNG (HCC): ICD-10-CM

## 2020-11-10 PROCEDURE — 99443 PR PHYS/QHP TELEPHONE EVALUATION 21-30 MIN: CPT | Performed by: INTERNAL MEDICINE

## 2020-11-10 NOTE — TELEPHONE ENCOUNTER
Patient needs to have vaccine dates added to her chart, she spoke to [facility] where she had the vaccine's done & she doesn't see them in her chart, Hep A 7/12/19 & 5/8/20  Shingrix 6/14/20

## 2020-11-13 RX ORDER — METHIMAZOLE 5 MG/1
TABLET ORAL
Qty: 135 TABLET | Refills: 0 | Status: SHIPPED | OUTPATIENT
Start: 2020-11-13 | End: 2021-02-11

## 2020-11-27 DIAGNOSIS — J44.0 COPD WITH ACUTE LOWER RESPIRATORY INFECTION (HCC): ICD-10-CM

## 2020-11-29 RX ORDER — TIOTROPIUM BROMIDE INHALATION SPRAY 3.12 UG/1
SPRAY, METERED RESPIRATORY (INHALATION)
Qty: 4 G | Refills: 11 | Status: SHIPPED | OUTPATIENT
Start: 2020-11-29 | End: 2021-02-16 | Stop reason: SDUPTHER

## 2020-12-10 ENCOUNTER — RESULTS ENCOUNTER (OUTPATIENT)
Dept: ENDOCRINOLOGY | Age: 75
End: 2020-12-10

## 2020-12-10 DIAGNOSIS — I10 ESSENTIAL HYPERTENSION: ICD-10-CM

## 2020-12-10 DIAGNOSIS — E05.20 TOXIC MULTINODULAR GOITER: ICD-10-CM

## 2020-12-10 DIAGNOSIS — E55.9 VITAMIN D INSUFFICIENCY: ICD-10-CM

## 2021-01-01 ENCOUNTER — TELEPHONE (OUTPATIENT)
Dept: ONCOLOGY | Facility: CLINIC | Age: 76
End: 2021-01-01

## 2021-01-01 ENCOUNTER — TELEPHONE (OUTPATIENT)
Dept: FAMILY MEDICINE CLINIC | Facility: CLINIC | Age: 76
End: 2021-01-01

## 2021-01-01 ENCOUNTER — INFUSION (OUTPATIENT)
Dept: ONCOLOGY | Facility: HOSPITAL | Age: 76
End: 2021-01-01

## 2021-01-01 ENCOUNTER — OFFICE VISIT (OUTPATIENT)
Dept: ONCOLOGY | Facility: CLINIC | Age: 76
End: 2021-01-01

## 2021-01-01 ENCOUNTER — TELEMEDICINE (OUTPATIENT)
Dept: ONCOLOGY | Facility: CLINIC | Age: 76
End: 2021-01-01

## 2021-01-01 ENCOUNTER — TELEPHONE (OUTPATIENT)
Dept: ENDOCRINOLOGY | Age: 76
End: 2021-01-01

## 2021-01-01 ENCOUNTER — HOSPITAL ENCOUNTER (OUTPATIENT)
Dept: GENERAL RADIOLOGY | Facility: HOSPITAL | Age: 76
Discharge: HOME | End: 2021-08-09

## 2021-01-01 ENCOUNTER — TELEPHONE (OUTPATIENT)
Dept: OTHER | Facility: HOSPITAL | Age: 76
End: 2021-01-01

## 2021-01-01 ENCOUNTER — HOSPITAL ENCOUNTER (OUTPATIENT)
Facility: HOSPITAL | Age: 76
Discharge: HOME | End: 2021-08-12
Attending: SURGERY | Admitting: SURGERY

## 2021-01-01 ENCOUNTER — PREP FOR SURGERY (OUTPATIENT)
Dept: OTHER | Facility: HOSPITAL | Age: 76
End: 2021-01-01

## 2021-01-01 ENCOUNTER — TELEPHONE (OUTPATIENT)
Dept: MAMMOGRAPHY | Facility: HOSPITAL | Age: 76
End: 2021-01-01

## 2021-01-01 ENCOUNTER — APPOINTMENT (OUTPATIENT)
Dept: ONCOLOGY | Facility: HOSPITAL | Age: 76
End: 2021-01-01

## 2021-01-01 ENCOUNTER — HOSPITAL ENCOUNTER (OUTPATIENT)
Dept: ULTRASOUND IMAGING | Facility: HOSPITAL | Age: 76
Discharge: HOME | End: 2021-06-22

## 2021-01-01 ENCOUNTER — CLINICAL SUPPORT (OUTPATIENT)
Dept: SURGERY | Facility: CLINIC | Age: 76
End: 2021-01-01

## 2021-01-01 ENCOUNTER — HOSPITAL ENCOUNTER (OUTPATIENT)
Dept: PET IMAGING | Facility: HOSPITAL | Age: 76
Discharge: HOME | End: 2021-05-25
Admitting: INTERNAL MEDICINE

## 2021-01-01 ENCOUNTER — OFFICE VISIT (OUTPATIENT)
Dept: ENDOCRINOLOGY | Age: 76
End: 2021-01-01

## 2021-01-01 ENCOUNTER — LAB (OUTPATIENT)
Dept: LAB | Facility: HOSPITAL | Age: 76
End: 2021-01-01

## 2021-01-01 ENCOUNTER — LAB (OUTPATIENT)
Dept: ENDOCRINOLOGY | Age: 76
End: 2021-01-01

## 2021-01-01 ENCOUNTER — OFFICE VISIT (OUTPATIENT)
Dept: FAMILY MEDICINE CLINIC | Facility: CLINIC | Age: 76
End: 2021-01-01

## 2021-01-01 ENCOUNTER — OUTSIDE FACILITY SERVICE (OUTPATIENT)
Dept: SURGERY | Facility: CLINIC | Age: 76
End: 2021-01-01
Payer: MEDICARE

## 2021-01-01 ENCOUNTER — CLINICAL SUPPORT (OUTPATIENT)
Dept: OTHER | Facility: HOSPITAL | Age: 76
End: 2021-01-01

## 2021-01-01 ENCOUNTER — APPOINTMENT (OUTPATIENT)
Dept: LAB | Facility: HOSPITAL | Age: 76
End: 2021-01-01

## 2021-01-01 ENCOUNTER — TELEPHONE (OUTPATIENT)
Dept: SURGERY | Facility: CLINIC | Age: 76
End: 2021-01-01

## 2021-01-01 ENCOUNTER — ANESTHESIA (OUTPATIENT)
Dept: PERIOP | Facility: HOSPITAL | Age: 76
End: 2021-01-01

## 2021-01-01 ENCOUNTER — HOSPITAL ENCOUNTER (OUTPATIENT)
Dept: PET IMAGING | Facility: HOSPITAL | Age: 76
Discharge: HOME | End: 2021-08-30

## 2021-01-01 ENCOUNTER — TELEPHONE (OUTPATIENT)
Dept: ONCOLOGY | Facility: HOSPITAL | Age: 76
End: 2021-01-01

## 2021-01-01 ENCOUNTER — HOSPITAL ENCOUNTER (OUTPATIENT)
Dept: MAMMOGRAPHY | Facility: HOSPITAL | Age: 76
Discharge: HOME | End: 2021-06-22

## 2021-01-01 ENCOUNTER — HOSPITAL ENCOUNTER (OUTPATIENT)
Dept: ULTRASOUND IMAGING | Facility: HOSPITAL | Age: 76
Discharge: HOME | End: 2021-06-17
Admitting: INTERNAL MEDICINE

## 2021-01-01 ENCOUNTER — OFFICE VISIT (OUTPATIENT)
Dept: SURGERY | Facility: CLINIC | Age: 76
End: 2021-01-01

## 2021-01-01 ENCOUNTER — PATIENT MESSAGE (OUTPATIENT)
Dept: FAMILY MEDICINE CLINIC | Facility: CLINIC | Age: 76
End: 2021-01-01

## 2021-01-01 ENCOUNTER — APPOINTMENT (OUTPATIENT)
Dept: BONE DENSITY | Facility: HOSPITAL | Age: 76
End: 2021-01-01

## 2021-01-01 ENCOUNTER — PRE-ADMISSION TESTING (OUTPATIENT)
Dept: PREADMISSION TESTING | Facility: HOSPITAL | Age: 76
End: 2021-01-01

## 2021-01-01 ENCOUNTER — HOSPITAL ENCOUNTER (OUTPATIENT)
Dept: BONE DENSITY | Facility: HOSPITAL | Age: 76
Discharge: HOME | End: 2021-12-29
Admitting: FAMILY MEDICINE

## 2021-01-01 ENCOUNTER — HOSPITAL ENCOUNTER (OUTPATIENT)
Dept: MAMMOGRAPHY | Facility: HOSPITAL | Age: 76
Discharge: HOME | End: 2021-06-17
Admitting: INTERNAL MEDICINE

## 2021-01-01 ENCOUNTER — ANESTHESIA EVENT (OUTPATIENT)
Dept: PERIOP | Facility: HOSPITAL | Age: 76
End: 2021-01-01

## 2021-01-01 VITALS
OXYGEN SATURATION: 96 % | HEIGHT: 60 IN | RESPIRATION RATE: 16 BRPM | HEIGHT: 60 IN | DIASTOLIC BLOOD PRESSURE: 72 MMHG | HEART RATE: 112 BPM | BODY MASS INDEX: 18.65 KG/M2 | WEIGHT: 92 LBS | SYSTOLIC BLOOD PRESSURE: 122 MMHG | WEIGHT: 95 LBS | TEMPERATURE: 98.6 F | BODY MASS INDEX: 18.06 KG/M2

## 2021-01-01 VITALS
RESPIRATION RATE: 14 BRPM | DIASTOLIC BLOOD PRESSURE: 74 MMHG | BODY MASS INDEX: 17 KG/M2 | WEIGHT: 86.6 LBS | HEART RATE: 105 BPM | HEIGHT: 60 IN | OXYGEN SATURATION: 96 % | SYSTOLIC BLOOD PRESSURE: 134 MMHG | TEMPERATURE: 97.1 F

## 2021-01-01 VITALS
HEIGHT: 60 IN | HEART RATE: 93 BPM | OXYGEN SATURATION: 96 % | DIASTOLIC BLOOD PRESSURE: 65 MMHG | TEMPERATURE: 97.3 F | BODY MASS INDEX: 18.77 KG/M2 | RESPIRATION RATE: 18 BRPM | SYSTOLIC BLOOD PRESSURE: 122 MMHG | WEIGHT: 95.6 LBS

## 2021-01-01 VITALS
TEMPERATURE: 98 F | HEART RATE: 112 BPM | OXYGEN SATURATION: 100 % | WEIGHT: 88 LBS | BODY MASS INDEX: 17.28 KG/M2 | RESPIRATION RATE: 20 BRPM | SYSTOLIC BLOOD PRESSURE: 111 MMHG | DIASTOLIC BLOOD PRESSURE: 68 MMHG

## 2021-01-01 VITALS
RESPIRATION RATE: 16 BRPM | SYSTOLIC BLOOD PRESSURE: 140 MMHG | HEIGHT: 60 IN | TEMPERATURE: 97.7 F | OXYGEN SATURATION: 98 % | BODY MASS INDEX: 16.49 KG/M2 | HEART RATE: 131 BPM | WEIGHT: 84 LBS | DIASTOLIC BLOOD PRESSURE: 74 MMHG

## 2021-01-01 VITALS
SYSTOLIC BLOOD PRESSURE: 133 MMHG | DIASTOLIC BLOOD PRESSURE: 78 MMHG | HEIGHT: 60 IN | OXYGEN SATURATION: 94 % | RESPIRATION RATE: 18 BRPM | BODY MASS INDEX: 18.24 KG/M2 | TEMPERATURE: 98.7 F | HEART RATE: 122 BPM | WEIGHT: 92.9 LBS

## 2021-01-01 VITALS
HEIGHT: 60 IN | HEART RATE: 105 BPM | OXYGEN SATURATION: 99 % | BODY MASS INDEX: 17.3 KG/M2 | SYSTOLIC BLOOD PRESSURE: 112 MMHG | WEIGHT: 88.1 LBS | TEMPERATURE: 97.8 F | RESPIRATION RATE: 22 BRPM | DIASTOLIC BLOOD PRESSURE: 68 MMHG

## 2021-01-01 VITALS
RESPIRATION RATE: 18 BRPM | OXYGEN SATURATION: 97 % | DIASTOLIC BLOOD PRESSURE: 85 MMHG | WEIGHT: 86.2 LBS | SYSTOLIC BLOOD PRESSURE: 137 MMHG | TEMPERATURE: 97.8 F | BODY MASS INDEX: 16.92 KG/M2 | HEART RATE: 98 BPM

## 2021-01-01 VITALS
RESPIRATION RATE: 18 BRPM | DIASTOLIC BLOOD PRESSURE: 74 MMHG | TEMPERATURE: 97.3 F | BODY MASS INDEX: 16.22 KG/M2 | SYSTOLIC BLOOD PRESSURE: 135 MMHG | HEIGHT: 60 IN | HEART RATE: 129 BPM | OXYGEN SATURATION: 98 % | WEIGHT: 82.6 LBS

## 2021-01-01 VITALS
HEART RATE: 112 BPM | SYSTOLIC BLOOD PRESSURE: 121 MMHG | TEMPERATURE: 98.1 F | RESPIRATION RATE: 20 BRPM | OXYGEN SATURATION: 95 % | BODY MASS INDEX: 17.87 KG/M2 | WEIGHT: 91 LBS | HEIGHT: 60 IN | DIASTOLIC BLOOD PRESSURE: 73 MMHG

## 2021-01-01 VITALS
TEMPERATURE: 97.5 F | HEART RATE: 97 BPM | BODY MASS INDEX: 17.19 KG/M2 | OXYGEN SATURATION: 91 % | WEIGHT: 88 LBS | DIASTOLIC BLOOD PRESSURE: 72 MMHG | SYSTOLIC BLOOD PRESSURE: 117 MMHG

## 2021-01-01 VITALS
WEIGHT: 89.1 LBS | HEIGHT: 60 IN | DIASTOLIC BLOOD PRESSURE: 72 MMHG | TEMPERATURE: 97.1 F | RESPIRATION RATE: 16 BRPM | HEART RATE: 104 BPM | SYSTOLIC BLOOD PRESSURE: 122 MMHG | OXYGEN SATURATION: 98 % | BODY MASS INDEX: 17.49 KG/M2

## 2021-01-01 VITALS
WEIGHT: 85.6 LBS | RESPIRATION RATE: 16 BRPM | BODY MASS INDEX: 16.81 KG/M2 | HEART RATE: 109 BPM | TEMPERATURE: 97.7 F | SYSTOLIC BLOOD PRESSURE: 111 MMHG | OXYGEN SATURATION: 100 % | DIASTOLIC BLOOD PRESSURE: 63 MMHG

## 2021-01-01 VITALS
WEIGHT: 87.5 LBS | HEART RATE: 108 BPM | SYSTOLIC BLOOD PRESSURE: 120 MMHG | HEIGHT: 60 IN | OXYGEN SATURATION: 99 % | TEMPERATURE: 98 F | DIASTOLIC BLOOD PRESSURE: 68 MMHG | RESPIRATION RATE: 16 BRPM | BODY MASS INDEX: 17.18 KG/M2

## 2021-01-01 VITALS
BODY MASS INDEX: 16.77 KG/M2 | HEART RATE: 116 BPM | SYSTOLIC BLOOD PRESSURE: 121 MMHG | HEIGHT: 60 IN | DIASTOLIC BLOOD PRESSURE: 67 MMHG | OXYGEN SATURATION: 98 % | WEIGHT: 85.4 LBS | TEMPERATURE: 96.4 F | RESPIRATION RATE: 16 BRPM

## 2021-01-01 VITALS
HEART RATE: 105 BPM | WEIGHT: 88.6 LBS | HEIGHT: 60 IN | DIASTOLIC BLOOD PRESSURE: 59 MMHG | TEMPERATURE: 97.7 F | BODY MASS INDEX: 17.4 KG/M2 | RESPIRATION RATE: 16 BRPM | SYSTOLIC BLOOD PRESSURE: 122 MMHG | OXYGEN SATURATION: 97 %

## 2021-01-01 VITALS
HEIGHT: 60 IN | OXYGEN SATURATION: 98 % | HEART RATE: 106 BPM | DIASTOLIC BLOOD PRESSURE: 70 MMHG | TEMPERATURE: 97.7 F | SYSTOLIC BLOOD PRESSURE: 116 MMHG | WEIGHT: 96 LBS | BODY MASS INDEX: 18.85 KG/M2

## 2021-01-01 VITALS — SYSTOLIC BLOOD PRESSURE: 115 MMHG | HEART RATE: 95 BPM | DIASTOLIC BLOOD PRESSURE: 83 MMHG

## 2021-01-01 VITALS — DIASTOLIC BLOOD PRESSURE: 77 MMHG | SYSTOLIC BLOOD PRESSURE: 131 MMHG | HEART RATE: 113 BPM

## 2021-01-01 VITALS
BODY MASS INDEX: 16.29 KG/M2 | OXYGEN SATURATION: 97 % | RESPIRATION RATE: 18 BRPM | DIASTOLIC BLOOD PRESSURE: 70 MMHG | SYSTOLIC BLOOD PRESSURE: 110 MMHG | WEIGHT: 83 LBS | HEART RATE: 126 BPM | TEMPERATURE: 97.7 F

## 2021-01-01 VITALS — DIASTOLIC BLOOD PRESSURE: 68 MMHG | HEART RATE: 122 BPM | SYSTOLIC BLOOD PRESSURE: 110 MMHG

## 2021-01-01 VITALS
BODY MASS INDEX: 18.12 KG/M2 | SYSTOLIC BLOOD PRESSURE: 145 MMHG | RESPIRATION RATE: 16 BRPM | HEART RATE: 106 BPM | HEIGHT: 60 IN | WEIGHT: 92.3 LBS | DIASTOLIC BLOOD PRESSURE: 74 MMHG | TEMPERATURE: 97.1 F | OXYGEN SATURATION: 96 %

## 2021-01-01 VITALS — BODY MASS INDEX: 17.24 KG/M2 | WEIGHT: 88.3 LBS

## 2021-01-01 VITALS
BODY MASS INDEX: 19.14 KG/M2 | SYSTOLIC BLOOD PRESSURE: 136 MMHG | DIASTOLIC BLOOD PRESSURE: 58 MMHG | HEIGHT: 60 IN | OXYGEN SATURATION: 97 % | WEIGHT: 97.5 LBS | HEART RATE: 123 BPM

## 2021-01-01 VITALS
RESPIRATION RATE: 18 BRPM | TEMPERATURE: 97.9 F | SYSTOLIC BLOOD PRESSURE: 115 MMHG | BODY MASS INDEX: 16.45 KG/M2 | OXYGEN SATURATION: 96 % | WEIGHT: 83.8 LBS | DIASTOLIC BLOOD PRESSURE: 59 MMHG | HEART RATE: 116 BPM

## 2021-01-01 VITALS — WEIGHT: 82.6 LBS | BODY MASS INDEX: 16.22 KG/M2 | HEIGHT: 60 IN

## 2021-01-01 VITALS — SYSTOLIC BLOOD PRESSURE: 121 MMHG | DIASTOLIC BLOOD PRESSURE: 66 MMHG | HEART RATE: 97 BPM

## 2021-01-01 VITALS — HEIGHT: 60 IN | WEIGHT: 92 LBS | BODY MASS INDEX: 18.06 KG/M2

## 2021-01-01 DIAGNOSIS — C34.12 MALIGNANT NEOPLASM OF UPPER LOBE OF LEFT LUNG (HCC): Primary | ICD-10-CM

## 2021-01-01 DIAGNOSIS — N63.20 BREAST MASS, LEFT: Primary | ICD-10-CM

## 2021-01-01 DIAGNOSIS — R93.89 ABNORMAL CT SCAN: ICD-10-CM

## 2021-01-01 DIAGNOSIS — C34.90 RECURRENT NON-SMALL CELL LUNG CANCER (HCC): ICD-10-CM

## 2021-01-01 DIAGNOSIS — Z79.899 ENCOUNTER FOR LONG-TERM (CURRENT) USE OF OTHER MEDICATIONS: ICD-10-CM

## 2021-01-01 DIAGNOSIS — C50.412 MALIGNANT NEOPLASM OF UPPER-OUTER QUADRANT OF LEFT BREAST IN FEMALE, ESTROGEN RECEPTOR NEGATIVE (HCC): ICD-10-CM

## 2021-01-01 DIAGNOSIS — Z79.899 ENCOUNTER FOR LONG-TERM (CURRENT) USE OF OTHER MEDICATIONS: Primary | ICD-10-CM

## 2021-01-01 DIAGNOSIS — C34.12 MALIGNANT NEOPLASM OF UPPER LOBE OF LEFT LUNG (HCC): ICD-10-CM

## 2021-01-01 DIAGNOSIS — Z17.1 MALIGNANT NEOPLASM OF UPPER-OUTER QUADRANT OF LEFT BREAST IN FEMALE, ESTROGEN RECEPTOR NEGATIVE (HCC): ICD-10-CM

## 2021-01-01 DIAGNOSIS — J43.1 PANLOBULAR EMPHYSEMA (HCC): ICD-10-CM

## 2021-01-01 DIAGNOSIS — J30.9 ALLERGIC RHINITIS, UNSPECIFIED SEASONALITY, UNSPECIFIED TRIGGER: ICD-10-CM

## 2021-01-01 DIAGNOSIS — Z78.0 POST-MENOPAUSAL: ICD-10-CM

## 2021-01-01 DIAGNOSIS — E05.20 TOXIC MULTINODULAR GOITER: Primary | ICD-10-CM

## 2021-01-01 DIAGNOSIS — R92.8 OTHER ABNORMAL AND INCONCLUSIVE FINDINGS ON DIAGNOSTIC IMAGING OF BREAST: ICD-10-CM

## 2021-01-01 DIAGNOSIS — C34.90 RECURRENT NON-SMALL CELL LUNG CANCER (HCC): Primary | ICD-10-CM

## 2021-01-01 DIAGNOSIS — Z99.81 OXYGEN DEPENDENT: ICD-10-CM

## 2021-01-01 DIAGNOSIS — I10 ESSENTIAL HYPERTENSION: ICD-10-CM

## 2021-01-01 DIAGNOSIS — C50.412 MALIGNANT NEOPLASM OF UPPER-OUTER QUADRANT OF LEFT BREAST IN FEMALE, ESTROGEN RECEPTOR NEGATIVE (HCC): Primary | ICD-10-CM

## 2021-01-01 DIAGNOSIS — J43.9 PULMONARY EMPHYSEMA, UNSPECIFIED EMPHYSEMA TYPE (HCC): ICD-10-CM

## 2021-01-01 DIAGNOSIS — R92.8 ABNORMAL MAMMOGRAM OF LEFT BREAST: ICD-10-CM

## 2021-01-01 DIAGNOSIS — M62.3 IMMOBILITY SYNDROME: ICD-10-CM

## 2021-01-01 DIAGNOSIS — B37.0 OROPHARYNGEAL CANDIDIASIS: Primary | ICD-10-CM

## 2021-01-01 DIAGNOSIS — R29.898 WEAKNESS OF BOTH LOWER EXTREMITIES: Primary | ICD-10-CM

## 2021-01-01 DIAGNOSIS — I10 ESSENTIAL HYPERTENSION: Primary | ICD-10-CM

## 2021-01-01 DIAGNOSIS — M85.80 OSTEOPENIA, UNSPECIFIED LOCATION: ICD-10-CM

## 2021-01-01 DIAGNOSIS — I47.1 MULTIFOCAL ATRIAL TACHYCARDIA (HCC): ICD-10-CM

## 2021-01-01 DIAGNOSIS — I47.1 MULTIFOCAL ATRIAL TACHYCARDIA (HCC): Primary | ICD-10-CM

## 2021-01-01 DIAGNOSIS — R92.8 ABNORMAL MAMMOGRAM OF LEFT BREAST: Primary | ICD-10-CM

## 2021-01-01 DIAGNOSIS — Z79.899 HIGH RISK MEDICATION USE: ICD-10-CM

## 2021-01-01 DIAGNOSIS — J44.0 COPD WITH ACUTE LOWER RESPIRATORY INFECTION (HCC): ICD-10-CM

## 2021-01-01 DIAGNOSIS — Z17.1 MALIGNANT NEOPLASM OF UPPER-OUTER QUADRANT OF LEFT BREAST IN FEMALE, ESTROGEN RECEPTOR NEGATIVE (HCC): Primary | ICD-10-CM

## 2021-01-01 DIAGNOSIS — J30.1 SEASONAL ALLERGIC RHINITIS DUE TO POLLEN: ICD-10-CM

## 2021-01-01 DIAGNOSIS — R63.4 WEIGHT LOSS: ICD-10-CM

## 2021-01-01 DIAGNOSIS — M81.0 AGE RELATED OSTEOPOROSIS, UNSPECIFIED PATHOLOGICAL FRACTURE PRESENCE: ICD-10-CM

## 2021-01-01 DIAGNOSIS — N63.20 BREAST MASS, LEFT: ICD-10-CM

## 2021-01-01 DIAGNOSIS — R29.898 LEG WEAKNESS, BILATERAL: ICD-10-CM

## 2021-01-01 DIAGNOSIS — Z78.0 POST-MENOPAUSAL: Primary | ICD-10-CM

## 2021-01-01 DIAGNOSIS — B35.1 ONYCHOMYCOSIS: Primary | ICD-10-CM

## 2021-01-01 DIAGNOSIS — E05.90 HYPERTHYROIDISM: ICD-10-CM

## 2021-01-01 DIAGNOSIS — E05.20 TOXIC MULTINODULAR GOITER: ICD-10-CM

## 2021-01-01 LAB
ALBUMIN SERPL-MCNC: 3.5 G/DL (ref 3.5–5.2)
ALBUMIN SERPL-MCNC: 3.7 G/DL (ref 3.5–5.2)
ALBUMIN SERPL-MCNC: 3.7 G/DL (ref 3.5–5.2)
ALBUMIN SERPL-MCNC: 3.8 G/DL (ref 3.5–5.2)
ALBUMIN SERPL-MCNC: 3.9 G/DL (ref 3.5–5.2)
ALBUMIN SERPL-MCNC: 4 G/DL (ref 3.5–5.2)
ALBUMIN SERPL-MCNC: 4.1 G/DL (ref 3.5–5.2)
ALBUMIN SERPL-MCNC: 4.1 G/DL (ref 3.5–5.2)
ALBUMIN SERPL-MCNC: 4.2 G/DL (ref 3.5–5.2)
ALBUMIN/GLOB SERPL: 1.1 G/DL
ALBUMIN/GLOB SERPL: 1.1 G/DL (ref 1.1–2.4)
ALBUMIN/GLOB SERPL: 1.2 G/DL
ALBUMIN/GLOB SERPL: 1.2 G/DL (ref 1.1–2.4)
ALBUMIN/GLOB SERPL: 1.3 G/DL (ref 1.1–2.4)
ALBUMIN/GLOB SERPL: 1.5 G/DL
ALP SERPL-CCNC: 68 U/L (ref 38–116)
ALP SERPL-CCNC: 69 U/L (ref 39–117)
ALP SERPL-CCNC: 71 U/L (ref 38–116)
ALP SERPL-CCNC: 72 U/L (ref 39–117)
ALP SERPL-CCNC: 73 U/L (ref 38–116)
ALP SERPL-CCNC: 73 U/L (ref 38–116)
ALP SERPL-CCNC: 78 U/L (ref 38–116)
ALP SERPL-CCNC: 79 U/L (ref 38–116)
ALP SERPL-CCNC: 79 U/L (ref 38–116)
ALP SERPL-CCNC: 80 U/L (ref 38–116)
ALP SERPL-CCNC: 82 U/L (ref 38–116)
ALP SERPL-CCNC: 84 U/L (ref 39–117)
ALP SERPL-CCNC: 89 U/L (ref 38–116)
ALT SERPL W P-5'-P-CCNC: 10 U/L (ref 0–33)
ALT SERPL W P-5'-P-CCNC: 11 U/L (ref 0–33)
ALT SERPL W P-5'-P-CCNC: 12 U/L (ref 1–33)
ALT SERPL W P-5'-P-CCNC: 14 U/L (ref 0–33)
ALT SERPL W P-5'-P-CCNC: 6 U/L (ref 0–33)
ALT SERPL W P-5'-P-CCNC: 8 U/L (ref 0–33)
ALT SERPL W P-5'-P-CCNC: 9 U/L (ref 0–33)
ALT SERPL W P-5'-P-CCNC: 9 U/L (ref 1–33)
ALT SERPL W P-5'-P-CCNC: <5 U/L (ref 0–33)
ALT SERPL-CCNC: 11 U/L (ref 1–33)
ANION GAP SERPL CALCULATED.3IONS-SCNC: 10 MMOL/L (ref 5–15)
ANION GAP SERPL CALCULATED.3IONS-SCNC: 10.1 MMOL/L (ref 5–15)
ANION GAP SERPL CALCULATED.3IONS-SCNC: 10.1 MMOL/L (ref 5–15)
ANION GAP SERPL CALCULATED.3IONS-SCNC: 10.6 MMOL/L (ref 5–15)
ANION GAP SERPL CALCULATED.3IONS-SCNC: 10.7 MMOL/L (ref 5–15)
ANION GAP SERPL CALCULATED.3IONS-SCNC: 10.8 MMOL/L (ref 5–15)
ANION GAP SERPL CALCULATED.3IONS-SCNC: 7.9 MMOL/L (ref 5–15)
ANION GAP SERPL CALCULATED.3IONS-SCNC: 8.1 MMOL/L (ref 5–15)
ANION GAP SERPL CALCULATED.3IONS-SCNC: 8.3 MMOL/L (ref 5–15)
ANION GAP SERPL CALCULATED.3IONS-SCNC: 8.5 MMOL/L (ref 5–15)
ANION GAP SERPL CALCULATED.3IONS-SCNC: 8.8 MMOL/L (ref 5–15)
ANION GAP SERPL CALCULATED.3IONS-SCNC: 9.2 MMOL/L (ref 5–15)
ANION GAP SERPL CALCULATED.3IONS-SCNC: 9.5 MMOL/L (ref 5–15)
ANION GAP SERPL CALCULATED.3IONS-SCNC: 9.7 MMOL/L (ref 5–15)
AST SERPL-CCNC: 11 U/L (ref 0–32)
AST SERPL-CCNC: 12 U/L (ref 0–32)
AST SERPL-CCNC: 12 U/L (ref 0–32)
AST SERPL-CCNC: 13 U/L (ref 0–32)
AST SERPL-CCNC: 13 U/L (ref 0–32)
AST SERPL-CCNC: 14 U/L (ref 0–32)
AST SERPL-CCNC: 14 U/L (ref 0–32)
AST SERPL-CCNC: 14 U/L (ref 1–32)
AST SERPL-CCNC: 15 U/L (ref 0–32)
AST SERPL-CCNC: 16 U/L (ref 0–32)
AST SERPL-CCNC: 18 U/L (ref 0–32)
AST SERPL-CCNC: 20 U/L (ref 0–32)
AST SERPL-CCNC: 23 U/L (ref 0–32)
BASOPHILS # BLD AUTO: 0.03 10*3/MM3 (ref 0–0.2)
BASOPHILS # BLD AUTO: 0.04 10*3/MM3 (ref 0–0.2)
BASOPHILS # BLD AUTO: 0.05 10*3/MM3 (ref 0–0.2)
BASOPHILS # BLD AUTO: 0.06 10*3/MM3 (ref 0–0.2)
BASOPHILS # BLD AUTO: 0.06 10*3/MM3 (ref 0–0.2)
BASOPHILS NFR BLD AUTO: 0.4 % (ref 0–1.5)
BASOPHILS NFR BLD AUTO: 0.5 % (ref 0–1.5)
BASOPHILS NFR BLD AUTO: 0.6 % (ref 0–1.5)
BASOPHILS NFR BLD AUTO: 0.7 % (ref 0–1.5)
BASOPHILS NFR BLD AUTO: 0.8 % (ref 0–1.5)
BASOPHILS NFR BLD AUTO: 0.9 % (ref 0–1.5)
BILIRUB SERPL-MCNC: 0.2 MG/DL (ref 0.2–1.2)
BILIRUB SERPL-MCNC: 0.2 MG/DL (ref 0–1.2)
BILIRUB SERPL-MCNC: <0.2 MG/DL (ref 0.2–1.2)
BILIRUB SERPL-MCNC: <0.2 MG/DL (ref 0–1.2)
BILIRUB SERPL-MCNC: <0.2 MG/DL (ref 0–1.2)
BUN SERPL-MCNC: 12 MG/DL (ref 6–20)
BUN SERPL-MCNC: 12 MG/DL (ref 6–20)
BUN SERPL-MCNC: 13 MG/DL (ref 8–23)
BUN SERPL-MCNC: 14 MG/DL (ref 6–20)
BUN SERPL-MCNC: 16 MG/DL (ref 6–20)
BUN SERPL-MCNC: 17 MG/DL (ref 6–20)
BUN SERPL-MCNC: 18 MG/DL (ref 6–20)
BUN SERPL-MCNC: 19 MG/DL (ref 8–23)
BUN SERPL-MCNC: 19 MG/DL (ref 8–23)
BUN/CREAT SERPL: 20.3 (ref 7.3–30)
BUN/CREAT SERPL: 20.6 (ref 7–25)
BUN/CREAT SERPL: 22.2 (ref 7.3–30)
BUN/CREAT SERPL: 25 (ref 7.3–30)
BUN/CREAT SERPL: 25.9 (ref 7.3–30)
BUN/CREAT SERPL: 27.7 (ref 7.3–30)
BUN/CREAT SERPL: 28 (ref 7.3–30)
BUN/CREAT SERPL: 28.6 (ref 7.3–30)
BUN/CREAT SERPL: 29.8 (ref 7.3–30)
BUN/CREAT SERPL: 30.2 (ref 7.3–30)
BUN/CREAT SERPL: 31.5 (ref 7.3–30)
BUN/CREAT SERPL: 33.3 (ref 7.3–30)
BUN/CREAT SERPL: 36.2 (ref 7.3–30)
BUN/CREAT SERPL: 36.5 (ref 7–25)
BUN/CREAT SERPL: 40.4 (ref 7–25)
CALCIUM SERPL-MCNC: 9.9 MG/DL (ref 8.6–10.5)
CALCIUM SPEC-SCNC: 9.1 MG/DL (ref 8.5–10.2)
CALCIUM SPEC-SCNC: 9.1 MG/DL (ref 8.5–10.2)
CALCIUM SPEC-SCNC: 9.1 MG/DL (ref 8.6–10.5)
CALCIUM SPEC-SCNC: 9.2 MG/DL (ref 8.5–10.2)
CALCIUM SPEC-SCNC: 9.2 MG/DL (ref 8.5–10.2)
CALCIUM SPEC-SCNC: 9.4 MG/DL (ref 8.5–10.2)
CALCIUM SPEC-SCNC: 9.6 MG/DL (ref 8.5–10.2)
CALCIUM SPEC-SCNC: 9.7 MG/DL (ref 8.5–10.2)
CALCIUM SPEC-SCNC: 9.7 MG/DL (ref 8.6–10.5)
CALCIUM SPEC-SCNC: 9.8 MG/DL (ref 8.5–10.2)
CHLORIDE SERPL-SCNC: 100 MMOL/L (ref 98–107)
CHLORIDE SERPL-SCNC: 101 MMOL/L (ref 98–107)
CHLORIDE SERPL-SCNC: 102 MMOL/L (ref 98–107)
CHLORIDE SERPL-SCNC: 103 MMOL/L (ref 98–107)
CHLORIDE SERPL-SCNC: 104 MMOL/L (ref 98–107)
CHLORIDE SERPL-SCNC: 104 MMOL/L (ref 98–107)
CHLORIDE SERPL-SCNC: 98 MMOL/L (ref 98–107)
CHLORIDE SERPL-SCNC: 99 MMOL/L (ref 98–107)
CO2 SERPL-SCNC: 25.3 MMOL/L (ref 22–29)
CO2 SERPL-SCNC: 25.9 MMOL/L (ref 22–29)
CO2 SERPL-SCNC: 25.9 MMOL/L (ref 22–29)
CO2 SERPL-SCNC: 26 MMOL/L (ref 22–29)
CO2 SERPL-SCNC: 26.4 MMOL/L (ref 22–29)
CO2 SERPL-SCNC: 27 MMOL/L (ref 22–29)
CO2 SERPL-SCNC: 27.5 MMOL/L (ref 22–29)
CO2 SERPL-SCNC: 27.7 MMOL/L (ref 22–29)
CO2 SERPL-SCNC: 27.8 MMOL/L (ref 22–29)
CO2 SERPL-SCNC: 27.9 MMOL/L (ref 22–29)
CO2 SERPL-SCNC: 28.3 MMOL/L (ref 22–29)
CO2 SERPL-SCNC: 29.1 MMOL/L (ref 22–29)
CO2 SERPL-SCNC: 29.2 MMOL/L (ref 22–29)
CO2 SERPL-SCNC: 29.2 MMOL/L (ref 22–29)
CO2 SERPL-SCNC: 29.5 MMOL/L (ref 22–29)
CREAT BLDA-MCNC: 0.6 MG/DL (ref 0.6–1.3)
CREAT SERPL-MCNC: 0.47 MG/DL (ref 0.57–1)
CREAT SERPL-MCNC: 0.47 MG/DL (ref 0.6–1.1)
CREAT SERPL-MCNC: 0.47 MG/DL (ref 0.6–1.1)
CREAT SERPL-MCNC: 0.49 MG/DL (ref 0.6–1.1)
CREAT SERPL-MCNC: 0.5 MG/DL (ref 0.6–1.1)
CREAT SERPL-MCNC: 0.51 MG/DL (ref 0.6–1.1)
CREAT SERPL-MCNC: 0.52 MG/DL (ref 0.57–1)
CREAT SERPL-MCNC: 0.53 MG/DL (ref 0.6–1.1)
CREAT SERPL-MCNC: 0.54 MG/DL (ref 0.6–1.1)
CREAT SERPL-MCNC: 0.56 MG/DL (ref 0.6–1.1)
CREAT SERPL-MCNC: 0.59 MG/DL (ref 0.6–1.1)
CREAT SERPL-MCNC: 0.63 MG/DL (ref 0.57–1)
CREAT SERPL-MCNC: 0.65 MG/DL (ref 0.6–1.1)
CYTO UR: NORMAL
CYTO UR: NORMAL
DEPRECATED RDW RBC AUTO: 43.2 FL (ref 37–54)
DEPRECATED RDW RBC AUTO: 45.9 FL (ref 37–54)
DEPRECATED RDW RBC AUTO: 47.7 FL (ref 37–54)
DEPRECATED RDW RBC AUTO: 47.8 FL (ref 37–54)
DEPRECATED RDW RBC AUTO: 48 FL (ref 37–54)
DEPRECATED RDW RBC AUTO: 48.9 FL (ref 37–54)
DEPRECATED RDW RBC AUTO: 49.1 FL (ref 37–54)
DEPRECATED RDW RBC AUTO: 50 FL (ref 37–54)
DEPRECATED RDW RBC AUTO: 51 FL (ref 37–54)
DEPRECATED RDW RBC AUTO: 51.5 FL (ref 37–54)
DEPRECATED RDW RBC AUTO: 54 FL (ref 37–54)
DEPRECATED RDW RBC AUTO: 57.7 FL (ref 37–54)
DEPRECATED RDW RBC AUTO: 61.9 FL (ref 37–54)
DEPRECATED RDW RBC AUTO: 61.9 FL (ref 37–54)
DEPRECATED RDW RBC AUTO: 62.2 FL (ref 37–54)
DEPRECATED RDW RBC AUTO: 62.4 FL (ref 37–54)
DX PRELIMINARY: NORMAL
EOSINOPHIL # BLD AUTO: 0 10*3/MM3 (ref 0–0.4)
EOSINOPHIL # BLD AUTO: 0.01 10*3/MM3 (ref 0–0.4)
EOSINOPHIL # BLD AUTO: 0.02 10*3/MM3 (ref 0–0.4)
EOSINOPHIL # BLD AUTO: 0.05 10*3/MM3 (ref 0–0.4)
EOSINOPHIL # BLD AUTO: 0.07 10*3/MM3 (ref 0–0.4)
EOSINOPHIL # BLD AUTO: 0.08 10*3/MM3 (ref 0–0.4)
EOSINOPHIL # BLD AUTO: 0.09 10*3/MM3 (ref 0–0.4)
EOSINOPHIL # BLD AUTO: 0.09 10*3/MM3 (ref 0–0.4)
EOSINOPHIL # BLD AUTO: 0.12 10*3/MM3 (ref 0–0.4)
EOSINOPHIL NFR BLD AUTO: 0 % (ref 0.3–6.2)
EOSINOPHIL NFR BLD AUTO: 0.1 % (ref 0.3–6.2)
EOSINOPHIL NFR BLD AUTO: 0.2 % (ref 0.3–6.2)
EOSINOPHIL NFR BLD AUTO: 0.3 % (ref 0.3–6.2)
EOSINOPHIL NFR BLD AUTO: 0.4 % (ref 0.3–6.2)
EOSINOPHIL NFR BLD AUTO: 0.8 % (ref 0.3–6.2)
EOSINOPHIL NFR BLD AUTO: 0.8 % (ref 0.3–6.2)
EOSINOPHIL NFR BLD AUTO: 0.9 % (ref 0.3–6.2)
EOSINOPHIL NFR BLD AUTO: 1.3 % (ref 0.3–6.2)
EOSINOPHIL NFR BLD AUTO: 1.3 % (ref 0.3–6.2)
EOSINOPHIL NFR BLD AUTO: 1.7 % (ref 0.3–6.2)
ERYTHROCYTE [DISTWIDTH] IN BLOOD BY AUTOMATED COUNT: 13.2 % (ref 12.3–15.4)
ERYTHROCYTE [DISTWIDTH] IN BLOOD BY AUTOMATED COUNT: 13.9 % (ref 12.3–15.4)
ERYTHROCYTE [DISTWIDTH] IN BLOOD BY AUTOMATED COUNT: 14.2 % (ref 12.3–15.4)
ERYTHROCYTE [DISTWIDTH] IN BLOOD BY AUTOMATED COUNT: 14.4 % (ref 12.3–15.4)
ERYTHROCYTE [DISTWIDTH] IN BLOOD BY AUTOMATED COUNT: 14.5 % (ref 12.3–15.4)
ERYTHROCYTE [DISTWIDTH] IN BLOOD BY AUTOMATED COUNT: 15.1 % (ref 12.3–15.4)
ERYTHROCYTE [DISTWIDTH] IN BLOOD BY AUTOMATED COUNT: 15.7 % (ref 12.3–15.4)
ERYTHROCYTE [DISTWIDTH] IN BLOOD BY AUTOMATED COUNT: 16.3 % (ref 12.3–15.4)
ERYTHROCYTE [DISTWIDTH] IN BLOOD BY AUTOMATED COUNT: 16.6 % (ref 12.3–15.4)
ERYTHROCYTE [DISTWIDTH] IN BLOOD BY AUTOMATED COUNT: 17.1 % (ref 12.3–15.4)
ERYTHROCYTE [DISTWIDTH] IN BLOOD BY AUTOMATED COUNT: 17.6 % (ref 12.3–15.4)
ERYTHROCYTE [DISTWIDTH] IN BLOOD BY AUTOMATED COUNT: 17.9 % (ref 12.3–15.4)
ERYTHROCYTE [DISTWIDTH] IN BLOOD BY AUTOMATED COUNT: 18 % (ref 12.3–15.4)
ERYTHROCYTE [DISTWIDTH] IN BLOOD BY AUTOMATED COUNT: 18 % (ref 12.3–15.4)
GFR SERPL CREATININE-BSD FRML MDRD: 105 ML/MIN/1.73
GFR SERPL CREATININE-BSD FRML MDRD: 109 ML/MIN/1.73
GFR SERPL CREATININE-BSD FRML MDRD: 112 ML/MIN/1.73
GFR SERPL CREATININE-BSD FRML MDRD: 115 ML/MIN/1.73
GFR SERPL CREATININE-BSD FRML MDRD: 117 ML/MIN/1.73
GFR SERPL CREATININE-BSD FRML MDRD: 120 ML/MIN/1.73
GFR SERPL CREATININE-BSD FRML MDRD: 122 ML/MIN/1.73
GFR SERPL CREATININE-BSD FRML MDRD: 128 ML/MIN/1.73
GFR SERPL CREATININE-BSD FRML MDRD: 88 ML/MIN/1.73
GFR SERPL CREATININE-BSD FRML MDRD: 99 ML/MIN/1.73
GLOBULIN SER CALC-MCNC: 2.8 GM/DL
GLOBULIN UR ELPH-MCNC: 2.6 GM/DL (ref 1.8–3.5)
GLOBULIN UR ELPH-MCNC: 3 GM/DL (ref 1.8–3.5)
GLOBULIN UR ELPH-MCNC: 3.1 GM/DL (ref 1.8–3.5)
GLOBULIN UR ELPH-MCNC: 3.1 GM/DL (ref 1.8–3.5)
GLOBULIN UR ELPH-MCNC: 3.2 GM/DL (ref 1.8–3.5)
GLOBULIN UR ELPH-MCNC: 3.3 GM/DL
GLOBULIN UR ELPH-MCNC: 3.3 GM/DL (ref 1.8–3.5)
GLOBULIN UR ELPH-MCNC: 3.4 GM/DL (ref 1.8–3.5)
GLOBULIN UR ELPH-MCNC: 3.5 GM/DL
GLOBULIN UR ELPH-MCNC: 3.5 GM/DL (ref 1.8–3.5)
GLUCOSE BLDC GLUCOMTR-MCNC: 98 MG/DL (ref 70–130)
GLUCOSE SERPL-MCNC: 101 MG/DL (ref 65–99)
GLUCOSE SERPL-MCNC: 102 MG/DL (ref 74–124)
GLUCOSE SERPL-MCNC: 103 MG/DL (ref 74–124)
GLUCOSE SERPL-MCNC: 103 MG/DL (ref 74–124)
GLUCOSE SERPL-MCNC: 105 MG/DL (ref 74–124)
GLUCOSE SERPL-MCNC: 106 MG/DL (ref 74–124)
GLUCOSE SERPL-MCNC: 114 MG/DL (ref 74–124)
GLUCOSE SERPL-MCNC: 115 MG/DL (ref 74–124)
GLUCOSE SERPL-MCNC: 129 MG/DL (ref 74–124)
GLUCOSE SERPL-MCNC: 93 MG/DL (ref 65–99)
GLUCOSE SERPL-MCNC: 96 MG/DL (ref 74–124)
GLUCOSE SERPL-MCNC: 98 MG/DL (ref 65–99)
GLUCOSE SERPL-MCNC: 98 MG/DL (ref 74–124)
GLUCOSE SERPL-MCNC: 98 MG/DL (ref 74–124)
GLUCOSE SERPL-MCNC: 99 MG/DL (ref 74–124)
HCT VFR BLD AUTO: 30.7 % (ref 34–46.6)
HCT VFR BLD AUTO: 30.8 % (ref 34–46.6)
HCT VFR BLD AUTO: 30.9 % (ref 34–46.6)
HCT VFR BLD AUTO: 31.1 % (ref 34–46.6)
HCT VFR BLD AUTO: 31.8 % (ref 34–46.6)
HCT VFR BLD AUTO: 31.8 % (ref 34–46.6)
HCT VFR BLD AUTO: 32.2 % (ref 34–46.6)
HCT VFR BLD AUTO: 33.7 % (ref 34–46.6)
HCT VFR BLD AUTO: 34.6 % (ref 34–46.6)
HCT VFR BLD AUTO: 35.2 % (ref 34–46.6)
HCT VFR BLD AUTO: 35.5 % (ref 34–46.6)
HCT VFR BLD AUTO: 35.8 % (ref 34–46.6)
HCT VFR BLD AUTO: 37.6 % (ref 34–46.6)
HCT VFR BLD AUTO: 37.8 % (ref 34–46.6)
HCT VFR BLD AUTO: 38.1 % (ref 34–46.6)
HCT VFR BLD AUTO: 38.1 % (ref 34–46.6)
HGB BLD-MCNC: 10.1 G/DL (ref 12–15.9)
HGB BLD-MCNC: 10.1 G/DL (ref 12–15.9)
HGB BLD-MCNC: 10.3 G/DL (ref 12–15.9)
HGB BLD-MCNC: 10.3 G/DL (ref 12–15.9)
HGB BLD-MCNC: 10.4 G/DL (ref 12–15.9)
HGB BLD-MCNC: 10.6 G/DL (ref 12–15.9)
HGB BLD-MCNC: 10.8 G/DL (ref 12–15.9)
HGB BLD-MCNC: 10.9 G/DL (ref 12–15.9)
HGB BLD-MCNC: 11.1 G/DL (ref 12–15.9)
HGB BLD-MCNC: 11.7 G/DL (ref 12–15.9)
HGB BLD-MCNC: 11.8 G/DL (ref 12–15.9)
HGB BLD-MCNC: 11.9 G/DL (ref 12–15.9)
HGB BLD-MCNC: 11.9 G/DL (ref 12–15.9)
HGB BLD-MCNC: 12.2 G/DL (ref 12–15.9)
HGB BLD-MCNC: 9.8 G/DL (ref 12–15.9)
HGB BLD-MCNC: 9.9 G/DL (ref 12–15.9)
IMM GRANULOCYTES # BLD AUTO: 0.02 10*3/MM3 (ref 0–0.05)
IMM GRANULOCYTES # BLD AUTO: 0.02 10*3/MM3 (ref 0–0.05)
IMM GRANULOCYTES # BLD AUTO: 0.03 10*3/MM3 (ref 0–0.05)
IMM GRANULOCYTES # BLD AUTO: 0.04 10*3/MM3 (ref 0–0.05)
IMM GRANULOCYTES # BLD AUTO: 0.05 10*3/MM3 (ref 0–0.05)
IMM GRANULOCYTES # BLD AUTO: 0.05 10*3/MM3 (ref 0–0.05)
IMM GRANULOCYTES # BLD AUTO: 0.06 10*3/MM3 (ref 0–0.05)
IMM GRANULOCYTES # BLD AUTO: 0.07 10*3/MM3 (ref 0–0.05)
IMM GRANULOCYTES NFR BLD AUTO: 0.3 % (ref 0–0.5)
IMM GRANULOCYTES NFR BLD AUTO: 0.4 % (ref 0–0.5)
IMM GRANULOCYTES NFR BLD AUTO: 0.4 % (ref 0–0.5)
IMM GRANULOCYTES NFR BLD AUTO: 0.5 % (ref 0–0.5)
IMM GRANULOCYTES NFR BLD AUTO: 0.6 % (ref 0–0.5)
IMM GRANULOCYTES NFR BLD AUTO: 0.7 % (ref 0–0.5)
IMM GRANULOCYTES NFR BLD AUTO: 0.8 % (ref 0–0.5)
IMM GRANULOCYTES NFR BLD AUTO: 0.8 % (ref 0–0.5)
IMM GRANULOCYTES NFR BLD AUTO: 0.9 % (ref 0–0.5)
IMM GRANULOCYTES NFR BLD AUTO: 1 % (ref 0–0.5)
IMM GRANULOCYTES NFR BLD AUTO: 1.2 % (ref 0–0.5)
LAB AP CASE REPORT: NORMAL
LAB AP CASE REPORT: NORMAL
LAB AP CLINICAL INFORMATION: NORMAL
LAB AP DIAGNOSIS COMMENT: NORMAL
LAB AP DIAGNOSIS COMMENT: NORMAL
LAB AP SPECIAL STAINS: NORMAL
LAB AP SYNOPTIC CHECKLIST: NORMAL
LAB AP SYNOPTIC CHECKLIST: NORMAL
LYMPHOCYTES # BLD AUTO: 0.56 10*3/MM3 (ref 0.7–3.1)
LYMPHOCYTES # BLD AUTO: 0.61 10*3/MM3 (ref 0.7–3.1)
LYMPHOCYTES # BLD AUTO: 0.61 10*3/MM3 (ref 0.7–3.1)
LYMPHOCYTES # BLD AUTO: 0.62 10*3/MM3 (ref 0.7–3.1)
LYMPHOCYTES # BLD AUTO: 0.64 10*3/MM3 (ref 0.7–3.1)
LYMPHOCYTES # BLD AUTO: 0.65 10*3/MM3 (ref 0.7–3.1)
LYMPHOCYTES # BLD AUTO: 0.67 10*3/MM3 (ref 0.7–3.1)
LYMPHOCYTES # BLD AUTO: 0.72 10*3/MM3 (ref 0.7–3.1)
LYMPHOCYTES # BLD AUTO: 0.79 10*3/MM3 (ref 0.7–3.1)
LYMPHOCYTES # BLD AUTO: 0.89 10*3/MM3 (ref 0.7–3.1)
LYMPHOCYTES # BLD AUTO: 0.9 10*3/MM3 (ref 0.7–3.1)
LYMPHOCYTES # BLD AUTO: 0.99 10*3/MM3 (ref 0.7–3.1)
LYMPHOCYTES # BLD AUTO: 1 10*3/MM3 (ref 0.7–3.1)
LYMPHOCYTES # BLD AUTO: 1.17 10*3/MM3 (ref 0.7–3.1)
LYMPHOCYTES NFR BLD AUTO: 11 % (ref 19.6–45.3)
LYMPHOCYTES NFR BLD AUTO: 11.2 % (ref 19.6–45.3)
LYMPHOCYTES NFR BLD AUTO: 11.4 % (ref 19.6–45.3)
LYMPHOCYTES NFR BLD AUTO: 11.4 % (ref 19.6–45.3)
LYMPHOCYTES NFR BLD AUTO: 11.8 % (ref 19.6–45.3)
LYMPHOCYTES NFR BLD AUTO: 12.1 % (ref 19.6–45.3)
LYMPHOCYTES NFR BLD AUTO: 12.5 % (ref 19.6–45.3)
LYMPHOCYTES NFR BLD AUTO: 12.5 % (ref 19.6–45.3)
LYMPHOCYTES NFR BLD AUTO: 12.8 % (ref 19.6–45.3)
LYMPHOCYTES NFR BLD AUTO: 13.5 % (ref 19.6–45.3)
LYMPHOCYTES NFR BLD AUTO: 13.7 % (ref 19.6–45.3)
LYMPHOCYTES NFR BLD AUTO: 13.8 % (ref 19.6–45.3)
LYMPHOCYTES NFR BLD AUTO: 13.9 % (ref 19.6–45.3)
LYMPHOCYTES NFR BLD AUTO: 15.6 % (ref 19.6–45.3)
LYMPHOCYTES NFR BLD AUTO: 6 % (ref 19.6–45.3)
LYMPHOCYTES NFR BLD AUTO: 8.5 % (ref 19.6–45.3)
MAGNESIUM SERPL-MCNC: 1.8 MG/DL (ref 1.8–2.5)
MCH RBC QN AUTO: 28.7 PG (ref 26.6–33)
MCH RBC QN AUTO: 29 PG (ref 26.6–33)
MCH RBC QN AUTO: 29 PG (ref 26.6–33)
MCH RBC QN AUTO: 29.2 PG (ref 26.6–33)
MCH RBC QN AUTO: 29.3 PG (ref 26.6–33)
MCH RBC QN AUTO: 29.6 PG (ref 26.6–33)
MCH RBC QN AUTO: 30.1 PG (ref 26.6–33)
MCH RBC QN AUTO: 30.2 PG (ref 26.6–33)
MCH RBC QN AUTO: 30.2 PG (ref 26.6–33)
MCH RBC QN AUTO: 30.3 PG (ref 26.6–33)
MCH RBC QN AUTO: 30.4 PG (ref 26.6–33)
MCH RBC QN AUTO: 30.7 PG (ref 26.6–33)
MCH RBC QN AUTO: 30.8 PG (ref 26.6–33)
MCH RBC QN AUTO: 30.9 PG (ref 26.6–33)
MCHC RBC AUTO-ENTMCNC: 30.6 G/DL (ref 31.5–35.7)
MCHC RBC AUTO-ENTMCNC: 30.7 G/DL (ref 31.5–35.7)
MCHC RBC AUTO-ENTMCNC: 31 G/DL (ref 31.5–35.7)
MCHC RBC AUTO-ENTMCNC: 31.2 G/DL (ref 31.5–35.7)
MCHC RBC AUTO-ENTMCNC: 31.3 G/DL (ref 31.5–35.7)
MCHC RBC AUTO-ENTMCNC: 31.4 G/DL (ref 31.5–35.7)
MCHC RBC AUTO-ENTMCNC: 31.5 G/DL (ref 31.5–35.7)
MCHC RBC AUTO-ENTMCNC: 31.8 G/DL (ref 31.5–35.7)
MCHC RBC AUTO-ENTMCNC: 31.9 G/DL (ref 31.5–35.7)
MCHC RBC AUTO-ENTMCNC: 32 G/DL (ref 31.5–35.7)
MCHC RBC AUTO-ENTMCNC: 32 G/DL (ref 31.5–35.7)
MCHC RBC AUTO-ENTMCNC: 32.4 G/DL (ref 31.5–35.7)
MCHC RBC AUTO-ENTMCNC: 32.4 G/DL (ref 31.5–35.7)
MCHC RBC AUTO-ENTMCNC: 33 G/DL (ref 31.5–35.7)
MCHC RBC AUTO-ENTMCNC: 33.4 G/DL (ref 31.5–35.7)
MCHC RBC AUTO-ENTMCNC: 33.4 G/DL (ref 31.5–35.7)
MCV RBC AUTO: 89.9 FL (ref 79–97)
MCV RBC AUTO: 90 FL (ref 79–97)
MCV RBC AUTO: 90.3 FL (ref 79–97)
MCV RBC AUTO: 90.7 FL (ref 79–97)
MCV RBC AUTO: 92 FL (ref 79–97)
MCV RBC AUTO: 92.6 FL (ref 79–97)
MCV RBC AUTO: 92.7 FL (ref 79–97)
MCV RBC AUTO: 93.3 FL (ref 79–97)
MCV RBC AUTO: 93.3 FL (ref 79–97)
MCV RBC AUTO: 94.5 FL (ref 79–97)
MCV RBC AUTO: 95 FL (ref 79–97)
MCV RBC AUTO: 95.3 FL (ref 79–97)
MCV RBC AUTO: 95.7 FL (ref 79–97)
MCV RBC AUTO: 96 FL (ref 79–97)
MCV RBC AUTO: 97.2 FL (ref 79–97)
MCV RBC AUTO: 97.2 FL (ref 79–97)
MONOCYTES # BLD AUTO: 0.43 10*3/MM3 (ref 0.1–0.9)
MONOCYTES # BLD AUTO: 0.46 10*3/MM3 (ref 0.1–0.9)
MONOCYTES # BLD AUTO: 0.55 10*3/MM3 (ref 0.1–0.9)
MONOCYTES # BLD AUTO: 0.57 10*3/MM3 (ref 0.1–0.9)
MONOCYTES # BLD AUTO: 0.58 10*3/MM3 (ref 0.1–0.9)
MONOCYTES # BLD AUTO: 0.59 10*3/MM3 (ref 0.1–0.9)
MONOCYTES # BLD AUTO: 0.62 10*3/MM3 (ref 0.1–0.9)
MONOCYTES # BLD AUTO: 0.66 10*3/MM3 (ref 0.1–0.9)
MONOCYTES # BLD AUTO: 0.71 10*3/MM3 (ref 0.1–0.9)
MONOCYTES # BLD AUTO: 0.76 10*3/MM3 (ref 0.1–0.9)
MONOCYTES # BLD AUTO: 0.8 10*3/MM3 (ref 0.1–0.9)
MONOCYTES # BLD AUTO: 0.9 10*3/MM3 (ref 0.1–0.9)
MONOCYTES NFR BLD AUTO: 10.7 % (ref 5–12)
MONOCYTES NFR BLD AUTO: 10.7 % (ref 5–12)
MONOCYTES NFR BLD AUTO: 11.4 % (ref 5–12)
MONOCYTES NFR BLD AUTO: 11.5 % (ref 5–12)
MONOCYTES NFR BLD AUTO: 12.1 % (ref 5–12)
MONOCYTES NFR BLD AUTO: 12.3 % (ref 5–12)
MONOCYTES NFR BLD AUTO: 12.7 % (ref 5–12)
MONOCYTES NFR BLD AUTO: 12.9 % (ref 5–12)
MONOCYTES NFR BLD AUTO: 7.1 % (ref 5–12)
MONOCYTES NFR BLD AUTO: 7.8 % (ref 5–12)
MONOCYTES NFR BLD AUTO: 8.3 % (ref 5–12)
MONOCYTES NFR BLD AUTO: 8.5 % (ref 5–12)
MONOCYTES NFR BLD AUTO: 9 % (ref 5–12)
MONOCYTES NFR BLD AUTO: 9.2 % (ref 5–12)
MONOCYTES NFR BLD AUTO: 9.3 % (ref 5–12)
MONOCYTES NFR BLD AUTO: 9.6 % (ref 5–12)
NEUTROPHILS NFR BLD AUTO: 3.23 10*3/MM3 (ref 1.7–7)
NEUTROPHILS NFR BLD AUTO: 3.39 10*3/MM3 (ref 1.7–7)
NEUTROPHILS NFR BLD AUTO: 3.61 10*3/MM3 (ref 1.7–7)
NEUTROPHILS NFR BLD AUTO: 3.65 10*3/MM3 (ref 1.7–7)
NEUTROPHILS NFR BLD AUTO: 3.83 10*3/MM3 (ref 1.7–7)
NEUTROPHILS NFR BLD AUTO: 3.87 10*3/MM3 (ref 1.7–7)
NEUTROPHILS NFR BLD AUTO: 4 10*3/MM3 (ref 1.7–7)
NEUTROPHILS NFR BLD AUTO: 4.1 10*3/MM3 (ref 1.7–7)
NEUTROPHILS NFR BLD AUTO: 5.02 10*3/MM3 (ref 1.7–7)
NEUTROPHILS NFR BLD AUTO: 5.19 10*3/MM3 (ref 1.7–7)
NEUTROPHILS NFR BLD AUTO: 5.39 10*3/MM3 (ref 1.7–7)
NEUTROPHILS NFR BLD AUTO: 5.41 10*3/MM3 (ref 1.7–7)
NEUTROPHILS NFR BLD AUTO: 6.24 10*3/MM3 (ref 1.7–7)
NEUTROPHILS NFR BLD AUTO: 6.43 10*3/MM3 (ref 1.7–7)
NEUTROPHILS NFR BLD AUTO: 6.62 10*3/MM3 (ref 1.7–7)
NEUTROPHILS NFR BLD AUTO: 7.97 10*3/MM3 (ref 1.7–7)
NEUTROPHILS NFR BLD AUTO: 70.9 % (ref 42.7–76)
NEUTROPHILS NFR BLD AUTO: 71.6 % (ref 42.7–76)
NEUTROPHILS NFR BLD AUTO: 72.2 % (ref 42.7–76)
NEUTROPHILS NFR BLD AUTO: 72.8 % (ref 42.7–76)
NEUTROPHILS NFR BLD AUTO: 74.4 % (ref 42.7–76)
NEUTROPHILS NFR BLD AUTO: 74.5 % (ref 42.7–76)
NEUTROPHILS NFR BLD AUTO: 75 % (ref 42.7–76)
NEUTROPHILS NFR BLD AUTO: 75.2 % (ref 42.7–76)
NEUTROPHILS NFR BLD AUTO: 75.5 % (ref 42.7–76)
NEUTROPHILS NFR BLD AUTO: 75.8 % (ref 42.7–76)
NEUTROPHILS NFR BLD AUTO: 77.7 % (ref 42.7–76)
NEUTROPHILS NFR BLD AUTO: 78 % (ref 42.7–76)
NEUTROPHILS NFR BLD AUTO: 78.5 % (ref 42.7–76)
NEUTROPHILS NFR BLD AUTO: 78.7 % (ref 42.7–76)
NEUTROPHILS NFR BLD AUTO: 78.8 % (ref 42.7–76)
NEUTROPHILS NFR BLD AUTO: 85.5 % (ref 42.7–76)
NRBC BLD AUTO-RTO: 0 /100 WBC (ref 0–0.2)
PATH REPORT.ADDENDUM SPEC: NORMAL
PATH REPORT.FINAL DX SPEC: NORMAL
PATH REPORT.FINAL DX SPEC: NORMAL
PATH REPORT.GROSS SPEC: NORMAL
PATH REPORT.GROSS SPEC: NORMAL
PLATELET # BLD AUTO: 299 10*3/MM3 (ref 140–450)
PLATELET # BLD AUTO: 301 10*3/MM3 (ref 140–450)
PLATELET # BLD AUTO: 301 10*3/MM3 (ref 140–450)
PLATELET # BLD AUTO: 309 10*3/MM3 (ref 140–450)
PLATELET # BLD AUTO: 313 10*3/MM3 (ref 140–450)
PLATELET # BLD AUTO: 313 10*3/MM3 (ref 140–450)
PLATELET # BLD AUTO: 314 10*3/MM3 (ref 140–450)
PLATELET # BLD AUTO: 327 10*3/MM3 (ref 140–450)
PLATELET # BLD AUTO: 337 10*3/MM3 (ref 140–450)
PLATELET # BLD AUTO: 338 10*3/MM3 (ref 140–450)
PLATELET # BLD AUTO: 348 10*3/MM3 (ref 140–450)
PLATELET # BLD AUTO: 350 10*3/MM3 (ref 140–450)
PLATELET # BLD AUTO: 353 10*3/MM3 (ref 140–450)
PLATELET # BLD AUTO: 358 10*3/MM3 (ref 140–450)
PLATELET # BLD AUTO: 370 10*3/MM3 (ref 140–450)
PLATELET # BLD AUTO: 380 10*3/MM3 (ref 140–450)
PMV BLD AUTO: 8.4 FL (ref 6–12)
PMV BLD AUTO: 8.5 FL (ref 6–12)
PMV BLD AUTO: 8.6 FL (ref 6–12)
PMV BLD AUTO: 8.6 FL (ref 6–12)
PMV BLD AUTO: 8.7 FL (ref 6–12)
PMV BLD AUTO: 8.9 FL (ref 6–12)
PMV BLD AUTO: 9 FL (ref 6–12)
PMV BLD AUTO: 9 FL (ref 6–12)
PMV BLD AUTO: 9.1 FL (ref 6–12)
PMV BLD AUTO: 9.2 FL (ref 6–12)
POTASSIUM SERPL-SCNC: 3.7 MMOL/L (ref 3.5–4.7)
POTASSIUM SERPL-SCNC: 4 MMOL/L (ref 3.5–4.7)
POTASSIUM SERPL-SCNC: 4 MMOL/L (ref 3.5–4.7)
POTASSIUM SERPL-SCNC: 4.1 MMOL/L (ref 3.5–4.7)
POTASSIUM SERPL-SCNC: 4.1 MMOL/L (ref 3.5–5.2)
POTASSIUM SERPL-SCNC: 4.2 MMOL/L (ref 3.5–4.7)
POTASSIUM SERPL-SCNC: 4.2 MMOL/L (ref 3.5–4.7)
POTASSIUM SERPL-SCNC: 4.3 MMOL/L (ref 3.5–5.2)
POTASSIUM SERPL-SCNC: 4.4 MMOL/L (ref 3.5–4.7)
POTASSIUM SERPL-SCNC: 4.4 MMOL/L (ref 3.5–4.7)
POTASSIUM SERPL-SCNC: 4.4 MMOL/L (ref 3.5–5.2)
POTASSIUM SERPL-SCNC: 4.8 MMOL/L (ref 3.5–4.7)
PROT SERPL-MCNC: 6.1 G/DL (ref 6.3–8)
PROT SERPL-MCNC: 6.9 G/DL (ref 6.3–8)
PROT SERPL-MCNC: 7 G/DL (ref 6.3–8)
PROT SERPL-MCNC: 7 G/DL (ref 6–8.5)
PROT SERPL-MCNC: 7.1 G/DL (ref 6.3–8)
PROT SERPL-MCNC: 7.1 G/DL (ref 6.3–8)
PROT SERPL-MCNC: 7.2 G/DL (ref 6.3–8)
PROT SERPL-MCNC: 7.3 G/DL (ref 6–8.5)
PROT SERPL-MCNC: 7.3 G/DL (ref 6–8.5)
PROT SERPL-MCNC: 7.6 G/DL (ref 6.3–8)
QT INTERVAL: 335 MS
RBC # BLD AUTO: 3.22 10*6/MM3 (ref 3.77–5.28)
RBC # BLD AUTO: 3.23 10*6/MM3 (ref 3.77–5.28)
RBC # BLD AUTO: 3.27 10*6/MM3 (ref 3.77–5.28)
RBC # BLD AUTO: 3.41 10*6/MM3 (ref 3.77–5.28)
RBC # BLD AUTO: 3.41 10*6/MM3 (ref 3.77–5.28)
RBC # BLD AUTO: 3.43 10*6/MM3 (ref 3.77–5.28)
RBC # BLD AUTO: 3.45 10*6/MM3 (ref 3.77–5.28)
RBC # BLD AUTO: 3.51 10*6/MM3 (ref 3.77–5.28)
RBC # BLD AUTO: 3.62 10*6/MM3 (ref 3.77–5.28)
RBC # BLD AUTO: 3.66 10*6/MM3 (ref 3.77–5.28)
RBC # BLD AUTO: 3.83 10*6/MM3 (ref 3.77–5.28)
RBC # BLD AUTO: 3.98 10*6/MM3 (ref 3.77–5.28)
RBC # BLD AUTO: 4.01 10*6/MM3 (ref 3.77–5.28)
RBC # BLD AUTO: 4.08 10*6/MM3 (ref 3.77–5.28)
RBC # BLD AUTO: 4.14 10*6/MM3 (ref 3.77–5.28)
RBC # BLD AUTO: 4.18 10*6/MM3 (ref 3.77–5.28)
SARS-COV-2 ORF1AB RESP QL NAA+PROBE: NOT DETECTED
SODIUM SERPL-SCNC: 135 MMOL/L (ref 134–145)
SODIUM SERPL-SCNC: 137 MMOL/L (ref 134–145)
SODIUM SERPL-SCNC: 138 MMOL/L (ref 134–145)
SODIUM SERPL-SCNC: 138 MMOL/L (ref 136–145)
SODIUM SERPL-SCNC: 139 MMOL/L (ref 134–145)
SODIUM SERPL-SCNC: 139 MMOL/L (ref 136–145)
SODIUM SERPL-SCNC: 139 MMOL/L (ref 136–145)
SODIUM SERPL-SCNC: 140 MMOL/L (ref 134–145)
SODIUM SERPL-SCNC: 140 MMOL/L (ref 134–145)
SODIUM SERPL-SCNC: 142 MMOL/L (ref 134–145)
SODIUM SERPL-SCNC: 143 MMOL/L (ref 134–145)
T3FREE SERPL-MCNC: 3.8 PG/ML (ref 2–4.4)
T4 FREE SERPL-MCNC: 1.22 NG/DL (ref 0.93–1.7)
TSH SERPL DL<=0.005 MIU/L-ACNC: 0.57 UIU/ML (ref 0.27–4.2)
WBC # BLD AUTO: 4.51 10*3/MM3 (ref 3.4–10.8)
WBC # BLD AUTO: 4.7 10*3/MM3 (ref 3.4–10.8)
WBC # BLD AUTO: 4.9 10*3/MM3 (ref 3.4–10.8)
WBC # BLD AUTO: 5.14 10*3/MM3 (ref 3.4–10.8)
WBC # BLD AUTO: 5.78 10*3/MM3 (ref 3.4–10.8)
WBC # BLD AUTO: 6.43 10*3/MM3 (ref 3.4–10.8)
WBC # BLD AUTO: 6.96 10*3/MM3 (ref 3.4–10.8)
WBC # BLD AUTO: 7.12 10*3/MM3 (ref 3.4–10.8)
WBC # BLD AUTO: 7.14 10*3/MM3 (ref 3.4–10.8)
WBC # BLD AUTO: 8.44 10*3/MM3 (ref 3.4–10.8)
WBC # BLD AUTO: 8.57 10*3/MM3 (ref 3.4–10.8)
WBC NRBC COR # BLD: 4.77 10*3/MM3 (ref 3.4–10.8)
WBC NRBC COR # BLD: 5.08 10*3/MM3 (ref 3.4–10.8)
WBC NRBC COR # BLD: 5.14 10*3/MM3 (ref 3.4–10.8)
WBC NRBC COR # BLD: 7.91 10*3/MM3 (ref 3.4–10.8)
WBC NRBC COR # BLD: 9.32 10*3/MM3 (ref 3.4–10.8)

## 2021-01-01 PROCEDURE — 93005 ELECTROCARDIOGRAM TRACING: CPT

## 2021-01-01 PROCEDURE — 77080 DXA BONE DENSITY AXIAL: CPT

## 2021-01-01 PROCEDURE — 99214 OFFICE O/P EST MOD 30 MIN: CPT | Performed by: INTERNAL MEDICINE

## 2021-01-01 PROCEDURE — 82565 ASSAY OF CREATININE: CPT

## 2021-01-01 PROCEDURE — 85025 COMPLETE CBC W/AUTO DIFF WBC: CPT

## 2021-01-01 PROCEDURE — 96375 TX/PRO/DX INJ NEW DRUG ADDON: CPT

## 2021-01-01 PROCEDURE — 88360 TUMOR IMMUNOHISTOCHEM/MANUAL: CPT | Performed by: INTERNAL MEDICINE

## 2021-01-01 PROCEDURE — 25010000002 PACLITAXEL PER 1 MG: Performed by: INTERNAL MEDICINE

## 2021-01-01 PROCEDURE — 80053 COMPREHEN METABOLIC PANEL: CPT

## 2021-01-01 PROCEDURE — 25010000002 DEXAMETHASONE PER 1 MG: Performed by: INTERNAL MEDICINE

## 2021-01-01 PROCEDURE — 25010000002 PALONOSETRON PER 25 MCG: Performed by: INTERNAL MEDICINE

## 2021-01-01 PROCEDURE — 94799 UNLISTED PULMONARY SVC/PX: CPT

## 2021-01-01 PROCEDURE — 96413 CHEMO IV INFUSION 1 HR: CPT

## 2021-01-01 PROCEDURE — 25010000003 CEFAZOLIN IN DEXTROSE 2-4 GM/100ML-% SOLUTION: Performed by: ANESTHESIOLOGY

## 2021-01-01 PROCEDURE — 63710000001 METHIMAZOLE 5 MG TABLET: Performed by: SURGERY

## 2021-01-01 PROCEDURE — 82962 GLUCOSE BLOOD TEST: CPT

## 2021-01-01 PROCEDURE — 88302 TISSUE EXAM BY PATHOLOGIST: CPT | Performed by: SURGERY

## 2021-01-01 PROCEDURE — 80053 COMPREHEN METABOLIC PANEL: CPT | Performed by: INTERNAL MEDICINE

## 2021-01-01 PROCEDURE — 96367 TX/PROPH/DG ADDL SEQ IV INF: CPT

## 2021-01-01 PROCEDURE — 25010000002 DEXAMETHASONE SODIUM PHOSPHATE 100 MG/10ML SOLUTION: Performed by: INTERNAL MEDICINE

## 2021-01-01 PROCEDURE — 88342 IMHCHEM/IMCYTCHM 1ST ANTB: CPT | Performed by: SURGERY

## 2021-01-01 PROCEDURE — 99214 OFFICE O/P EST MOD 30 MIN: CPT | Performed by: FAMILY MEDICINE

## 2021-01-01 PROCEDURE — 78815 PET IMAGE W/CT SKULL-THIGH: CPT

## 2021-01-01 PROCEDURE — 99024 POSTOP FOLLOW-UP VISIT: CPT | Performed by: SURGERY

## 2021-01-01 PROCEDURE — 77065 DX MAMMO INCL CAD UNI: CPT

## 2021-01-01 PROCEDURE — 25010000002 CARBOPLATIN PER 50 MG: Performed by: INTERNAL MEDICINE

## 2021-01-01 PROCEDURE — 99215 OFFICE O/P EST HI 40 MIN: CPT | Performed by: NURSE PRACTITIONER

## 2021-01-01 PROCEDURE — 25010000002 CARBOPLATIN PER 50 MG: Performed by: NURSE PRACTITIONER

## 2021-01-01 PROCEDURE — 25010000002 DIPHENHYDRAMINE PER 50 MG: Performed by: INTERNAL MEDICINE

## 2021-01-01 PROCEDURE — 63710000001 BUDESONIDE-FORMOTEROL 160-4.5 MCG/ACT AEROSOL 6 G INHALER: Performed by: SURGERY

## 2021-01-01 PROCEDURE — 36415 COLL VENOUS BLD VENIPUNCTURE: CPT

## 2021-01-01 PROCEDURE — 88360 TUMOR IMMUNOHISTOCHEM/MANUAL: CPT | Performed by: SURGERY

## 2021-01-01 PROCEDURE — 99214 OFFICE O/P EST MOD 30 MIN: CPT | Performed by: NURSE PRACTITIONER

## 2021-01-01 PROCEDURE — 25010000003 LIDOCAINE 1 % SOLUTION: Performed by: SURGERY

## 2021-01-01 PROCEDURE — 25010000002 PACLITAXEL PER 1 MG: Performed by: NURSE PRACTITIONER

## 2021-01-01 PROCEDURE — 96417 CHEMO IV INFUS EACH ADDL SEQ: CPT

## 2021-01-01 PROCEDURE — 63710000001 PROMETHAZINE PER 12.5 MG: Performed by: SURGERY

## 2021-01-01 PROCEDURE — A9270 NON-COVERED ITEM OR SERVICE: HCPCS | Performed by: SURGERY

## 2021-01-01 PROCEDURE — 76642 ULTRASOUND BREAST LIMITED: CPT

## 2021-01-01 PROCEDURE — 88309 TISSUE EXAM BY PATHOLOGIST: CPT | Performed by: SURGERY

## 2021-01-01 PROCEDURE — 19303 MAST SIMPLE COMPLETE: CPT | Performed by: PHYSICIAN ASSISTANT

## 2021-01-01 PROCEDURE — 88342 IMHCHEM/IMCYTCHM 1ST ANTB: CPT | Performed by: INTERNAL MEDICINE

## 2021-01-01 PROCEDURE — A4648 IMPLANTABLE TISSUE MARKER: HCPCS

## 2021-01-01 PROCEDURE — G0279 TOMOSYNTHESIS, MAMMO: HCPCS

## 2021-01-01 PROCEDURE — 94640 AIRWAY INHALATION TREATMENT: CPT

## 2021-01-01 PROCEDURE — 99215 OFFICE O/P EST HI 40 MIN: CPT | Performed by: SURGERY

## 2021-01-01 PROCEDURE — 63710000001 DILTIAZEM CD 120 MG CAPSULE SUSTAINED-RELEASE 24 HR: Performed by: SURGERY

## 2021-01-01 PROCEDURE — 63710000001 MONTELUKAST 10 MG TABLET: Performed by: SURGERY

## 2021-01-01 PROCEDURE — G0180 MD CERTIFICATION HHA PATIENT: HCPCS | Performed by: SURGERY

## 2021-01-01 PROCEDURE — 25010000002 DROPERIDOL PER 5 MG: Performed by: ANESTHESIOLOGY

## 2021-01-01 PROCEDURE — 25010000002 CEFAZOLIN 1-4 GM/50ML-% SOLUTION: Performed by: SURGERY

## 2021-01-01 PROCEDURE — C1889 IMPLANT/INSERT DEVICE, NOC: HCPCS | Performed by: SURGERY

## 2021-01-01 PROCEDURE — U0004 COV-19 TEST NON-CDC HGH THRU: HCPCS | Performed by: NURSE PRACTITIONER

## 2021-01-01 PROCEDURE — 38900 IO MAP OF SENT LYMPH NODE: CPT | Performed by: SURGERY

## 2021-01-01 PROCEDURE — 25010000002 DEXAMETHASONE PER 1 MG: Performed by: NURSE PRACTITIONER

## 2021-01-01 PROCEDURE — 25010000002 ONDANSETRON PER 1 MG: Performed by: ANESTHESIOLOGY

## 2021-01-01 PROCEDURE — U0005 INFEC AGEN DETEC AMPLI PROBE: HCPCS | Performed by: NURSE PRACTITIONER

## 2021-01-01 PROCEDURE — 25010000003 CEFAZOLIN IN DEXTROSE 2-4 GM/100ML-% SOLUTION: Performed by: SURGERY

## 2021-01-01 PROCEDURE — 71046 X-RAY EXAM CHEST 2 VIEWS: CPT

## 2021-01-01 PROCEDURE — 0 FLUDEOXYGLUCOSE F18 SOLUTION: Performed by: INTERNAL MEDICINE

## 2021-01-01 PROCEDURE — 25010000002 PROPOFOL 10 MG/ML EMULSION: Performed by: ANESTHESIOLOGY

## 2021-01-01 PROCEDURE — 71260 CT THORAX DX C+: CPT

## 2021-01-01 PROCEDURE — 25010000002 FENTANYL CITRATE (PF) 50 MCG/ML SOLUTION: Performed by: ANESTHESIOLOGY

## 2021-01-01 PROCEDURE — 38525 BIOPSY/REMOVAL LYMPH NODES: CPT | Performed by: SURGERY

## 2021-01-01 PROCEDURE — 63710000001 ACETAMINOPHEN 325 MG TABLET: Performed by: SURGERY

## 2021-01-01 PROCEDURE — 88341 IMHCHEM/IMCYTCHM EA ADD ANTB: CPT | Performed by: INTERNAL MEDICINE

## 2021-01-01 PROCEDURE — 83735 ASSAY OF MAGNESIUM: CPT | Performed by: NURSE PRACTITIONER

## 2021-01-01 PROCEDURE — 25010000002 IOPAMIDOL 61 % SOLUTION: Performed by: INTERNAL MEDICINE

## 2021-01-01 PROCEDURE — 63710000001 TIOTROPIUM BROMIDE MONOHYDRATE 2.5 MCG/ACT AEROSOL SOLUTION 4 G INHALER: Performed by: SURGERY

## 2021-01-01 PROCEDURE — 63710000001 FLUTICASONE 50 MCG/ACT SUSPENSION 16 G BOTTLE: Performed by: SURGERY

## 2021-01-01 PROCEDURE — 88305 TISSUE EXAM BY PATHOLOGIST: CPT | Performed by: INTERNAL MEDICINE

## 2021-01-01 PROCEDURE — 25010000003 LIDOCAINE 1 % SOLUTION: Performed by: RADIOLOGY

## 2021-01-01 PROCEDURE — 19303 MAST SIMPLE COMPLETE: CPT | Performed by: SURGERY

## 2021-01-01 PROCEDURE — A9552 F18 FDG: HCPCS | Performed by: INTERNAL MEDICINE

## 2021-01-01 PROCEDURE — 93010 ELECTROCARDIOGRAM REPORT: CPT | Performed by: INTERNAL MEDICINE

## 2021-01-01 PROCEDURE — C9803 HOPD COVID-19 SPEC COLLECT: HCPCS | Performed by: NURSE PRACTITIONER

## 2021-01-01 PROCEDURE — 63710000001 DIGOXIN 125 MCG TABLET: Performed by: SURGERY

## 2021-01-01 DEVICE — HORIZON TI MED 6/CART
Type: IMPLANTABLE DEVICE | Site: BREAST | Status: FUNCTIONAL
Brand: WECK

## 2021-01-01 DEVICE — HORIZON TI ML 6 CLIPS/CART
Type: IMPLANTABLE DEVICE | Site: BREAST | Status: FUNCTIONAL
Brand: WECK

## 2021-01-01 RX ORDER — PALONOSETRON 0.05 MG/ML
0.25 INJECTION, SOLUTION INTRAVENOUS ONCE
Status: CANCELLED | OUTPATIENT
Start: 2021-01-01

## 2021-01-01 RX ORDER — DIPHENHYDRAMINE HYDROCHLORIDE 50 MG/ML
50 INJECTION INTRAMUSCULAR; INTRAVENOUS AS NEEDED
Status: CANCELLED | OUTPATIENT
Start: 2021-01-01

## 2021-01-01 RX ORDER — SODIUM CHLORIDE 9 MG/ML
250 INJECTION, SOLUTION INTRAVENOUS ONCE
Status: COMPLETED | OUTPATIENT
Start: 2021-01-01 | End: 2021-01-01

## 2021-01-01 RX ORDER — DIGOXIN 125 MCG
125 TABLET ORAL
Status: DISCONTINUED | OUTPATIENT
Start: 2021-01-01 | End: 2021-01-01 | Stop reason: HOSPADM

## 2021-01-01 RX ORDER — PALONOSETRON 0.05 MG/ML
0.25 INJECTION, SOLUTION INTRAVENOUS ONCE
Status: COMPLETED | OUTPATIENT
Start: 2021-01-01 | End: 2021-01-01

## 2021-01-01 RX ORDER — TIOTROPIUM BROMIDE INHALATION SPRAY 3.12 UG/1
SPRAY, METERED RESPIRATORY (INHALATION)
Qty: 4 EACH | Refills: 11 | Status: SHIPPED | OUTPATIENT
Start: 2021-01-01

## 2021-01-01 RX ORDER — FAMOTIDINE 10 MG/ML
20 INJECTION, SOLUTION INTRAVENOUS AS NEEDED
Status: CANCELLED | OUTPATIENT
Start: 2021-01-01

## 2021-01-01 RX ORDER — FAMOTIDINE 10 MG/ML
20 INJECTION, SOLUTION INTRAVENOUS
Status: COMPLETED | OUTPATIENT
Start: 2021-01-01 | End: 2021-01-01

## 2021-01-01 RX ORDER — SODIUM CHLORIDE 9 MG/ML
250 INJECTION, SOLUTION INTRAVENOUS ONCE
Status: CANCELLED | OUTPATIENT
Start: 2021-01-01

## 2021-01-01 RX ORDER — PROMETHAZINE HYDROCHLORIDE 25 MG/1
25 TABLET ORAL ONCE AS NEEDED
Status: DISCONTINUED | OUTPATIENT
Start: 2021-01-01 | End: 2021-01-01 | Stop reason: HOSPADM

## 2021-01-01 RX ORDER — ALBUTEROL SULFATE 90 UG/1
2 AEROSOL, METERED RESPIRATORY (INHALATION) EVERY 4 HOURS PRN
Status: DISCONTINUED | OUTPATIENT
Start: 2021-01-01 | End: 2021-01-01 | Stop reason: HOSPADM

## 2021-01-01 RX ORDER — BUPIVACAINE HYDROCHLORIDE AND EPINEPHRINE 5; 5 MG/ML; UG/ML
INJECTION, SOLUTION EPIDURAL; INTRACAUDAL; PERINEURAL AS NEEDED
Status: DISCONTINUED | OUTPATIENT
Start: 2021-01-01 | End: 2021-01-01 | Stop reason: HOSPADM

## 2021-01-01 RX ORDER — FAMOTIDINE 10 MG/ML
20 INJECTION, SOLUTION INTRAVENOUS ONCE
Status: CANCELLED | OUTPATIENT
Start: 2021-01-01

## 2021-01-01 RX ORDER — DILTIAZEM HYDROCHLORIDE 120 MG/1
120 CAPSULE, COATED, EXTENDED RELEASE ORAL DAILY
Qty: 90 CAPSULE | Refills: 3 | Status: SHIPPED | OUTPATIENT
Start: 2021-01-01 | End: 2022-01-01 | Stop reason: HOSPADM

## 2021-01-01 RX ORDER — HYDROCODONE BITARTRATE AND ACETAMINOPHEN 5; 325 MG/1; MG/1
1 TABLET ORAL ONCE AS NEEDED
Status: DISCONTINUED | OUTPATIENT
Start: 2021-01-01 | End: 2021-01-01 | Stop reason: HOSPADM

## 2021-01-01 RX ORDER — OXYCODONE HCL 10 MG/1
10 TABLET, FILM COATED, EXTENDED RELEASE ORAL ONCE
Status: CANCELLED | OUTPATIENT
Start: 2021-01-01 | End: 2021-01-01

## 2021-01-01 RX ORDER — FAMOTIDINE 10 MG/ML
20 INJECTION, SOLUTION INTRAVENOUS ONCE
Status: COMPLETED | OUTPATIENT
Start: 2021-01-01 | End: 2021-01-01

## 2021-01-01 RX ORDER — SODIUM CHLORIDE, SODIUM LACTATE, POTASSIUM CHLORIDE, CALCIUM CHLORIDE 600; 310; 30; 20 MG/100ML; MG/100ML; MG/100ML; MG/100ML
INJECTION, SOLUTION INTRAVENOUS CONTINUOUS PRN
Status: DISCONTINUED | OUTPATIENT
Start: 2021-01-01 | End: 2021-01-01 | Stop reason: SURG

## 2021-01-01 RX ORDER — METHIMAZOLE 5 MG/1
7.5 TABLET ORAL DAILY
Status: DISCONTINUED | OUTPATIENT
Start: 2021-01-01 | End: 2021-01-01 | Stop reason: HOSPADM

## 2021-01-01 RX ORDER — ISOSULFAN BLUE 50 MG/5ML
INJECTION, SOLUTION SUBCUTANEOUS AS NEEDED
Status: DISCONTINUED | OUTPATIENT
Start: 2021-01-01 | End: 2021-01-01 | Stop reason: HOSPADM

## 2021-01-01 RX ORDER — SODIUM CHLORIDE 0.9 % (FLUSH) 0.9 %
10 SYRINGE (ML) INJECTION AS NEEDED
Status: DISCONTINUED | OUTPATIENT
Start: 2021-01-01 | End: 2021-01-01 | Stop reason: HOSPADM

## 2021-01-01 RX ORDER — HYDRALAZINE HYDROCHLORIDE 20 MG/ML
5 INJECTION INTRAMUSCULAR; INTRAVENOUS
Status: DISCONTINUED | OUTPATIENT
Start: 2021-01-01 | End: 2021-01-01 | Stop reason: HOSPADM

## 2021-01-01 RX ORDER — ACETAMINOPHEN 650 MG/1
650 SUPPOSITORY RECTAL EVERY 4 HOURS PRN
Status: DISCONTINUED | OUTPATIENT
Start: 2021-01-01 | End: 2021-01-01 | Stop reason: HOSPADM

## 2021-01-01 RX ORDER — DROPERIDOL 2.5 MG/ML
0.62 INJECTION, SOLUTION INTRAMUSCULAR; INTRAVENOUS ONCE AS NEEDED
Status: COMPLETED | OUTPATIENT
Start: 2021-01-01 | End: 2021-01-01

## 2021-01-01 RX ORDER — LANOLIN ALCOHOL/MO/W.PET/CERES
1000 CREAM (GRAM) TOPICAL EVERY OTHER DAY
COMMUNITY
End: 2021-01-01

## 2021-01-01 RX ORDER — OXYCODONE HCL 10 MG/1
10 TABLET, FILM COATED, EXTENDED RELEASE ORAL ONCE
Status: COMPLETED | OUTPATIENT
Start: 2021-01-01 | End: 2021-01-01

## 2021-01-01 RX ORDER — CASTOR OIL AND BALSAM, PERU 788; 87 MG/G; MG/G
OINTMENT TOPICAL EVERY 12 HOURS SCHEDULED
Status: DISCONTINUED | OUTPATIENT
Start: 2021-01-01 | End: 2021-01-01 | Stop reason: HOSPADM

## 2021-01-01 RX ORDER — SODIUM CHLORIDE 0.9 % (FLUSH) 0.9 %
10 SYRINGE (ML) INJECTION EVERY 12 HOURS SCHEDULED
Status: DISCONTINUED | OUTPATIENT
Start: 2021-01-01 | End: 2021-01-01 | Stop reason: HOSPADM

## 2021-01-01 RX ORDER — AMOXICILLIN 250 MG
2 CAPSULE ORAL DAILY
Qty: 60 TABLET | Refills: 2 | Status: SHIPPED | OUTPATIENT
Start: 2021-01-01

## 2021-01-01 RX ORDER — NALOXONE HCL 0.4 MG/ML
0.4 VIAL (ML) INJECTION AS NEEDED
Status: DISCONTINUED | OUTPATIENT
Start: 2021-01-01 | End: 2021-01-01 | Stop reason: HOSPADM

## 2021-01-01 RX ORDER — CEFAZOLIN SODIUM 2 G/100ML
2 INJECTION, SOLUTION INTRAVENOUS ONCE
Status: CANCELLED | OUTPATIENT
Start: 2021-01-01 | End: 2021-01-01

## 2021-01-01 RX ORDER — DIGOXIN 125 MCG
TABLET ORAL
Qty: 90 TABLET | Refills: 0 | Status: SHIPPED | OUTPATIENT
Start: 2021-01-01 | End: 2021-01-01 | Stop reason: SDUPTHER

## 2021-01-01 RX ORDER — DIPHENHYDRAMINE HYDROCHLORIDE 50 MG/ML
12.5 INJECTION INTRAMUSCULAR; INTRAVENOUS
Status: DISCONTINUED | OUTPATIENT
Start: 2021-01-01 | End: 2021-01-01 | Stop reason: HOSPADM

## 2021-01-01 RX ORDER — DILTIAZEM HYDROCHLORIDE 120 MG/1
120 CAPSULE, COATED, EXTENDED RELEASE ORAL DAILY
Status: DISCONTINUED | OUTPATIENT
Start: 2021-01-01 | End: 2021-01-01 | Stop reason: HOSPADM

## 2021-01-01 RX ORDER — ACETAMINOPHEN 500 MG
1000 TABLET ORAL ONCE
Status: CANCELLED | OUTPATIENT
Start: 2021-01-01 | End: 2021-01-01

## 2021-01-01 RX ORDER — DILTIAZEM HYDROCHLORIDE 120 MG/1
CAPSULE, EXTENDED RELEASE ORAL
Qty: 90 CAPSULE | Refills: 3 | OUTPATIENT
Start: 2021-01-01

## 2021-01-01 RX ORDER — DEXTROSE, SODIUM CHLORIDE, AND POTASSIUM CHLORIDE 5; .45; .15 G/100ML; G/100ML; G/100ML
50 INJECTION INTRAVENOUS CONTINUOUS
Status: DISCONTINUED | OUTPATIENT
Start: 2021-01-01 | End: 2021-01-01 | Stop reason: HOSPADM

## 2021-01-01 RX ORDER — PROMETHAZINE HYDROCHLORIDE 12.5 MG/1
12.5 SUPPOSITORY RECTAL EVERY 6 HOURS PRN
Status: DISCONTINUED | OUTPATIENT
Start: 2021-01-01 | End: 2021-01-01 | Stop reason: HOSPADM

## 2021-01-01 RX ORDER — FLUTICASONE PROPIONATE 50 MCG
2 SPRAY, SUSPENSION (ML) NASAL DAILY
Status: DISCONTINUED | OUTPATIENT
Start: 2021-01-01 | End: 2021-01-01 | Stop reason: HOSPADM

## 2021-01-01 RX ORDER — CEFAZOLIN SODIUM 2 G/100ML
INJECTION, SOLUTION INTRAVENOUS AS NEEDED
Status: DISCONTINUED | OUTPATIENT
Start: 2021-01-01 | End: 2021-01-01 | Stop reason: SURG

## 2021-01-01 RX ORDER — NALBUPHINE HCL 10 MG/ML
10 AMPUL (ML) INJECTION EVERY 4 HOURS PRN
Status: DISCONTINUED | OUTPATIENT
Start: 2021-01-01 | End: 2021-01-01 | Stop reason: HOSPADM

## 2021-01-01 RX ORDER — MONTELUKAST SODIUM 10 MG/1
TABLET ORAL
Qty: 90 TABLET | Refills: 1 | OUTPATIENT
Start: 2021-01-01

## 2021-01-01 RX ORDER — NALOXONE HCL 0.4 MG/ML
0.1 VIAL (ML) INJECTION
Status: DISCONTINUED | OUTPATIENT
Start: 2021-01-01 | End: 2021-01-01 | Stop reason: HOSPADM

## 2021-01-01 RX ORDER — SODIUM CHLORIDE, SODIUM LACTATE, POTASSIUM CHLORIDE, CALCIUM CHLORIDE 600; 310; 30; 20 MG/100ML; MG/100ML; MG/100ML; MG/100ML
9 INJECTION, SOLUTION INTRAVENOUS CONTINUOUS PRN
Status: DISCONTINUED | OUTPATIENT
Start: 2021-01-01 | End: 2021-01-01 | Stop reason: HOSPADM

## 2021-01-01 RX ORDER — CASTOR OIL AND BALSAM, PERU 788; 87 MG/G; MG/G
OINTMENT TOPICAL
Qty: 30 G | Refills: 2 | Status: SHIPPED | OUTPATIENT
Start: 2021-01-01 | End: 2021-01-01

## 2021-01-01 RX ORDER — ONDANSETRON HYDROCHLORIDE 8 MG/1
8 TABLET, FILM COATED ORAL 3 TIMES DAILY PRN
Qty: 30 TABLET | Refills: 3 | Status: SHIPPED | OUTPATIENT
Start: 2021-01-01 | End: 2021-01-01

## 2021-01-01 RX ORDER — PROPOFOL 10 MG/ML
VIAL (ML) INTRAVENOUS CONTINUOUS PRN
Status: DISCONTINUED | OUTPATIENT
Start: 2021-01-01 | End: 2021-01-01 | Stop reason: SURG

## 2021-01-01 RX ORDER — SODIUM CHLORIDE 0.9 % (FLUSH) 0.9 %
10 SYRINGE (ML) INJECTION AS NEEDED
Status: CANCELLED | OUTPATIENT
Start: 2021-01-01

## 2021-01-01 RX ORDER — FLUTICASONE PROPIONATE 50 MCG
SPRAY, SUSPENSION (ML) NASAL
Qty: 48 ML | Refills: 3 | Status: SHIPPED | OUTPATIENT
Start: 2021-01-01

## 2021-01-01 RX ORDER — MONTELUKAST SODIUM 10 MG/1
10 TABLET ORAL DAILY
Status: DISCONTINUED | OUTPATIENT
Start: 2021-01-01 | End: 2021-01-01 | Stop reason: HOSPADM

## 2021-01-01 RX ORDER — LIDOCAINE HYDROCHLORIDE AND EPINEPHRINE 10; 10 MG/ML; UG/ML
10 INJECTION, SOLUTION INFILTRATION; PERINEURAL ONCE
Status: COMPLETED | OUTPATIENT
Start: 2021-01-01 | End: 2021-01-01

## 2021-01-01 RX ORDER — DIGOXIN 125 MCG
TABLET ORAL
Qty: 90 TABLET | Refills: 1 | Status: SHIPPED | OUTPATIENT
Start: 2021-01-01

## 2021-01-01 RX ORDER — PROMETHAZINE HYDROCHLORIDE 12.5 MG/1
12.5 TABLET ORAL EVERY 6 HOURS PRN
Status: DISCONTINUED | OUTPATIENT
Start: 2021-01-01 | End: 2021-01-01 | Stop reason: HOSPADM

## 2021-01-01 RX ORDER — MIDAZOLAM HYDROCHLORIDE 1 MG/ML
0.5 INJECTION INTRAMUSCULAR; INTRAVENOUS
Status: DISCONTINUED | OUTPATIENT
Start: 2021-01-01 | End: 2021-01-01 | Stop reason: HOSPADM

## 2021-01-01 RX ORDER — MONTELUKAST SODIUM 10 MG/1
TABLET ORAL
Qty: 90 TABLET | Refills: 1 | Status: SHIPPED | OUTPATIENT
Start: 2021-01-01

## 2021-01-01 RX ORDER — SODIUM CHLORIDE 0.9 % (FLUSH) 0.9 %
10 SYRINGE (ML) INJECTION EVERY 12 HOURS SCHEDULED
Status: CANCELLED | OUTPATIENT
Start: 2021-01-01

## 2021-01-01 RX ORDER — FENTANYL CITRATE 50 UG/ML
INJECTION, SOLUTION INTRAMUSCULAR; INTRAVENOUS AS NEEDED
Status: DISCONTINUED | OUTPATIENT
Start: 2021-01-01 | End: 2021-01-01 | Stop reason: SURG

## 2021-01-01 RX ORDER — DIGOXIN 125 MCG
TABLET ORAL
Qty: 90 TABLET | Refills: 0 | Status: SHIPPED | OUTPATIENT
Start: 2021-01-01 | End: 2021-01-01

## 2021-01-01 RX ORDER — NALBUPHINE HCL 10 MG/ML
2 AMPUL (ML) INJECTION EVERY 4 HOURS PRN
Status: DISCONTINUED | OUTPATIENT
Start: 2021-01-01 | End: 2021-01-01 | Stop reason: HOSPADM

## 2021-01-01 RX ORDER — BUDESONIDE AND FORMOTEROL FUMARATE DIHYDRATE 160; 4.5 UG/1; UG/1
2 AEROSOL RESPIRATORY (INHALATION)
Qty: 10.2 G | Refills: 11 | Status: SHIPPED | OUTPATIENT
Start: 2021-01-01

## 2021-01-01 RX ORDER — LIDOCAINE HYDROCHLORIDE 10 MG/ML
10 INJECTION, SOLUTION INFILTRATION; PERINEURAL ONCE
Status: COMPLETED | OUTPATIENT
Start: 2021-01-01 | End: 2021-01-01

## 2021-01-01 RX ORDER — BUDESONIDE AND FORMOTEROL FUMARATE DIHYDRATE 160; 4.5 UG/1; UG/1
2 AEROSOL RESPIRATORY (INHALATION)
Status: DISCONTINUED | OUTPATIENT
Start: 2021-01-01 | End: 2021-01-01 | Stop reason: HOSPADM

## 2021-01-01 RX ORDER — ONDANSETRON 4 MG/1
4 TABLET, FILM COATED ORAL EVERY 6 HOURS PRN
Status: DISCONTINUED | OUTPATIENT
Start: 2021-01-01 | End: 2021-01-01 | Stop reason: HOSPADM

## 2021-01-01 RX ORDER — HYDROMORPHONE HYDROCHLORIDE 1 MG/ML
0.5 INJECTION, SOLUTION INTRAMUSCULAR; INTRAVENOUS; SUBCUTANEOUS
Status: DISCONTINUED | OUTPATIENT
Start: 2021-01-01 | End: 2021-01-01 | Stop reason: HOSPADM

## 2021-01-01 RX ORDER — HYDROCODONE BITARTRATE AND ACETAMINOPHEN 5; 325 MG/1; MG/1
1 TABLET ORAL EVERY 4 HOURS PRN
Status: DISCONTINUED | OUTPATIENT
Start: 2021-01-01 | End: 2021-01-01 | Stop reason: HOSPADM

## 2021-01-01 RX ORDER — ONDANSETRON 2 MG/ML
4 INJECTION INTRAMUSCULAR; INTRAVENOUS ONCE AS NEEDED
Status: COMPLETED | OUTPATIENT
Start: 2021-01-01 | End: 2021-01-01

## 2021-01-01 RX ORDER — HYDROCODONE BITARTRATE AND ACETAMINOPHEN 10; 325 MG/1; MG/1
1 TABLET ORAL EVERY 4 HOURS PRN
Status: DISCONTINUED | OUTPATIENT
Start: 2021-01-01 | End: 2021-01-01 | Stop reason: HOSPADM

## 2021-01-01 RX ORDER — MAGNESIUM HYDROXIDE 1200 MG/15ML
LIQUID ORAL AS NEEDED
Status: DISCONTINUED | OUTPATIENT
Start: 2021-01-01 | End: 2021-01-01 | Stop reason: HOSPADM

## 2021-01-01 RX ORDER — CEFAZOLIN SODIUM 1 G/50ML
1 INJECTION, SOLUTION INTRAVENOUS EVERY 8 HOURS
Status: COMPLETED | OUTPATIENT
Start: 2021-01-01 | End: 2021-01-01

## 2021-01-01 RX ORDER — ACETAMINOPHEN 325 MG/1
650 TABLET ORAL EVERY 4 HOURS PRN
Status: DISCONTINUED | OUTPATIENT
Start: 2021-01-01 | End: 2021-01-01 | Stop reason: HOSPADM

## 2021-01-01 RX ORDER — ALBUTEROL SULFATE 90 UG/1
AEROSOL, METERED RESPIRATORY (INHALATION)
Qty: 8.5 G | Refills: 10 | Status: SHIPPED | OUTPATIENT
Start: 2021-01-01 | End: 2022-01-01 | Stop reason: SDUPTHER

## 2021-01-01 RX ORDER — CEFAZOLIN SODIUM 2 G/100ML
2 INJECTION, SOLUTION INTRAVENOUS ONCE
Status: COMPLETED | OUTPATIENT
Start: 2021-01-01 | End: 2021-01-01

## 2021-01-01 RX ORDER — ACYCLOVIR 400 MG/1
400 TABLET ORAL 2 TIMES DAILY PRN
COMMUNITY

## 2021-01-01 RX ORDER — LIDOCAINE HYDROCHLORIDE 10 MG/ML
INJECTION, SOLUTION INFILTRATION; PERINEURAL AS NEEDED
Status: DISCONTINUED | OUTPATIENT
Start: 2021-01-01 | End: 2021-01-01 | Stop reason: HOSPADM

## 2021-01-01 RX ORDER — FENTANYL CITRATE 50 UG/ML
25 INJECTION, SOLUTION INTRAMUSCULAR; INTRAVENOUS
Status: DISCONTINUED | OUTPATIENT
Start: 2021-01-01 | End: 2021-01-01 | Stop reason: HOSPADM

## 2021-01-01 RX ORDER — ACETAMINOPHEN 500 MG
1000 TABLET ORAL ONCE
Status: COMPLETED | OUTPATIENT
Start: 2021-01-01 | End: 2021-01-01

## 2021-01-01 RX ORDER — ONDANSETRON 2 MG/ML
4 INJECTION INTRAMUSCULAR; INTRAVENOUS EVERY 6 HOURS PRN
Status: DISCONTINUED | OUTPATIENT
Start: 2021-01-01 | End: 2021-01-01 | Stop reason: HOSPADM

## 2021-01-01 RX ORDER — METHIMAZOLE 5 MG/1
7.5 TABLET ORAL DAILY
Qty: 135 TABLET | Refills: 2 | Status: SHIPPED | OUTPATIENT
Start: 2021-01-01 | End: 2022-01-01

## 2021-01-01 RX ORDER — BUPIVACAINE HYDROCHLORIDE AND EPINEPHRINE 5; 5 MG/ML; UG/ML
INJECTION, SOLUTION EPIDURAL; INTRACAUDAL; PERINEURAL
Status: COMPLETED | OUTPATIENT
Start: 2021-01-01 | End: 2021-01-01

## 2021-01-01 RX ORDER — MONTELUKAST SODIUM 10 MG/1
TABLET ORAL
Qty: 90 TABLET | Refills: 1 | Status: SHIPPED | OUTPATIENT
Start: 2021-01-01 | End: 2021-01-01 | Stop reason: SDUPTHER

## 2021-01-01 RX ADMIN — DROPERIDOL 0.62 MG: 2.5 INJECTION, SOLUTION INTRAMUSCULAR; INTRAVENOUS at 10:56

## 2021-01-01 RX ADMIN — PALONOSETRON 0.25 MG: 0.05 INJECTION, SOLUTION INTRAVENOUS at 12:34

## 2021-01-01 RX ADMIN — PALONOSETRON 0.25 MG: 0.05 INJECTION, SOLUTION INTRAVENOUS at 09:31

## 2021-01-01 RX ADMIN — PALONOSETRON 0.25 MG: 0.05 INJECTION, SOLUTION INTRAVENOUS at 14:04

## 2021-01-01 RX ADMIN — FAMOTIDINE 20 MG: 10 INJECTION INTRAVENOUS at 07:50

## 2021-01-01 RX ADMIN — DIPHENHYDRAMINE HYDROCHLORIDE 25 MG: 50 INJECTION, SOLUTION INTRAMUSCULAR; INTRAVENOUS at 13:54

## 2021-01-01 RX ADMIN — PROMETHAZINE HYDROCHLORIDE 12.5 MG: 12.5 TABLET ORAL at 12:11

## 2021-01-01 RX ADMIN — PALONOSETRON 0.25 MG: 0.05 INJECTION, SOLUTION INTRAVENOUS at 11:47

## 2021-01-01 RX ADMIN — ACETAMINOPHEN 650 MG: 325 TABLET, FILM COATED ORAL at 22:45

## 2021-01-01 RX ADMIN — CARBOPLATIN 120 MG: 10 INJECTION INTRAVENOUS at 14:21

## 2021-01-01 RX ADMIN — PALONOSETRON 0.25 MG: 0.05 INJECTION, SOLUTION INTRAVENOUS at 14:53

## 2021-01-01 RX ADMIN — OXYCODONE HYDROCHLORIDE 10 MG: 10 TABLET, FILM COATED, EXTENDED RELEASE ORAL at 07:25

## 2021-01-01 RX ADMIN — CARBOPLATIN 100 MG: 10 INJECTION INTRAVENOUS at 15:08

## 2021-01-01 RX ADMIN — DIPHENHYDRAMINE HYDROCHLORIDE 50 MG: 50 INJECTION, SOLUTION INTRAMUSCULAR; INTRAVENOUS at 09:33

## 2021-01-01 RX ADMIN — FENTANYL CITRATE 25 MCG: 50 INJECTION INTRAMUSCULAR; INTRAVENOUS at 08:29

## 2021-01-01 RX ADMIN — CARBOPLATIN 120 MG: 10 INJECTION INTRAVENOUS at 11:45

## 2021-01-01 RX ADMIN — CARBOPLATIN 110 MG: 10 INJECTION INTRAVENOUS at 14:05

## 2021-01-01 RX ADMIN — LIDOCAINE HYDROCHLORIDE 2 ML: 10 INJECTION, SOLUTION INFILTRATION; PERINEURAL at 14:52

## 2021-01-01 RX ADMIN — FLUDEOXYGLUCOSE F18 1 DOSE: 300 INJECTION INTRAVENOUS at 09:46

## 2021-01-01 RX ADMIN — SODIUM CHLORIDE 250 ML: 9 INJECTION, SOLUTION INTRAVENOUS at 11:47

## 2021-01-01 RX ADMIN — CARBOPLATIN 130 MG: 10 INJECTION INTRAVENOUS at 16:54

## 2021-01-01 RX ADMIN — FAMOTIDINE 20 MG: 10 INJECTION INTRAVENOUS at 09:30

## 2021-01-01 RX ADMIN — FAMOTIDINE 20 MG: 10 INJECTION INTRAVENOUS at 11:47

## 2021-01-01 RX ADMIN — SODIUM CHLORIDE 250 ML: 9 INJECTION, SOLUTION INTRAVENOUS at 12:34

## 2021-01-01 RX ADMIN — FAMOTIDINE 20 MG: 10 INJECTION INTRAVENOUS at 11:56

## 2021-01-01 RX ADMIN — CEFAZOLIN SODIUM 2 G: 2 INJECTION, SOLUTION INTRAVENOUS at 07:49

## 2021-01-01 RX ADMIN — METHIMAZOLE 7.5 MG: 5 TABLET ORAL at 07:58

## 2021-01-01 RX ADMIN — PACLITAXEL 80 MG: 6 INJECTION, SOLUTION INTRAVENOUS at 13:15

## 2021-01-01 RX ADMIN — CEFAZOLIN SODIUM 1 G: 1 INJECTION, SOLUTION INTRAVENOUS at 22:26

## 2021-01-01 RX ADMIN — FAMOTIDINE 20 MG: 10 INJECTION INTRAVENOUS at 13:13

## 2021-01-01 RX ADMIN — MONTELUKAST SODIUM 10 MG: 10 TABLET, FILM COATED ORAL at 07:58

## 2021-01-01 RX ADMIN — DIGOXIN 125 MCG: 125 TABLET ORAL at 11:30

## 2021-01-01 RX ADMIN — FENTANYL CITRATE 25 MCG: 50 INJECTION INTRAMUSCULAR; INTRAVENOUS at 08:20

## 2021-01-01 RX ADMIN — PACLITAXEL 80 MG: 6 INJECTION, SOLUTION INTRAVENOUS at 15:16

## 2021-01-01 RX ADMIN — PALONOSETRON 0.25 MG: 0.05 INJECTION, SOLUTION INTRAVENOUS at 13:11

## 2021-01-01 RX ADMIN — CARBOPLATIN 110 MG: 10 INJECTION INTRAVENOUS at 14:45

## 2021-01-01 RX ADMIN — PACLITAXEL 80 MG: 6 INJECTION, SOLUTION INTRAVENOUS at 10:45

## 2021-01-01 RX ADMIN — PALONOSETRON 0.25 MG: 0.05 INJECTION, SOLUTION INTRAVENOUS at 14:13

## 2021-01-01 RX ADMIN — DIPHENHYDRAMINE HYDROCHLORIDE 25 MG: 50 INJECTION, SOLUTION INTRAMUSCULAR; INTRAVENOUS at 12:12

## 2021-01-01 RX ADMIN — DIPHENHYDRAMINE HYDROCHLORIDE 25 MG: 50 INJECTION, SOLUTION INTRAMUSCULAR; INTRAVENOUS at 12:19

## 2021-01-01 RX ADMIN — FAMOTIDINE 20 MG: 10 INJECTION INTRAVENOUS at 13:14

## 2021-01-01 RX ADMIN — DEXAMETHASONE SODIUM PHOSPHATE 10 MG: 10 INJECTION, SOLUTION INTRAMUSCULAR; INTRAVENOUS at 14:54

## 2021-01-01 RX ADMIN — SODIUM CHLORIDE 250 ML: 9 INJECTION, SOLUTION INTRAVENOUS at 14:08

## 2021-01-01 RX ADMIN — DILTIAZEM HYDROCHLORIDE 120 MG: 120 CAPSULE, COATED, EXTENDED RELEASE ORAL at 07:57

## 2021-01-01 RX ADMIN — PALONOSETRON 0.25 MG: 0.05 INJECTION, SOLUTION INTRAVENOUS at 12:36

## 2021-01-01 RX ADMIN — IOPAMIDOL 75 ML: 612 INJECTION, SOLUTION INTRAVENOUS at 14:07

## 2021-01-01 RX ADMIN — FAMOTIDINE 20 MG: 10 INJECTION INTRAVENOUS at 14:53

## 2021-01-01 RX ADMIN — SODIUM CHLORIDE 250 ML: 9 INJECTION, SOLUTION INTRAVENOUS at 13:30

## 2021-01-01 RX ADMIN — SODIUM CHLORIDE 250 ML: 9 INJECTION, SOLUTION INTRAVENOUS at 14:52

## 2021-01-01 RX ADMIN — PACLITAXEL 80 MG: 6 INJECTION, SOLUTION INTRAVENOUS at 15:42

## 2021-01-01 RX ADMIN — BUDESONIDE AND FORMOTEROL FUMARATE DIHYDRATE 2 PUFF: 160; 4.5 AEROSOL RESPIRATORY (INHALATION) at 19:47

## 2021-01-01 RX ADMIN — SODIUM CHLORIDE, POTASSIUM CHLORIDE, SODIUM LACTATE AND CALCIUM CHLORIDE 9 ML/HR: 600; 310; 30; 20 INJECTION, SOLUTION INTRAVENOUS at 07:29

## 2021-01-01 RX ADMIN — PALONOSETRON 0.25 MG: 0.05 INJECTION, SOLUTION INTRAVENOUS at 14:55

## 2021-01-01 RX ADMIN — CARBOPLATIN 130 MG: 10 INJECTION INTRAVENOUS at 13:54

## 2021-01-01 RX ADMIN — DIPHENHYDRAMINE HYDROCHLORIDE 25 MG: 50 INJECTION, SOLUTION INTRAMUSCULAR; INTRAVENOUS at 15:07

## 2021-01-01 RX ADMIN — DIPHENHYDRAMINE HYDROCHLORIDE 25 MG: 50 INJECTION, SOLUTION INTRAMUSCULAR; INTRAVENOUS at 14:14

## 2021-01-01 RX ADMIN — FAMOTIDINE 20 MG: 10 INJECTION INTRAVENOUS at 12:10

## 2021-01-01 RX ADMIN — SODIUM CHLORIDE 250 ML: 9 INJECTION, SOLUTION INTRAVENOUS at 11:56

## 2021-01-01 RX ADMIN — SODIUM CHLORIDE 250 ML: 9 INJECTION, SOLUTION INTRAVENOUS at 14:37

## 2021-01-01 RX ADMIN — BUDESONIDE AND FORMOTEROL FUMARATE DIHYDRATE 2 PUFF: 160; 4.5 AEROSOL RESPIRATORY (INHALATION) at 08:35

## 2021-01-01 RX ADMIN — TIOTROPIUM BROMIDE INHALATION SPRAY 2 PUFF: 3.12 SPRAY, METERED RESPIRATORY (INHALATION) at 08:36

## 2021-01-01 RX ADMIN — PACLITAXEL 80 MG: 6 INJECTION, SOLUTION INTRAVENOUS at 14:04

## 2021-01-01 RX ADMIN — CARBOPLATIN 130 MG: 10 INJECTION INTRAVENOUS at 15:10

## 2021-01-01 RX ADMIN — PALONOSETRON 0.25 MG: 0.05 INJECTION, SOLUTION INTRAVENOUS at 12:10

## 2021-01-01 RX ADMIN — FAMOTIDINE 20 MG: 10 INJECTION INTRAVENOUS at 14:12

## 2021-01-01 RX ADMIN — PACLITAXEL 80 MG: 6 INJECTION, SOLUTION INTRAVENOUS at 15:24

## 2021-01-01 RX ADMIN — FENTANYL CITRATE 25 MCG: 50 INJECTION INTRAMUSCULAR; INTRAVENOUS at 08:24

## 2021-01-01 RX ADMIN — CARBOPLATIN 120 MG: 10 INJECTION INTRAVENOUS at 16:42

## 2021-01-01 RX ADMIN — FAMOTIDINE 20 MG: 10 INJECTION INTRAVENOUS at 13:38

## 2021-01-01 RX ADMIN — PACLITAXEL 80 MG: 6 INJECTION, SOLUTION INTRAVENOUS at 15:49

## 2021-01-01 RX ADMIN — CARBOPLATIN 130 MG: 10 INJECTION INTRAVENOUS at 16:22

## 2021-01-01 RX ADMIN — LIDOCAINE HYDROCHLORIDE,EPINEPHRINE BITARTRATE 10 ML: 10; .01 INJECTION, SOLUTION INFILTRATION; PERINEURAL at 14:53

## 2021-01-01 RX ADMIN — SODIUM CHLORIDE 250 ML: 9 INJECTION, SOLUTION INTRAVENOUS at 09:30

## 2021-01-01 RX ADMIN — CARBOPLATIN 120 MG: 10 INJECTION INTRAVENOUS at 15:46

## 2021-01-01 RX ADMIN — PACLITAXEL 80 MG: 6 INJECTION, SOLUTION INTRAVENOUS at 13:42

## 2021-01-01 RX ADMIN — SODIUM CHLORIDE 250 ML: 9 INJECTION, SOLUTION INTRAVENOUS at 12:14

## 2021-01-01 RX ADMIN — PALONOSETRON 0.25 MG: 0.05 INJECTION, SOLUTION INTRAVENOUS at 13:55

## 2021-01-01 RX ADMIN — PACLITAXEL 80 MG: 6 INJECTION, SOLUTION INTRAVENOUS at 14:43

## 2021-01-01 RX ADMIN — PACLITAXEL 80 MG: 6 INJECTION, SOLUTION INTRAVENOUS at 12:55

## 2021-01-01 RX ADMIN — FLUTICASONE PROPIONATE 2 SPRAY: 50 SPRAY, METERED NASAL at 08:00

## 2021-01-01 RX ADMIN — DIPHENHYDRAMINE HYDROCHLORIDE 25 MG: 50 INJECTION, SOLUTION INTRAMUSCULAR; INTRAVENOUS at 14:39

## 2021-01-01 RX ADMIN — ONDANSETRON 4 MG: 2 INJECTION INTRAMUSCULAR; INTRAVENOUS at 10:25

## 2021-01-01 RX ADMIN — DIPHENHYDRAMINE HYDROCHLORIDE 25 MG: 50 INJECTION, SOLUTION INTRAMUSCULAR; INTRAVENOUS at 13:16

## 2021-01-01 RX ADMIN — PROPOFOL 100 MCG/KG/MIN: 10 INJECTION, EMULSION INTRAVENOUS at 08:16

## 2021-01-01 RX ADMIN — POTASSIUM CHLORIDE, DEXTROSE MONOHYDRATE AND SODIUM CHLORIDE 50 ML/HR: 150; 5; 450 INJECTION, SOLUTION INTRAVENOUS at 12:13

## 2021-01-01 RX ADMIN — ACETAMINOPHEN 1000 MG: 500 TABLET, FILM COATED ORAL at 07:25

## 2021-01-01 RX ADMIN — DEXAMETHASONE SODIUM PHOSPHATE 10 MG: 10 INJECTION INTRAMUSCULAR; INTRAVENOUS at 11:47

## 2021-01-01 RX ADMIN — DIPHENHYDRAMINE HYDROCHLORIDE 25 MG: 50 INJECTION, SOLUTION INTRAMUSCULAR; INTRAVENOUS at 12:36

## 2021-01-01 RX ADMIN — PACLITAXEL 80 MG: 6 INJECTION, SOLUTION INTRAVENOUS at 14:02

## 2021-01-01 RX ADMIN — DEXAMETHASONE SODIUM PHOSPHATE 10 MG: 10 INJECTION INTRAMUSCULAR; INTRAVENOUS at 11:56

## 2021-01-01 RX ADMIN — SODIUM CHLORIDE 250 ML: 9 INJECTION, SOLUTION INTRAVENOUS at 13:14

## 2021-01-01 RX ADMIN — DIPHENHYDRAMINE HYDROCHLORIDE 25 MG: 50 INJECTION, SOLUTION INTRAMUSCULAR; INTRAVENOUS at 13:14

## 2021-01-01 RX ADMIN — FAMOTIDINE 20 MG: 10 INJECTION INTRAVENOUS at 14:39

## 2021-01-01 RX ADMIN — CEFAZOLIN SODIUM 2 G: 2 INJECTION, SOLUTION INTRAVENOUS at 08:27

## 2021-01-01 RX ADMIN — DIPHENHYDRAMINE HYDROCHLORIDE 25 MG: 50 INJECTION, SOLUTION INTRAMUSCULAR; INTRAVENOUS at 12:04

## 2021-01-01 RX ADMIN — SODIUM CHLORIDE 250 ML: 9 INJECTION, SOLUTION INTRAVENOUS at 13:11

## 2021-01-01 RX ADMIN — DEXAMETHASONE SODIUM PHOSPHATE 10 MG: 10 INJECTION, SOLUTION INTRAMUSCULAR; INTRAVENOUS at 14:06

## 2021-01-01 RX ADMIN — SODIUM CHLORIDE, POTASSIUM CHLORIDE, SODIUM LACTATE AND CALCIUM CHLORIDE: 600; 310; 30; 20 INJECTION, SOLUTION INTRAVENOUS at 08:03

## 2021-01-01 RX ADMIN — FAMOTIDINE 20 MG: 10 INJECTION INTRAVENOUS at 12:35

## 2021-01-01 RX ADMIN — CARBOPLATIN 120 MG: 10 INJECTION INTRAVENOUS at 16:27

## 2021-01-01 RX ADMIN — FAMOTIDINE 20 MG: 10 INJECTION INTRAVENOUS at 14:06

## 2021-01-01 RX ADMIN — DEXAMETHASONE SODIUM PHOSPHATE 12 MG: 10 INJECTION, SOLUTION INTRAMUSCULAR; INTRAVENOUS at 09:49

## 2021-01-01 RX ADMIN — PACLITAXEL 80 MG: 6 INJECTION, SOLUTION INTRAVENOUS at 13:02

## 2021-01-01 RX ADMIN — CEFAZOLIN SODIUM 1 G: 1 INJECTION, SOLUTION INTRAVENOUS at 14:33

## 2021-01-01 RX ADMIN — PALONOSETRON HYDROCHLORIDE 0.25 MG: 0.25 INJECTION INTRAVENOUS at 13:15

## 2021-01-01 RX ADMIN — BUPIVACAINE HYDROCHLORIDE AND EPINEPHRINE BITARTRATE 30 ML: 5; .005 INJECTION, SOLUTION EPIDURAL; INTRACAUDAL; PERINEURAL at 08:36

## 2021-01-01 RX ADMIN — DIPHENHYDRAMINE HYDROCHLORIDE 25 MG: 50 INJECTION, SOLUTION INTRAMUSCULAR; INTRAVENOUS at 14:27

## 2021-01-01 RX ADMIN — DEXAMETHASONE SODIUM PHOSPHATE 10 MG: 10 INJECTION INTRAMUSCULAR; INTRAVENOUS at 13:37

## 2021-01-01 RX ADMIN — DEXAMETHASONE SODIUM PHOSPHATE 10 MG: 10 INJECTION INTRAMUSCULAR; INTRAVENOUS at 14:55

## 2021-01-01 RX ADMIN — FENTANYL CITRATE 25 MCG: 50 INJECTION INTRAMUSCULAR; INTRAVENOUS at 08:15

## 2021-01-01 RX ADMIN — SODIUM CHLORIDE 250 ML: 9 INJECTION, SOLUTION INTRAVENOUS at 14:03

## 2021-01-07 ENCOUNTER — OFFICE VISIT (OUTPATIENT)
Dept: CARDIOLOGY | Facility: CLINIC | Age: 76
End: 2021-01-07

## 2021-01-07 VITALS
BODY MASS INDEX: 18.65 KG/M2 | HEIGHT: 60 IN | SYSTOLIC BLOOD PRESSURE: 123 MMHG | HEART RATE: 97 BPM | DIASTOLIC BLOOD PRESSURE: 69 MMHG | WEIGHT: 95 LBS | OXYGEN SATURATION: 98 %

## 2021-01-07 DIAGNOSIS — I10 ESSENTIAL HYPERTENSION: ICD-10-CM

## 2021-01-07 DIAGNOSIS — I34.0 NONRHEUMATIC MITRAL VALVE REGURGITATION: ICD-10-CM

## 2021-01-07 DIAGNOSIS — I47.1 MULTIFOCAL ATRIAL TACHYCARDIA (HCC): Primary | ICD-10-CM

## 2021-01-07 PROCEDURE — 99441 PR PHYS/QHP TELEPHONE EVALUATION 5-10 MIN: CPT | Performed by: INTERNAL MEDICINE

## 2021-01-07 NOTE — PROGRESS NOTES
Subjective:     Encounter Date:01/07/2021      Patient ID: Maryam Arredondo is a 75 y.o. female.    Chief Complaint: Palpitations/hypertension.  History of Present Illness    75-year-old female who presents today for reevaluation.  Patient is doing well.  She still has some lung issues.  Unfortunately she had some issues with her Cardizem when she went to get it refilled they did not have the 180s so she was dropped down to 120.  I first was talking to her did not sound like it is much of an issue but she does say she notices her heart rates a little higher from time to time  ROS    Procedures       Objective:     Physical Exam     A phone visit  Lab Review:       Assessment:          Diagnosis Plan   1. Multifocal atrial tachycardia (CMS/HCC)     2. Nonrheumatic mitral valve regurgitation     3. Essential hypertension            Plan:       1.  Multifocal atrial tachycardia secondary to severe emphysema.  Her Cardizem was decreased on the 120.  I do agree with her primary care physician that I would not thought been a big deal but she does notice that her heart is racing more and with that she says she just does not feel like she used to be.  I think at this time I would continue the same dosage however I told her the next time she gets her medicine refilled I would go back up to the 180 mg.  In the meantime if she starts feeling worse I told her to contact my office and I will go up on her medications applied is double what she has.  2.  Hypertension despite decreasing her medication her blood pressures been running good.  3.  COPD  4.  Follow-up 1 year sooner course if she has issues.    This patient has consented to a telehealth visit via phone. The visit was scheduled as a phone visit to comply with patient safety concerns in accordance with CDC recommendations.  All vitals recorded within this visit are reported by the patient.  I spent  5 minutes spent in direct conversation with this patient.

## 2021-01-12 ENCOUNTER — TELEPHONE (OUTPATIENT)
Dept: FAMILY MEDICINE CLINIC | Facility: CLINIC | Age: 76
End: 2021-01-12

## 2021-01-12 DIAGNOSIS — J30.9 ALLERGIC RHINITIS, UNSPECIFIED SEASONALITY, UNSPECIFIED TRIGGER: ICD-10-CM

## 2021-01-12 RX ORDER — MONTELUKAST SODIUM 10 MG/1
TABLET ORAL
Qty: 90 TABLET | Refills: 2 | Status: SHIPPED | OUTPATIENT
Start: 2021-01-12 | End: 2021-01-01

## 2021-01-12 RX ORDER — BUDESONIDE AND FORMOTEROL FUMARATE DIHYDRATE 160; 4.5 UG/1; UG/1
AEROSOL RESPIRATORY (INHALATION)
Qty: 10.2 G | Refills: 10 | Status: SHIPPED | OUTPATIENT
Start: 2021-01-12 | End: 2021-01-01

## 2021-01-12 NOTE — TELEPHONE ENCOUNTER
Tell her when she gets refills is when they send over the preapproval. We have to wait until they are due and the pharmacy will send them over to us.

## 2021-01-12 NOTE — TELEPHONE ENCOUNTER
Caller: Maryam Arredondo    Relationship to patient: Self    Best call back number: [phone]    Patient is needing: PATIENT IS NEEDING PRE APPROVAL FROM DR BROWN FOR MEDICATIONS SYMBICORT AND SINGULAIR FOR 2021 THAT WAY WHEN SHE CALLS MEDICATIONS IN FOR A REFILL, SHE WILL BE ABLE TO REFILL THEM WITH NO PROBLEM. HER MEDICARE NUMBER TO PRE APPROVE THEM [phone].     PLEASE ADVISE

## 2021-01-13 ENCOUNTER — TELEPHONE (OUTPATIENT)
Dept: FAMILY MEDICINE CLINIC | Facility: CLINIC | Age: 76
End: 2021-01-13

## 2021-01-13 ENCOUNTER — PATIENT MESSAGE (OUTPATIENT)
Dept: FAMILY MEDICINE CLINIC | Facility: CLINIC | Age: 76
End: 2021-01-13

## 2021-01-13 NOTE — TELEPHONE ENCOUNTER
Patient called in wanting to know if the office received the PA form for the Symbicort and Singulair from [facility]?    Please call back to advise @ [phone]

## 2021-02-05 RX ORDER — DIGOXIN 125 MCG
TABLET ORAL
Qty: 90 TABLET | Refills: 1 | Status: SHIPPED | OUTPATIENT
Start: 2021-02-05 | End: 2021-01-01

## 2021-02-09 ENCOUNTER — HOSPITAL ENCOUNTER (OUTPATIENT)
Dept: PET IMAGING | Facility: HOSPITAL | Age: 76
Discharge: HOME | End: 2021-02-09
Admitting: RADIOLOGY

## 2021-02-09 DIAGNOSIS — C34.90 RECURRENT NON-SMALL CELL LUNG CANCER (HCC): ICD-10-CM

## 2021-02-09 LAB — CREAT BLDA-MCNC: 0.6 MG/DL (ref 0.6–1.3)

## 2021-02-09 PROCEDURE — 25010000002 IOPAMIDOL 61 % SOLUTION: Performed by: RADIOLOGY

## 2021-02-09 PROCEDURE — 71260 CT THORAX DX C+: CPT

## 2021-02-09 PROCEDURE — 82565 ASSAY OF CREATININE: CPT

## 2021-02-09 RX ADMIN — IOPAMIDOL 75 ML: 612 INJECTION, SOLUTION INTRAVENOUS at 10:51

## 2021-02-11 RX ORDER — METHIMAZOLE 5 MG/1
TABLET ORAL
Qty: 135 TABLET | Refills: 0 | Status: SHIPPED | OUTPATIENT
Start: 2021-02-11 | End: 2021-01-01 | Stop reason: SDUPTHER

## 2021-02-12 ENCOUNTER — OFFICE VISIT (OUTPATIENT)
Dept: RADIATION ONCOLOGY | Facility: HOSPITAL | Age: 76
End: 2021-02-12

## 2021-02-12 DIAGNOSIS — C34.12 MALIGNANT NEOPLASM OF UPPER LOBE OF LEFT LUNG (HCC): Primary | ICD-10-CM

## 2021-02-12 PROCEDURE — 99423 OL DIG E/M SVC 21+ MIN: CPT | Performed by: RADIOLOGY

## 2021-02-12 NOTE — PROGRESS NOTES
DIAGNOSIS and REASON FOR FOLLOW UP: Malignant neoplasm of upper lobe of left lung (CMS/HCC)  You have chosen to receive care through a telephone visit. Do you consent to use a telephone visit for your medical care today? Yes (consent also obtained by RN prior to visit)    CHIEF COMPLAINT:  Post-radiation follow up  I had the pleasure of speaking with Maryam Arredondo  during a televisit today, now approximately 12 months out from her most recent course of radiation therapy  for the above mentioned diagnosis.    Clinically she is doing wonderfully well.  She states she feels very good.  She denies any change in her shortness of breath or cough.  She denies any change in her performance status.    she completed a CT of the chest on February 9, 2021 which showed an interval increase in the anterior left upper lobe pleural-based mass now measuring 4.1 x 2.2 x 1.8 cm, increased from 3.6 x 2.2 x 1.8 cm.  The remainder of the chest was stable, with no new nodules or lymphadenopathy.    Performance Status:  (1) Restricted in physically strenuous activity, ambulatory and able to do work of light nature  Subjective   Past Medical History: she  has a past medical history of Abnormal color of sputum, Acromioclavicular joint arthritis, Acute maxillary sinusitis, Acute upper respiratory infection, Allergic rhinitis, Anemia, Asthma, B12 deficiency, Benign essential hypertension, Breast cancer (CMS/HCC), Cerumen impaction, COPD (chronic obstructive pulmonary disease) (CMS/HCC), COPD exacerbation (CMS/HCC), Diverticulosis of colon, Cedric-Barr infection, Fever, Heart disease, High risk medication use, Hospital discharge follow-up, Hyperthyroidism, Hypoxia, Laryngitis, Leg wound, left, Medicare annual wellness visit, subsequent, Mitral regurgitation, Multifocal atrial tachycardia (CMS/HCC), Nocturnal hypoxia, Non-small cell lung cancer (CMS/HCC), Onychomycosis of toenail, Oral aphthae, Osteopenia, Other fatigue, Oxygen dependent,  Pneumonia of upper lobe of lung, Pneumothorax, Post-menopausal, Skin abrasion, SOB (shortness of breath), Supraventricular tachycardia (CMS/HCC), Tachycardia, Thyroid nodule, Toxic multinodular goiter, Vitamin B deficiency, Vitamin D deficiency, and Wound healing well on examination.    Past Surgical History:  she has a past surgical history that includes Breast lumpectomy (Right); Mastectomy (Right, 2004); Colonoscopy (04/2017); Neck surgery; Hysterectomy; Vascular surgery; Breast lumpectomy; Cardiac catheterization; and Colonoscopy (N/A, 4/28/2017).    Meds:    Current Outpatient Medications:   •  albuterol sulfate  (90 Base) MCG/ACT inhaler, Inhale 2 puffs Every 4 (Four) Hours As Needed for Wheezing. Per MD - until current episode is resolved, Disp: 1 inhaler, Rfl: 11  •  Cholecalciferol (VITAMIN D3) 2000 UNITS tablet, Take 1 tablet by mouth daily., Disp: , Rfl:   •  digoxin (LANOXIN) 125 MCG tablet, TAKE ONE TABLET BY MOUTH DAILY, Disp: 90 tablet, Rfl: 1  •  dilTIAZem CD (Cartia XT) 120 MG 24 hr capsule, Take 1 capsule by mouth Daily., Disp: 90 capsule, Rfl: 3  •  fluticasone (FLONASE) 50 MCG/ACT nasal spray, 2 sprays into the nostril(s) as directed by provider 2 (Two) Times a Day., Disp: 48 g, Rfl: 3  •  ipratropium (ATROVENT) 0.06 % nasal spray, 2 sprays into the nostril(s) as directed by provider 3 (Three) Times a Day., Disp: 15 mL, Rfl: 12  •  methIMAzole (TAPAZOLE) 5 MG tablet, TAKE 1 AND 1/2 TABLET BY MOUTH DAILY, Disp: 135 tablet, Rfl: 0  •  metroNIDAZOLE (METROCREAM) 0.75 % cream, Apply  topically to the appropriate area as directed 2 (Two) Times a Day., Disp: , Rfl:   •  montelukast (SINGULAIR) 10 MG tablet, Take 1 tablet by mouth Every Morning., Disp: 30 tablet, Rfl: 5  •  montelukast (SINGULAIR) 10 MG tablet, TAKE ONE TABLET BY MOUTH DAILY, Disp: 90 tablet, Rfl: 2  •  Multiple Vitamins-Minerals (PRESERVISION AREDS 2) chewable tablet, Chew 2 capsules Every Morning., Disp: , Rfl:   •  Nutritional  Supplements (ENSURE ACTIVE PO), Take  by mouth. Ensure or boost, Disp: , Rfl:   •  Nutritional Supplements (JUICE PLUS FIBRE PO), Take  by mouth. 12 gummies, Disp: , Rfl:   •  O2 (OXYGEN), Inhale Continuous., Disp: , Rfl:   •  Spiriva Respimat 2.5 MCG/ACT aerosol solution inhaler, INHALE TWO PUFFS BY MOUTH DAILY, Disp: 4 g, Rfl: 11  •  Symbicort 160-4.5 MCG/ACT inhaler, INHALE TWO PUFFS BY MOUTH TWICE A DAY, Disp: 10.2 g, Rfl: 10  •  vitamin B-12 (CYANOCOBALAMIN) 1000 MCG tablet, Take 1 tablet by mouth Daily. M,W,F, Disp: , Rfl:     Allergies:    Allergies   Allergen Reactions   • Codeine Unknown - High Severity     Patient is unaware of the reaction to this medication     • Penicillin V Unknown - High Severity     Reaction unknown to patient     • Penicillins Unknown - High Severity     Pt is unaware of the reaction to this medication     • Mucinex D [Pseudoephedrine-Guaifenesin Er] Nausea Only     Felt hot in chest       Family History:  her family history includes Arthritis in her mother; Breast cancer in her paternal grandmother; Heart failure in her mother; Lung cancer in her father; No Known Problems in her sister.    Social History:  she  reports that she quit smoking about 26 years ago. Her smoking use included cigarettes. She has a 100.00 pack-year smoking history. She has never used smokeless tobacco. She reports that she does not drink alcohol or use drugs.    Pertinent Findings on Review of Systems:  Fourteen systems have been reviewed with the patient and are negative other than as mentioned above.  Objective   Physical exam not performed due to phone visit     Assessment:  Malignant neoplasm of upper lobe of left lung (CMS/HCC)    Doing well post treatment but with radiographic evidence of progression in the left upper lobe, a year after retreatment of this area.    Plan:   We reviewed the diagnostic imaging and I had previously reviewed her treatment plans and the area of concern has been treated  twice now and I have no further radiation to get to this area.  I let Dr. Ventura know before this visit of these recent findings and will try to discuss systemic therapy with her at her next visit on March 5.  I encouraged her to call if she has any further questions or concerns and she will meet with Dr. Ventura and finalize her next steps then.    Subjective     I spent greater than 25 minutes in preparation and phone time with the patient, spent in counseling, including review of symptoms and imaging as well as upcoming follow up visits, continued surveillance and potential outcomes.

## 2021-02-16 ENCOUNTER — OFFICE VISIT (OUTPATIENT)
Dept: FAMILY MEDICINE CLINIC | Facility: CLINIC | Age: 76
End: 2021-02-16

## 2021-02-16 VITALS
HEART RATE: 100 BPM | WEIGHT: 97.2 LBS | DIASTOLIC BLOOD PRESSURE: 65 MMHG | HEIGHT: 60 IN | OXYGEN SATURATION: 97 % | SYSTOLIC BLOOD PRESSURE: 128 MMHG | BODY MASS INDEX: 19.08 KG/M2

## 2021-02-16 DIAGNOSIS — J43.9 PULMONARY EMPHYSEMA, UNSPECIFIED EMPHYSEMA TYPE (HCC): ICD-10-CM

## 2021-02-16 DIAGNOSIS — Z99.81 OXYGEN DEPENDENT: ICD-10-CM

## 2021-02-16 DIAGNOSIS — Z00.00 MEDICARE ANNUAL WELLNESS VISIT, SUBSEQUENT: Primary | ICD-10-CM

## 2021-02-16 DIAGNOSIS — J44.0 COPD WITH ACUTE LOWER RESPIRATORY INFECTION (HCC): ICD-10-CM

## 2021-02-16 DIAGNOSIS — C34.90 RECURRENT NON-SMALL CELL LUNG CANCER (HCC): ICD-10-CM

## 2021-02-16 PROCEDURE — G0439 PPPS, SUBSEQ VISIT: HCPCS | Performed by: FAMILY MEDICINE

## 2021-02-16 PROCEDURE — 99214 OFFICE O/P EST MOD 30 MIN: CPT | Performed by: FAMILY MEDICINE

## 2021-02-16 RX ORDER — TIOTROPIUM BROMIDE INHALATION SPRAY 3.12 UG/1
2 SPRAY, METERED RESPIRATORY (INHALATION) DAILY
Qty: 4 G | Refills: 11
Start: 2021-02-16 | End: 2021-01-01

## 2021-02-16 NOTE — PROGRESS NOTES
The ABCs of the Annual Wellness Visit  Subsequent Medicare Wellness Visit    F/U COPD.    Subjective   History of Present Illness:  Maryam Arredondo is a 75 y.o. female who presents for a Subsequent Medicare Wellness Visit.  Telemedicine visit due to covid.  18 minute visit.  Consent obtained.    F/u COPD.  On spiriva and symbicort now.  On 2L of oxygen now.    F/U L upper lobe pleural based mass 2.8 by 1.8 cm and was 2.2 cm by 1.8cm.  Was 3.6 cm sagittal width and now 4.1cm.    HEALTH RISK ASSESSMENT    Recent Hospitalizations:  No hospitalization(s) within the last year.    Current Medical Providers:  Patient Care Team:  Kevin Seymour MD as PCP - General  Unruly Bobo MD as Consulting Physician (Cardiology)  Heather Anthony MD as Consulting Physician (Pulmonary Disease)  Laith Prado DPM as Consulting Physician (Podiatry)  Michael Christie MD as Consulting Physician (Endocrinology)  Je Ventura MD as Consulting Physician (Hematology and Oncology)  Roderick Frankel Jr., MD as Surgeon (General Surgery)  Je Castro MD as Consulting Physician (Dermatology)  Brandie Bennett MD as Consulting Physician (Radiation Oncology)    Smoking Status:  Social History     Tobacco Use   Smoking Status Former Smoker   • Packs/day: 2.00   • Years: 50.00   • Pack years: 100.00   • Types: Cigarettes   • Quit date: 3/14/1994   • Years since quittin.9   Smokeless Tobacco Never Used   Tobacco Comment    D/C 20 years ago, smoking 2 ppd before quitting       Alcohol Consumption:  Social History     Substance and Sexual Activity   Alcohol Use No    Comment: Social use rare       Depression Screen:   PHQ-2/PHQ-9 Depression Screening 2020   Little interest or pleasure in doing things 0   Feeling down, depressed, or hopeless 0   Total Score 0       Fall Risk Screen:  STEADI Fall Risk Assessment has not been completed.    Health Habits and Functional and Cognitive Screening:  Functional & Cognitive  Status 2/16/2021   Do you have difficulty preparing food and eating? No   Do you have difficulty bathing yourself, getting dressed or grooming yourself? No   Do you have difficulty using the toilet? No   Do you have difficulty moving around from place to place? No   Do you have trouble with steps or getting out of a bed or a chair? No   Current Diet Well Balanced Diet   Dental Exam Up to date   Eye Exam Up to date   Exercise (times per week) 0 times per week   Current Exercises Include No Regular Exercise   Current Exercise Activities Include -   Do you need help using the phone?  No   Are you deaf or do you have serious difficulty hearing?  No   Do you need help with transportation? No   Do you need help shopping? No   Do you need help preparing meals?  No   Do you need help with housework?  No   Do you need help with laundry? No   Do you need help taking your medications? No   Do you need help managing money? No   Do you ever drive or ride in a car without wearing a seat belt? No   Have you felt unusual stress, anger or loneliness in the last month? No   Who do you live with? Alone   If you need help, do you have trouble finding someone available to you? No   Have you been bothered in the last four weeks by sexual problems? No   Do you have difficulty concentrating, remembering or making decisions? No         Does the patient have evidence of cognitive impairment? No    Asprin use counseling:Does not need ASA (and currently is not on it)    Age-appropriate Screening Schedule:  Refer to the list below for future screening recommendations based on patient's age, sex and/or medical conditions. Orders for these recommended tests are listed in the plan section. The patient has been provided with a written plan.    Health Maintenance   Topic Date Due   • MAMMOGRAM  07/11/2021   • COLONOSCOPY  04/28/2027   • TDAP/TD VACCINES (3 - Td) 01/26/2030   • INFLUENZA VACCINE  Completed   • ZOSTER VACCINE  Completed   • DXA SCAN   Discontinued          The following portions of the patient's history were reviewed and updated as appropriate: allergies, current medications, past family history, past medical history, past social history, past surgical history and problem list.    Outpatient Medications Prior to Visit   Medication Sig Dispense Refill   • albuterol sulfate  (90 Base) MCG/ACT inhaler Inhale 2 puffs Every 4 (Four) Hours As Needed for Wheezing. Per MD - until current episode is resolved 1 inhaler 11   • Cholecalciferol (VITAMIN D3) 2000 UNITS tablet Take 1 tablet by mouth daily.     • digoxin (LANOXIN) 125 MCG tablet TAKE ONE TABLET BY MOUTH DAILY 90 tablet 1   • dilTIAZem CD (Cartia XT) 120 MG 24 hr capsule Take 1 capsule by mouth Daily. 90 capsule 3   • fluticasone (FLONASE) 50 MCG/ACT nasal spray 2 sprays into the nostril(s) as directed by provider 2 (Two) Times a Day. 48 g 3   • ipratropium (ATROVENT) 0.06 % nasal spray 2 sprays into the nostril(s) as directed by provider 3 (Three) Times a Day. 15 mL 12   • methIMAzole (TAPAZOLE) 5 MG tablet TAKE 1 AND 1/2 TABLET BY MOUTH DAILY 135 tablet 0   • metroNIDAZOLE (METROCREAM) 0.75 % cream Apply  topically to the appropriate area as directed 2 (Two) Times a Day.     • montelukast (SINGULAIR) 10 MG tablet TAKE ONE TABLET BY MOUTH DAILY 90 tablet 2   • Multiple Vitamins-Minerals (PRESERVISION AREDS 2) chewable tablet Chew 2 capsules Every Morning.     • Nutritional Supplements (ENSURE ACTIVE PO) Take  by mouth. Ensure or boost     • Nutritional Supplements (JUICE PLUS FIBRE PO) Take  by mouth. 12 gummies     • O2 (OXYGEN) Inhale Continuous.     • Symbicort 160-4.5 MCG/ACT inhaler INHALE TWO PUFFS BY MOUTH TWICE A DAY 10.2 g 10   • vitamin B-12 (CYANOCOBALAMIN) 1000 MCG tablet Take 1 tablet by mouth Daily. M,W,F     • montelukast (SINGULAIR) 10 MG tablet Take 1 tablet by mouth Every Morning. 30 tablet 5   • Spiriva Respimat 2.5 MCG/ACT aerosol solution inhaler INHALE TWO PUFFS BY  MOUTH DAILY 4 g 11     No facility-administered medications prior to visit.        Patient Active Problem List   Diagnosis   • Pneumothorax   • Hypoxia   • Tachycardia   • Hyperthyroidism   • Essential hypertension   • Multifocal atrial tachycardia (CMS/HCC)   • Toxic multinodular goiter   • Pulmonary emphysema (CMS/HCC)   • Vitamin D insufficiency   • Pneumonia   • COPD with acute lower respiratory infection (CMS/HCC)   • Malignant neoplasm of upper lobe of left lung (CMS/HCC)   • Infected skin tear   • Allergic rhinitis   • B12 deficiency   • Oxygen dependent   • Mitral regurgitation   • Breast cancer (CMS/HCC)   • Medicare annual wellness visit, subsequent   • Panlobular emphysema (CMS/HCC)   • Recurrent non-small cell lung cancer (CMS/HCC)   • Encounter for long-term (current) use of other medications   • Unspecified fracture of fifth metacarpal bone, left hand, initial encounter for closed fracture   • Immobility syndrome   • Encounter for removal of sutures   • Laceration of skin of knee without complication, left, sequela   • Dysuria   • Acute vaginitis   • Vaginal candidiasis       Advanced Care Planning:  ACP discussion was held with the patient during this visit. Patient has an advance directive (not in EMR), copy requested.    Review of Systems   Constitutional: Negative for activity change, appetite change and fatigue.   HENT: Negative for hearing loss and postnasal drip.    Eyes: Negative for discharge and itching.   Respiratory: Negative for cough and shortness of breath.    Cardiovascular: Negative for chest pain and leg swelling.   Gastrointestinal: Negative for abdominal distention and abdominal pain.   Endocrine: Negative for cold intolerance and heat intolerance.   Genitourinary: Negative for difficulty urinating and flank pain.   Musculoskeletal: Negative for arthralgias and myalgias.   Skin: Negative for color change.   Neurological: Negative for dizziness and facial asymmetry.   Hematological:  "Negative for adenopathy.   Psychiatric/Behavioral: Negative for agitation and confusion.       Compared to one year ago, the patient feels her physical health is the same.  Compared to one year ago, the patient feels her mental health is the same.    Reviewed chart for potential of high risk medication in the elderly: yes  Reviewed chart for potential of harmful drug interactions in the elderly:yes    Objective         Vitals:    02/16/21 1337   BP: 128/65   Pulse: 100   SpO2: 97%   Weight: 44.1 kg (97 lb 3.2 oz)   Height: 152.4 cm (60\")       Body mass index is 18.98 kg/m².  Discussed the patient's BMI with her. The BMI is in the acceptable range.    Physical Exam  Neurological:      Mental Status: She is oriented to person, place, and time.   Psychiatric:         Thought Content: Thought content normal.         Judgment: Judgment normal.               Assessment/Plan   Medicare Risks and Personalized Health Plan  CMS Preventative Services Quick Reference  Inactivity/Sedentary    The above risks/problems have been discussed with the patient.  Pertinent information has been shared with the patient in the After Visit Summary.  Follow up plans and orders are seen below in the Assessment/Plan Section.    Diagnoses and all orders for this visit:    1. Medicare annual wellness visit, subsequent (Primary)    2. Recurrent non-small cell lung cancer (CMS/HCC)    3. Oxygen dependent    4. Pulmonary emphysema, unspecified emphysema type (CMS/HCC)    5. COPD with acute lower respiratory infection (CMS/HCC)  -     Spiriva Respimat 2.5 MCG/ACT aerosol solution inhaler; Inhale 2 puffs Daily.  Dispense: 4 g; Refill: 11    Recurrent NSCLC.  Increasing size noted on CT.  To Dr Ventura.    COPD controlled.  Continue O2 2L a minute.    Continue symbicort/spiriva.  Rx for spiriva.  Follow Up:  No follow-ups on file.     An After Visit Summary and PPPS were given to the patient.     Preventive Counseling:  Encouraged regular activity.  " Flu /pneumovax utd.  Covid vaccine upcoming.

## 2021-02-26 ENCOUNTER — TELEPHONE (OUTPATIENT)
Dept: FAMILY MEDICINE CLINIC | Facility: CLINIC | Age: 76
End: 2021-02-26

## 2021-02-26 NOTE — TELEPHONE ENCOUNTER
Steve called and said the dianosis codes put in for her dexa scan does not qualify it for medical necessity.  They said they need a new order with the corrected codes and she will need to be rescheduled.  She said we will need to contact the patient and let her know.

## 2021-03-01 ENCOUNTER — TELEPHONE (OUTPATIENT)
Dept: ONCOLOGY | Facility: CLINIC | Age: 76
End: 2021-03-01

## 2021-03-01 DIAGNOSIS — Z78.0 POST-MENOPAUSAL: ICD-10-CM

## 2021-03-01 DIAGNOSIS — Z79.52 CURRENT CHRONIC USE OF SYSTEMIC STEROIDS: Primary | ICD-10-CM

## 2021-03-01 NOTE — TELEPHONE ENCOUNTER
Pt has follow up appt 3/5.  She is not scheduled for labwork. She said this was unusual.  Should she be scheduled for labs?      [phone]

## 2021-03-02 DIAGNOSIS — Z23 IMMUNIZATION DUE: ICD-10-CM

## 2021-03-05 ENCOUNTER — APPOINTMENT (OUTPATIENT)
Dept: LAB | Facility: HOSPITAL | Age: 76
End: 2021-03-05

## 2021-03-05 ENCOUNTER — OFFICE VISIT (OUTPATIENT)
Dept: ONCOLOGY | Facility: CLINIC | Age: 76
End: 2021-03-05

## 2021-03-05 VITALS
DIASTOLIC BLOOD PRESSURE: 85 MMHG | HEIGHT: 60 IN | HEART RATE: 105 BPM | WEIGHT: 97.8 LBS | RESPIRATION RATE: 16 BRPM | BODY MASS INDEX: 19.2 KG/M2 | TEMPERATURE: 96.9 F | OXYGEN SATURATION: 93 % | SYSTOLIC BLOOD PRESSURE: 142 MMHG

## 2021-03-05 DIAGNOSIS — C34.12 MALIGNANT NEOPLASM OF UPPER LOBE OF LEFT LUNG (HCC): Primary | ICD-10-CM

## 2021-03-05 PROCEDURE — 99214 OFFICE O/P EST MOD 30 MIN: CPT | Performed by: INTERNAL MEDICINE

## 2021-03-05 NOTE — PROGRESS NOTES
History:     Reason for follow up:   1.  Stage IIB left upper lobe non-small cell lung cancer, high PDL-1 (80%), negative egfr/alk/ros   * status post radiation therapy completed 1/26/18  2.  Recurrent disease involving pleural-based nodule in the lingula and pleural-based mass left upper lobe; both areas treated with radiation therapy completed 2/3/2020  3.  Patient received 3 doses of Keytruda February/March 2020 but stopped secondary to diarrhea and weight loss     HPI:  Maryam Arredondo presents for follow-up of her lung cancer.  She was felt to have progressive disease early part 2020 and took 3 doses of Keytruda complicated by diarrhea and weight loss after which she discontinued therapy.  She is under observation.  The patient returned today for follow-up and scan review.  She continues to do well with no significant pain in the chest.  She has stable chronic dyspnea and O2 requirements.  She is accompanied by her sister.          Reviewed, confirmed and updated history (past medical, social and family)   Past Medical   Past Medical History:   Diagnosis Date   • Abnormal color of sputum     grey   • Acromioclavicular joint arthritis    • Acute maxillary sinusitis    • Acute upper respiratory infection    • Allergic rhinitis    • Anemia    • Asthma    • B12 deficiency    • Benign essential hypertension    • Breast cancer (CMS/HCC)     s/p Lumpectomy ~2000 and chemo/XRT. Then  Masectomy ~2004 for some recurrence   • Cerumen impaction    • COPD (chronic obstructive pulmonary disease) (CMS/HCC)    • COPD exacerbation (CMS/HCC)    • Diverticulosis of colon    • Cedric-Barr infection    • Fever    • Heart disease    • High risk medication use    • Hospital discharge follow-up    • Hyperthyroidism    • Hypoxia    • Laryngitis    • Leg wound, left    • Medicare annual wellness visit, subsequent    • Mitral regurgitation     mild to moderate   • Multifocal atrial tachycardia (CMS/HCC)    • Nocturnal hypoxia    •  Non-small cell lung cancer (CMS/HCC)    • Onychomycosis of toenail    • Oral aphthae    • Osteopenia    • Other fatigue    • Oxygen dependent     2L - at night only   • Pneumonia of upper lobe of lung    • Pneumothorax    • Post-menopausal    • Skin abrasion    • SOB (shortness of breath)    • Supraventricular tachycardia (CMS/HCC)    • Tachycardia    • Thyroid nodule    • Toxic multinodular goiter    • Vitamin B deficiency    • Vitamin D deficiency    • Wound healing well on examination     and   Patient Active Problem List   Diagnosis   • Pneumothorax   • Hypoxia   • Tachycardia   • Hyperthyroidism   • Essential hypertension   • Multifocal atrial tachycardia (CMS/HCC)   • Toxic multinodular goiter   • Pulmonary emphysema (CMS/HCC)   • Vitamin D insufficiency   • Pneumonia   • COPD with acute lower respiratory infection (CMS/HCC)   • Malignant neoplasm of upper lobe of left lung (CMS/HCC)   • Infected skin tear   • Allergic rhinitis   • B12 deficiency   • Oxygen dependent   • Mitral regurgitation   • Breast cancer (CMS/HCC)   • Medicare annual wellness visit, subsequent   • Panlobular emphysema (CMS/HCC)   • Recurrent non-small cell lung cancer (CMS/HCC)   • Encounter for long-term (current) use of other medications   • Unspecified fracture of fifth metacarpal bone, left hand, initial encounter for closed fracture   • Immobility syndrome   • Encounter for removal of sutures   • Laceration of skin of knee without complication, left, sequela   • Dysuria   • Acute vaginitis   • Vaginal candidiasis   • Post-menopausal   • Current chronic use of systemic steroids     Social History   Social History     Socioeconomic History   • Marital status: Single     Spouse name: Not on file   • Number of children: Not on file   • Years of education: Not on file   • Highest education level: Not on file   Occupational History   • Occupation:      Employer: RETIRED   Tobacco Use   • Smoking status: Former  Smoker     Packs/day: 2.00     Years: 50.00     Pack years: 100.00     Types: Cigarettes     Quit date: 3/14/1994     Years since quittin.9   • Smokeless tobacco: Never Used   • Tobacco comment: D/C 20 years ago, smoking 2 ppd before quitting   Substance and Sexual Activity   • Alcohol use: No     Comment: Social use rare   • Drug use: No   • Sexual activity: Defer     Comment: drinks decaf      Family History  Family History   Problem Relation Age of Onset   • Arthritis Mother    • Heart failure Mother         Congestive Heart Failure   • Lung cancer Father    • Breast cancer Paternal Grandmother    • No Known Problems Sister      Allergies  Allergies   Allergen Reactions   • Codeine Unknown - High Severity     Patient is unaware of the reaction to this medication     • Penicillin V Unknown - High Severity     Reaction unknown to patient     • Penicillins Unknown - High Severity     Pt is unaware of the reaction to this medication     • Mucinex D [Pseudoephedrine-Guaifenesin Er] Nausea Only     Felt hot in chest       Medications: The current medication list was reviewed in the EMR.    Review of Systems  Review of Systems   Constitutional: Positive for fatigue. Negative for activity change, appetite change, chills, diaphoresis, fever and unexpected weight change.   HENT: Negative for congestion and sinus pressure.    Respiratory: Positive for shortness of breath. Negative for cough and chest tightness.    Cardiovascular: Negative for chest pain, palpitations and leg swelling.   Gastrointestinal: Negative for abdominal pain, blood in stool, constipation, diarrhea, nausea and vomiting.   Endocrine: Negative.    Genitourinary: Negative.    Musculoskeletal: Positive for arthralgias and gait problem. Negative for joint swelling and myalgias.   Skin: Negative.    Allergic/Immunologic: Negative.    Hematological: Negative for adenopathy. Does not bruise/bleed easily.   Psychiatric/Behavioral: Agitation:  Behavioral  "problem:  The patient is nervous/anxious.    ROS unchanged-3/5/2021    Objective    Objective:     Vitals:    03/05/21 1336   BP: 142/85   Pulse: 105   Resp: 16   Temp: 96.9 °F (36.1 °C)   TempSrc: Temporal   SpO2: 93%   Weight: 44.4 kg (97 lb 12.8 oz)   Height: 152.4 cm (60\")   PainSc: 0-No pain     Current Status 3/5/2021   ECOG score 1   GENERAL:  Well-developed elderly lady in no distress.  She looks stronger than previous visits.  SKIN: No rash or induration, bruising on the forearm  EYES:    EOMs intact.  Conjunctivae normal.  HEAD:  Normocephalic.  NECK:  Supple with good range of motion; no thyromegaly or masses  LYMPHATICS:  No cervical, supraclavicular, axillary adenopathy.  RESP: Mild decrease in breath sounds, no increased work of breathing, no wheezing.  She is on O2 by nasal cannula  CARDIAC:  Regular rate and rhythm without murmurs, rubs or gallops. Normal S1,S2. No edema  GI:  Soft, nontender, normal bowel sounds  MSK:  No clubbing or cyanosis,.  No edema   NEUROLOGICAL:   No focal neurological deficits.  PSYCHIATRIC:  Normal affect and mood.  Alert and Oriented x 3.     Labs and Imaging  Results for orders placed or performed during the hospital encounter of 02/09/21   POC Creatinine    Specimen: Blood   Result Value Ref Range    Creatinine 0.60 0.60 - 1.30 mg/dL       CT 2/9/2021 shows slight increase size of a left upper lobe pleural-based mass 4.1 x 2.8 x 1.8 cm previously 3.6 x 2.2 x 1.8 cm.  I personally reviewed the CT scan-there has been slight progression of disease in the left upper lobe.  Assessment/Plan   Assessment/Plan:   This is a 75 y.o. female with:     1.  Recurrent left upper lobe non-small cell lung cancer, high PDL-1 (80%), negative egfr/alk/ros   *The patient was previously treated with radiation therapy to the left upper lobe nodule/mass completed January 2018   *PET scan from 12/20/2019.  There is significant uptake in the mass in the apex of the left lung and also a nodule in " the lingula with significant uptake for its size and I think both areas are likely consistent with recurrent disease.  We opted to not send the patient for a biopsy as she has very poor lung function and a pneumothorax might prove fatal for her.  There is some uptake in the left hilum but this was present in October 2018 and stable.  There is uptake in the right thyroid gland which is stable and the patient has declined biopsy of this in the past.     The patient underwent additional radiation to the hypermetabolic lesions and also initiated Keytruda on 1/21/2020.    Keytruda stopped after 3 doses secondary to significant diarrhea.  Diarrhea resolved with a short course of steroids.    CT chest reviewed today from 2/9/2021 shows slight progression of disease in the left upper lobe.  The patient is overall asymptomatic and in fact looks better than she has on previous visits.  I discussed the case with Dr. Bennett radiation oncology and the tumor in the left upper lobe cannot be treated with additional radiation therapy.    I discussed with the patient and her sister systemic therapy but she is not inclined or willing to undergo additional immunotherapy or chemotherapy.  The patient's sister asked if any other treatments are available.  I will see if there is tissue available from her previous biopsy in 2017 to do a more extended genetic panel to see if any target treatments are available.  If there is not tissue available we will perform a peripheral blood liquid biopsy.    Otherwise given the mild growth, if no target mutation is identified we will plan continued observation with a CT of the chest in 3 months.    2.  Multinodular goiter stable by CT--followed by endocrinology.  There is uptake in the right thyroid gland similar to prior PET.  The patient declines biopsy of the hypermetabolic right thyroid lesion    3. History of right breast cancer, initially diagnosed in 2000 treated with lumpectomy, radiation,  chemotherapy and endocrine therapy with local recurrence in 2005 treated with mastectomy and then Arimidex.       4. COPD, oxygen dependent

## 2021-03-08 ENCOUNTER — TELEPHONE (OUTPATIENT)
Dept: ONCOLOGY | Facility: CLINIC | Age: 76
End: 2021-03-08

## 2021-03-08 NOTE — TELEPHONE ENCOUNTER
Pt wants to bring insurance cards in tomorrow when she is in the building. She did not have the cards when asked for them on 3/5 at her appt.    Call her back to let her know if this will be ok.  [phone]

## 2021-03-16 ENCOUNTER — OFFICE VISIT (OUTPATIENT)
Dept: FAMILY MEDICINE CLINIC | Facility: CLINIC | Age: 76
End: 2021-03-16

## 2021-03-16 VITALS
HEART RATE: 114 BPM | BODY MASS INDEX: 19.24 KG/M2 | OXYGEN SATURATION: 98 % | HEIGHT: 60 IN | WEIGHT: 98 LBS | SYSTOLIC BLOOD PRESSURE: 132 MMHG | DIASTOLIC BLOOD PRESSURE: 74 MMHG | TEMPERATURE: 99.1 F

## 2021-03-16 DIAGNOSIS — E05.90 HYPERTHYROIDISM: ICD-10-CM

## 2021-03-16 DIAGNOSIS — I47.1 MULTIFOCAL ATRIAL TACHYCARDIA (HCC): ICD-10-CM

## 2021-03-16 DIAGNOSIS — I10 ESSENTIAL HYPERTENSION: ICD-10-CM

## 2021-03-16 DIAGNOSIS — C34.90 RECURRENT NON-SMALL CELL LUNG CANCER (HCC): ICD-10-CM

## 2021-03-16 DIAGNOSIS — E05.20 TOXIC MULTINODULAR GOITER: ICD-10-CM

## 2021-03-16 DIAGNOSIS — Z79.899 ENCOUNTER FOR LONG-TERM (CURRENT) USE OF OTHER MEDICATIONS: Primary | ICD-10-CM

## 2021-03-16 PROBLEM — I47.19 MULTIFOCAL ATRIAL TACHYCARDIA: Status: ACTIVE | Noted: 2021-03-16

## 2021-03-16 PROBLEM — I47.19 MULTIFOCAL ATRIAL TACHYCARDIA: Status: RESOLVED | Noted: 2021-03-16 | Resolved: 2021-03-16

## 2021-03-16 PROCEDURE — 99214 OFFICE O/P EST MOD 30 MIN: CPT | Performed by: FAMILY MEDICINE

## 2021-03-16 NOTE — PROGRESS NOTES
Chief Complaint   Patient presents with   • Hypertension       Subjective   Maryam Arredondo is a 75 y.o. female.     History of Present Illness   F?u recurrent RAMYA non small cell lung ca.  Seeing Dr Ventura.  Reviewed notes and radiation tx not recommended.  Awaiting possible immunotx treatment.    F/U MAT/high risk med.  ON digoxin daily and decreased cardizem to 120 due to access issues.     FU HTN.  No orhtostasis.    The following portions of the patient's history were reviewed and updated as appropriate: allergies, current medications, past family history, past medical history, past social history, past surgical history and problem list.    Review of Systems   Constitutional: Negative for appetite change and fatigue.   HENT: Negative for nosebleeds and sore throat.    Eyes: Negative for blurred vision and visual disturbance.   Respiratory: Negative for shortness of breath and wheezing.    Cardiovascular: Negative for chest pain and leg swelling.   Gastrointestinal: Negative for abdominal distention and abdominal pain.   Endocrine: Negative for cold intolerance and polyuria.   Genitourinary: Negative for dysuria and hematuria.   Musculoskeletal: Negative for arthralgias and myalgias.   Skin: Negative for color change and rash.   Neurological: Negative for weakness and confusion.   Psychiatric/Behavioral: Negative for agitation and depressed mood.       Patient Active Problem List   Diagnosis   • Pneumothorax   • Hypoxia   • Tachycardia   • Hyperthyroidism   • Essential hypertension   • Multifocal atrial tachycardia (CMS/HCC)   • Toxic multinodular goiter   • Pulmonary emphysema (CMS/HCC)   • Vitamin D insufficiency   • Pneumonia   • COPD with acute lower respiratory infection (CMS/HCC)   • Malignant neoplasm of upper lobe of left lung (CMS/HCC)   • Infected skin tear   • Allergic rhinitis   • B12 deficiency   • Oxygen dependent   • Mitral regurgitation   • Breast cancer (CMS/HCC)   • Medicare annual wellness  visit, subsequent   • Panlobular emphysema (CMS/HCC)   • Recurrent non-small cell lung cancer (CMS/HCC)   • Encounter for long-term (current) use of other medications   • Unspecified fracture of fifth metacarpal bone, left hand, initial encounter for closed fracture   • Immobility syndrome   • Encounter for removal of sutures   • Laceration of skin of knee without complication, left, sequela   • Dysuria   • Acute vaginitis   • Vaginal candidiasis   • Post-menopausal   • Current chronic use of systemic steroids       Allergies   Allergen Reactions   • Codeine Unknown - High Severity     Patient is unaware of the reaction to this medication     • Penicillin V Unknown - High Severity     Reaction unknown to patient     • Penicillins Unknown - High Severity     Pt is unaware of the reaction to this medication     • Mucinex D [Pseudoephedrine-Guaifenesin Er] Nausea Only     Felt hot in chest         Current Outpatient Medications:   •  albuterol sulfate  (90 Base) MCG/ACT inhaler, Inhale 2 puffs Every 4 (Four) Hours As Needed for Wheezing. Per MD - until current episode is resolved, Disp: 1 inhaler, Rfl: 11  •  Cholecalciferol (VITAMIN D3) 2000 UNITS tablet, Take 1 tablet by mouth daily., Disp: , Rfl:   •  digoxin (LANOXIN) 125 MCG tablet, TAKE ONE TABLET BY MOUTH DAILY, Disp: 90 tablet, Rfl: 1  •  dilTIAZem CD (Cartia XT) 120 MG 24 hr capsule, Take 1 capsule by mouth Daily., Disp: 90 capsule, Rfl: 3  •  fluticasone (FLONASE) 50 MCG/ACT nasal spray, 2 sprays into the nostril(s) as directed by provider 2 (Two) Times a Day., Disp: 48 g, Rfl: 3  •  ipratropium (ATROVENT) 0.06 % nasal spray, 2 sprays into the nostril(s) as directed by provider 3 (Three) Times a Day., Disp: 15 mL, Rfl: 12  •  methIMAzole (TAPAZOLE) 5 MG tablet, TAKE 1 AND 1/2 TABLET BY MOUTH DAILY, Disp: 135 tablet, Rfl: 0  •  metroNIDAZOLE (METROCREAM) 0.75 % cream, Apply  topically to the appropriate area as directed 2 (Two) Times a Day., Disp: ,  Rfl:   •  montelukast (SINGULAIR) 10 MG tablet, TAKE ONE TABLET BY MOUTH DAILY, Disp: 90 tablet, Rfl: 2  •  Multiple Vitamins-Minerals (PRESERVISION AREDS 2) chewable tablet, Chew 2 capsules Every Morning., Disp: , Rfl:   •  Nutritional Supplements (ENSURE ACTIVE PO), Take  by mouth. Ensure or boost, Disp: , Rfl:   •  Nutritional Supplements (JUICE PLUS FIBRE PO), Take  by mouth. 12 gummies, Disp: , Rfl:   •  O2 (OXYGEN), Inhale Continuous., Disp: , Rfl:   •  Spiriva Respimat 2.5 MCG/ACT aerosol solution inhaler, Inhale 2 puffs Daily., Disp: 4 g, Rfl: 11  •  Symbicort 160-4.5 MCG/ACT inhaler, INHALE TWO PUFFS BY MOUTH TWICE A DAY, Disp: 10.2 g, Rfl: 10    Past Medical History:   Diagnosis Date   • Abnormal color of sputum     grey   • Acromioclavicular joint arthritis    • Acute maxillary sinusitis    • Acute upper respiratory infection    • Allergic rhinitis    • Anemia    • Asthma    • B12 deficiency    • Benign essential hypertension    • Breast cancer (CMS/HCC)     s/p Lumpectomy ~2000 and chemo/XRT. Then  Masectomy ~2004 for some recurrence   • Cerumen impaction    • COPD (chronic obstructive pulmonary disease) (CMS/HCC)    • COPD exacerbation (CMS/HCC)    • Diverticulosis of colon    • Cedric-Barr infection    • Fever    • Heart disease    • High risk medication use    • Hospital discharge follow-up    • Hyperthyroidism    • Hypoxia    • Laryngitis    • Leg wound, left    • Medicare annual wellness visit, subsequent    • Mitral regurgitation     mild to moderate   • Multifocal atrial tachycardia (CMS/HCC)    • Nocturnal hypoxia    • Non-small cell lung cancer (CMS/HCC)    • Onychomycosis of toenail    • Oral aphthae    • Osteopenia    • Other fatigue    • Oxygen dependent     2L - at night only   • Pneumonia of upper lobe of lung    • Pneumothorax    • Post-menopausal    • Skin abrasion    • SOB (shortness of breath)    • Supraventricular tachycardia (CMS/HCC)    • Tachycardia    • Thyroid nodule    • Toxic  multinodular goiter    • Vitamin B deficiency    • Vitamin D deficiency    • Wound healing well on examination        Past Surgical History:   Procedure Laterality Date   • BREAST LUMPECTOMY Right    • BREAST LUMPECTOMY     • CARDIAC CATHETERIZATION      PS SAPHENOUS VAIN RIGHT LEG   • COLONOSCOPY  2017    Complete. 4 polyps. Recheck in 2019.    • COLONOSCOPY N/A 2017    Procedure: COLONOSCOPY WITH POLYPECTOMY(COLD BIOPSY AND HOT SNARE);  Surgeon: Luiza Eddy MD;  Location: [facility] ENDOSCOPY;  Service:    • HYSTERECTOMY     • MASTECTOMY Right    • NECK SURGERY      2 spurs removed   • VASCULAR SURGERY      spider vein ablation       Family History   Problem Relation Age of Onset   • Arthritis Mother    • Heart failure Mother         Congestive Heart Failure   • Lung cancer Father    • Breast cancer Paternal Grandmother    • No Known Problems Sister        Social History     Tobacco Use   • Smoking status: Former Smoker     Packs/day: 2.00     Years: 50.00     Pack years: 100.00     Types: Cigarettes     Quit date: 3/14/1994     Years since quittin.0   • Smokeless tobacco: Never Used   • Tobacco comment: D/C 20 years ago, smoking 2 ppd before quitting   Substance Use Topics   • Alcohol use: No     Comment: Social use rare            Objective     Vitals:    21 1327   BP: 132/74   Pulse: 114   Temp: 99.1 °F (37.3 °C)   SpO2: 98%     Body mass index is 19.14 kg/m².    Physical Exam  Vitals reviewed.   Constitutional:       Appearance: She is well-developed. She is not diaphoretic.   HENT:      Head: Normocephalic and atraumatic.   Eyes:      General: No scleral icterus.     Pupils: Pupils are equal, round, and reactive to light.   Neck:      Thyroid: No thyromegaly.   Cardiovascular:      Rate and Rhythm: Normal rate and regular rhythm.      Heart sounds: No murmur. No friction rub. No gallop.    Pulmonary:      Effort: Pulmonary effort is normal. No respiratory distress.      Breath  sounds: No wheezing or rales.   Chest:      Chest wall: No tenderness.   Abdominal:      General: Bowel sounds are normal. There is no distension.      Palpations: Abdomen is soft.      Tenderness: There is no abdominal tenderness.   Musculoskeletal:         General: No deformity. Normal range of motion.   Lymphadenopathy:      Cervical: No cervical adenopathy.   Skin:     General: Skin is warm and dry.      Findings: No rash.   Neurological:      Cranial Nerves: No cranial nerve deficit.      Motor: No abnormal muscle tone.         Lab Results   Component Value Date    GLUCOSE 93 06/11/2020    BUN 26 (H) 07/08/2020    CREATININE 0.60 02/09/2021    EGFRIFNONA 120 07/08/2020    EGFRIFAFRI 146 07/08/2020    BCR 52.0 (H) 07/08/2020    K 4.3 07/08/2020    CO2 29.1 (H) 07/08/2020    CALCIUM 9.3 07/08/2020    PROTENTOTREF 6.9 07/08/2020    ALBUMIN 4.10 07/08/2020    LABIL2 1.5 07/08/2020    AST 13 07/08/2020    ALT 12 07/08/2020       WBC   Date Value Ref Range Status   10/01/2020 7.46 3.40 - 10.80 10*3/mm3 Final   06/11/2019 6.11 3.40 - 10.80 10*3/mm3 Final     RBC   Date Value Ref Range Status   10/01/2020 4.15 3.77 - 5.28 10*6/mm3 Final   06/11/2019 4.17 3.77 - 5.28 10*6/mm3 Final     Hemoglobin   Date Value Ref Range Status   10/01/2020 12.3 12.0 - 15.9 g/dL Final     Hematocrit   Date Value Ref Range Status   10/01/2020 38.4 34.0 - 46.6 % Final     MCV   Date Value Ref Range Status   10/01/2020 92.5 79.0 - 97.0 fL Final     MCH   Date Value Ref Range Status   10/01/2020 29.6 26.6 - 33.0 pg Final     MCHC   Date Value Ref Range Status   10/01/2020 32.0 31.5 - 35.7 g/dL Final     RDW   Date Value Ref Range Status   10/01/2020 13.3 12.3 - 15.4 % Final     RDW-SD   Date Value Ref Range Status   10/01/2020 45.0 37.0 - 54.0 fl Final     MPV   Date Value Ref Range Status   10/01/2020 9.1 6.0 - 12.0 fL Final     Platelets   Date Value Ref Range Status   10/01/2020 270 140 - 450 10*3/mm3 Final     Neutrophil %   Date Value  Ref Range Status   10/01/2020 76.2 (H) 42.7 - 76.0 % Final     Lymphocyte %   Date Value Ref Range Status   10/01/2020 12.7 (L) 19.6 - 45.3 % Final     Monocyte %   Date Value Ref Range Status   10/01/2020 8.4 5.0 - 12.0 % Final     Eosinophil %   Date Value Ref Range Status   10/01/2020 1.1 0.3 - 6.2 % Final     Basophil %   Date Value Ref Range Status   10/01/2020 0.9 0.0 - 1.5 % Final     Immature Grans %   Date Value Ref Range Status   10/01/2020 0.7 (H) 0.0 - 0.5 % Final     Neutrophils, Absolute   Date Value Ref Range Status   10/01/2020 5.68 1.70 - 7.00 10*3/mm3 Final     Lymphocytes, Absolute   Date Value Ref Range Status   10/01/2020 0.95 0.70 - 3.10 10*3/mm3 Final     Monocytes, Absolute   Date Value Ref Range Status   10/01/2020 0.63 0.10 - 0.90 10*3/mm3 Final     Eosinophils, Absolute   Date Value Ref Range Status   10/01/2020 0.08 0.00 - 0.40 10*3/mm3 Final     Basophils, Absolute   Date Value Ref Range Status   10/01/2020 0.07 0.00 - 0.20 10*3/mm3 Final     Immature Grans, Absolute   Date Value Ref Range Status   10/01/2020 0.05 0.00 - 0.05 10*3/mm3 Final     nRBC   Date Value Ref Range Status   10/01/2020 0.0 0.0 - 0.2 /100 WBC Final       No results found for: HGBA1C    Lab Results   Component Value Date    TGFBEXAQ89 1,228 (H) 09/03/2019       TSH   Date Value Ref Range Status   07/08/2020 1.280 0.270 - 4.200 uIU/mL Final   03/02/2020 1.500 0.270 - 4.200 uIU/mL Final       No results found for: CHOL  Lab Results   Component Value Date    TRIG 58 09/03/2019     Lab Results   Component Value Date    HDL 72 (H) 09/03/2019     Lab Results   Component Value Date     (H) 09/03/2019     Lab Results   Component Value Date    VLDL 11.6 09/03/2019     No results found for: LDLHDL      Procedures    Assessment/Plan   Problems Addressed this Visit     Encounter for long-term (current) use of other medications - Primary    Relevant Orders    Digoxin Level    Essential hypertension    Relevant Orders     Comprehensive Metabolic Panel    TSH    T4, free    Hyperthyroidism    Relevant Orders    TSH    T4, free    Multifocal atrial tachycardia (CMS/HCC)    Relevant Orders    Digoxin Level    Recurrent non-small cell lung cancer (CMS/HCC)    Toxic multinodular goiter    Relevant Orders    TSH    T4, free      Diagnoses       Codes Comments    Encounter for long-term (current) use of other medications    -  Primary ICD-10-CM: Z79.899  ICD-9-CM: V58.69     Multifocal atrial tachycardia (CMS/HCC)     ICD-10-CM: I47.1  ICD-9-CM: 427.89     Toxic multinodular goiter     ICD-10-CM: E05.20  ICD-9-CM: 242.20     Recurrent non-small cell lung cancer (CMS/HCC)     ICD-10-CM: C34.90  ICD-9-CM: 162.9     Hyperthyroidism     ICD-10-CM: E05.90  ICD-9-CM: 242.90     Essential hypertension     ICD-10-CM: I10  ICD-9-CM: 401.9         MAT.  Doing well with meds.  No SE.  Check dig level.    HTN.  Controlled.  Continue meds.    Hyperthyroidism.  Check thyroid panel.      Orders Placed This Encounter   Procedures   • Comprehensive Metabolic Panel   • Digoxin Level   • TSH   • T4, free       Current Outpatient Medications   Medication Sig Dispense Refill   • albuterol sulfate  (90 Base) MCG/ACT inhaler Inhale 2 puffs Every 4 (Four) Hours As Needed for Wheezing. Per MD - until current episode is resolved 1 inhaler 11   • Cholecalciferol (VITAMIN D3) 2000 UNITS tablet Take 1 tablet by mouth daily.     • digoxin (LANOXIN) 125 MCG tablet TAKE ONE TABLET BY MOUTH DAILY 90 tablet 1   • dilTIAZem CD (Cartia XT) 120 MG 24 hr capsule Take 1 capsule by mouth Daily. 90 capsule 3   • fluticasone (FLONASE) 50 MCG/ACT nasal spray 2 sprays into the nostril(s) as directed by provider 2 (Two) Times a Day. 48 g 3   • ipratropium (ATROVENT) 0.06 % nasal spray 2 sprays into the nostril(s) as directed by provider 3 (Three) Times a Day. 15 mL 12   • methIMAzole (TAPAZOLE) 5 MG tablet TAKE 1 AND 1/2 TABLET BY MOUTH DAILY 135 tablet 0   • metroNIDAZOLE  (METROCREAM) 0.75 % cream Apply  topically to the appropriate area as directed 2 (Two) Times a Day.     • montelukast (SINGULAIR) 10 MG tablet TAKE ONE TABLET BY MOUTH DAILY 90 tablet 2   • Multiple Vitamins-Minerals (PRESERVISION AREDS 2) chewable tablet Chew 2 capsules Every Morning.     • Nutritional Supplements (ENSURE ACTIVE PO) Take  by mouth. Ensure or boost     • Nutritional Supplements (JUICE PLUS FIBRE PO) Take  by mouth. 12 gummies     • O2 (OXYGEN) Inhale Continuous.     • Spiriva Respimat 2.5 MCG/ACT aerosol solution inhaler Inhale 2 puffs Daily. 4 g 11   • Symbicort 160-4.5 MCG/ACT inhaler INHALE TWO PUFFS BY MOUTH TWICE A DAY 10.2 g 10     No current facility-administered medications for this visit.       Maryam Maria Elena had no medications administered during this visit.    Return in about 3 months (around 6/16/2021).    There are no Patient Instructions on file for this visit.

## 2021-03-17 LAB
ALBUMIN SERPL-MCNC: 4 G/DL (ref 3.5–5.2)
ALBUMIN/GLOB SERPL: 1.4 G/DL
ALP SERPL-CCNC: 77 U/L (ref 39–117)
ALT SERPL-CCNC: 13 U/L (ref 1–33)
AST SERPL-CCNC: 18 U/L (ref 1–32)
BILIRUB SERPL-MCNC: 0.2 MG/DL (ref 0–1.2)
BUN SERPL-MCNC: 14 MG/DL (ref 8–23)
BUN/CREAT SERPL: 23.3 (ref 7–25)
CALCIUM SERPL-MCNC: 9.7 MG/DL (ref 8.6–10.5)
CHLORIDE SERPL-SCNC: 104 MMOL/L (ref 98–107)
CO2 SERPL-SCNC: 31.9 MMOL/L (ref 22–29)
CREAT SERPL-MCNC: 0.6 MG/DL (ref 0.57–1)
DIGOXIN SERPL-MCNC: 0.6 NG/ML (ref 0.6–1.2)
GLOBULIN SER CALC-MCNC: 2.8 GM/DL
GLUCOSE SERPL-MCNC: 81 MG/DL (ref 65–99)
POTASSIUM SERPL-SCNC: 4 MMOL/L (ref 3.5–5.2)
PROT SERPL-MCNC: 6.8 G/DL (ref 6–8.5)
SODIUM SERPL-SCNC: 144 MMOL/L (ref 136–145)
T4 FREE SERPL-MCNC: 0.98 NG/DL (ref 0.93–1.7)
TSH SERPL DL<=0.005 MIU/L-ACNC: 1.17 UIU/ML (ref 0.27–4.2)

## 2021-03-18 ENCOUNTER — APPOINTMENT (OUTPATIENT)
Dept: BONE DENSITY | Facility: HOSPITAL | Age: 76
End: 2021-03-18

## 2021-03-22 LAB
CYTO UR: NORMAL
LAB AP CASE REPORT: NORMAL
LAB AP CLINICAL INFORMATION: NORMAL
Lab: NORMAL
PATH REPORT.ADDENDUM SPEC: NORMAL
PATH REPORT.FINAL DX SPEC: NORMAL
PATH REPORT.GROSS SPEC: NORMAL

## 2021-04-06 NOTE — PROGRESS NOTES
Kings Arredondo is a 75 y.o. female.     F/u for nontoxic MNG, hypothyroidism, COPD, asthma      Patient is a 75-year-old female who came in for follow-up     She has toxic multinodular goiter. She had a thyroid scan and uptake in February 2014 which showed increased uptake of 40% with a heterogeneous distribution of the radiopharmaceutical in both lobes of the thyroid gland. A dominant area of photopenia is present on the left lobe. She had a thyroid ultrasound which showed a multinodular goiter with cysts and solid nodules. The dominant nodule in the left is partially cystic. She had an ultrasound in August 2014 which showed the left lobe dominant nodule is smaller.      She is on Tapazole 5 mg 1.5 tab once a day.   Diarrhea has resolved since Keytruda was discontinued.  She denies heat or cold intolerance. She denies palpitations. She denies tremors.  She denies dysphagia.  TSH done in March 2021 is normal at 1.17 with a normal free T4 of 0.98 ng per DL.     She had a thyroid ultrasound done on April 4, 2018 which showed a multinodular goiter.  There is a hypervascular 2 cm nodule in the upper right thyroid lobe which corresponds to the hypermetabolic nodule on the PET CT scan of November 15, 2017.  She wants continued observation due to her other co-morbidities.      She had left upper lobe lung biopsy in in November 2017 which yielded adenocarcinoma.  She had PET CT scan done in November 2017 which showed a small hypermetabolic nodule in the right lobe of the thyroid gland which may be an adenoma or a small thyroid cancer.  A metastasis to the thyroid gland is considered much less likely.  She has completed radiation therapy.  She had CT scan of the chest in May 2018 which showed slight interval decrease in size of pleural-based lung tumor in the left upper lobe and moderately severe emphysema.  There is no lymphadenopathy.  She was taken off Keytruda because of diarrhea.  She follows with   [facility].     CT of the chest done in September 2020 showed stable 2 cm left pleural-based mass.  CT chest done in 2/21 showed interval increase in size of left upper lobe pleural-based mass.     She has COPD. She denies any shortness of breath or coughing.  She is on maintenance Symbicort, Spiriva, Singulair,  and Pro-Air prn for COPD.  Zyrtec made her sleepy.  She is on continuous O2.  She follows with Dr. Anthony.      She has hypertension and history of multifocal tachycardia.  She was taken off lisinopril last May 2014. She is on diltiazem  mg/day and digoxin 0.125 mg/day.  She denies chest pain or palpitations.  She follows with Dr. Bobo.      She had a previous right mastectomy for breast cancer. She has completed 5 years of Arimidex in 2009. She has osteopenia and was on Fosamax for unknown period of time. Her last bone mineral density was June 24, 2016 at [facility] showed osteopenia in both hips and normal lumbar spine bone density.  She is on Vit D 2000 units daily      She had a colonoscopy in April 2017 and 2 tubular adenoma with low-grade dysplasia were removed by Dr. Eddy.  She was advised a repeat colonoscopy in 2019.   She has not scheduled a follow-up colonoscopy because of pandemic and co-morbidities.    She had 2 Pfizer Covid vaccines without major side effects  The following portions of the patient's history were reviewed and updated as appropriate: allergies, current medications, past family history, past medical history, past social history, past surgical history and problem list.    Review of Systems   Respiratory: Positive for shortness of breath.    Cardiovascular: Negative for chest pain and palpitations.   Gastrointestinal: Negative.    Genitourinary: Negative.    Musculoskeletal: Negative for myalgias.     Objective      Vitals:    04/16/21 1404   BP: 136/58   BP Location: Right arm   Patient Position: Sitting   Cuff Size: Small Adult   Pulse: (!) 123   SpO2: 97%   Weight: 44.2 kg (97  "lb 8 oz)   Height: 152.4 cm (60\")     Physical Exam  Constitutional:       General: She is not in acute distress.     Appearance: Normal appearance. She is not ill-appearing or toxic-appearing.   Eyes:      General: No scleral icterus.        Right eye: No discharge.         Left eye: No discharge.      Extraocular Movements: Extraocular movements intact.      Conjunctiva/sclera: Conjunctivae normal.   Neck:      Vascular: No carotid bruit.   Cardiovascular:      Rate and Rhythm: Normal rate and regular rhythm.      Heart sounds: Normal heart sounds. No murmur heard.   No friction rub.   Pulmonary:      Effort: Pulmonary effort is normal. No respiratory distress.      Breath sounds: Normal breath sounds. No stridor. No rales.   Chest:      Chest wall: No tenderness.   Abdominal:      General: Bowel sounds are normal. There is no distension.      Palpations: Abdomen is soft. There is no mass.      Tenderness: There is no right CVA tenderness or left CVA tenderness.   Musculoskeletal:         General: No swelling or tenderness. Normal range of motion.      Cervical back: Neck supple. No rigidity or tenderness.   Lymphadenopathy:      Cervical: No cervical adenopathy.   Skin:     General: Skin is warm and dry.   Neurological:      General: No focal deficit present.      Mental Status: She is alert and oriented to person, place, and time.   Psychiatric:         Mood and Affect: Mood normal.         Behavior: Behavior normal.       Office Visit on 03/16/2021   Component Date Value Ref Range Status   • Glucose 03/16/2021 81  65 - 99 mg/dL Final   • BUN 03/16/2021 14  8 - 23 mg/dL Final   • Creatinine 03/16/2021 0.60  0.57 - 1.00 mg/dL Final   • eGFR Non  Am 03/16/2021 97  >60 mL/min/1.73 Final    Comment: The MDRD GFR formula is only valid for adults with stable  renal function between ages 18 and 70.     • eGFR  Am 03/16/2021 118  >60 mL/min/1.73 Final   • BUN/Creatinine Ratio 03/16/2021 23.3  7.0 - 25.0 " Final   • Sodium 03/16/2021 144  136 - 145 mmol/L Final   • Potassium 03/16/2021 4.0  3.5 - 5.2 mmol/L Final   • Chloride 03/16/2021 104  98 - 107 mmol/L Final   • Total CO2 03/16/2021 31.9* 22.0 - 29.0 mmol/L Final   • Calcium 03/16/2021 9.7  8.6 - 10.5 mg/dL Final   • Total Protein 03/16/2021 6.8  6.0 - 8.5 g/dL Final   • Albumin 03/16/2021 4.00  3.50 - 5.20 g/dL Final   • Globulin 03/16/2021 2.8  gm/dL Final   • A/G Ratio 03/16/2021 1.4  g/dL Final   • Total Bilirubin 03/16/2021 0.2  0.0 - 1.2 mg/dL Final   • Alkaline Phosphatase 03/16/2021 77  39 - 117 U/L Final   • AST (SGOT) 03/16/2021 18  1 - 32 U/L Final   • ALT (SGPT) 03/16/2021 13  1 - 33 U/L Final   • Digoxin 03/16/2021 0.60  0.60 - 1.20 ng/mL Final    Results may be falsely increased if patient taking Biotin.   • TSH 03/16/2021 1.170  0.270 - 4.200 uIU/mL Final   • Free T4 03/16/2021 0.98  0.93 - 1.70 ng/dL Final    Results may be falsely increased if patient taking Biotin.     Assessment/Plan   Diagnoses and all orders for this visit:    1. Toxic multinodular goiter (Primary)    2. Multifocal atrial tachycardia (CMS/HCC)    3. Hyperthyroidism    4. Essential hypertension    5. Pulmonary emphysema, unspecified emphysema type (CMS/HCC)    Other orders  -     methIMAzole (TAPAZOLE) 5 MG tablet; Take 1.5 tablets by mouth Daily.  Dispense: 135 tablet; Refill: 2      Continue Tapazole 5 mg 1.5 tablets daily.    Continue digoxin and diltiazem per cardiologist.  Continue vitamin D 2000 units/day.  Continue Singulair, Symbicort, Spiriva, albuterol, Flonase, and Atrovent nasal spray per pulmonologist.    Copy of my note sent to Dr. Seymour Dr. Anthony, Dr. Bobo, and Dr. Je Ventura    RTC 4 mos

## 2021-05-06 NOTE — TELEPHONE ENCOUNTER
Caller: Maryam Arredondo    Relationship: Self    Best call back number:[phone]       What was the call regarding:     PT RETURNING A CALL FROM OFFICE THAT SAID THERE WAS A NOON TIME APPT HUSAM FOR PT ON 5/28 FOR HER APPT    Do you require a callback:YES

## 2021-05-06 NOTE — TELEPHONE ENCOUNTER
Caller: Maryam Arredondo    Relationship to patient: Self    Best call back number: [phone]    Chief complaint:  CANC./VENKATA.    Type of visit:  VITALS/FOLLOW UP 1    Requested date: SAME DAY, ASKING FOR AN EARLIER TIME, 2 O'CLOCK WOULD BE GREAT.    If rescheduling, when is the original appointment: 5/28/2021     Additional notes: PATIENT ALSO WOULD LIKE TO VERIFY THAT IT IS OKAY THAT HER SISTER ACCOMPANIES HER TO THIS APPT., HER NAME IS ANGÉLICA BENÍTEZ.

## 2021-05-28 NOTE — PROGRESS NOTES
History:     Reason for follow up:   1.  Stage IIB left upper lobe non-small cell lung cancer, high PDL-1 (80%), negative egfr/alk/ros   * status post radiation therapy completed 1/26/18  2.  Recurrent disease involving pleural-based nodule in the lingula and pleural-based mass left upper lobe; both areas treated with radiation therapy completed 2/3/2020  3.  Patient received 3 doses of Keytruda February/March 2020 but stopped secondary to diarrhea and weight loss     HPI:  Maryam Arredondo presents for follow-up of her lung cancer.  She was felt to have progressive disease early part 2020 and took 3 doses of Keytruda complicated by diarrhea and weight loss after which she discontinued therapy.  She is under observation.  The patient returned today for follow-up and scan review.      The patient is doing about the same.  She has chronic hypoxic respiratory failure on O2.  She denies worsening shortness of breath.  She denies cough.  She denies pain in the chest.  She has anxiety.      Reviewed, confirmed and updated history (past medical, social and family)   Past Medical   Past Medical History:   Diagnosis Date   • Abnormal color of sputum     grey   • Acromioclavicular joint arthritis    • Acute maxillary sinusitis    • Acute upper respiratory infection    • Allergic rhinitis    • Anemia    • Asthma    • B12 deficiency    • Benign essential hypertension    • Breast cancer (CMS/HCC)     s/p Lumpectomy ~2000 and chemo/XRT. Then  Masectomy ~2004 for some recurrence   • Cerumen impaction    • COPD (chronic obstructive pulmonary disease) (CMS/HCC)    • COPD exacerbation (CMS/HCC)    • Diverticulosis of colon    • Cedric-Barr infection    • Fever    • Heart disease    • High risk medication use    • Hospital discharge follow-up    • Hyperthyroidism    • Hypoxia    • Laryngitis    • Leg wound, left    • Medicare annual wellness visit, subsequent    • Mitral regurgitation     mild to moderate   • Multifocal atrial  tachycardia (CMS/HCC)    • Nocturnal hypoxia    • Non-small cell lung cancer (CMS/HCC)    • Onychomycosis of toenail    • Oral aphthae    • Osteopenia    • Other fatigue    • Oxygen dependent     2L - at night only   • Pneumonia of upper lobe of lung    • Pneumothorax    • Post-menopausal    • Skin abrasion    • SOB (shortness of breath)    • Supraventricular tachycardia (CMS/HCC)    • Tachycardia    • Thyroid nodule    • Toxic multinodular goiter    • Vitamin B deficiency    • Vitamin D deficiency    • Wound healing well on examination     and   Patient Active Problem List   Diagnosis   • Pneumothorax   • Hypoxia   • Tachycardia   • Hyperthyroidism   • Essential hypertension   • Multifocal atrial tachycardia (CMS/HCC)   • Toxic multinodular goiter   • Pulmonary emphysema (CMS/HCC)   • Vitamin D insufficiency   • Pneumonia   • COPD with acute lower respiratory infection (CMS/HCC)   • Malignant neoplasm of upper lobe of left lung (CMS/HCC)   • Infected skin tear   • Allergic rhinitis   • B12 deficiency   • Oxygen dependent   • Mitral regurgitation   • Breast cancer (CMS/HCC)   • Medicare annual wellness visit, subsequent   • Panlobular emphysema (CMS/HCC)   • Recurrent non-small cell lung cancer (CMS/HCC)   • Encounter for long-term (current) use of other medications   • Unspecified fracture of fifth metacarpal bone, left hand, initial encounter for closed fracture   • Immobility syndrome   • Encounter for removal of sutures   • Laceration of skin of knee without complication, left, sequela   • Dysuria   • Acute vaginitis   • Vaginal candidiasis   • Post-menopausal   • Current chronic use of systemic steroids     Social History   Social History     Socioeconomic History   • Marital status: Single     Spouse name: Not on file   • Number of children: Not on file   • Years of education: Not on file   • Highest education level: Not on file   Tobacco Use   • Smoking status: Former Smoker     Packs/day: 2.00     Years:  50.00     Pack years: 100.00     Types: Cigarettes     Quit date: 3/14/1994     Years since quittin.2   • Smokeless tobacco: Never Used   • Tobacco comment: D/C 20 years ago, smoking 2 ppd before quitting   Substance and Sexual Activity   • Alcohol use: No     Comment: Social use rare   • Drug use: No   • Sexual activity: Defer     Comment: drinks decaf      Family History  Family History   Problem Relation Age of Onset   • Arthritis Mother    • Heart failure Mother         Congestive Heart Failure   • Lung cancer Father    • Breast cancer Paternal Grandmother    • No Known Problems Sister      Allergies  Allergies   Allergen Reactions   • Codeine Unknown - High Severity     Patient is unaware of the reaction to this medication     • Penicillin V Unknown - High Severity     Reaction unknown to patient     • Penicillins Unknown - High Severity     Pt is unaware of the reaction to this medication     • Mucinex D [Pseudoephedrine-Guaifenesin Er] Nausea Only     Felt hot in chest       Medications: The current medication list was reviewed in the EMR.    Review of Systems  Review of Systems   Constitutional: Positive for fatigue. Negative for activity change, appetite change, chills, diaphoresis, fever and unexpected weight change.   HENT: Negative for congestion and sinus pressure.    Respiratory: Positive for shortness of breath. Negative for cough and chest tightness.    Cardiovascular: Negative for chest pain, palpitations and leg swelling.   Gastrointestinal: Negative for abdominal pain, blood in stool, constipation, diarrhea, nausea and vomiting.   Endocrine: Negative.    Genitourinary: Negative.    Musculoskeletal: Positive for arthralgias and gait problem. Negative for joint swelling and myalgias.   Skin: Negative.    Allergic/Immunologic: Negative.    Hematological: Negative for adenopathy. Does not bruise/bleed easily.   Psychiatric/Behavioral: Agitation:  Behavioral problem:  The patient is  "nervous/anxious.    ROS unchanged-5/28/2021    Objective    Objective:     Vitals:    05/28/21 1232   BP: 122/65   Pulse: 93   Resp: 18   Temp: 97.3 °F (36.3 °C)   TempSrc: Infrared   SpO2: 96%   Weight: 43.4 kg (95 lb 9.6 oz)   Height: 152.4 cm (60\")   PainSc: 0-No pain     Current Status 5/28/2021   ECOG score 1   GENERAL:  Well-developed elderly lady in no distress.    SKIN: No rash or induration, bruising on the forearm  EYES:    EOMs intact.  Conjunctivae normal.  HEAD:  Normocephalic.  NECK:  Supple with good range of motion; no thyromegaly or masses  LYMPHATICS:  No cervical, supraclavicular, axillary adenopathy.  RESP: Mild decrease in breath sounds, no increased work of breathing, no wheezing.  She is on O2 by nasal cannula  CARDIAC:  Regular rate and rhythm without murmurs, rubs or gallops. Normal S1,S2. No edema  GI:  Soft, nontender, normal bowel sounds  MSK:  No clubbing or cyanosis,.  No edema   NEUROLOGICAL:   No focal neurological deficits.  PSYCHIATRIC:  Normal affect and mood.  Somewhat anxious.    Labs and Imaging  WBC   Date Value Ref Range Status   10/01/2020 7.46 3.40 - 10.80 10*3/mm3 Final   06/11/2019 6.11 3.40 - 10.80 10*3/mm3 Final     RBC   Date Value Ref Range Status   10/01/2020 4.15 3.77 - 5.28 10*6/mm3 Final   06/11/2019 4.17 3.77 - 5.28 10*6/mm3 Final     Hemoglobin   Date Value Ref Range Status   10/01/2020 12.3 12.0 - 15.9 g/dL Final     Hematocrit   Date Value Ref Range Status   10/01/2020 38.4 34.0 - 46.6 % Final     MCV   Date Value Ref Range Status   10/01/2020 92.5 79.0 - 97.0 fL Final     MCH   Date Value Ref Range Status   10/01/2020 29.6 26.6 - 33.0 pg Final     MCHC   Date Value Ref Range Status   10/01/2020 32.0 31.5 - 35.7 g/dL Final     RDW   Date Value Ref Range Status   10/01/2020 13.3 12.3 - 15.4 % Final     RDW-SD   Date Value Ref Range Status   10/01/2020 45.0 37.0 - 54.0 fl Final     MPV   Date Value Ref Range Status   10/01/2020 9.1 6.0 - 12.0 fL Final "     Platelets   Date Value Ref Range Status   10/01/2020 270 140 - 450 10*3/mm3 Final     Neutrophil %   Date Value Ref Range Status   10/01/2020 76.2 (H) 42.7 - 76.0 % Final     Lymphocyte %   Date Value Ref Range Status   10/01/2020 12.7 (L) 19.6 - 45.3 % Final     Monocyte %   Date Value Ref Range Status   10/01/2020 8.4 5.0 - 12.0 % Final     Eosinophil %   Date Value Ref Range Status   10/01/2020 1.1 0.3 - 6.2 % Final     Basophil %   Date Value Ref Range Status   10/01/2020 0.9 0.0 - 1.5 % Final     Immature Grans %   Date Value Ref Range Status   10/01/2020 0.7 (H) 0.0 - 0.5 % Final     Neutrophils, Absolute   Date Value Ref Range Status   10/01/2020 5.68 1.70 - 7.00 10*3/mm3 Final     Lymphocytes, Absolute   Date Value Ref Range Status   10/01/2020 0.95 0.70 - 3.10 10*3/mm3 Final     Monocytes, Absolute   Date Value Ref Range Status   10/01/2020 0.63 0.10 - 0.90 10*3/mm3 Final     Eosinophils, Absolute   Date Value Ref Range Status   10/01/2020 0.08 0.00 - 0.40 10*3/mm3 Final     Basophils, Absolute   Date Value Ref Range Status   10/01/2020 0.07 0.00 - 0.20 10*3/mm3 Final     Immature Grans, Absolute   Date Value Ref Range Status   10/01/2020 0.05 0.00 - 0.05 10*3/mm3 Final     nRBC   Date Value Ref Range Status   10/01/2020 0.0 0.0 - 0.2 /100 WBC Final     Lab Results   Component Value Date    GLUCOSE 93 06/11/2020    BUN 13 04/20/2021    CREATININE 0.60 05/25/2021    EGFRIFNONA 92 04/20/2021    EGFRIFAFRI 111 04/20/2021    BCR 20.6 04/20/2021    K 4.4 04/20/2021    CO2 27.0 04/20/2021    CALCIUM 9.9 04/20/2021    PROTENTOTREF 7.0 04/20/2021    ALBUMIN 4.20 04/20/2021    LABIL2 1.5 04/20/2021    AST 14 04/20/2021    ALT 11 04/20/2021     CT chest 5/25/2021: There is a left upper lobe pleural-based mass which has increased in size currently 3.1 x 2.3 cm previously 2.8 x 1.8 cm.  A left hilar lymph node has increased in size from 3 to 7 mm.  There is a new nodule in the left breast 1.3 cm.                                    I personally reviewed CT chest 5/25/2021-there has been progression in size of the left upper lobe tumor    Assessment/Plan   Assessment/Plan:   This is a 75 y.o. female with:     1.  Recurrent left upper lobe non-small cell lung cancer, high PDL-1 (80%), negative egfr/alk/ros; [facility] testing showed no actionable gene mutations  · radiation therapy to the left upper lobe nodule/mass completed January 2018  · PET scan from 12/20/2019 showed significant uptake in the mass in the apex of the left lung and also a nodule in the lingula with significant uptake for its size and I think both areas are likely consistent with recurrent disease.  We opted to not send the patient for a biopsy as she has very poor lung function and a pneumothorax might prove fatal for her.  There is some uptake in the left hilum but this was present in October 2018 and stable.  There is uptake in the right thyroid gland which is stable and the patient has declined biopsy of this in the past.   · The patient underwent additional radiation to the hypermetabolic lesions and also initiated Keytruda on 1/21/2020.  · Keytruda stopped after 3 doses secondary to significant diarrhea.  Diarrhea resolved with a short course of steroids.  · CT chest  2/9/2021 shows slight progression of disease in the left upper lobe.  The patient is overall asymptomatic and in fact looks better than she has on previous visits.  I discussed the case with Dr. Bennett radiation oncology and the tumor in the left upper lobe cannot be treated with additional radiation therapy.  · CT chest 5/25/2021 showed further progression of the mass in the left upper lobe in addition to slight enlargement of the left hilar lymph node    2.  Multinodular goiter stable by CT--followed by endocrinology.  There is uptake in the right thyroid gland similar to prior PET.  The patient declines biopsy of the hypermetabolic right thyroid lesion    3. History of right  breast cancer, initially diagnosed in 2000 treated with lumpectomy, radiation, chemotherapy and endocrine therapy with local recurrence in 2005 treated with mastectomy and then Arimidex.     4. COPD, oxygen dependent    5.  New left breast nodule by CT 1.3 cm    I reviewed the imaging studies with the patient and her sister who accompanied her.  I reviewed [facility] testing which showed no targetable gene mutations.  The patient has progression of her malignancy.  She does not want chemotherapy.  Given the high PD-L1 I offered to try a different immunotherapy such as Opdivo to see if she may tolerate better than Keytruda which gave her significant fatigue and diarrhea.  The patient does not want to initiate additional immunotherapy at this time since she is asymptomatic from the cancer.  She would like a repeat CT of the chest in 3 months which I ordered.  She has a new left breast nodule which we will assess with a mammogram and ultrasound.  She would be agreeable to a biopsy if needed.  We will call her with the breast imaging results to discuss further.

## 2021-06-01 PROBLEM — N63.20 BREAST MASS, LEFT: Status: ACTIVE | Noted: 2021-01-01

## 2021-06-01 PROBLEM — B37.0 OROPHARYNGEAL CANDIDIASIS: Status: ACTIVE | Noted: 2021-01-01

## 2021-06-01 NOTE — PROGRESS NOTES
Chief Complaint   Patient presents with   • Follow-up     3 mon       Subjective   Maryam Arredondo is a 75 y.o. female.     History of Present Illness   F/U L upper lobe cancer/nonsmall cell.  CT reviewed from 2/9/21 with increase in size of L upper lobe nodule from 2.8 x 1.8 cm up to 3.1 x 2.3 cm.  L hilar lymph node was 3mm and now is 7 mm.  Also found new 1.3 cm L breast mass.   Seeing Dr Ventura.  Dr ventura has set up L breast U/S and mammogram.    F/U COPD.  Doing well with symbicort and spiriva.  On 2L O2 via NC.    C/o with sore throat for one day.  Some sore throat as well.  No help with warm salt water.    The following portions of the patient's history were reviewed and updated as appropriate: allergies, current medications, past family history, past medical history, past social history, past surgical history and problem list.    Review of Systems   Constitutional: Negative for appetite change and fatigue.   HENT: Positive for sore throat. Negative for nosebleeds.    Eyes: Negative for blurred vision and visual disturbance.   Respiratory: Negative for shortness of breath and wheezing.    Cardiovascular: Negative for chest pain and leg swelling.   Gastrointestinal: Negative for abdominal distention and abdominal pain.   Endocrine: Negative for cold intolerance and polyuria.   Genitourinary: Negative for dysuria and hematuria.   Musculoskeletal: Negative for arthralgias and myalgias.   Skin: Negative for color change and rash.   Neurological: Negative for weakness and confusion.   Psychiatric/Behavioral: Negative for agitation and depressed mood.       Patient Active Problem List   Diagnosis   • Pneumothorax   • Hypoxia   • Tachycardia   • Hyperthyroidism   • Essential hypertension   • Multifocal atrial tachycardia (CMS/HCC)   • Toxic multinodular goiter   • Pulmonary emphysema (CMS/HCC)   • Vitamin D insufficiency   • Pneumonia   • COPD with acute lower respiratory infection (CMS/HCC)   • Malignant neoplasm  of upper lobe of left lung (CMS/HCC)   • Infected skin tear   • Allergic rhinitis   • B12 deficiency   • Oxygen dependent   • Mitral regurgitation   • Breast cancer (CMS/HCC)   • Medicare annual wellness visit, subsequent   • Panlobular emphysema (CMS/HCC)   • Recurrent non-small cell lung cancer (CMS/[facility])   • Encounter for long-term (current) use of other medications   • Unspecified fracture of fifth metacarpal bone, left hand, initial encounter for closed fracture   • Immobility syndrome   • Encounter for removal of sutures   • Laceration of skin of knee without complication, left, sequela   • Dysuria   • Acute vaginitis   • Vaginal candidiasis   • Post-menopausal   • Current chronic use of systemic steroids   • Oropharyngeal candidiasis   • Breast mass, left       Allergies   Allergen Reactions   • Codeine Unknown - High Severity     Patient is unaware of the reaction to this medication     • Penicillin V Unknown - High Severity     Reaction unknown to patient     • Penicillins Unknown - High Severity     Pt is unaware of the reaction to this medication     • Mucinex D [Pseudoephedrine-Guaifenesin Er] Nausea Only     Felt hot in chest         Current Outpatient Medications:   •  albuterol sulfate  (90 Base) MCG/ACT inhaler, INHALE TWO PUFFS BY MOUTH EVERY 4 HOURS AS NEEDED FOR WHEEZING, Disp: 8.5 g, Rfl: 10  •  Cholecalciferol (VITAMIN D3) 2000 UNITS tablet, Take 1 tablet by mouth daily., Disp: , Rfl:   •  ciclopirox (Penlac) 8 % solution, Apply  topically to the appropriate area as directed Every Night., Disp: 6 mL, Rfl: 1  •  digoxin (LANOXIN) 125 MCG tablet, TAKE ONE TABLET BY MOUTH DAILY, Disp: 90 tablet, Rfl: 1  •  dilTIAZem CD (Cartia XT) 120 MG 24 hr capsule, Take 1 capsule by mouth Daily., Disp: 90 capsule, Rfl: 3  •  fluticasone (FLONASE) 50 MCG/ACT nasal spray, 2 sprays into the nostril(s) as directed by provider 2 (Two) Times a Day., Disp: 48 g, Rfl: 3  •  methIMAzole (TAPAZOLE) 5 MG tablet,  Take 1.5 tablets by mouth Daily., Disp: 135 tablet, Rfl: 2  •  metroNIDAZOLE (METROCREAM) 0.75 % cream, Apply  topically to the appropriate area as directed 2 (Two) Times a Day., Disp: , Rfl:   •  montelukast (SINGULAIR) 10 MG tablet, TAKE ONE TABLET BY MOUTH DAILY, Disp: 90 tablet, Rfl: 2  •  Multiple Vitamins-Minerals (PRESERVISION AREDS 2) chewable tablet, Chew 2 capsules Every Morning., Disp: , Rfl:   •  Nutritional Supplements (ENSURE ACTIVE PO), Take  by mouth. Ensure or boost, Disp: , Rfl:   •  Nutritional Supplements (JUICE PLUS FIBRE PO), Take  by mouth. 12 gummies, Disp: , Rfl:   •  O2 (OXYGEN), Inhale Continuous., Disp: , Rfl:   •  Spiriva Respimat 2.5 MCG/ACT aerosol solution inhaler, Inhale 2 puffs Daily., Disp: 4 g, Rfl: 11  •  Symbicort 160-4.5 MCG/ACT inhaler, INHALE TWO PUFFS BY MOUTH TWICE A DAY, Disp: 10.2 g, Rfl: 10  •  ipratropium (ATROVENT) 0.06 % nasal spray, 2 sprays into the nostril(s) as directed by provider 3 (Three) Times a Day., Disp: 15 mL, Rfl: 12  •  mupirocin (BACTROBAN) 2 % ointment, , Disp: , Rfl:   •  nystatin (MYCOSTATIN) 332321 UNIT/ML suspension, Swish and swallow 5 mL 4 (Four) Times a Day., Disp: 473 mL, Rfl: 5    Past Medical History:   Diagnosis Date   • Abnormal color of sputum     grey   • Acromioclavicular joint arthritis    • Acute maxillary sinusitis    • Acute upper respiratory infection    • Allergic rhinitis    • Anemia    • Asthma    • B12 deficiency    • Benign essential hypertension    • Breast cancer (CMS/HCC)     s/p Lumpectomy ~2000 and chemo/XRT. Then  Masectomy ~2004 for some recurrence   • Cerumen impaction    • COPD (chronic obstructive pulmonary disease) (CMS/HCC)    • COPD exacerbation (CMS/HCC)    • Diverticulosis of colon    • Cedric-Barr infection    • Fever    • Heart disease    • High risk medication use    • Hospital discharge follow-up    • Hyperthyroidism    • Hypoxia    • Laryngitis    • Leg wound, left    • Medicare annual wellness visit,  subsequent    • Mitral regurgitation     mild to moderate   • Multifocal atrial tachycardia (CMS/HCC)    • Nocturnal hypoxia    • Non-small cell lung cancer (CMS/HCC)    • Onychomycosis of toenail    • Oral aphthae    • Osteopenia    • Other fatigue    • Oxygen dependent     2L - at night only   • Pneumonia of upper lobe of lung    • Pneumothorax    • Post-menopausal    • Skin abrasion    • SOB (shortness of breath)    • Supraventricular tachycardia (CMS/HCC)    • Tachycardia    • Thyroid nodule    • Toxic multinodular goiter    • Vitamin B deficiency    • Vitamin D deficiency    • Wound healing well on examination        Past Surgical History:   Procedure Laterality Date   • BREAST LUMPECTOMY Right    • BREAST LUMPECTOMY     • CARDIAC CATHETERIZATION      PS SAPHENOUS VAIN RIGHT LEG   • COLONOSCOPY  2017    Complete. 4 polyps. Recheck in 2019.    • COLONOSCOPY N/A 2017    Procedure: COLONOSCOPY WITH POLYPECTOMY(COLD BIOPSY AND HOT SNARE);  Surgeon: Luiza Eddy MD;  Location: [facility];  Service:    • HYSTERECTOMY     • MASTECTOMY Right    • NECK SURGERY      2 spurs removed   • VASCULAR SURGERY      spider vein ablation       Family History   Problem Relation Age of Onset   • Arthritis Mother    • Heart failure Mother         Congestive Heart Failure   • Lung cancer Father    • Breast cancer Paternal Grandmother    • No Known Problems Sister        Social History     Tobacco Use   • Smoking status: Former Smoker     Packs/day: 2.00     Years: 50.00     Pack years: 100.00     Types: Cigarettes     Quit date: 3/14/1994     Years since quittin.2   • Smokeless tobacco: Never Used   • Tobacco comment: D/C 20 years ago, smoking 2 ppd before quitting   Substance Use Topics   • Alcohol use: No     Comment: Social use rare            Objective     Vitals:    21 1446   BP: 116/70   Pulse: 106   Temp: 97.7 °F (36.5 °C)   SpO2: 98%     Body mass index is 18.75 kg/m².    Physical  Exam  Vitals reviewed.   Constitutional:       Appearance: She is well-developed. She is not diaphoretic.   HENT:      Head: Normocephalic and atraumatic.      Mouth/Throat:      Comments: Sl erythema post soft palate  Eyes:      General: No scleral icterus.     Pupils: Pupils are equal, round, and reactive to light.   Neck:      Thyroid: No thyromegaly.   Cardiovascular:      Rate and Rhythm: Normal rate and regular rhythm.      Heart sounds: No murmur heard.   No friction rub. No gallop.    Pulmonary:      Effort: Pulmonary effort is normal. No respiratory distress.      Breath sounds: No wheezing or rales.   Chest:      Chest wall: No tenderness.   Abdominal:      General: Bowel sounds are normal. There is no distension.      Palpations: Abdomen is soft.      Tenderness: There is no abdominal tenderness.   Musculoskeletal:         General: No deformity. Normal range of motion.   Lymphadenopathy:      Cervical: No cervical adenopathy.   Skin:     General: Skin is warm and dry.      Findings: No rash.   Neurological:      Cranial Nerves: No cranial nerve deficit.      Motor: No abnormal muscle tone.         Lab Results   Component Value Date    GLUCOSE 93 06/11/2020    BUN 13 04/20/2021    CREATININE 0.60 05/25/2021    EGFRIFNONA 92 04/20/2021    EGFRIFAFRI 111 04/20/2021    BCR 20.6 04/20/2021    K 4.4 04/20/2021    CO2 27.0 04/20/2021    CALCIUM 9.9 04/20/2021    PROTENTOTREF 7.0 04/20/2021    ALBUMIN 4.20 04/20/2021    LABIL2 1.5 04/20/2021    AST 14 04/20/2021    ALT 11 04/20/2021       WBC   Date Value Ref Range Status   10/01/2020 7.46 3.40 - 10.80 10*3/mm3 Final   06/11/2019 6.11 3.40 - 10.80 10*3/mm3 Final     RBC   Date Value Ref Range Status   10/01/2020 4.15 3.77 - 5.28 10*6/mm3 Final   06/11/2019 4.17 3.77 - 5.28 10*6/mm3 Final     Hemoglobin   Date Value Ref Range Status   10/01/2020 12.3 12.0 - 15.9 g/dL Final     Hematocrit   Date Value Ref Range Status   10/01/2020 38.4 34.0 - 46.6 % Final     MCV    Date Value Ref Range Status   10/01/2020 92.5 79.0 - 97.0 fL Final     MCH   Date Value Ref Range Status   10/01/2020 29.6 26.6 - 33.0 pg Final     MCHC   Date Value Ref Range Status   10/01/2020 32.0 31.5 - 35.7 g/dL Final     RDW   Date Value Ref Range Status   10/01/2020 13.3 12.3 - 15.4 % Final     RDW-SD   Date Value Ref Range Status   10/01/2020 45.0 37.0 - 54.0 fl Final     MPV   Date Value Ref Range Status   10/01/2020 9.1 6.0 - 12.0 fL Final     Platelets   Date Value Ref Range Status   10/01/2020 270 140 - 450 10*3/mm3 Final     Neutrophil %   Date Value Ref Range Status   10/01/2020 76.2 (H) 42.7 - 76.0 % Final     Lymphocyte %   Date Value Ref Range Status   10/01/2020 12.7 (L) 19.6 - 45.3 % Final     Monocyte %   Date Value Ref Range Status   10/01/2020 8.4 5.0 - 12.0 % Final     Eosinophil %   Date Value Ref Range Status   10/01/2020 1.1 0.3 - 6.2 % Final     Basophil %   Date Value Ref Range Status   10/01/2020 0.9 0.0 - 1.5 % Final     Immature Grans %   Date Value Ref Range Status   10/01/2020 0.7 (H) 0.0 - 0.5 % Final     Neutrophils, Absolute   Date Value Ref Range Status   10/01/2020 5.68 1.70 - 7.00 10*3/mm3 Final     Lymphocytes, Absolute   Date Value Ref Range Status   10/01/2020 0.95 0.70 - 3.10 10*3/mm3 Final     Monocytes, Absolute   Date Value Ref Range Status   10/01/2020 0.63 0.10 - 0.90 10*3/mm3 Final     Eosinophils, Absolute   Date Value Ref Range Status   10/01/2020 0.08 0.00 - 0.40 10*3/mm3 Final     Basophils, Absolute   Date Value Ref Range Status   10/01/2020 0.07 0.00 - 0.20 10*3/mm3 Final     Immature Grans, Absolute   Date Value Ref Range Status   10/01/2020 0.05 0.00 - 0.05 10*3/mm3 Final     nRBC   Date Value Ref Range Status   10/01/2020 0.0 0.0 - 0.2 /100 WBC Final       No results found for: HGBA1C    Lab Results   Component Value Date    XKIJJWXN59 1,228 (H) 09/03/2019       TSH   Date Value Ref Range Status   04/20/2021 0.571 0.270 - 4.200 uIU/mL Final   03/02/2020  1.500 0.270 - 4.200 uIU/mL Final       No results found for: CHOL  Lab Results   Component Value Date    TRIG 58 09/03/2019     Lab Results   Component Value Date    HDL 72 (H) 09/03/2019     Lab Results   Component Value Date     (H) 09/03/2019     Lab Results   Component Value Date    VLDL 11.6 09/03/2019     No results found for: LDLHDL      Procedures    Assessment/Plan   Problems Addressed this Visit     Breast mass, left    Oropharyngeal candidiasis - Primary    Panlobular emphysema (CMS/HCC)    Recurrent non-small cell lung cancer (CMS/HCC)      Diagnoses       Codes Comments    Oropharyngeal candidiasis    -  Primary ICD-10-CM: B37.0  ICD-9-CM: 112.0     Panlobular emphysema (CMS/HCC)     ICD-10-CM: J43.1  ICD-9-CM: 492.8     Recurrent non-small cell lung cancer (CMS/HCC)     ICD-10-CM: C34.90  ICD-9-CM: 162.9     Breast mass, left     ICD-10-CM: N63.20  ICD-9-CM: 611.72         New dx of oropharyngeal candidiasis.  Nystatin swish and swallow.  L breast mass,  New problem.  Dr ventura ordered mammo and u/s.    L lung NSCLCA.  Followed by Dr Ventura.    COPD.  Continue inhalers and O2.      No orders of the defined types were placed in this encounter.      Current Outpatient Medications   Medication Sig Dispense Refill   • albuterol sulfate  (90 Base) MCG/ACT inhaler INHALE TWO PUFFS BY MOUTH EVERY 4 HOURS AS NEEDED FOR WHEEZING 8.5 g 10   • Cholecalciferol (VITAMIN D3) 2000 UNITS tablet Take 1 tablet by mouth daily.     • ciclopirox (Penlac) 8 % solution Apply  topically to the appropriate area as directed Every Night. 6 mL 1   • digoxin (LANOXIN) 125 MCG tablet TAKE ONE TABLET BY MOUTH DAILY 90 tablet 1   • dilTIAZem CD (Cartia XT) 120 MG 24 hr capsule Take 1 capsule by mouth Daily. 90 capsule 3   • fluticasone (FLONASE) 50 MCG/ACT nasal spray 2 sprays into the nostril(s) as directed by provider 2 (Two) Times a Day. 48 g 3   • methIMAzole (TAPAZOLE) 5 MG tablet Take 1.5 tablets by mouth Daily.  135 tablet 2   • metroNIDAZOLE (METROCREAM) 0.75 % cream Apply  topically to the appropriate area as directed 2 (Two) Times a Day.     • montelukast (SINGULAIR) 10 MG tablet TAKE ONE TABLET BY MOUTH DAILY 90 tablet 2   • Multiple Vitamins-Minerals (PRESERVISION AREDS 2) chewable tablet Chew 2 capsules Every Morning.     • Nutritional Supplements (ENSURE ACTIVE PO) Take  by mouth. Ensure or boost     • Nutritional Supplements (JUICE PLUS FIBRE PO) Take  by mouth. 12 gummies     • O2 (OXYGEN) Inhale Continuous.     • Spiriva Respimat 2.5 MCG/ACT aerosol solution inhaler Inhale 2 puffs Daily. 4 g 11   • Symbicort 160-4.5 MCG/ACT inhaler INHALE TWO PUFFS BY MOUTH TWICE A DAY 10.2 g 10   • ipratropium (ATROVENT) 0.06 % nasal spray 2 sprays into the nostril(s) as directed by provider 3 (Three) Times a Day. 15 mL 12   • mupirocin (BACTROBAN) 2 % ointment      • nystatin (MYCOSTATIN) 382713 UNIT/ML suspension Swish and swallow 5 mL 4 (Four) Times a Day. 473 mL 5     No current facility-administered medications for this visit.       Maryam Arredondo had no medications administered during this visit.    No follow-ups on file.    There are no Patient Instructions on file for this visit.

## 2021-06-17 NOTE — TELEPHONE ENCOUNTER
PT IS AGREEABLE TO CONTINUE WITH BIOPSY BUT HAS FURTHER QUESTIONS AND CANNOT DO THIS COMING WEEK . PLEASE RETURN CALL.

## 2021-06-17 NOTE — TELEPHONE ENCOUNTER
Called pt. No answer. L/M (per V/R form) informing her that radiologist recommends pt have a biopsy to further assess breast masses seen on mammogram. Informed her that we would order this and schedule this as long as she is agreeable to have this done. Orders placed. Message sent to scheduling.  ----- Message from Je Ventura MD sent at 6/17/2021  3:31 PM EDT -----  Can you let the patient know that radiology recommended biopsy of the breast masses seen on mammogram/ultrasound and set her up for breast biopsy if she is agreeable.  ----- Message -----  From: Interface, Rad Results [facility] In  Sent: 6/17/2021   2:20 PM EDT  To: Je Ventura MD

## 2021-06-18 NOTE — TELEPHONE ENCOUNTER
Ms. holbrook. for biopsy. Arrival/appt. times & location confirmed. Medical history & medications reviewed. Meds. restrictions. expl. Adv. to wear a soft bra. Okay to eat/drink prior to arrival. Questions addressed. Adv. visitors with outpt. not allowed.

## 2021-06-18 NOTE — TELEPHONE ENCOUNTER
Caller: Maryam Arredondo    Relationship: Self    Best call back number:     What is the best time to reach you: ANYTIME    Who are you requesting to speak with (clinical staff, provider,  specific staff member):     Do you know the name of the person who called:     What was the call regarding: PATIENT IS CALLING IN WANTING TO KNOW IF DR BROWN HAS RECEIVED REPORTS FROM DR MAE FROM HER TEST SHE HAD PREFORMED ON YESTERDAY.  SHE DOES NOT WANT A CALL BACK SHE JUST WANTS TO MAKE SURE THAT DR BROWN HAS THE RESULTS.  SHE SAID THAT SOMEONE IN THE OFFICE CAN CALL TO LET HER  KNOW.     Do you require a callback: YES

## 2021-06-22 NOTE — NURSING NOTE
Biopsy site to left outer breast, with slight bruising and small hematoma noted to be developing.  Skin Affix dry and intact. Denies pain. Ice pack with protective covering applied to biopsy site. Discharge instructions discussed with understanding voiced by patient. Pt had post mammo.  stated they were good images, pt ok to leave. Copies of discharge instructions provided to patient. No distress noted. To home via private vehicle.

## 2021-06-22 NOTE — H&P
Name: Maryam Arredondo ADMIT: 2021   : 1945  PCP: Kevin Seymour MD    MRN: [id number] LOS: 0 days   AGE/SEX: 75 y.o. female  ROOM: Room/bed info not found       Chief complaint L breast mass    Present Illness or Internal History:  Patient is a 75 y.o. female presents with L breast mass for US bx.     Past Surgical History:  Past Surgical History:   Procedure Laterality Date   • BREAST LUMPECTOMY Right    • BREAST LUMPECTOMY     • CARDIAC CATHETERIZATION      PS SAPHENOUS VAIN RIGHT LEG   • COLONOSCOPY  2017    Complete. 4 polyps. Recheck in 2019.    • COLONOSCOPY N/A 2017    Procedure: COLONOSCOPY WITH POLYPECTOMY(COLD BIOPSY AND HOT SNARE);  Surgeon: Luiza Eddy MD;  Location: [facility];  Service:    • HYSTERECTOMY     • MASTECTOMY Right    • NECK SURGERY      2 spurs removed   • OOPHORECTOMY     • VASCULAR SURGERY      spider vein ablation       Past Medical History:  Past Medical History:   Diagnosis Date   • Abnormal color of sputum     grey   • Acromioclavicular joint arthritis    • Acute maxillary sinusitis    • Acute upper respiratory infection    • Allergic rhinitis    • Anemia    • Asthma    • B12 deficiency    • Benign essential hypertension    • Breast cancer (CMS/HCC)     s/p Lumpectomy ~ and chemo/XRT. Then  Masectomy ~ for some recurrence   • Cerumen impaction    • COPD (chronic obstructive pulmonary disease) (CMS/HCC)    • COPD exacerbation (CMS/HCC)    • Diverticulosis of colon    • Cedric-Barr infection    • Fever    • Heart disease    • High risk medication use    • Hospital discharge follow-up    • Hyperthyroidism    • Hypoxia    • Laryngitis    • Leg wound, left    • Medicare annual wellness visit, subsequent    • Mitral regurgitation     mild to moderate   • Multifocal atrial tachycardia (CMS/HCC)    • Nocturnal hypoxia    • Non-small cell lung cancer (CMS/HCC)    • Onychomycosis of toenail    • Oral aphthae    • Osteopenia    • Other  fatigue    • Oxygen dependent     2L - at night only   • Pneumonia of upper lobe of lung    • Pneumothorax    • Post-menopausal    • Skin abrasion    • SOB (shortness of breath)    • Supraventricular tachycardia (CMS/HCC)    • Tachycardia    • Thyroid nodule    • Toxic multinodular goiter    • Vitamin B deficiency    • Vitamin D deficiency    • Wound healing well on examination        Home Medications:  (Not in a hospital admission)      Allergies:  Codeine, Penicillin v, Penicillins, and Mucinex d [pseudoephedrine-guaifenesin er]    Family History:  Family History   Problem Relation Age of Onset   • Arthritis Mother    • Heart failure Mother         Congestive Heart Failure   • Lung cancer Father    • Breast cancer Paternal Grandmother    • No Known Problems Sister        Social History:  Social History     Tobacco Use   • Smoking status: Former Smoker     Packs/day: 2.00     Years: 50.00     Pack years: 100.00     Types: Cigarettes     Quit date: 3/14/1994     Years since quittin.2   • Smokeless tobacco: Never Used   • Tobacco comment: D/C 20 years ago, smoking 2 ppd before quitting   Substance Use Topics   • Alcohol use: No     Comment: Social use rare   • Drug use: No        Objective     Physical Exam:    No exam performed today,    Vital Signs  Temp:  [98.6 °F (37 °C)] 98.6 °F (37 °C)  Heart Rate:  [110] 110  Resp:  [16] 16  BP: (128)/(64) 128/64    Anticipated Surgical Procedure:  Left breast ultrasound guided core needle biopsy    The risks, benefits and alternatives of this procedure have been discussed with the patient or responsible party: Yes        Robyn Tobin MD  21  14:18 EDT

## 2021-06-24 NOTE — TELEPHONE ENCOUNTER
Pt was asking if she needed to continue to use ice pack after 3 days. Nurse told her only if she felt she needed it. Pt was curious how long she would be unable to lift arms, nurse told her 3 days.  Pt also curious if the MD was going to call her with results. Nurse informed her to contact her MD for results.

## 2021-06-24 NOTE — TELEPHONE ENCOUNTER
Caller: Phill Arredondo    Relationship: Self    Best call back number: [phone]    What was the call regarding: PHILL CALLED TO SEE IF SHE COULD GET AN APPT WITH DR. MAE ON 06/28, 06/29, OR 06/30 TO GO OVER THE RESULTS FROM HER BIOPSY ON 06/22. SHE DOES NOT WANT TO GO OVER THOSE RESULTS BY PHONE, SHE WANTS AN IN PERSON VISIT. PLEASE CALL ONCE SCHEDULED.    Do you require a callback: YES

## 2021-06-28 NOTE — TELEPHONE ENCOUNTER
Returning call to pt. Pt wondering if sister could come to appt with her on 7/9 to receive results. Told pt that would be fine. Pt v/u.

## 2021-06-28 NOTE — TELEPHONE ENCOUNTER
Caller: Phill Arredondo    Relationship: Self    Best call back number: [phone]    What is the best time to reach you: ANY    Who are you requesting to speak with: DR. MAE'S NURSE    Do you know the name of the person who called: PHILL      What was the call regarding: ASKING PERMISSION FOR HER SISTER - ANGÉLICA TO ACCOMPANY HER TO HER 7/9/2021 APPT.     Do you require a callback:  YES, PLEASE

## 2021-07-09 NOTE — PROGRESS NOTES
History:     Reason for follow up:   1.  Stage IIB left upper lobe non-small cell lung cancer, high PDL-1 (80%), negative egfr/alk/ros   * status post radiation therapy completed 1/26/18  2.  Recurrent disease involving pleural-based nodule in the lingula and pleural-based mass left upper lobe; both areas treated with radiation therapy completed 2/3/2020  3.  Patient received 3 doses of Keytruda February/March 2020 but stopped secondary to diarrhea and weight loss     HPI:  Maryam Arredondo is a 75-year-old lady with active non-small cell lung cancer which is progressing.  Most recent scan showed a nodule/mass in the left breast which was new and she has been undergoing additional evaluation with breast imaging and biopsy.  She returned today for follow-up of results.  She reports tenderness at the breast biopsy site and also has noted inversion of the nipple on the left.      Reviewed, confirmed and updated history (past medical, social and family)   Past Medical   Past Medical History:   Diagnosis Date   • Abnormal color of sputum     grey   • Acromioclavicular joint arthritis    • Acute maxillary sinusitis    • Acute upper respiratory infection    • Allergic rhinitis    • Anemia    • Asthma    • B12 deficiency    • Benign essential hypertension    • Breast cancer (CMS/HCC)     s/p Lumpectomy ~2000 and chemo/XRT. Then  Masectomy ~2004 for some recurrence   • Cerumen impaction    • COPD (chronic obstructive pulmonary disease) (CMS/HCC)    • COPD exacerbation (CMS/HCC)    • Diverticulosis of colon    • Cedric-Barr infection    • Fever    • Heart disease    • High risk medication use    • Hospital discharge follow-up    • Hyperthyroidism    • Hypoxia    • Laryngitis    • Leg wound, left    • Medicare annual wellness visit, subsequent    • Mitral regurgitation     mild to moderate   • Multifocal atrial tachycardia (CMS/HCC)    • Nocturnal hypoxia    • Non-small cell lung cancer (CMS/HCC)    • Onychomycosis of toenail     • Oral aphthae    • Osteopenia    • Other fatigue    • Oxygen dependent     2L - at night only   • Pneumonia of upper lobe of lung    • Pneumothorax    • Post-menopausal    • Skin abrasion    • SOB (shortness of breath)    • Supraventricular tachycardia (CMS/HCC)    • Tachycardia    • Thyroid nodule    • Toxic multinodular goiter    • Vitamin B deficiency    • Vitamin D deficiency    • Wound healing well on examination     and   Patient Active Problem List   Diagnosis   • Pneumothorax   • Hypoxia   • Tachycardia   • Hyperthyroidism   • Essential hypertension   • Multifocal atrial tachycardia (CMS/HCC)   • Toxic multinodular goiter   • Pulmonary emphysema (CMS/HCC)   • Vitamin D insufficiency   • Pneumonia   • COPD with acute lower respiratory infection (CMS/HCC)   • Malignant neoplasm of upper lobe of left lung (CMS/HCC)   • Infected skin tear   • Allergic rhinitis   • B12 deficiency   • Oxygen dependent   • Mitral regurgitation   • Breast cancer (CMS/HCC)   • Medicare annual wellness visit, subsequent   • Panlobular emphysema (CMS/HCC)   • Recurrent non-small cell lung cancer (CMS/HCC)   • Encounter for long-term (current) use of other medications   • Unspecified fracture of fifth metacarpal bone, left hand, initial encounter for closed fracture   • Immobility syndrome   • Encounter for removal of sutures   • Laceration of skin of knee without complication, left, sequela   • Dysuria   • Acute vaginitis   • Vaginal candidiasis   • Post-menopausal   • Current chronic use of systemic steroids   • Oropharyngeal candidiasis   • Breast mass, left     Social History   Social History     Socioeconomic History   • Marital status: Single     Spouse name: Not on file   • Number of children: Not on file   • Years of education: Not on file   • Highest education level: Not on file   Tobacco Use   • Smoking status: Former Smoker     Packs/day: 2.00     Years: 50.00     Pack years: 100.00     Types: Cigarettes     Quit date:  3/14/1994     Years since quittin.3   • Smokeless tobacco: Never Used   • Tobacco comment: D/C 20 years ago, smoking 2 ppd before quitting   Substance and Sexual Activity   • Alcohol use: No     Comment: Social use rare   • Drug use: No   • Sexual activity: Defer     Comment: drinks decaf      Family History  Family History   Problem Relation Age of Onset   • Arthritis Mother    • Heart failure Mother         Congestive Heart Failure   • Lung cancer Father    • Breast cancer Paternal Grandmother    • No Known Problems Sister      Allergies  Allergies   Allergen Reactions   • Codeine Unknown - High Severity     Patient is unaware of the reaction to this medication     • Penicillin V Unknown - High Severity     Reaction unknown to patient     • Penicillins Unknown - High Severity     Pt is unaware of the reaction to this medication     • Mucinex D [Pseudoephedrine-Guaifenesin Er] Nausea Only     Felt hot in chest       Medications: The current medication list was reviewed in the EMR.    Review of Systems  Review of Systems   Constitutional: Positive for fatigue. Negative for activity change, appetite change, chills, diaphoresis, fever and unexpected weight change.   HENT: Negative for congestion and sinus pressure.    Respiratory: Positive for shortness of breath. Negative for cough and chest tightness.    Cardiovascular: Negative for chest pain, palpitations and leg swelling.   Gastrointestinal: Negative for abdominal pain, blood in stool, constipation, diarrhea, nausea and vomiting.   Endocrine: Negative.    Genitourinary: Negative.    Musculoskeletal: Positive for arthralgias and gait problem. Negative for joint swelling and myalgias.   Skin: Negative.    Allergic/Immunologic: Negative.    Hematological: Negative for adenopathy. Does not bruise/bleed easily.   Psychiatric/Behavioral: Agitation:  Behavioral problem:  The patient is nervous/anxious.    ROS unchanged-2021    Objective    Objective:  "    Vitals:    07/09/21 0749   BP: 133/78   Pulse: (!) 122   Resp: 18   Temp: 98.7 °F (37.1 °C)   TempSrc: Infrared   SpO2: 94%  Comment: with oxygen   Weight: 42.1 kg (92 lb 14.4 oz)   Height: 152.4 cm (60\")   PainSc: 0-No pain     Current Status 7/9/2021   ECOG score 1   GENERAL:  Well-developed elderly lady in no distress.    SKIN: No rash or induration, bruising on the forearm  EYES:    EOMs intact.  Conjunctivae normal.  HEAD:  Normocephalic.  NECK:  Supple with good range of motion; no thyromegaly or masses  LYMPHATICS:  No cervical, supraclavicular, axillary adenopathy.  RESP: Mild decrease in breath sounds, no increased work of breathing, no wheezing.  She is on O2 by nasal cannula  Breast: Bruising in the upper outer quadrant of the left breast, mild nipple inversion.  There is a 2 cm mass or hematoma palpated in the upper outer quadrant of the left breast  CARDIAC:  Regular rate and rhythm without murmurs, rubs or gallops. Normal S1,S2. No edema  GI:  Soft, nontender, normal bowel sounds  MSK:  No clubbing or cyanosis,.  No edema   NEUROLOGICAL:   No focal neurological deficits.  PSYCHIATRIC:  Normal affect and mood.  Somewhat anxious.  Poor memory.    Labs and Imaging  WBC   Date Value Ref Range Status   07/09/2021 7.12 3.40 - 10.80 10*3/mm3 Final   06/11/2019 6.11 3.40 - 10.80 10*3/mm3 Final     RBC   Date Value Ref Range Status   07/09/2021 4.18 3.77 - 5.28 10*6/mm3 Final   06/11/2019 4.17 3.77 - 5.28 10*6/mm3 Final     Hemoglobin   Date Value Ref Range Status   07/09/2021 12.2 12.0 - 15.9 g/dL Final     Hematocrit   Date Value Ref Range Status   07/09/2021 37.6 34.0 - 46.6 % Final     MCV   Date Value Ref Range Status   07/09/2021 90.0 79.0 - 97.0 fL Final     MCH   Date Value Ref Range Status   07/09/2021 29.2 26.6 - 33.0 pg Final     MCHC   Date Value Ref Range Status   07/09/2021 32.4 31.5 - 35.7 g/dL Final     RDW   Date Value Ref Range Status   07/09/2021 13.9 12.3 - 15.4 % Final     RDW-SD   Date " Value Ref Range Status   07/09/2021 45.9 37.0 - 54.0 fl Final     MPV   Date Value Ref Range Status   07/09/2021 9.2 6.0 - 12.0 fL Final     Platelets   Date Value Ref Range Status   07/09/2021 309 140 - 450 10*3/mm3 Final     Neutrophil %   Date Value Ref Range Status   07/09/2021 72.8 42.7 - 76.0 % Final     Lymphocyte %   Date Value Ref Range Status   07/09/2021 13.9 (L) 19.6 - 45.3 % Final     Monocyte %   Date Value Ref Range Status   07/09/2021 10.7 5.0 - 12.0 % Final     Eosinophil %   Date Value Ref Range Status   07/09/2021 1.3 0.3 - 6.2 % Final     Basophil %   Date Value Ref Range Status   07/09/2021 0.7 0.0 - 1.5 % Final     Immature Grans %   Date Value Ref Range Status   07/09/2021 0.6 (H) 0.0 - 0.5 % Final     Neutrophils, Absolute   Date Value Ref Range Status   07/09/2021 5.19 1.70 - 7.00 10*3/mm3 Final     Lymphocytes, Absolute   Date Value Ref Range Status   07/09/2021 0.99 0.70 - 3.10 10*3/mm3 Final     Monocytes, Absolute   Date Value Ref Range Status   07/09/2021 0.76 0.10 - 0.90 10*3/mm3 Final     Eosinophils, Absolute   Date Value Ref Range Status   07/09/2021 0.09 0.00 - 0.40 10*3/mm3 Final     Basophils, Absolute   Date Value Ref Range Status   07/09/2021 0.05 0.00 - 0.20 10*3/mm3 Final     Immature Grans, Absolute   Date Value Ref Range Status   07/09/2021 0.04 0.00 - 0.05 10*3/mm3 Final     nRBC   Date Value Ref Range Status   07/09/2021 0.0 0.0 - 0.2 /100 WBC Final     Lab Results   Component Value Date    GLUCOSE 93 06/11/2020    BUN 13 04/20/2021    CREATININE 0.60 05/25/2021    EGFRIFNONA 92 04/20/2021    EGFRIFAFRI 111 04/20/2021    BCR 20.6 04/20/2021    K 4.4 04/20/2021    CO2 27.0 04/20/2021    CALCIUM 9.9 04/20/2021    PROTENTOTREF 7.0 04/20/2021    ALBUMIN 4.20 04/20/2021    LABIL2 1.5 04/20/2021    AST 14 04/20/2021    ALT 11 04/20/2021     CT chest 5/25/2021: There is a left upper lobe pleural-based mass which has increased in size currently 3.1 x 2.3 cm previously 2.8 x 1.8  cm.  A left hilar lymph node has increased in size from 3 to 7 mm.  There is a new nodule in the left breast 1.3 cm.                                   I personally reviewed CT chest 5/25/2021-there has been progression in size of the left upper lobe tumor    Left breast mammogram/ultrasound 6/17/2021 showed 3 tumors in the upper outer quadrant of the left breast 1.7 cm, 1.1 cm, and 0.4 cm    Assessment/Plan   Assessment/Plan:   This is a 75 y.o. female with:     1.  Recurrent left upper lobe non-small cell lung cancer, high PDL-1 (80%), negative egfr/alk/ros; [facility] testing showed no actionable gene mutations  · radiation therapy to the left upper lobe nodule/mass completed January 2018  · PET scan from 12/20/2019 showed significant uptake in the mass in the apex of the left lung and also a nodule in the lingula with significant uptake for its size and I think both areas are likely consistent with recurrent disease.  We opted to not send the patient for a biopsy as she has very poor lung function and a pneumothorax might prove fatal for her.  There is some uptake in the left hilum but this was present in October 2018 and stable.  There is uptake in the right thyroid gland which is stable and the patient has declined biopsy of this in the past.   · The patient underwent additional radiation to the hypermetabolic lesions and also initiated Keytruda on 1/21/2020.  · Keytruda stopped after 3 doses secondary to significant diarrhea.  Diarrhea resolved with a short course of steroids.  · CT chest  2/9/2021 shows slight progression of disease in the left upper lobe.  The patient is overall asymptomatic and in fact looks better than she has on previous visits.  I discussed the case with Dr. Bennett radiation oncology and the tumor in the left upper lobe cannot be treated with additional radiation therapy.  · CT chest 5/25/2021 showed further progression of the mass in the left upper lobe in addition to slight  enlargement of the left hilar lymph node    2.  Multinodular goiter stable by CT--followed by endocrinology.  There is uptake in the right thyroid gland similar to prior PET.  The patient declines biopsy of the hypermetabolic right thyroid lesion    3. History of right breast cancer, initially diagnosed in 2000 treated with lumpectomy, radiation, chemotherapy and endocrine therapy with local recurrence in 2005 treated with mastectomy and then Arimidex.     4. COPD, oxygen dependent    5.  New left breast cancer:  · Left breast nodule incidentally noted by CT 5/25/2021 during staging of the patient's lung cancer; nodule 1.3 cm 1.7 cm, 1.1 cm, 0.4 cm  · Breast biopsy by ultrasound guidance 6/22/2021 showed invasive mammary carcinoma with metaplastic features high histologic grade, high proliferative rate 13 mm with intermediate grade ductal carcinoma in situ.  Tumor is negative for ER, TN and HER-2  · Mammogram/ultrasound 6/17/2021 showed 3 tumors in the upper outer quadrant of the left breast      Oncology plan/recommendations:  Imaging and pathology reviewed with the patient and her sister today.  She has a new high-grade triple negative cancer of the left breast.  The patient is frail, on oxygen and has progressing non-small cell lung cancer.  Unfortunately the breast cancer cannot be treated with antiestrogen therapy given ER negativity.  This type of tumor would typically be treated with chemotherapy neoadjuvantly or adjuvantly but she is pretty frail and I am not sure would be able to tolerate chemotherapy.  I am going to request records regarding her previous chemotherapy treatments for contralateral breast cancer 20 years ago.  I am going to have her reviewed by breast surgery ASAP to see if she has a surgical candidate.  Dr. Meyer did her right mastectomy and she request to see him if he is still taking patient; if not she will see one of the other breast surgeons.  If the patient is able to undergo a left  mastectomy I will review her pathology and determine if she is a candidate for adjuvant therapy based on results and performance status.  Could consider weekly Taxol plus/minus carboplatin which could also have activity for her lung cancer?

## 2021-07-19 NOTE — PROGRESS NOTES
"SURGERY  Maryam Arredondo   1945 7/19/21  Chief Complaint:  Left breast cancer, new, triple negative.    HPI    Ms. Arredondo is an unfortuante 75 y.o. female who is referred by Dr Ventura with a triple negative breast cancer that was found incidentally when being staged for her lung cancer treatment.  Three or more areas noted by imaging (CT 1.3 cm, 1.7 cm, 1.1 cm and 0.4 cm/1.1 cm 1:00, 1.7 cm 2:00 and 0.4 cm 2:30), upper outer quadrant with US guided biopsy showing a poorly differentiated malignant neoplasm with metaplastic feature, 1.3 cm, triple negative.   She thus has no oral options for treatment.      She is not a good surgical candidate due to multiple comorbidities.  This primarily centers around her COPD, she using 2 L of oxygen continuously, but also complicated by her multifocal atrial tachycardia with borderline control.  She does have hypothyroidism but had a TSH checked in April that is normal and she is on methimazole.    She did take Keytruda for her lung cancer but that was stopped due to intolerance.  Today at the beginning of our office visit she tells me that she would not be willing to take that anymore, but at the end confides that if she had to she would.  She tells me \"I am going to beat this\".  I discussed with Dr. Ventura prior to the visit and after it, and he feels that her lung cancer is advancing as evidenced on CTs but has been slow, being first diagnosed in 2018, treated with radiation, while he feels the breast cancer is very aggressive and will advance quickly.    I discussed with Dr. Anthony and he feels that she is at higher risk but it is not prohibitive saying her COPD is not mainly emphysema type.  Dr. Bobo has weighed in after contacting him and notes that she is at higher risk but it is not modifiable and he does not advise anything else prior to surgery.    Past Medical History:   Diagnosis Date   • Abnormal color of sputum     grey   • Acromioclavicular joint arthritis  "   • Acute maxillary sinusitis    • Acute upper respiratory infection    • Allergic rhinitis    • Anemia    • Asthma    • B12 deficiency    • Benign essential hypertension    • Breast cancer (CMS/HCC)     s/p Lumpectomy ~2000 and chemo/XRT. Then  Masectomy ~2004 for some recurrence   • Cerumen impaction    • COPD (chronic obstructive pulmonary disease) (CMS/HCC)    • COPD exacerbation (CMS/HCC)    • Diverticulosis of colon    • Cedric-Barr infection    • Fever    • Heart disease    • High risk medication use    • Hospital discharge follow-up    • Hyperthyroidism    • Hypoxia    • Laryngitis    • Leg wound, left    • Medicare annual wellness visit, subsequent    • Mitral regurgitation     mild to moderate   • Multifocal atrial tachycardia (CMS/HCC)    • Nocturnal hypoxia    • Non-small cell lung cancer (CMS/HCC)    • Onychomycosis of toenail    • Oral aphthae    • Osteopenia    • Other fatigue    • Oxygen dependent     2L - continuous   • Pneumonia of upper lobe of lung    • Pneumothorax    • Post-menopausal    • Skin abrasion    • SOB (shortness of breath)    • Supraventricular tachycardia (CMS/HCC)    • Tachycardia    • Thyroid nodule    • Toxic multinodular goiter    • Vitamin B deficiency    • Vitamin D deficiency    • Wound healing well on examination      Past Surgical History:   Procedure Laterality Date   • BREAST BIOPSY Left 06/22/2021    US guided core needle biopsy of left brest-Dr. Robyn Tobin, [facility]   • BREAST BIOPSY Right 2000   • BREAST BIOPSY Right 2004   • BREAST LUMPECTOMY Right 2000    Dr. Jaime Meyer   • COLONOSCOPY N/A 4/28/2017    Procedure: COLONOSCOPY WITH POLYPECTOMY(COLD BIOPSY AND HOT SNARE);  Surgeon: Luiza Eddy MD;  Location: [facility] ENDOSCOPY;  Service:    • LUNG SURGERY Left     Partial left lung collapse   • MASTECTOMY Right 2004    Dr. Jaime Meyer   • NECK SURGERY N/A     2 spurs removed   • TOTAL LAPAROSCOPIC HYSTERECTOMY SALPINGO OOPHORECTOMY Bilateral    •  VASCULAR SURGERY Right     spider vein ablation     Family History   Problem Relation Age of Onset   • Arthritis Mother    • Heart failure Mother         Congestive Heart Failure   • Lung cancer Father    • Heart disease Father    • Hypertension Father    • Breast cancer Paternal Grandmother 62   • No Known Problems Sister      Social History     Socioeconomic History   • Marital status: Single     Spouse name: Not on file   • Number of children: Not on file   • Years of education: Not on file   • Highest education level: Not on file   Tobacco Use   • Smoking status: Former Smoker     Packs/day: 2.00     Years: 50.00     Pack years: 100.00     Types: Cigarettes     Quit date: 3/14/1994     Years since quittin.3   • Smokeless tobacco: Never Used   • Tobacco comment: D/C 20 years ago, smoking 2 ppd before quitting   Substance and Sexual Activity   • Alcohol use: No     Comment: Social use rare   • Drug use: No   • Sexual activity: Defer     Comment: drinks decaf          Current Outpatient Medications:   •  albuterol sulfate  (90 Base) MCG/ACT inhaler, INHALE TWO PUFFS BY MOUTH EVERY 4 HOURS AS NEEDED FOR WHEEZING, Disp: 8.5 g, Rfl: 10  •  Cholecalciferol (VITAMIN D3) 2000 UNITS tablet, Take 1 tablet by mouth daily., Disp: , Rfl:   •  digoxin (LANOXIN) 125 MCG tablet, TAKE ONE TABLET BY MOUTH DAILY, Disp: 90 tablet, Rfl: 0  •  dilTIAZem CD (Cartia XT) 120 MG 24 hr capsule, Take 1 capsule by mouth Daily., Disp: 90 capsule, Rfl: 3  •  fluticasone (FLONASE) 50 MCG/ACT nasal spray, 2 sprays into the nostril(s) as directed by provider 2 (Two) Times a Day., Disp: 48 g, Rfl: 3  •  methIMAzole (TAPAZOLE) 5 MG tablet, Take 1.5 tablets by mouth Daily., Disp: 135 tablet, Rfl: 2  •  metroNIDAZOLE (METROCREAM) 0.75 % cream, Apply  topically to the appropriate area as directed 2 (Two) Times a Day., Disp: , Rfl:   •  montelukast (SINGULAIR) 10 MG tablet, TAKE ONE TABLET BY MOUTH DAILY, Disp: 90 tablet, Rfl: 1  •   "Multiple Vitamins-Minerals (PRESERVISION AREDS 2) chewable tablet, Chew 2 capsules Every Morning., Disp: , Rfl:   •  mupirocin (BACTROBAN) 2 % ointment, Apply 1 application topically to the appropriate area as directed As Needed., Disp: , Rfl:   •  Nutritional Supplements (ENSURE ACTIVE PO), Take  by mouth. Ensure or boost, Disp: , Rfl:   •  Nutritional Supplements (JUICE PLUS FIBRE PO), Take 12 tablets by mouth Daily. 12 gummies , Disp: , Rfl:   •  O2 (OXYGEN), Inhale 2 L/min Continuous., Disp: , Rfl:   •  Spiriva Respimat 2.5 MCG/ACT aerosol solution inhaler, Inhale 2 puffs Daily., Disp: 4 g, Rfl: 11  •  Symbicort 160-4.5 MCG/ACT inhaler, INHALE TWO PUFFS BY MOUTH TWICE A DAY, Disp: 10.2 g, Rfl: 10  •  vitamin B-12 (CYANOCOBALAMIN) 1000 MCG tablet, Take 1,000 mcg by mouth Every Other Day. Take only on Mon, Wed, Fri, Disp: , Rfl:   •  acyclovir (ZOVIRAX) 400 MG tablet, Take 400 mg by mouth 2 (Two) Times a Day As Needed. Take no more than 5 doses a day., Disp: , Rfl:   •  ciclopirox (Penlac) 8 % solution, Apply  topically to the appropriate area as directed Every Night., Disp: 6 mL, Rfl: 1  •  nystatin (MYCOSTATIN) 088437 UNIT/ML suspension, Swish and swallow 5 mL 4 (Four) Times a Day. (Patient taking differently: Swish and swallow 500,000 Units 4 (Four) Times a Day As Needed.), Disp: 473 mL, Rfl: 5    Allergies   Allergen Reactions   • Codeine Unknown - High Severity     Patient is unaware of the reaction to this medication     • Penicillins Unknown - High Severity     Pt is unaware of the reaction to this medication     • Mucinex D [Pseudoephedrine-Guaifenesin Er] Nausea Only     Felt hot in chest       Review of Systems  Denies any shortness of breath with activity, despite looking short of breath to me after moving from the chair to the examining table.  Vitals:    07/19/21 1502   Weight: 41.7 kg (92 lb)   Height: 152.4 cm (60\")       PHYSICAL EXAM:    Ht 152.4 cm (60\")   Wt 41.7 kg (92 lb)   LMP  (LMP " Unknown)   BMI 17.97 kg/m²   Body mass index is 17.97 kg/m².    Constitutional: well developed, chronically ill, thin, frail in appearance   Eyes: sclera nonicteric, conjunctiva not injected   ENMT: Hearing seemingly intact, trachea midline, dentitia not visualized  CVS: Irregularly irregular, soft murmur, peripheral edema not seen  Respiratory: CTA, appears to be moving little air, greater than normal respiratory effort   Gastrointestinal: no hepatosplenomegaly, abdomen soft  Genitourinary: inguinal hernia not detected  Musculoskeletal: gait a little slowed and wobbly, muscle mass diminished  Skin: warm and dry, no rashes visible.  Thin  Neurological: awake and alert, seems to have reasonable capacity for understanding for medical decision making  Psychiatric: appears to have reasonable judgement   Lymphatics: no cervical adenopathy or axillary adenopathy.  Breasts: Right breast surgically absent, left breast with palpable masses in the upper outer quadrant, ptotic A cup    Radiographic Findings: As above    Lab: Triple negative    Pamphlet reviewed: Breast cancer    IMPRESSION:  · Likely 3-4 separate versus 1 large left upper outer quadrant aggressive triple negative breast cancer discovered incidentally on lung cancer staging work-up.  Poorly differentiated  · Poor surgical candidate but not absolutely prohibitive  · Multifocal atrial tachycardia on digoxin, diltiazem 120 mg 24 hours  · COPD on Singulair, albuterol 2 puffs every 4 hours as needed wheezing, Spiriva 2 puffs daily Symbicort 2 puffs twice daily and Flonase  · Hyperthyroidism on methimazole  · Lung cancer progressing but somewhat controlled, with the perceived aggressiveness of the breast cancer significantly outweighing that of the lung cancer    PLAN:  · EKG well ahead of surgery in order to make sure that it will not be a stopping point for us  · Anesthetic plan with chest wall blocks and MAC  · Left mastectomy without any cayden evaluation  · Use  of Tisseel  · Has requested home health upon discharge.  · Discussed with the patient and her sister that this is not a curative intervention most likely but will help manage the situation    Rachel El MD  7/19/21

## 2021-08-09 NOTE — DISCHARGE INSTRUCTIONS
Take the following medications the morning of surgery:  DIGOXIN, DILTIAZEM, SPIRIVA INHALER, SYMBICORT, METHIMAZOLE    ARRIVE TO MAIN OR DESK 8/11/21 AT 6:00AM    If you are on prescription narcotic pain medication to control your pain you may also take that medication the morning of surgery.    General Instructions:  • Do not eat solid food after midnight the night before surgery.  • You may drink clear liquids day of surgery but must stop at least one hour before your hospital arrival time.  • It is beneficial for you to have a clear drink that contains carbohydrates the day of surgery.  We suggest a 12 to 20 ounce bottle of Gatorade or Powerade for non-diabetic patients or a 12 to 20 ounce bottle of G2 or Powerade Zero for diabetic patients. (Pediatric patients, are not advised to drink a 12 to 20 ounce carbohydrate drink)    Clear liquids are liquids you can see through.  Nothing red in color.     Plain water                               Sports drinks  Sodas                                   Gelatin (Jell-O)  Fruit juices without pulp such as white grape juice and apple juice  Popsicles that contain no fruit or yogurt  Tea or coffee (no cream or milk added)  Gatorade / Powerade  G2 / Powerade Zero    • Infants may have breast milk up to four hours before surgery.  • Infants drinking formula may drink formula up to six hours before surgery.   • Patients who avoid smoking, chewing tobacco and alcohol for 4 weeks prior to surgery have a reduced risk of post-operative complications.  Quit smoking as many days before surgery as you can.  • Do not smoke, use chewing tobacco or drink alcohol the day of surgery.   • If applicable bring your C-PAP/ BI-PAP machine.  • Bring any papers given to you in the doctor’s office.  • Wear clean comfortable clothes.  • Do not wear contact lenses, false eyelashes or make-up.  Bring a case for your glasses.   • Bring crutches or walker if applicable.  • Remove all piercings.  Leave  jewelry and any other valuables at home.  • Hair extensions with metal clips must be removed prior to surgery.  • The Pre-Admission Testing nurse will instruct you to bring medications if unable to obtain an accurate list in Pre-Admission Testing.        Preventing a Surgical Site Infection:  • For 2 to 3 days before surgery, avoid shaving with a razor because the razor can irritate skin and make it easier to develop an infection.    • Any areas of open skin can increase the risk of a post-operative wound infection by allowing bacteria to enter and travel throughout the body.  Notify your surgeon if you have any skin wounds / rashes even if it is not near the expected surgical site.  The area will need assessed to determine if surgery should be delayed until it is healed.  • The night prior to surgery shower using a fresh bar of anti-bacterial soap (such as Dial) and clean washcloth.  Sleep in a clean bed with clean clothing.  Do not allow pets to sleep with you.  • Shower on the morning of surgery using a fresh bar of anti-bacterial soap (such as Dial) and clean washcloth.  Dry with a clean towel and dress in clean clothing.  • Ask your surgeon if you will be receiving antibiotics prior to surgery.  • Make sure you, your family, and all healthcare providers clean their hands with soap and water or an alcohol based hand  before caring for you or your wound.    Day of surgery:  Your arrival time is approximately two hours before your scheduled surgery time.  Upon arrival, a Pre-op nurse and Anesthesiologist will review your health history, obtain vital signs, and answer questions you may have.  The only belongings needed at this time will be a list of your home medications and if applicable your C-PAP/BI-PAP machine.  A Pre-op nurse will start an IV and you may receive medication in preparation for surgery, including something to help you relax.     Please be aware that surgery does come with discomfort.  We  want to make every effort to control your discomfort so please discuss any uncontrolled symptoms with your nurse.   Your doctor will most likely have prescribed pain medications.      If you are going home after surgery you will receive individualized written care instructions before being discharged.  A responsible adult must drive you to and from the hospital on the day of your surgery and stay with you for 24 hours.  Discharge prescriptions can be filled by the hospital pharmacy during regular pharmacy hours.  If you are having surgery late in the day/evening your prescription may be e-prescribed to your pharmacy.  Please verify your pharmacy hours or chose a 24 hour pharmacy to avoid not having access to your prescription because your pharmacy has closed for the day.    If you are staying overnight following surgery, you will be transported to your hospital room following the recovery period.  [facility] has all private rooms.    If you have any questions please call Pre-Admission Testing at [phone].  Deductibles and co-payments are collected on the day of service. Please be prepared to pay the required co-pay, deductible or deposit on the day of service as defined by your plan.    Patient Education for Self-Quarantine Process    • Following your COVID testing, we strongly recommend that you wear a mask when you are with other people and practice social distancing.   • Limit your activities to only required outings.  • Wash your hands with soap and water frequently for at least 20 seconds.   • Avoid touching your eyes, nose and mouth with unwashed hands.  • Do not share anything - utensils, drinking glasses, food from the same bowl.   • Sanitize household surfaces daily. Include all high touch areas (door handles, light switches, phones, countertops, etc.)    Call your surgeon immediately if you experience any of the following symptoms:  • Sore Throat  • Shortness of Breath or difficulty  breathing  • Cough  • Chills  • Body soreness or muscle pain  • Headache  • Fever  • New loss of taste or smell  • Do not arrive for your surgery ill.  Your procedure will need to be rescheduled to another time.  You will need to call your physician before the day of surgery to avoid any unnecessary exposure to hospital staff as well as other patients.        CHLORHEXIDINE CLOTH INSTRUCTIONS  The morning of surgery follow these instructions using the Chlorhexidine cloths you've been given.  These steps reduce bacteria on the body.  Do not use the cloths near your eyes, ears mouth, genitalia or on open wounds.  Throw the cloths away after use but do not try to flush them down a toilet.      • Open and remove one cloth at a time from the package.    • Leave the cloth unfolded and begin the bathing.  • Massage the skin with the cloths using gentle pressure to remove bacteria.  Do not scrub harshly.   • Follow the steps below with one 2% CHG cloth per area (6 total cloths).  • One cloth for neck, shoulders and chest.  • One cloth for both arms, hands, fingers and underarms (do underarms last).  • One cloth for the abdomen followed by groin.  • One cloth for right leg and foot including between the toes.  • One cloth for left leg and foot including between the toes.  • The last cloth is to be used for the back of the neck, back and buttocks.    Allow the CHG to air dry 3 minutes on the skin which will give it time to work and decrease the chance of irritation.  The skin may feel sticky until it is dry.  Do not rinse with water or any other liquid or you will lose the beneficial effects of the CHG.  If mild skin irritation occurs, do rinse the skin to remove the CHG.  Report this to the nurse at time of admission.  Do not apply lotions, creams, ointments, deodorants or perfumes after using the clothes. Dress in clean clothes before coming to the hospital.

## 2021-08-11 NOTE — OP NOTE
BREAST SURGERY  Operative Note :  Sienna Arredondo  1945    Procedure Date: 08/11/21    Pre-op Diagnosis:  · Left breast cancer, triple negative, upper outer quadrant, aggressive  · Lung cancer, undergoing treatment.    Post-op Diagnosis:  · same    Procedure:   · Left mastectomy with lower axillary dissection    Surgeon: sienna      Assistant: yana    Indications:  · Aggressive breast cancer, felt more likely to cause demise than more slowly growing lung cancer  · Home O2 continuously    Findings:   · Ophiem node biopsy: not done but injection carried out and lymph nodes with stain removed for local control as a lower axillary dissection.  · Regional block and MAC with local lidocaine rather than general anesthetic.    Recommendations:   · Routine recuperation  · Home health upon discharge    Technique:     She came to the operating room were regional and MAC anesthetic were induced.  She was prepped with Hibiclens and draped sterilely.  I injected 5 mL of sulfan blue in the subareolar region.  I also injected lidocaine on the medial aspect of the operative field.  IV abx were given    Incision was then marked out in an elliptical fashion.  This was done with the orientation just slightly cephalad laterally.  Incision was made with the knife, then carried through the subcutaneous fatty tissue with the cautery, then the Prabhakar retractors used circumferentially to construct the flaps.  The cautery was used to do so with an intent to leave subcutaneous fat but take all the breast tissue.  Her flaps were very very thin.  The dissection extended to the clavicle superiorly, the rectus fascia inferiorly, the sternum medially and the latissimus dorsi laterally.  The pectoralis fascia was scored at these extents.  The breast was then brought off the chest wall including the pectoralis fascia with the cautery.  The removal was done primarily with the cautery and sparingly, as needed clips were placed.   The dissection was straightforward other than concern for the thinness.    The sentinel node tracking was indentified and the lower axillary contents which were filled with dye were excised, placing clips on the lymphatics and vessels.  This was assisted by visualization of the lymphatics via loops.  2 intercostobrachials were divided by necessity.       The wound was then irrigated, and hemostasis ensured.  A 19 Spanish fluted drain was placed into the mastectomy flap area and a second to the axilla, and sutured in.  Mastectomy wound was then closed with interrupted 3-0 Vicryl suture the dermis and ganga.    Assistant: Robinson De PA.  He assisted with suction, retraction with the freemans, exposure, closure and bandaging and was critical to an expeditious and safe operation.

## 2021-08-11 NOTE — ANESTHESIA PREPROCEDURE EVALUATION
Anesthesia Evaluation     Patient summary reviewed                Airway   Mallampati: II  Neck ROM: full  No difficulty expected  Dental      Pulmonary    (+) lung cancer, COPD (O2 dependent), asthma,shortness of breath,   Cardiovascular     ECG reviewed  Rhythm: regular    (+) hypertension, valvular problems/murmurs (moderate MR) MR,       Neuro/Psych  GI/Hepatic/Renal/Endo      Musculoskeletal     Abdominal    Substance History      OB/GYN          Other   arthritis,    history of cancer active                    Anesthesia Plan    ASA 4     regional       Anesthetic plan, all risks, benefits, and alternatives have been provided, discussed and informed consent has been obtained with: patient.  Use of blood products discussed with patient .

## 2021-08-11 NOTE — PROGRESS NOTES
Continued Stay Note  [facility]     Patient Name: Maryam Arredondo  MRN: [id number]  Today's Date: 8/11/2021    Admit Date: 8/11/2021    Discharge Plan     Row Name 08/11/21 1623       Plan    Plan  Home with [facility] (PENDING)    Plan Comments  Received msg that MD wanting HH. Met with patient to discuss DC plan and need for HH. Patient requested [facility] as she has used them in the past. Referral placed in [facility]. Informed Joy with [facility] of referral. Patient denies any additional needs/equipment.        Discharge Codes    No documentation.       Expected Discharge Date and Time     Expected Discharge Date Expected Discharge Time    Aug 12, 2021             Elena Zuñiga RN

## 2021-08-11 NOTE — ANESTHESIA POSTPROCEDURE EVALUATION
Patient: Maryam Arredondo    Procedure Summary     Date: 08/11/21 Room / Location: [facility] OR  / [facility] MAIN OR    Anesthesia Start: 0803 Anesthesia Stop: 0947    Procedure: LEFT BREAST MASTECTOMY WITH LOWER AXILLARY DISSECTION (Left Breast) Diagnosis:       Malignant neoplasm of upper-outer quadrant of left breast in female, estrogen receptor negative (CMS/HCC)      (Malignant neoplasm of upper-outer quadrant of left breast in female, estrogen receptor negative (CMS/HCC) [C50.412, Z17.1])    Surgeons: Rachel El MD Provider: Kelvin Zimmerman MD    Anesthesia Type: regional ASA Status: 4          Anesthesia Type: regional    Vitals  Vitals Value Taken Time   /77 08/11/21 0951   Temp     Pulse 86 08/11/21 0955   Resp     SpO2 91 % 08/11/21 0955   Vitals shown include unvalidated device data.        Post Anesthesia Care and Evaluation    No anesthesia care post op

## 2021-08-11 NOTE — DISCHARGE PLACEMENT REQUEST
"Maryam Arredondo (75 y.o. Female)     Date of Birth Social Security Number Address Home Phone MRN    1945  [address] [place] [id number] [phone] [id number]    [employer] Marital Status          [facility] Single       Admission Date Admission Type Admitting Provider Attending Provider Department, Room/Bed    8/11/21 Elective Rachel El MD Chipman, Janet, MD [address], P680/1    Discharge Date Discharge Disposition Discharge Destination                       Attending Provider: Rachel El MD    Allergies: Codeine, Penicillins, Mucinex D [Pseudoephedrine-guaifenesin Er]    Isolation: None   Infection: None   Code Status: CPR    Ht: 152.4 cm (60\")   Wt: 41.9 kg (92 lb 4.8 oz)    Admission Cmt: None   Principal Problem: Breast cancer (CMS/HCC) [C50.919] More...                 Active Insurance as of 8/11/2021     Primary Coverage     Payor Plan Insurance Group Employer/Plan Group    MEDICARE MEDICARE A & B      Payor Plan Address Payor Plan Phone Number Payor Plan Fax Number Effective Dates    [address] [phone]  8/1/2009 - None Entered    [facility] [id number]       Subscriber Name Subscriber Birth Date Member ID       MARYAM ARREDONDO 1945 [id number]           Secondary Coverage     Payor Plan Insurance Group Employer/Plan Group    [facility] SUPP [id number]     Payor Plan Address Payor Plan Phone Number Payor Plan Fax Number Effective Dates    [address]   12/1/2016 - None Entered    [facility] [id number]       Subscriber Name Subscriber Birth Date Member ID       MARYAM ARREDONDO 1945 [id number]                 Emergency Contacts      (Rel.) Home Phone Work Phone Mobile Phone    IZZY BENÍTEZ (Relative) -- -- [phone]    Patience Benítez (Sister) [phone] -- [phone]    JEYSONRAINE (Brother) [phone] -- [phone]              "

## 2021-08-11 NOTE — PLAN OF CARE
Goal Outcome Evaluation:  Plan of Care Reviewed With: patient        Progress: no change  Outcome Summary: 2LO2NC, VSS, up with asst, voids well, phenergan for nausea, no c/o pain, milad clear liquid diet, ivf's infusing per orders, Cef iv started, x2 LORA's with serosang drnge, I&S at bedside, right thigh wrapped with petroleum gauze/kerlix for a skin tear, no left or right BP's due to axillary dissection/ BP's taken on left lower leg.

## 2021-08-11 NOTE — ANESTHESIA PROCEDURE NOTES
PECS 2    Pre-sedation assessment completed: 8/11/2021 8:07 AM    Patient reassessed immediately prior to procedure    Patient location during procedure: OR  Start time: 8/11/2021 8:07 AM  Stop time: 8/11/2021 8:16 AM  Reason for block: at surgeon's request  Performed by  Anesthesiologist: Kelvin Zimmerman MD  Preanesthetic Checklist  Completed: patient identified, IV checked, site marked, risks and benefits discussed, surgical consent, monitors and equipment checked, pre-op evaluation and timeout performed  Prep:  Pt Position: supine  Procedure  Sedation:yes  Performed under: MAC  Guidance:ultrasound guided  Images:still images not obtained (malfunction)    Laterality:left  Block Type:PECS II  Injection Technique:single-shotNeedle Gauge:25 G      Medications Used: bupivacaine-EPINEPHrine PF (MARCAINE w/EPI) 0.5% -1:[id number] injection, 30 mL      Medications  Comment:Image not obtained due to US malfuntion    Local injected between Pec major/ minor, pec minor/seratus and serratus/lattisimus under US guidance on left side approximately in anterior axillary line.

## 2021-08-12 PROBLEM — C50.412 MALIGNANT NEOPLASM OF UPPER-OUTER QUADRANT OF LEFT BREAST IN FEMALE, ESTROGEN RECEPTOR NEGATIVE (HCC): Status: ACTIVE | Noted: 2021-01-01

## 2021-08-12 PROBLEM — Z17.1 MALIGNANT NEOPLASM OF UPPER-OUTER QUADRANT OF LEFT BREAST IN FEMALE, ESTROGEN RECEPTOR NEGATIVE (HCC): Status: ACTIVE | Noted: 2021-01-01

## 2021-08-12 NOTE — DISCHARGE INSTRUCTIONS
Wound Care [[id number]] (Order [id number])  Order  Date: 8/12/2021 Department: [address] Released By: Birgit Franklin RN, CHATO (auto-released) Authorizing: Rachel El MD   Order History  Inpatient  Date/Time Action Taken User Additional Information   08/12/21 1113 Release Birgit Franklin RN, CWOCN (auto-released) From Order:[id number]   Order Details    Frequency Duration Priority Order Class   Every Third Day Until Specified Routine Hospital Performed   Start Date/Time    Start Date Start Time   08/12/21 1111   Order Information    Order Date Service Start Date Start Time End Date   08/12/21 Surgery 08/12/21 1111 08/12/21   Comments    Apply wound gel to open skin. Cover with silicone border dressing. You can also apply the gel to the back of the dressing and then place it. Change every 3 days.     * See venelex order. Apply venelex to the exposed bruised tissue every 12hours to keep the bruising soft and supple. (Not under the dressing or it will not stick.)          Reference Links    Associated Reports   View Encounter   Order Questions    Question Answer Comment   Wound Locations right thigh tear    Cleanse Normal Saline    Intervention Other    Other apply wound gel to open skin    Dressing: Silicone Border Dressing           Verbal Order Info    Action Created on Order Mode Entered by Responsible Provider Signed by Signed on   Ordering 08/12/21 1113 Telephone with readback Birgit Franklin RN, Rachel Mckeon MD     Reprint Order Requisition    Wound Care (Order #[id number]) on 8/12/21   Encounter    View Encounter          Order Provider Info        Office phone Pager E-mail   Ordering User Birgit Franklin RN, CHATO -- -- --   Authorizing Provider Rachel El [phone] -- --   Attending Provider Rachel El [phone] -- --   Linked Charges

## 2021-08-12 NOTE — PROGRESS NOTES
"GENERAL SURGERY  Maryam Arredondo    Cc: \"I feel good\"    HPI: This patient is a 75-year-old lady postop day 1 status post left breast mastectomy with lower axillary dissection.  She states she is doing well having minimal discomfort.  She is tolerating her diet without nausea or abdominal pain.  She has ambulated.  She has not had a bowel movement but is passing flatus.  She is making urine.  She did inquire about why her right thigh was wrapped in a bandage but states she has no pain associated with this.  She is concerned about heading home without home health to help her manage the drains.    /85 (BP Location: Left leg, Patient Position: Lying)   Pulse 75   Temp 99.8 °F (37.7 °C) (Oral)   Resp 16   Ht 152.4 cm (60\")   Wt 41.9 kg (92 lb 4.8 oz)   LMP  (LMP Unknown)   SpO2 95%   BMI 18.03 kg/m²   I/O last 3 completed shifts:  In: 2000 [P.O.:600; I.V.:1400]  Out: 3350 [Urine:3225; Drains:125]  No intake/output data recorded.    Physical Exam  VSS  Chest/breast: Bandages are not saturated, she is nontender to palpation, both drains are in place with expected output which is serous  Skin: Extensive ecchymosis, right thigh bandaged with skin tear from Bovie pad removal  Psych: Alert and orient x3, normal affect, normal judgment    Impression:  • Postop day 1 status post left breast mastectomy and lower axillary dissection  · Tolerating pain well  · Drain output acceptable  · Lung cancer, currently undergoing treatment    Plan:  • We will discharge her home today with home health nursing instructions for drain care and right thigh wound care  · We will have her follow-up over the phone on Monday with drain totals and give her a follow-up date for drain removal.  · She will follow up in office with us in approximately 2 weeks for staple removal        FLORIN Billings  08/12/21      "

## 2021-08-12 NOTE — PLAN OF CARE
Goal Outcome Evaluation:  Plan of Care Reviewed With: patient        Progress: improving  Outcome Summary: VSS. Some episode of confusion last night, reorientation done. Tylenol given for pain. Incision site dressing CDI. LORA X2 with  small amount of serous sanguinous output.  Up with assist, walked in the noel with walker and O2 @ 2L/minute, tolerated well. Falls precaution maintained. Slept  on and off.

## 2021-08-12 NOTE — NURSING NOTE
CWOCN- consult to assess right thigh. There is an area of scattered bruising noted across the right thigh. There is an open skin tear- the skin has been almost approximated with an open area of 4.2x0.3x0.1cm. The tissue is pink, dry.   Recommend to apply wound gel to the open wound and secure with silicone border dressing. This can be changed every 3 days. Recommend to apply venelex to the right thigh bruising to keep the skin soft and supple. Patient will need an RX for the venelex. It can be applied 2 times per day.   Discussed with patient and RN. No family in room at that time.

## 2021-08-12 NOTE — DISCHARGE SUMMARY
Discharge Summary    Patient name: Maryam Arredondo    Medical record number: [id number]    Admission date: 8/11/2021  Discharge date:  8/12/2021    Attending physician: Dr. Rachel El    Primary care physician: Kevin Seymour MD    Consulting physician(s):  none    Primary Diagnoses:    · Aggressive left breast cancer, triple negative.    Secondary Diagnoses:     · Poor surgical candidate but not absolutely prohibitive  · Multifocal atrial tachycardia on digoxin, diltiazem 120 mg 24 hours  · COPD on Singulair, albuterol 2 puffs every 4 hours as needed wheezing, Spiriva 2 puffs daily Symbicort 2 puffs twice daily and Flonase  · Hyperthyroidism on methimazole  · Lung cancer progressing but somewhat controlled, with the perceived aggressiveness of the breast cancer significantly outweighing that of the lung cancer     Procedure/Proc Date:      · Left mastectomy with lower axillary dissection with use of lymphazurin: 8/11/2021    Hospital Course:   The patient is a very pleasant 75 y.o. female that was admitted to the hospital on 8/11/2021 for the aforementioned surgery.  This was done under MAC with regional block plus local.  Our plan had been to not attempt pursuit of axillary evaluation due to her frail state.  She did so well during the beginning of the case that i opted to inject with the lymphazurin and simply remove the lower lymph nodes that appeared to be the draining nodes, for better local control.  She understands that our care is not standard of care but has been altered due to her multiple comorbidities.    Postop she did very well.  She was noted open awakening to have a skin tear on her right thigh, which we have begun ointment and care as recommended by the skin nurse.      She will be discharged today, at her request, with use of only tylenol after discharge with home health to assist with her drain and wound care.    Pathology:   · pending    Discharge medications:      Your medication list       CHANGE how you take these medications      Instructions Last Dose Given Next Dose Due   albuterol sulfate  (90 Base) MCG/ACT inhaler  Commonly known as: PROVENTIL HFA;VENTOLIN HFA;PROAIR HFA  What changed:   · how to take this  · when to take this  · reasons to take this      INHALE TWO PUFFS BY MOUTH EVERY 4 HOURS AS NEEDED FOR WHEEZING       ciclopirox 8 % solution  Commonly known as: Penlac  What changed:   · how much to take  · when to take this  · additional instructions      Apply  topically to the appropriate area as directed Every Night.       digoxin 125 MCG tablet  Commonly known as: LANOXIN  What changed: when to take this      TAKE ONE TABLET BY MOUTH DAILY       fluticasone 50 MCG/ACT nasal spray  Commonly known as: FLONASE  What changed: when to take this      2 sprays into the nostril(s) as directed by provider 2 (Two) Times a Day.       metroNIDAZOLE 0.75 % cream  Commonly known as: METROCREAM  What changed:   · how much to take  · when to take this  · additional instructions      Apply  topically to the appropriate area as directed 2 (Two) Times a Day.       nystatin 489295 UNIT/ML suspension  Commonly known as: MYCOSTATIN  What changed:   · when to take this  · reasons to take this      Swish and swallow 5 mL 4 (Four) Times a Day.       Symbicort 160-4.5 MCG/ACT inhaler  Generic drug: budesonide-formoterol  What changed:   · how to take this  · when to take this      INHALE TWO PUFFS BY MOUTH TWICE A DAY          CONTINUE taking these medications      Instructions Last Dose Given Next Dose Due   acyclovir 400 MG tablet  Commonly known as: ZOVIRAX      Take 400 mg by mouth 2 (Two) Times a Day As Needed. Take no more than 5 doses a day.       dilTIAZem  MG 24 hr capsule  Commonly known as: Cartia XT      Take 1 capsule by mouth Daily.       ENSURE ACTIVE PO      Take 1 dose by mouth 2 (Two) Times a Day. Ensure or boost       JUICE PLUS FIBRE PO      Take 9 tablets by mouth Daily.  CHEWS  HOLD FOR SURGERY       methIMAzole 5 MG tablet  Commonly known as: TAPAZOLE      Take 1.5 tablets by mouth Daily.       montelukast 10 MG tablet  Commonly known as: SINGULAIR      TAKE ONE TABLET BY MOUTH DAILY       mupirocin 2 % ointment  Commonly known as: BACTROBAN      Apply 1 application topically to the appropriate area as directed As Needed.       O2  Commonly known as: OXYGEN      Inhale 2 L/min Continuous.       PRESERVISION AREDS 2 PO      Take 1 tablet by mouth Daily.       Spiriva Respimat 2.5 MCG/ACT aerosol solution inhaler  Generic drug: tiotropium bromide monohydrate      Inhale 2 puffs Daily.       vitamin B-12 1000 MCG tablet  Commonly known as: CYANOCOBALAMIN      Take 1,000 mcg by mouth Every Other Day. Take only on Mon, Wed, Fri       Vitamin D3 50 MCG (2000 UT) tablet      Take 1 tablet by mouth daily.              Discharge diet:  · regular    Recommendations:    · Follow up in the office  · Call on Monday with drain outputs.        Rachel El MD  Office Number: [phone].

## 2021-08-12 NOTE — PROGRESS NOTES
Continued Stay Note  [facility]     Patient Name: Maryam Arredondo  MRN: [id number]  Today's Date: 8/12/2021    Admit Date: 8/11/2021    Discharge Plan     Row Name 08/12/21 1449       Plan    Plan  Home with [facility]    Plan Comments  Spoke to Flakito with [facility] to inform of DC today and order for drsg changes q 3days per wound.    Row Name 08/12/21 1255       Plan    Plan  Home with [facility]    Plan Comments  Msg left for Joy with [facility] to inform of DC today.        Discharge Codes    No documentation.       Expected Discharge Date and Time     Expected Discharge Date Expected Discharge Time    Aug 12, 2021             Elena Zuñiga RN

## 2021-08-12 NOTE — PROGRESS NOTES
Continued Stay Note  [facility]     Patient Name: Maryam Arredondo  MRN: [id number]  Today's Date: 8/12/2021    Admit Date: 8/11/2021    Discharge Plan     Row Name 08/12/21 1255       Plan    Plan  Home with [facility]    Plan Comments  Msg left for Joy with [facility] to inform of DC today.        Discharge Codes    No documentation.       Expected Discharge Date and Time     Expected Discharge Date Expected Discharge Time    Aug 12, 2021             Elena Zuñiga RN

## 2021-08-12 NOTE — PROGRESS NOTES
Case Management Discharge Note      Final Note: Discharged home with [facility]. Joy/[facility] aware. Elena Zuñiga, RN         Transportation Services  Private: Car    Final Discharge Disposition Code: 06 - home with home health care

## 2021-08-16 NOTE — TELEPHONE ENCOUNTER
Oncology Social Work    Referral received from Dr. El to see for psychosocial support/ community resources.  Chart reviewed.  Patient just discharged from hospital s/p left mastectomy. She was dx with aggressive left breast cancer, triple negative.  Patient has history of Lung cancer and already followed by Dr. Ventura.    She was discharged home with [facility] ( RN, AIDE and PT).    OSW called and spoke to patient.  She states that she is doing well.  Her nephew Vel has been a big help since she lives alone.  Patient also does not drive and Vel provides transportation to and from her appt's.  He does her grocery shopping too.    Patient has a PET/CT scan on Wednesday and had several questions about this.  OSW has called over to PET /CT office - awaiting return call.   Patient reports no financial distress.  She states she feels good emotionally - no anxiety or depressed feelings.  She is worried about her leg that is bruised.     Patient has an appt with Dr. Ventura on 8/23.   Reminded patient to call Dr. El's office to schedule her f/u visit and to report drain output.     Will follow up with patient tomorrow  Thank you  Cami Pena, MSSW, CSW

## 2021-08-17 NOTE — TELEPHONE ENCOUNTER
Nate called back. He just wanted to check with Dr. Ventura to see if he still wanted to do the scan with the drains being in. He wanted to make sure they wouldn't mess up the scan. Message sent to Dr. Ventura. Per Dr. Ventura order okay to move out until after drains removed. Spoke with Nate and he wishes to move these about 2 weeks (CTs and MD appointment). He is waiting to hear back from Dr. El as he has been sending her drainage totals and is hopeful these drains will be removed soon. Message sent to scheduling.     Called pt Nate Sullivan. No answer. L/M to have him call back.

## 2021-08-17 NOTE — TELEPHONE ENCOUNTER
Mr. Tripathi has called and was asking when you would like to see Maryam? Also, if drain totals have been reviewed. Would you like me to schedule so that her drains can be removed?       Drain: #1 30, 20, 20, 19, 16  Drain #2 19,13,10,10, 8

## 2021-08-17 NOTE — TELEPHONE ENCOUNTER
Caller: IZZY BENÍTEZ    Relationship: Emergency Contact    Best call back number: [phone]    What was the call regarding: IZZY CALLED IN REGARDS TO PHILL'S CT SCHEDULED FOR TOMORROW. HE SAYS THAT DR. ACOSTA MENTIONED MAYBE NOT DOING IT THIS TIME DUE TO HER DRAIN. HE WOULD LIKE A CALL BACK TO DISCUSS.    Do you require a callback: YES

## 2021-08-19 NOTE — TELEPHONE ENCOUNTER
Spoke with Patience, pt's sister.  Patience is currently at home with Covid and will not be here on Monday at pt's apt.  She is sending either Maryam's nephew or friend in her place.  Pt approved for 1 visitor.  If Patience has questions after office visit, she has been instructed to call the office and ask to speak with Fariha Campos) RN, Dr. Ventura's RN.  Patience v/moy.

## 2021-08-19 NOTE — TELEPHONE ENCOUNTER
Caller: ANGÉLICA    Relationship: SISTER    Best call back number: [phone]    Who are you requesting to speak with (clinical staff, provider,  specific staff member): PHILL    What was the call regarding: ANGÉLICA IS UNABLE TO BE AT [facility] NEXT AP[facility] SINCE SHE'S CONTRACTED COVID, SO SHE'S POSSIBLY GOING TO HAVE HER SON GO IN PLACE OF HER. IF HE CANNOT GO, SHE'D LIKE A CALL FROM DR MAE TO DISCUSS. ALSO SHE IS ASKING FOR JONNY'S PHONE NUMBER. IT WAS GIVEN TO HER BEFORE BUT SHE HAD MISPLACED IT.    Do you require a callback: YES

## 2021-08-23 PROBLEM — Z17.1 MALIGNANT NEOPLASM OF UPPER-OUTER QUADRANT OF LEFT BREAST IN FEMALE, ESTROGEN RECEPTOR NEGATIVE (HCC): Status: RESOLVED | Noted: 2021-01-01 | Resolved: 2021-01-01

## 2021-08-23 PROBLEM — R30.0 DYSURIA: Status: RESOLVED | Noted: 2020-02-20 | Resolved: 2021-01-01

## 2021-08-23 PROBLEM — S81.012S: Status: RESOLVED | Noted: 2020-02-06 | Resolved: 2021-01-01

## 2021-08-23 PROBLEM — L08.9 INFECTED SKIN TEAR: Status: RESOLVED | Noted: 2019-08-17 | Resolved: 2021-01-01

## 2021-08-23 PROBLEM — B37.31 VAGINAL CANDIDIASIS: Status: RESOLVED | Noted: 2020-08-12 | Resolved: 2021-01-01

## 2021-08-23 PROBLEM — N63.20 BREAST MASS, LEFT: Status: RESOLVED | Noted: 2021-01-01 | Resolved: 2021-01-01

## 2021-08-23 PROBLEM — B37.0 OROPHARYNGEAL CANDIDIASIS: Status: RESOLVED | Noted: 2021-01-01 | Resolved: 2021-01-01

## 2021-08-23 PROBLEM — J18.9 PNEUMONIA: Status: RESOLVED | Noted: 2017-10-30 | Resolved: 2021-01-01

## 2021-08-23 PROBLEM — C50.412 MALIGNANT NEOPLASM OF UPPER-OUTER QUADRANT OF LEFT BREAST IN FEMALE, ESTROGEN RECEPTOR NEGATIVE: Status: RESOLVED | Noted: 2021-01-01 | Resolved: 2021-01-01

## 2021-08-23 PROBLEM — T14.8XXA INFECTED SKIN TEAR: Status: RESOLVED | Noted: 2019-08-17 | Resolved: 2021-01-01

## 2021-08-23 PROBLEM — N76.0 ACUTE VAGINITIS: Status: RESOLVED | Noted: 2020-02-20 | Resolved: 2021-01-01

## 2021-08-23 PROBLEM — J44.0 COPD WITH ACUTE LOWER RESPIRATORY INFECTION: Status: RESOLVED | Noted: 2017-10-30 | Resolved: 2021-01-01

## 2021-08-23 NOTE — PROGRESS NOTES
History:     Reason for follow up:   1.  Stage IIB left upper lobe non-small cell lung cancer, high PDL-1 (80%), negative egfr/alk/ros   * status post radiation therapy completed 1/26/18  2.  Recurrent disease involving pleural-based nodule in the lingula and pleural-based mass left upper lobe; both areas treated with radiation therapy completed 2/3/2020  3.  Patient received 3 doses of Keytruda February/March 2020 but stopped secondary to diarrhea and weight loss  4.  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  Her lung cancer staging studies on 5/25/2021 showed a new left breast mass which was confirmed on breast imaging.  She underwent a needle biopsy 6/22/2021 which showed a high-grade triple negative cancer in the left breast with other sites of disease suggested by imaging.  I discussed her case with Dr. El who agreed to take the patient to surgery given the high grade nature of the breast cancer.  She underwent a left mastectomy and lower axillary dissection on 8/11/2021.  Pathology outlined in the assessment plan below.    The patient did well with her surgery.  Drains have been removed.  She is going for surgery follow-up and potential suture removal this afternoon.  She has a skin tear right lower extremity healing well.      Reviewed, confirmed and updated history (past medical, social and family)   Past Medical   Past Medical History:   Diagnosis Date   • Acromioclavicular joint arthritis    • Anemia    • Asthma    • B12 deficiency    • Benign essential hypertension    • Breast cancer (CMS/HCC)     s/p Lumpectomy ~2000 and chemo/XRT. Then  Masectomy ~2004 for some recurrence   • Breast cancer (CMS/HCC) 06/22/2021    Left breast  invasive ammary carcinoma, ER/[place] negative, HER2 negative   • Bruises easily    • COPD (chronic obstructive pulmonary disease) (CMS/HCC)    • COPD exacerbation (CMS/HCC)    • COVID-19 vaccine series completed     2/17/21 2ND DOSE   • Cedric-Barr infection    • Heart disease    • History of chemotherapy     RIGHT BREAST CANCER   • History of radiation therapy     LEFT UPPER LOBE   • History of UTI    • Hyperthyroidism    • Hypoxia    • Mitral regurgitation     mild to moderate   • Multifocal atrial tachycardia (CMS/HCC)    • Nocturnal hypoxia    • Non-small cell lung cancer (CMS/HCC)     UPPER LOBE LEFT   • Oral aphthae    • Osteopenia    • Other fatigue    • Oxygen dependent     2L - at night only   • Pneumothorax    • Rosacea    • SOB (shortness of breath)    • Supraventricular tachycardia (CMS/HCC)    • Tachycardia    • Thyroid nodule    • Toxic multinodular goiter    • Urgency of urination    • Vitamin B deficiency    • Vitamin D deficiency     and   Patient Active Problem List   Diagnosis   • Pneumothorax   • Hypoxia   • Tachycardia   • Hyperthyroidism   • Essential hypertension   • Multifocal atrial tachycardia (CMS/HCC)   • Toxic multinodular goiter   • Vitamin D insufficiency   • Malignant neoplasm of upper lobe of left lung (CMS/HCC)   • Allergic rhinitis   • B12 deficiency   • Oxygen dependent   • Mitral regurgitation   • Breast cancer (CMS/HCC)   • Medicare annual wellness visit, subsequent   • Panlobular emphysema (CMS/HCC)   • Recurrent non-small cell lung cancer (CMS/HCC)   • Encounter for long-term (current) use of other medications   • Unspecified fracture of fifth metacarpal bone, left hand, initial encounter for closed fracture   • Immobility syndrome   • Post-menopausal   • Current chronic use of systemic steroids     Social History   Social History     Socioeconomic History   • Marital status: Single     Spouse name: Not on file   • Number of children: Not on file   • Years of education: Not on  file   • Highest education level: Not on file   Tobacco Use   • Smoking status: Former Smoker     Packs/day: 1.50     Years: 30.00     Pack years: 45.00     Types: Cigarettes     Quit date: 3/14/1994     Years since quittin.4   • Smokeless tobacco: Never Used   Vaping Use   • Vaping Use: Never used   Substance and Sexual Activity   • Alcohol use: No   • Drug use: No   • Sexual activity: Defer     Comment: drinks decaf      Family History  Family History   Problem Relation Age of Onset   • Arthritis Mother    • Heart failure Mother         Congestive Heart Failure   • Lung cancer Father    • Heart disease Father    • Hypertension Father    • Breast cancer Paternal Grandmother 62   • No Known Problems Sister    • Malig Hyperthermia Neg Hx      Allergies  Allergies   Allergen Reactions   • Codeine Unknown - High Severity     Patient is unaware of the reaction to this medication     • Penicillins Unknown - High Severity     Pt is unaware of the reaction to this medication     • Mucinex D [Pseudoephedrine-Guaifenesin Er] Nausea Only     Felt hot in chest       Medications: The current medication list was reviewed in the EMR.    Review of Systems  Review of Systems   Constitutional: Positive for fatigue. Negative for activity change, appetite change, chills, diaphoresis, fever and unexpected weight change.   HENT: Negative for congestion and sinus pressure.    Respiratory: Positive for shortness of breath. Negative for cough and chest tightness.    Cardiovascular: Negative for chest pain, palpitations and leg swelling.   Gastrointestinal: Negative for abdominal pain, blood in stool, constipation, diarrhea, nausea and vomiting.   Endocrine: Negative.    Genitourinary: Negative.    Musculoskeletal: Positive for arthralgias and gait problem. Negative for joint swelling and myalgias.   Skin: Negative.    Allergic/Immunologic: Negative.    Hematological: Negative for adenopathy. Does not bruise/bleed easily.  "  Psychiatric/Behavioral: Agitation:  Behavioral problem:  The patient is nervous/anxious.    ROS unchanged-8/23/2021    Objective    Objective:     Vitals:    08/23/21 0746   BP: 122/59   Pulse: 105   Resp: 16   Temp: 97.7 °F (36.5 °C)   TempSrc: Infrared   SpO2: 97%  Comment: w/oxy   Weight: 40.2 kg (88 lb 9.6 oz)   Height: 152.4 cm (60\")   PainSc: 0-No pain     Current Status 8/23/2021   ECOG score 0   GENERAL:  Well-developed elderly lady in no distress.    SKIN: No rash or induration, bruising on the forearm  EYES:    EOMs intact.  Conjunctivae normal.  HEAD:  Normocephalic.  NECK:  Supple with good range of motion; no thyromegaly or masses  LYMPHATICS:  No cervical, supraclavicular, axillary adenopathy.  RESP: Mild decrease in breath sounds, no increased work of breathing, no wheezing.  She is on O2 by nasal cannula  Breast: Status post left mastectomy  CARDIAC:  Regular rate and rhythm without murmurs, rubs or gallops. Normal S1,S2. No edema  GI:  Soft, nontender, normal bowel sounds  MSK:  No clubbing or cyanosis,.  No edema   NEUROLOGICAL:   No focal neurological deficits.  PSYCHIATRIC:  Normal affect and mood.  Somewhat anxious.  Poor memory.    Labs and Imaging  WBC   Date Value Ref Range Status   08/23/2021 7.14 3.40 - 10.80 10*3/mm3 Final   06/11/2019 6.11 3.40 - 10.80 10*3/mm3 Final     RBC   Date Value Ref Range Status   08/23/2021 4.01 3.77 - 5.28 10*6/mm3 Final   06/11/2019 4.17 3.77 - 5.28 10*6/mm3 Final     Hemoglobin   Date Value Ref Range Status   08/23/2021 11.7 (L) 12.0 - 15.9 g/dL Final     Hematocrit   Date Value Ref Range Status   08/23/2021 38.1 34.0 - 46.6 % Final     MCV   Date Value Ref Range Status   08/23/2021 95.0 79.0 - 97.0 fL Final     MCH   Date Value Ref Range Status   08/23/2021 29.2 26.6 - 33.0 pg Final     MCHC   Date Value Ref Range Status   08/23/2021 30.7 (L) 31.5 - 35.7 g/dL Final     RDW   Date Value Ref Range Status   08/23/2021 14.5 12.3 - 15.4 % Final     RDW-SD "   Date Value Ref Range Status   08/23/2021 51.0 37.0 - 54.0 fl Final     MPV   Date Value Ref Range Status   08/23/2021 8.6 6.0 - 12.0 fL Final     Platelets   Date Value Ref Range Status   08/23/2021 337 140 - 450 10*3/mm3 Final     Neutrophil %   Date Value Ref Range Status   08/23/2021 75.5 42.7 - 76.0 % Final     Lymphocyte %   Date Value Ref Range Status   08/23/2021 12.5 (L) 19.6 - 45.3 % Final     Monocyte %   Date Value Ref Range Status   08/23/2021 9.2 5.0 - 12.0 % Final     Eosinophil %   Date Value Ref Range Status   08/23/2021 1.7 0.3 - 6.2 % Final     Basophil %   Date Value Ref Range Status   08/23/2021 0.8 0.0 - 1.5 % Final     Immature Grans %   Date Value Ref Range Status   08/23/2021 0.3 0.0 - 0.5 % Final     Neutrophils, Absolute   Date Value Ref Range Status   08/23/2021 5.39 1.70 - 7.00 10*3/mm3 Final     Lymphocytes, Absolute   Date Value Ref Range Status   08/23/2021 0.89 0.70 - 3.10 10*3/mm3 Final     Monocytes, Absolute   Date Value Ref Range Status   08/23/2021 0.66 0.10 - 0.90 10*3/mm3 Final     Eosinophils, Absolute   Date Value Ref Range Status   08/23/2021 0.12 0.00 - 0.40 10*3/mm3 Final     Basophils, Absolute   Date Value Ref Range Status   08/23/2021 0.06 0.00 - 0.20 10*3/mm3 Final     Immature Grans, Absolute   Date Value Ref Range Status   08/23/2021 0.02 0.00 - 0.05 10*3/mm3 Final     nRBC   Date Value Ref Range Status   08/23/2021 0.0 0.0 - 0.2 /100 WBC Final     Lab Results   Component Value Date    GLUCOSE 98 08/09/2021    BUN 19 08/09/2021    CREATININE 0.52 (L) 08/09/2021    EGFRIFNONA 115 08/09/2021    EGFRIFAFRI 111 04/20/2021    BCR 36.5 (H) 08/09/2021    K 4.1 08/09/2021    CO2 27.5 08/09/2021    CALCIUM 9.1 08/09/2021    PROTENTOTREF 7.0 04/20/2021    ALBUMIN 3.80 08/09/2021    LABIL2 1.5 04/20/2021    AST 14 08/09/2021    ALT 12 08/09/2021     CT chest 5/25/2021: There is a left upper lobe pleural-based mass which has increased in size currently 3.1 x 2.3 cm previously  2.8 x 1.8 cm.  A left hilar lymph node has increased in size from 3 to 7 mm.  There is a new nodule in the left breast 1.3 cm.                                   I personally reviewed CT chest 5/25/2021-there has been progression in size of the left upper lobe tumor    Left breast mammogram/ultrasound 6/17/2021 showed 3 tumors in the upper outer quadrant of the left breast 1.7 cm, 1.1 cm, and 0.4 cm    Assessment/Plan   Assessment/Plan:   This is a 75 y.o. female with:     1.  Recurrent left upper lobe non-small cell lung cancer, high PDL-1 (80%), negative egfr/alk/ros; [facility] testing showed no actionable gene mutations  · radiation therapy to the left upper lobe nodule/mass completed January 2018  · PET scan from 12/20/2019 showed significant uptake in the mass in the apex of the left lung and also a nodule in the lingula with significant uptake for its size and I think both areas are likely consistent with recurrent disease.  We opted to not send the patient for a biopsy as she has very poor lung function and a pneumothorax might prove fatal for her.  There is some uptake in the left hilum but this was present in October 2018 and stable.  There is uptake in the right thyroid gland which is stable and the patient has declined biopsy of this in the past.   · The patient underwent additional radiation to the hypermetabolic lesions and also initiated Keytruda on 1/21/2020.  · Keytruda stopped after 3 doses secondary to significant diarrhea.  Diarrhea resolved with a short course of steroids.  · CT chest  2/9/2021 shows slight progression of disease in the left upper lobe.  The patient is overall asymptomatic and in fact looks better than she has on previous visits.  I discussed the case with Dr. Bennett radiation oncology and the tumor in the left upper lobe cannot be treated with additional radiation therapy.  · CT chest 5/25/2021 showed further progression of the mass in the left upper lobe in addition to  slight enlargement of the left hilar lymph node    2.  Multinodular goiter stable by CT--followed by endocrinology.  There is uptake in the right thyroid gland similar to prior PET.  The patient declines biopsy of the hypermetabolic right thyroid lesion    3. History of right breast cancer, initially diagnosed in 2000 treated with lumpectomy, radiation, chemotherapy and endocrine therapy with local recurrence in 2005 treated with mastectomy and then Arimidex.     4. COPD, oxygen dependent    5.  New left breast cancer: [id number]  · Left breast nodule incidentally noted by CT 5/25/2021 during staging of the patient's lung cancer; nodule 1.3 cm 1.7 cm, 1.1 cm, 0.4 cm  · Breast biopsy by ultrasound guidance 6/22/2021 showed invasive mammary carcinoma with metaplastic features high histologic grade, high proliferative rate 13 mm with intermediate grade ductal carcinoma in situ.  Tumor is negative for ER, SC and HER-2  · Mammogram/ultrasound 6/17/2021 showed 3 tumors in the upper outer quadrant of the left breast  · Status post left mastectomy and lower axillary dissection 8/11/2021 pathology showed 2 foci of invasive mammary carcinoma.  The largest was 3.9 cm metaplastic carcinoma (50% ductal/50% sarcomatoid) poorly differentiated/grade 3 ER negative, SC negative, HER-2 negative.  The second tumor was invasive ductal carcinoma with apocrine features grade 2+ for lymphovascular and perineural invasion, ER +80%, SC +70% HER-2 negative and Ki-67 21%.  There was associated high-grade ductal carcinoma in situ with apocrine features measuring 90 millimeters.  1 of 3 axillary lymph nodes positive for carcinoma measuring 4 mm.      Oncology plan/recommendations:  The patient is status post recent left mastectomy 8/11/2021 for 2 foci of carcinoma.  One tumor is very high risk measuring 3.9 cm, grade 3, triple negative metaplastic carcinoma which has a high risk of local and distant recurrence.  I discussed this with the patient  and her family member who accompanied her today.  This type of tumor would typically be treated with adjuvant chemotherapy.  The patient is 76 years old, fairly frail and has several comorbidities.  I am unsure if she is able to tolerate chemotherapy but she would like to try.  She also has recurrent non-small cell lung cancer.  I am going to order a PET to reassess the status of her malignancy.  I will see her back in a couple weeks to review the results.  If her performance status allows I am going to consider a weekly carboplatin/Taxol regimen which would have activity both for her triple negative breast cancer and also her non-small cell lung malignancy.

## 2021-08-23 NOTE — PROGRESS NOTES
SURGERY: [facility]  Post op Visit  Maryam Arredondo  08/23/2021    Ms. Arredondo  presents today after having undergone Surgery 8/11/21, of a left mastetomy with lower axillary dissection.  This was for a triple negative aggressive cancer.  Despite her multiple comorbidities (home O2 and lung cancer), it was felt that this breast cancer was the more immediate threat.  Her pathology showed a 3.9 cm poorly differentiated cancer with 1/3 lymph nodes positive.  I did her under MAC with a block.    She did extremely well, was discharged on POD1 and had her drains removed post op week 1.    Today she comes back for her staple removal.  She looks great.  She had a problem with her leg from the bovie pad (apparently/seemingly) which is healing well.    We discussed her pathology.  i'll see her prn.    Rachel El MD  18:21 EDT

## 2021-08-30 NOTE — TELEPHONE ENCOUNTER
Caller: PHILL GARRETT    Relationship: PATIENT    Best call back number: [phone]    What is the best time to reach you: ANY    Do you require a callback: PATIENT HAS A FOLLOW UP APPT ON 9/10/21 AND SHE IS REQUESTING HER SISTER, ANGÉLICA BENÍTEZ, TO ACCOMPANY HER FOR THIS VISIT.    PLEASE CALL TO DISCUSS.

## 2021-09-01 NOTE — TELEPHONE ENCOUNTER
PATIENT REQUEST HER SISTER ACCOMPANIES HER TO UPCOMING APPOINTMENT. SISTER CARRIES OXYGEN. PLEASE CALL PATIENT BACK TO [facility]VISE. [phone]

## 2021-09-02 NOTE — TELEPHONE ENCOUNTER
Caller: Maryam Arredondo    Relationship: Self    Best call back number: [phone]     Who are you requesting to speak with (clinical staff, provider,  specific staff member): DR BROWN    What was the call regarding: PATIENT WAS ATTEMPTING TO SEND A [employer] MESSAGE. HER QUESTIONS WERE:  - HAVE YOU BEEN RECEIVING INFORMATION REGARDING WHAT'S GOING ON WITH HER MEDICALLY?  - BASED ON HER MEDICAL HISTORY DO YOU RECOMMEND THE COVID BOOSTER VACCINE?    Do you require a callback: PATIENT PREFERS A MYCHART MESSAGE

## 2021-09-02 NOTE — TELEPHONE ENCOUNTER
PATIENT CALLED BACK AGAIN REQUESTING HER SISTER BE ALLOWED TO COME TO HER VISIT. ADVISED PATIENT SOMEONE WILL CALL HER BACK SOON WITH A RESPONSE

## 2021-09-10 NOTE — PROGRESS NOTES
History:     Reason for follow up:   1.  Stage IIB left upper lobe non-small cell lung cancer, high PDL-1 (80%), negative egfr/alk/ros   * status post radiation therapy completed 1/26/18  2.  Recurrent disease involving pleural-based nodule in the lingula and pleural-based mass left upper lobe; both areas treated with radiation therapy completed 2/3/2020  3.  Patient received 3 doses of Keytruda February/March 2020 but stopped secondary to diarrhea and weight loss  4.  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  Her lung cancer staging studies on 5/25/2021 showed a new left breast mass which was confirmed on breast imaging.  She underwent a needle biopsy 6/22/2021 which showed a high-grade triple negative cancer in the left breast with other sites of disease suggested by imaging.  I discussed her case with Dr. El who agreed to take the patient to surgery given the high grade nature of the breast cancer.  She underwent a left mastectomy and lower axillary dissection on 8/11/2021.  Pathology outlined in the assessment plan below.    The patient did well with her surgery.  She reports no pain and now has Steri-Strips in place.      Reviewed, confirmed and updated history (past medical, social and family)   Past Medical   Past Medical History:   Diagnosis Date   • Acromioclavicular joint arthritis    • Anemia    • Asthma    • B12 deficiency    • Benign essential hypertension    • Breast cancer (CMS/HCC)     s/p Lumpectomy ~2000 and chemo/XRT. Then  Masectomy ~2004 for some recurrence   • Breast cancer (CMS/HCC) 06/22/2021    Left breast invasive ammary carcinoma, ER/MD negative, HER2 negative   • Bruises easily    • COPD (chronic obstructive  pulmonary disease) (CMS/HCC)    • COPD exacerbation (CMS/HCC)    • COVID-19 vaccine series completed     2/17/21 2ND DOSE   • Cedric-Barr infection    • Heart disease    • History of chemotherapy     RIGHT BREAST CANCER   • History of radiation therapy     LEFT UPPER LOBE   • History of UTI    • Hyperthyroidism    • Hypoxia    • Mitral regurgitation     mild to moderate   • Multifocal atrial tachycardia (CMS/HCC)    • Nocturnal hypoxia    • Non-small cell lung cancer (CMS/HCC)     UPPER LOBE LEFT   • Oral aphthae    • Osteopenia    • Other fatigue    • Oxygen dependent     2L - at night only   • Pneumothorax    • Rosacea    • SOB (shortness of breath)    • Supraventricular tachycardia (CMS/HCC)    • Tachycardia    • Thyroid nodule    • Toxic multinodular goiter    • Urgency of urination    • Vitamin B deficiency    • Vitamin D deficiency     and   Patient Active Problem List   Diagnosis   • Pneumothorax   • Hypoxia   • Tachycardia   • Hyperthyroidism   • Essential hypertension   • Multifocal atrial tachycardia (CMS/HCC)   • Toxic multinodular goiter   • Vitamin D insufficiency   • Malignant neoplasm of upper lobe of left lung (CMS/HCC)   • Allergic rhinitis   • B12 deficiency   • Oxygen dependent   • Mitral regurgitation   • Breast cancer (CMS/HCC)   • Medicare annual wellness visit, subsequent   • Panlobular emphysema (CMS/HCC)   • Recurrent non-small cell lung cancer (CMS/HCC)   • Encounter for long-term (current) use of other medications   • Unspecified fracture of fifth metacarpal bone, left hand, initial encounter for closed fracture   • Immobility syndrome   • Post-menopausal   • Current chronic use of systemic steroids     Social History   Social History     Socioeconomic History   • Marital status: Single     Spouse name: Not on file   • Number of children: Not on file   • Years of education: Not on file   • Highest education level: Not on file   Tobacco Use   • Smoking status: Former Smoker     Packs/day:  1.50     Years: 30.00     Pack years: 45.00     Types: Cigarettes     Quit date: 3/14/1994     Years since quittin.5   • Smokeless tobacco: Never Used   Vaping Use   • Vaping Use: Never used   Substance and Sexual Activity   • Alcohol use: No   • Drug use: No   • Sexual activity: Defer     Comment: drinks decaf      Family History  Family History   Problem Relation Age of Onset   • Arthritis Mother    • Heart failure Mother         Congestive Heart Failure   • Lung cancer Father    • Heart disease Father    • Hypertension Father    • Breast cancer Paternal Grandmother 62   • No Known Problems Sister    • Malig Hyperthermia Neg Hx      Allergies  Allergies   Allergen Reactions   • Codeine Unknown - High Severity     Patient is unaware of the reaction to this medication     • Penicillins Unknown - High Severity     Pt is unaware of the reaction to this medication     • Mucinex D [Pseudoephedrine-Guaifenesin Er] Nausea Only     Felt hot in chest       Medications: The current medication list was reviewed in the EMR.    Review of Systems  Review of Systems   Constitutional: Positive for fatigue. Negative for activity change, appetite change, chills, diaphoresis, fever and unexpected weight change.   HENT: Negative for congestion and sinus pressure.    Respiratory: Positive for shortness of breath. Negative for cough and chest tightness.    Cardiovascular: Negative for chest pain, palpitations and leg swelling.   Gastrointestinal: Negative for abdominal pain, blood in stool, constipation, diarrhea, nausea and vomiting.   Endocrine: Negative.    Genitourinary: Negative.    Musculoskeletal: Positive for arthralgias and gait problem. Negative for joint swelling and myalgias.   Skin: Negative.    Allergic/Immunologic: Negative.    Hematological: Negative for adenopathy. Does not bruise/bleed easily.   Psychiatric/Behavioral: Agitation:  Behavioral problem:  The patient is nervous/anxious.    ROS  "unchanged-9/10/2021    Objective    Objective:     Vitals:    09/10/21 1330   Height: 152.4 cm (60\")     Current Status 8/23/2021   ECOG score 0   GENERAL:  Well-developed elderly lady in no distress.    SKIN: No rash or induration, bruising on the forearm  EYES:    EOMs intact.  Conjunctivae normal.  HEAD:  Normocephalic.  NECK:  Supple with good range of motion; no thyromegaly or masses  LYMPHATICS:  No cervical, supraclavicular, axillary adenopathy.  RESP: Mild decrease in breath sounds, no increased work of breathing, no wheezing.  She is on O2 by nasal cannula  Breast: Status post left mastectomy with a well-healing wound and Steri-Strips in place  CARDIAC:  Regular rate and rhythm without murmurs, rubs or gallops. Normal S1,S2. No edema  GI:  Soft, nontender, normal bowel sounds  MSK:  No clubbing or cyanosis,.  No edema   NEUROLOGICAL:   No focal neurological deficits.  PSYCHIATRIC:  Normal affect and mood.  Somewhat anxious.  Poor memory.    Labs and Imaging  WBC   Date Value Ref Range Status   09/10/2021 8.57 3.40 - 10.80 10*3/mm3 Final   06/11/2019 6.11 3.40 - 10.80 10*3/mm3 Final     RBC   Date Value Ref Range Status   09/10/2021 4.14 3.77 - 5.28 10*6/mm3 Final   06/11/2019 4.17 3.77 - 5.28 10*6/mm3 Final     Hemoglobin   Date Value Ref Range Status   09/10/2021 11.9 (L) 12.0 - 15.9 g/dL Final     Hematocrit   Date Value Ref Range Status   09/10/2021 38.1 34.0 - 46.6 % Final     MCV   Date Value Ref Range Status   09/10/2021 92.0 79.0 - 97.0 fL Final     MCH   Date Value Ref Range Status   09/10/2021 28.7 26.6 - 33.0 pg Final     MCHC   Date Value Ref Range Status   09/10/2021 31.2 (L) 31.5 - 35.7 g/dL Final     RDW   Date Value Ref Range Status   09/10/2021 14.2 12.3 - 15.4 % Final     RDW-SD   Date Value Ref Range Status   09/10/2021 47.7 37.0 - 54.0 fl Final     MPV   Date Value Ref Range Status   09/10/2021 8.9 6.0 - 12.0 fL Final     Platelets   Date Value Ref Range Status   09/10/2021 353 140 - 450 " 10*3/mm3 Final     Neutrophil %   Date Value Ref Range Status   09/10/2021 75.0 42.7 - 76.0 % Final     Lymphocyte %   Date Value Ref Range Status   09/10/2021 13.7 (L) 19.6 - 45.3 % Final     Monocyte %   Date Value Ref Range Status   09/10/2021 9.3 5.0 - 12.0 % Final     Eosinophil %   Date Value Ref Range Status   09/10/2021 0.8 0.3 - 6.2 % Final     Basophil %   Date Value Ref Range Status   09/10/2021 0.7 0.0 - 1.5 % Final     Immature Grans %   Date Value Ref Range Status   09/10/2021 0.5 0.0 - 0.5 % Final     Neutrophils, Absolute   Date Value Ref Range Status   09/10/2021 6.43 1.70 - 7.00 10*3/mm3 Final     Lymphocytes, Absolute   Date Value Ref Range Status   09/10/2021 1.17 0.70 - 3.10 10*3/mm3 Final     Monocytes, Absolute   Date Value Ref Range Status   09/10/2021 0.80 0.10 - 0.90 10*3/mm3 Final     Eosinophils, Absolute   Date Value Ref Range Status   09/10/2021 0.07 0.00 - 0.40 10*3/mm3 Final     Basophils, Absolute   Date Value Ref Range Status   09/10/2021 0.06 0.00 - 0.20 10*3/mm3 Final     Immature Grans, Absolute   Date Value Ref Range Status   09/10/2021 0.04 0.00 - 0.05 10*3/mm3 Final     nRBC   Date Value Ref Range Status   09/10/2021 0.0 0.0 - 0.2 /100 WBC Final     Lab Results   Component Value Date    GLUCOSE 99 08/23/2021    BUN 12 08/23/2021    CREATININE 0.59 (L) 08/23/2021    EGFRIFNONA 99 08/23/2021    EGFRIFAFRI 111 04/20/2021    BCR 20.3 08/23/2021    K 4.0 08/23/2021    CO2 28.3 08/23/2021    CALCIUM 9.6 08/23/2021    PROTENTOTREF 7.0 04/20/2021    ALBUMIN 3.90 08/23/2021    LABIL2 1.5 04/20/2021    AST 16 08/23/2021    ALT 11 08/23/2021     CT chest 5/25/2021: There is a left upper lobe pleural-based mass which has increased in size currently 3.1 x 2.3 cm previously 2.8 x 1.8 cm.  A left hilar lymph node has increased in size from 3 to 7 mm.  There is a new nodule in the left breast 1.3 cm.                                   I personally reviewed CT chest 5/25/2021-there has been  progression in size of the left upper lobe tumor    Left breast mammogram/ultrasound 6/17/2021 showed 3 tumors in the upper outer quadrant of the left breast 1.7 cm, 1.1 cm, and 0.4 cm    PET scan on 8/30/2021: Hypermetabolic left upper lobe pleural-based mass 4.5 cm with central necrosis, SUV 9.8, extension into the pleura and chest wall, low-level activity overlying the anterior first rib, hypermetabolic left hilar lymph node 8.5 SUV    Assessment/Plan   Assessment/Plan:   This is a 75 y.o. female with:     1.  Recurrent left upper lobe non-small cell lung cancer, high PDL-1 (80%), negative egfr/alk/ros; [facility] testing showed no actionable gene mutations  · radiation therapy to the left upper lobe nodule/mass completed January 2018  · PET scan from 12/20/2019 showed significant uptake in the mass in the apex of the left lung and also a nodule in the lingula with significant uptake for its size and I think both areas are likely consistent with recurrent disease.  We opted to not send the patient for a biopsy as she has very poor lung function and a pneumothorax might prove fatal for her.  There is some uptake in the left hilum but this was present in October 2018 and stable.  There is uptake in the right thyroid gland which is stable and the patient has declined biopsy of this in the past.   · The patient underwent additional radiation to the hypermetabolic lesions and also initiated Keytruda on 1/21/2020.  · Keytruda stopped after 3 doses secondary to significant diarrhea.  Diarrhea resolved with a short course of steroids.  · CT chest  2/9/2021 shows slight progression of disease in the left upper lobe.  The patient is overall asymptomatic and in fact looks better than she has on previous visits.  I discussed the case with Dr. Bennett radiation oncology and the tumor in the left upper lobe cannot be treated with additional radiation therapy.  · CT chest 5/25/2021 showed further progression of the mass  in the left upper lobe in addition to slight enlargement of the left hilar lymph node  · PET scan 8/30/2021:Hypermetabolic left upper lobe pleural-based mass 4.5 cm with central necrosis, SUV 9.8, extension into the pleura and chest wall, low-level activity overlying the anterior first rib, hypermetabolic left hilar lymph node 8.5 SUV  ·   2.  Multinodular goiter stable by CT--followed by endocrinology.  There is uptake in the right thyroid gland similar to prior PET.  The patient declines biopsy of the hypermetabolic right thyroid lesion.  PET scan 8/30/2021 showed decreased activity of a hypermetabolic focus in the right thyroid SUV 6.9 previously 10.2    3. History of right breast cancer, initially diagnosed in 2000 treated with lumpectomy, radiation, chemotherapy and endocrine therapy with local recurrence in 2005 treated with mastectomy and then Arimidex.     4. COPD, oxygen dependent    5.  New left breast cancer: [id number]  · Left breast nodule incidentally noted by CT 5/25/2021 during staging of the patient's lung cancer; nodule 1.3 cm 1.7 cm, 1.1 cm, 0.4 cm  · Breast biopsy by ultrasound guidance 6/22/2021 showed invasive mammary carcinoma with metaplastic features high histologic grade, high proliferative rate 13 mm with intermediate grade ductal carcinoma in situ.  Tumor is negative for ER, VT and HER-2  · Mammogram/ultrasound 6/17/2021 showed 3 tumors in the upper outer quadrant of the left breast  · Status post left mastectomy and lower axillary dissection 8/11/2021 pathology showed 2 foci of invasive mammary carcinoma.  The largest was 3.9 cm metaplastic carcinoma (50% ductal/50% sarcomatoid) poorly differentiated/grade 3 ER negative, VT negative, HER-2 negative.  The second tumor was invasive ductal carcinoma with apocrine features grade 2+ for lymphovascular and perineural invasion, ER +80%, VT +70% HER-2 negative and Ki-67 21%.  There was associated high-grade ductal carcinoma in situ with apocrine  features measuring 90 millimeters.  1 of 3 axillary lymph nodes positive for carcinoma measuring 4 mm.      Oncology plan/recommendations:  The patient is status post recent left mastectomy 8/11/2021 for 2 foci of carcinoma.  One tumor is very high risk measuring 3.9 cm, grade 3, triple negative metaplastic carcinoma which has a high risk of local and distant recurrence.  I discussed this with the patient and her sister  who accompanied her today.  This type of tumor would typically be treated with adjuvant chemotherapy.  PET scan reviewed today shows relatively stable lung cancer in the left upper lobe with a probable left hilar lymph node involved but no evidence of distant disease.  The patient would like to consider adjuvant chemotherapy for her breast cancer.  I therefore recommended weekly carboplatin AUC 1.5/Taxol dose reduced to 60 mg meter squared secondary to her frailty which would potentially also treat palliatively her lung cancer.  I discussed risk and side effects of the regimen.  She would like to proceed and we will do so in about 2 weeks once her wound has fully healed.  If tolerated 12 weeks of treatment would be anticipated.

## 2021-09-13 NOTE — TELEPHONE ENCOUNTER
Caller: Maryam Arredondo    Relationship to patient: Self    Best call back number: [phone]    Chief complaint: PATIENT STATES THAT SHE HAD DISCUSSED PET RESULTS WITH DR. MAE LAST WEEK. PATIENT WOULD LIKE TO TAKE CHEMO AS A PRECAUTIONARY MEASURE AND DR. MAE HAD GIVEN PATIENT A DATE OF 10/4/21 FOR HER CHEMO TO START. PATIENTS SISTER WILL BE ATTENDING WITH HER.  WHEN WOULD HER ORIENTATION BE?     PATIENT WOULD LIKE ALL OF THIS INFORMATION TO BE ADDED TO HER MY CHART AS WELL AS MAILED TO HER SO THAT HER SISTER CAN HAVE A COPY AS SHE LIVES OUT OF TOWN.       Requested date: 10/4/21    PLEASE CONTACT PATIENT TO ADVISE.  LEAVE VM IF NO ANSWER.

## 2021-09-14 NOTE — TELEPHONE ENCOUNTER
Caller: Angélica Tripathi    Relationship: Emergency Contact    Best call back number: [phone]    What was the call regarding: [facility] CALLED TO SAY THAT THEY WILL BE OUT OF TOWN 09/23-10/02. THEY JUST WANTED TO LET US KNOW FOR SCHEDULING REASONS.

## 2021-09-16 NOTE — TELEPHONE ENCOUNTER
Called pt and advised to call centralized scheduling, [facility] did not order the dexa scan, she ryan rizzo

## 2021-09-16 NOTE — TELEPHONE ENCOUNTER
Caller: PHILL GARRETT    Relationship: PATIENT    Best call back number: [phone]    What is the best time to reach you: ANY    What was the call regarding: PATIENT WOULD LIKE TO GET THE PHONE NUMBER OF THE OFFICE FOR HER BONE DENSITY SCAN ON 10/6/21. SHE IS HAVING CHEMO ON 10/4 AND WAS TOLD SHE SHOULDN'T HAVE THE SCAN SO CLOSE TO THAT TREATMENT.    Do you require a callback: PLEASE CALL WITH TELEPHONE NUMBER.

## 2021-09-20 NOTE — PROGRESS NOTES
____________________PATIENT EDUCATION____________________    PATIENT EDUCATION:  Today I met with the patient to discuss the chemotherapy regimen recommended for treatment of her  Malignant neoplasm of upper lobe of left lung (CMS/HCC)    Malignant neoplasm of upper-outer quadrant of left breast in female, estrogen receptor negative (CMS/HCC)  .  The patient was given explanation of treatment premed side effects including office policy that prohibits patients to drive if sedating medications are administered, MD explanation given regarding benefits, side effects, toxicities and goals of treatment.  The patient received a Chemotherapy/Biotherapy Plan Summary including diagnosis and explanation of specific treatment plan.    SIDE EFFECTS:  Common side effects were discussed with the patient and sister.  Discussion included where applicable hair loss/discoloration, anemia/fatigue, infection/chills/fever, appetite, bleeding risk/precautions, constipation, diarrhea, mouth sores, taste alteration, loss of appetite, nausea/vomiting, peripheral neuropathy, skin/nail changes, rash, muscle aches/weakness, photosensitivity, weight gain/loss, liver damage, lung damage, kidney damage, DVT/PE risk, fluid retention, pleural/pericardial effusion, somnolence, electrolyte/LFT imbalance, vein exercises and/or the possible need for vascular access/port placement.  The patient was advised that although uncommon, leakage of an infused medication from the vein or venous access device may lead to skin breakdown and/or other tissue damage.  The patient was advised that he/she may have pain, bleeding, and/or bruising from the insertion of a needle in their vein or venous access device (port).  The patient was further advised that, in spite of proper technique, infection with redness and irritation may rarely occur at the site where the needle was inserted.     Discussion also included side effects specific to drugs in the treatment plan,  specifically Taxol, Carboplatin    I spent 50 minutes caring for Maryam on this date of service. This time includes time spent by me in the following activities: preparing for the visit, reviewing tests, obtaining and/or reviewing a separately obtained history, counseling and educating the patient/family/caregiver, ordering medications, tests, or procedures, referring and communicating with other health care professionals, documenting information in the medical record and care coordination.      Charo David, APRN  09/20/2021

## 2021-09-20 NOTE — PROGRESS NOTES
[facility] Hematology/Oncology Treatment Plan Summary    Name: Maryam Arredondo  [facility]# [id number]  MD: Dr. Ventura    Diagnosis:     ICD-10-CM ICD-9-CM   1. Malignant neoplasm of upper lobe of left lung (CMS/HCC)  C34.12 162.3   2. Malignant neoplasm of upper-outer quadrant of left breast in female, estrogen receptor negative (CMS/HCC)  C50.412 174.4    Z17.1 V86.1        Goal of chemotherapy: adjuvant    Treatment Medication(s):   1. Carboplatin  2. Taxol    Frequency: weekly x 12    Number of cycles: 12    Starting on: Monday 10/4/2021      Items for home use: Immodium AD (for diarrhea), Tylenol (for fever and/or pain) and Thermometer    Rx written for: [x] Nausea    [] Pre-Chemo   ondansetron 8 mg by mouth every 8 hours as needed for nausea      Completing Provider: GUILLE Robertson           Date/time: 09/20/2021      Please note: You will be seen by a provider frequently with your treatment plan. This plan may change depending on many factors, if so, this will be discussed with you by your physician.  Last update 12/2020.

## 2021-09-29 NOTE — TELEPHONE ENCOUNTER
Caller: Maryam Arredondo    Relationship: Self    Best call back number: [phone]    What is the best time to reach you:     Who are you requesting to speak with (clinical staff, provider,  specific staff member): CLINICAL    Do you know the name of the person who called:     What was the call regarding: PT CALLING REGARDING CARDIA MEDICATION AND DIGOXIN MEDICATION. PT STATES THAT BOTH RX COME WITH A WARNING FOR DIZZINESS.     PT WANTS TO KNOW HOW THIS SYMPTOM MAY BE EFFECTED BY CHEMO.    Do you require a callback: YES

## 2021-09-30 NOTE — TELEPHONE ENCOUNTER
Rx Refill Note  Requested Prescriptions     Pending Prescriptions Disp Refills   • digoxin (LANOXIN) 125 MCG tablet [Pharmacy Med Name: DIGOXIN 125 MCG TABLET] 90 tablet 0     Sig: TAKE ONE TABLET BY MOUTH DAILY      Last office visit with prescribing clinician: 6/1/2021  Next office visit with prescribing clinician: Visit date not found          {TIP  Is Refill Pharmacy correct?:23}  Remington Naqvi MA  09/30/21, 07:12 EDT

## 2021-10-05 NOTE — TELEPHONE ENCOUNTER
Caller: Maryam Arredondo    Relationship: Self    Best call back number: [phone]    What is the best time to reach you: ASAP    Who are you requesting to speak with (clinical staff, provider,  specific staff member): NURSE    Do you know the name of the person who called:     What was the call regarding: PT WANTS TO KNOW IF IT'S OKAY TO GET A BONE DENSITY SCAN    Do you require a callback: YES

## 2021-10-05 NOTE — TELEPHONE ENCOUNTER
Returned pts call. Pt wanting to know if Dr. Ventura is ok with her getting a bone density scan tomorrow ordered by her PCP. Sent Elvia Narvaez a message.

## 2021-10-06 NOTE — PROGRESS NOTES
F/u L breast cancer.    Subjective   Maryam Arredondo is a 76 y.o. female.     History of Present Illness   Telemedicine visit due to covid.  Consent obtained.  Pt unable to drive at present.  12 minute visit.    F/U L breast cancer.  Has had L mastectomy and invasive ca grade 3 L axillary lymph node.  Undeergoing taxol and carboplatin weekly.   Seeing Dr Ventura  F/U HTN.  Doing wel with meds.  On cartia and needs it refilled.    F/U COPD.  On 2 L a minute NC>  Doing well with breathing. On spiriva and symbicort.     The following portions of the patient's history were reviewed and updated as appropriate: allergies, current medications, past family history, past medical history, past social history, past surgical history and problem list.    Review of Systems   Constitutional: Negative for appetite change and fatigue.   HENT: Negative for nosebleeds and sore throat.    Eyes: Negative for blurred vision and visual disturbance.   Respiratory: Negative for shortness of breath and wheezing.    Cardiovascular: Negative for chest pain and leg swelling.   Gastrointestinal: Negative for abdominal distention and abdominal pain.   Endocrine: Negative for cold intolerance and polyuria.   Genitourinary: Negative for dysuria and hematuria.   Musculoskeletal: Negative for arthralgias and myalgias.   Skin: Negative for color change and rash.   Neurological: Negative for weakness and confusion.   Psychiatric/Behavioral: Negative for agitation and depressed mood.       Patient Active Problem List   Diagnosis   • Pneumothorax   • Hypoxia   • Tachycardia   • Hyperthyroidism   • Essential hypertension   • Multifocal atrial tachycardia (HCC)   • Toxic multinodular goiter   • Vitamin D insufficiency   • Malignant neoplasm of upper lobe of left lung (HCC)   • Allergic rhinitis   • B12 deficiency   • Oxygen dependent   • Mitral regurgitation   • Breast cancer (HCC)   • Medicare annual wellness visit, subsequent   • Panlobular emphysema (HCC)    • Recurrent non-small cell lung cancer (HCC)   • Encounter for long-term (current) use of other medications   • Unspecified fracture of fifth metacarpal bone, left hand, initial encounter for closed fracture   • Immobility syndrome   • Post-menopausal   • Current chronic use of systemic steroids       Allergies   Allergen Reactions   • Codeine Unknown - High Severity     Patient is unaware of the reaction to this medication     • Penicillins Unknown - High Severity     Pt is unaware of the reaction to this medication     • Mucinex D [Pseudoephedrine-Guaifenesin Er] Nausea Only     Felt hot in chest         Current Outpatient Medications:   •  acyclovir (ZOVIRAX) 400 MG tablet, Take 400 mg by mouth 2 (Two) Times a Day As Needed. Take no more than 5 doses a day., Disp: , Rfl:   •  albuterol sulfate  (90 Base) MCG/ACT inhaler, INHALE TWO PUFFS BY MOUTH EVERY 4 HOURS AS NEEDED FOR WHEEZING (Patient taking differently: Inhale 2 puffs As Needed.), Disp: 8.5 g, Rfl: 10  •  castor oil-balsam peru (VENELEX) ointment, Apply once or twice daily to open areas., Disp: 30 g, Rfl: 2  •  Cholecalciferol (VITAMIN D3) 2000 UNITS tablet, Take 1 tablet by mouth daily., Disp: , Rfl:   •  ciclopirox (Penlac) 8 % solution, Apply  topically to the appropriate area as directed Every Night. (Patient taking differently: Apply 1 application topically to the appropriate area as directed Daily. ROSACEA), Disp: 6 mL, Rfl: 1  •  digoxin (LANOXIN) 125 MCG tablet, TAKE ONE TABLET BY MOUTH DAILY, Disp: 90 tablet, Rfl: 0  •  dilTIAZem CD (Cartia XT) 120 MG 24 hr capsule, Take 1 capsule by mouth Daily., Disp: 90 capsule, Rfl: 3  •  fluticasone (FLONASE) 50 MCG/ACT nasal spray, 2 sprays into the nostril(s) as directed by provider 2 (Two) Times a Day. (Patient taking differently: 2 sprays into the nostril(s) as directed by provider Daily.), Disp: 48 g, Rfl: 3  •  methIMAzole (TAPAZOLE) 5 MG tablet, Take 1.5 tablets by mouth Daily., Disp: 135  tablet, Rfl: 2  •  metroNIDAZOLE (METROCREAM) 0.75 % cream, Apply  topically to the appropriate area as directed 2 (Two) Times a Day. (Patient taking differently: Apply 1 application topically to the appropriate area as directed Daily. ROSACEA), Disp: , Rfl:   •  montelukast (SINGULAIR) 10 MG tablet, TAKE ONE TABLET BY MOUTH DAILY (Patient taking differently: Take 10 mg by mouth Daily.), Disp: 90 tablet, Rfl: 1  •  Multiple Vitamins-Minerals (PRESERVISION AREDS 2 PO), Take 1 tablet by mouth Daily., Disp: , Rfl:   •  mupirocin (BACTROBAN) 2 % ointment, Apply 1 application topically to the appropriate area as directed As Needed., Disp: , Rfl:   •  Nutritional Supplements (ENSURE ACTIVE PO), Take 1 dose by mouth 2 (Two) Times a Day. Ensure or boost , Disp: , Rfl:   •  Nutritional Supplements (JUICE PLUS FIBRE PO), Take 9 tablets by mouth Daily. CHEWS HOLD FOR SURGERY, Disp: , Rfl:   •  nystatin (MYCOSTATIN) 831794 UNIT/ML suspension, Swish and swallow 5 mL 4 (Four) Times a Day. (Patient taking differently: Swish and swallow 500,000 Units 4 (Four) Times a Day As Needed.), Disp: 473 mL, Rfl: 5  •  O2 (OXYGEN), Inhale 2 L/min Continuous., Disp: , Rfl:   •  ondansetron (ZOFRAN) 8 MG tablet, Take 1 tablet by mouth 3 (Three) Times a Day As Needed for Nausea or Vomiting., Disp: 30 tablet, Rfl: 3  •  Spiriva Respimat 2.5 MCG/ACT aerosol solution inhaler, Inhale 2 puffs Daily., Disp: 4 g, Rfl: 11  •  Symbicort 160-4.5 MCG/ACT inhaler, INHALE TWO PUFFS BY MOUTH TWICE A DAY (Patient taking differently: Inhale 2 puffs 2 (Two) Times a Day.), Disp: 10.2 g, Rfl: 10  •  vitamin B-12 (CYANOCOBALAMIN) 1000 MCG tablet, Take 1,000 mcg by mouth Every Other Day. Take only on Mon, Wed, Fri, Disp: , Rfl:     Past Medical History:   Diagnosis Date   • Acromioclavicular joint arthritis    • Anemia    • Asthma    • B12 deficiency    • Benign essential hypertension    • Breast cancer (CMS/HCC)     s/p Lumpectomy ~2000 and chemo/XRT. Then   Masectomy ~2004 for some recurrence   • Breast cancer (CMS/HCC) 06/22/2021    Left breast invasive ammary carcinoma, ER/MD negative, HER2 negative   • Bruises easily    • COPD (chronic obstructive pulmonary disease) (CMS/HCC)    • COPD exacerbation (CMS/HCC)    • COVID-19 vaccine series completed     2/17/21 2ND DOSE   • Cedric-Barr infection    • Heart disease    • History of chemotherapy     RIGHT BREAST CANCER   • History of radiation therapy     LEFT UPPER LOBE   • History of UTI    • Hyperthyroidism    • Hypoxia    • Mitral regurgitation     mild to moderate   • Multifocal atrial tachycardia (CMS/HCC)    • Nocturnal hypoxia    • Non-small cell lung cancer (CMS/HCC)     UPPER LOBE LEFT   • Oral aphthae    • Osteopenia    • Other fatigue    • Oxygen dependent     2L - at night only   • Pneumothorax    • Rosacea    • SOB (shortness of breath)    • Supraventricular tachycardia (CMS/HCC)    • Tachycardia    • Thyroid nodule    • Toxic multinodular goiter    • Urgency of urination    • Vitamin B deficiency    • Vitamin D deficiency        Past Surgical History:   Procedure Laterality Date   • BREAST BIOPSY Left 06/22/2021    US guided core needle biopsy of left brest-Dr. Robyn Tobin, Merged with [facility]   • BREAST BIOPSY Right 2000   • BREAST BIOPSY Right 2004   • BREAST LUMPECTOMY Right 2000    Dr. Jaime Meyer   • BREAST LUMPECTOMY Right     DR. MEYER 2000   • COLONOSCOPY N/A 4/28/2017    Procedure: COLONOSCOPY WITH POLYPECTOMY(COLD BIOPSY AND HOT SNARE);  Surgeon: Luiza Eddy MD;  Location: [facility] ENDOSCOPY;  Service:    • ENDOVENOUS ABLATION SAPHENOUS VEIN W/ LASER Right    • LUNG SURGERY Left     Partial left lung collapse   • MASTECTOMY Right 2004    Dr. Jaime Meyer-WITH LYMPH NODE REMOVAL   • MASTECTOMY Left 8/11/2021    Procedure: LEFT BREAST MASTECTOMY WITH LOWER AXILLARY DISSECTION;  Surgeon: Rachel El MD;  Location: [facility] MAIN OR;  Service: General;  Laterality: Left;   • NECK SURGERY N/A      2 spurs removed   • TOTAL LAPAROSCOPIC HYSTERECTOMY SALPINGO OOPHORECTOMY Bilateral        Family History   Problem Relation Age of Onset   • Arthritis Mother    • Heart failure Mother         Congestive Heart Failure   • Lung cancer Father    • Heart disease Father    • Hypertension Father    • Breast cancer Paternal Grandmother 62   • No Known Problems Sister    • Malig Hyperthermia Neg Hx        Social History     Tobacco Use   • Smoking status: Former Smoker     Packs/day: 1.50     Years: 30.00     Pack years: 45.00     Types: Cigarettes     Quit date: 3/14/1994     Years since quittin.5   • Smokeless tobacco: Never Used   Substance Use Topics   • Alcohol use: No            Objective     There were no vitals filed for this visit.  There is no height or weight on file to calculate BMI.    Physical Exam  Neurological:      Mental Status: She is oriented to person, place, and time.   Psychiatric:         Thought Content: Thought content normal.         Judgment: Judgment normal.         Lab Results   Component Value Date    GLUCOSE 98 10/04/2021    BUN 17 10/04/2021    CREATININE 0.54 (L) 10/04/2021    EGFRIFNONA 109 10/04/2021    EGFRIFAFRI 111 2021    BCR 31.5 (H) 10/04/2021    K 4.2 10/04/2021    CO2 25.9 10/04/2021    CALCIUM 9.6 10/04/2021    PROTENTOTREF 7.0 2021    ALBUMIN 4.10 10/04/2021    LABIL2 1.5 2021    AST 15 10/04/2021    ALT 6 10/04/2021       WBC   Date Value Ref Range Status   10/04/2021 8.44 3.40 - 10.80 10*3/mm3 Final   2019 6.11 3.40 - 10.80 10*3/mm3 Final     RBC   Date Value Ref Range Status   10/04/2021 4.08 3.77 - 5.28 10*6/mm3 Final   2019 4.17 3.77 - 5.28 10*6/mm3 Final     Hemoglobin   Date Value Ref Range Status   10/04/2021 11.9 (L) 12.0 - 15.9 g/dL Final     Hematocrit   Date Value Ref Range Status   10/04/2021 37.8 34.0 - 46.6 % Final     MCV   Date Value Ref Range Status   10/04/2021 92.6 79.0 - 97.0 fL Final     MCH   Date Value Ref Range Status    10/04/2021 29.2 26.6 - 33.0 pg Final     MCHC   Date Value Ref Range Status   10/04/2021 31.5 31.5 - 35.7 g/dL Final     RDW   Date Value Ref Range Status   10/04/2021 14.4 12.3 - 15.4 % Final     RDW-SD   Date Value Ref Range Status   10/04/2021 48.9 37.0 - 54.0 fl Final     MPV   Date Value Ref Range Status   10/04/2021 9.0 6.0 - 12.0 fL Final     Platelets   Date Value Ref Range Status   10/04/2021 301 140 - 450 10*3/mm3 Final     Neutrophil %   Date Value Ref Range Status   10/04/2021 78.5 (H) 42.7 - 76.0 % Final     Lymphocyte %   Date Value Ref Range Status   10/04/2021 11.8 (L) 19.6 - 45.3 % Final     Monocyte %   Date Value Ref Range Status   10/04/2021 7.8 5.0 - 12.0 % Final     Eosinophil %   Date Value Ref Range Status   10/04/2021 0.9 0.3 - 6.2 % Final     Basophil %   Date Value Ref Range Status   10/04/2021 0.6 0.0 - 1.5 % Final     Immature Grans %   Date Value Ref Range Status   10/04/2021 0.4 0.0 - 0.5 % Final     Neutrophils, Absolute   Date Value Ref Range Status   10/04/2021 6.62 1.70 - 7.00 10*3/mm3 Final     Lymphocytes, Absolute   Date Value Ref Range Status   10/04/2021 1.00 0.70 - 3.10 10*3/mm3 Final     Monocytes, Absolute   Date Value Ref Range Status   10/04/2021 0.66 0.10 - 0.90 10*3/mm3 Final     Eosinophils, Absolute   Date Value Ref Range Status   10/04/2021 0.08 0.00 - 0.40 10*3/mm3 Final     Basophils, Absolute   Date Value Ref Range Status   10/04/2021 0.05 0.00 - 0.20 10*3/mm3 Final     Immature Grans, Absolute   Date Value Ref Range Status   10/04/2021 0.03 0.00 - 0.05 10*3/mm3 Final     nRBC   Date Value Ref Range Status   10/04/2021 0.0 0.0 - 0.2 /100 WBC Final       No results found for: HGBA1C    Lab Results   Component Value Date    USPLKADK89 1,228 (H) 09/03/2019       TSH   Date Value Ref Range Status   04/20/2021 0.571 0.270 - 4.200 uIU/mL Final   03/02/2020 1.500 0.270 - 4.200 uIU/mL Final       No results found for: CHOL  Lab Results   Component Value Date    TRIG 58  09/03/2019     Lab Results   Component Value Date    HDL 72 (H) 09/03/2019     Lab Results   Component Value Date     (H) 09/03/2019     Lab Results   Component Value Date    VLDL 11.6 09/03/2019     No results found for: LDLHDL      Procedures    Assessment/Plan   Problems Addressed this Visit     Breast cancer (HCC)    Essential hypertension - Primary    Relevant Medications    dilTIAZem CD (Cartia XT) 120 MG 24 hr capsule    Panlobular emphysema (HCC)      Diagnoses       Codes Comments    Essential hypertension    -  Primary ICD-10-CM: I10  ICD-9-CM: 401.9     Malignant neoplasm of upper-outer quadrant of left breast in female, estrogen receptor negative (HCC)     ICD-10-CM: C50.412, Z17.1  ICD-9-CM: 174.4, V86.1     Panlobular emphysema (HCC)     ICD-10-CM: J43.1  ICD-9-CM: 492.8       HTN.  Controlled.  Continue diltiazem.  Rx sent.   L breast ca with lymph node invasion.  Continue chemo.  Plan is 12 weeks tx.  Continue to see Dr Ventura. Office notes reviewed form Dr Ventura.   Emphysema.  Controlled.  Continue spiriva and symbicort.  Continue O2.      No orders of the defined types were placed in this encounter.      Current Outpatient Medications   Medication Sig Dispense Refill   • acyclovir (ZOVIRAX) 400 MG tablet Take 400 mg by mouth 2 (Two) Times a Day As Needed. Take no more than 5 doses a day.     • albuterol sulfate  (90 Base) MCG/ACT inhaler INHALE TWO PUFFS BY MOUTH EVERY 4 HOURS AS NEEDED FOR WHEEZING (Patient taking differently: Inhale 2 puffs As Needed.) 8.5 g 10   • castor oil-balsam peru (VENELEX) ointment Apply once or twice daily to open areas. 30 g 2   • Cholecalciferol (VITAMIN D3) 2000 UNITS tablet Take 1 tablet by mouth daily.     • ciclopirox (Penlac) 8 % solution Apply  topically to the appropriate area as directed Every Night. (Patient taking differently: Apply 1 application topically to the appropriate area as directed Daily. ROSACEA) 6 mL 1   • digoxin (LANOXIN) 125 MCG  tablet TAKE ONE TABLET BY MOUTH DAILY 90 tablet 0   • dilTIAZem CD (Cartia XT) 120 MG 24 hr capsule Take 1 capsule by mouth Daily. 90 capsule 3   • fluticasone (FLONASE) 50 MCG/ACT nasal spray 2 sprays into the nostril(s) as directed by provider 2 (Two) Times a Day. (Patient taking differently: 2 sprays into the nostril(s) as directed by provider Daily.) 48 g 3   • methIMAzole (TAPAZOLE) 5 MG tablet Take 1.5 tablets by mouth Daily. 135 tablet 2   • metroNIDAZOLE (METROCREAM) 0.75 % cream Apply  topically to the appropriate area as directed 2 (Two) Times a Day. (Patient taking differently: Apply 1 application topically to the appropriate area as directed Daily. ROSACEA)     • montelukast (SINGULAIR) 10 MG tablet TAKE ONE TABLET BY MOUTH DAILY (Patient taking differently: Take 10 mg by mouth Daily.) 90 tablet 1   • Multiple Vitamins-Minerals (PRESERVISION AREDS 2 PO) Take 1 tablet by mouth Daily.     • mupirocin (BACTROBAN) 2 % ointment Apply 1 application topically to the appropriate area as directed As Needed.     • Nutritional Supplements (ENSURE ACTIVE PO) Take 1 dose by mouth 2 (Two) Times a Day. Ensure or boost      • Nutritional Supplements (JUICE PLUS FIBRE PO) Take 9 tablets by mouth Daily. CHEWS  HOLD FOR SURGERY     • nystatin (MYCOSTATIN) 271850 UNIT/ML suspension Swish and swallow 5 mL 4 (Four) Times a Day. (Patient taking differently: Swish and swallow 500,000 Units 4 (Four) Times a Day As Needed.) 473 mL 5   • O2 (OXYGEN) Inhale 2 L/min Continuous.     • ondansetron (ZOFRAN) 8 MG tablet Take 1 tablet by mouth 3 (Three) Times a Day As Needed for Nausea or Vomiting. 30 tablet 3   • Spiriva Respimat 2.5 MCG/ACT aerosol solution inhaler Inhale 2 puffs Daily. 4 g 11   • Symbicort 160-4.5 MCG/ACT inhaler INHALE TWO PUFFS BY MOUTH TWICE A DAY (Patient taking differently: Inhale 2 puffs 2 (Two) Times a Day.) 10.2 g 10   • vitamin B-12 (CYANOCOBALAMIN) 1000 MCG tablet Take 1,000 mcg by mouth Every Other Day.  Take only on Mon, Wed, Fri       No current facility-administered medications for this visit.       Maryam Maria Elena had no medications administered during this visit.    Return in about 3 months (around 1/6/2022).    There are no Patient Instructions on file for this visit.

## 2021-10-08 NOTE — TELEPHONE ENCOUNTER
Caller: Maryam Arredondo    Relationship: Self    Best call back number: [phone]    What is the best time to reach you: ASAP    Who are you requesting to speak with (clinical staff, provider,  specific staff member): NURSE    Do you know the name of the person who called:     What was the call regarding: PT DOESN'T WANT TO DO A VIDEO VISIT    Do you require a callback: YES

## 2021-10-08 NOTE — TELEPHONE ENCOUNTER
Pt reporting passing gas and some minimal results with movement.  Pt currently taking Senokot bid.  Instructed pt to add Miralax in the AM if no further BMs today.  Pt v/u.  Pt states she feels like the steroid she gets with her chemo causes her to have constipation.  Will discuss this issue next week with Frankie

## 2021-10-08 NOTE — TELEPHONE ENCOUNTER
Caller: Maryam Arredondo    Relationship: Self    Best call back number: [phone]    What is the best time to reach you: ANYTIME    Who are you requesting to speak with (clinical staff, provider,  specific staff member): NURSE    What was the call regarding: PATIENT SPOKE TO OFFICE EARLIER TODAY AND WAS TOLD TO INFORM OUR OFFICE IF SHE CONTINUED TO BE CONSTIPATED BY 4:00.  SHE STATES THAT SHE IS VERY GASSY BUT HAS SOME SOME MINIMAL SUCCESS WITH MOVEMENT.  SHE IS TAKING HER SENEKOT AS DIRECTED AND DRINKING LOTS OF WATER.  SHE FEELS THAT THIS HAS BEEN CAUSED BY HER STEROID USE.      Do you require a callback:  PLEASE CONTACT PATIENT TO ADVISE.

## 2021-10-11 NOTE — PROGRESS NOTES
History:     Reason for follow up:   1.  Stage IIB left upper lobe non-small cell lung cancer, high PDL-1 (80%), negative egfr/alk/ros   * status post radiation therapy completed 1/26/18  2.  Recurrent disease involving pleural-based nodule in the lingula and pleural-based mass left upper lobe; both areas treated with radiation therapy completed 2/3/2020  3.  Patient received 3 doses of Keytruda February/March 2020 but stopped secondary to diarrhea and weight loss  4.  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  Her lung cancer staging studies on 5/25/2021 showed a new left breast mass which was confirmed on breast imaging.  She underwent a needle biopsy 6/22/2021 which showed a high-grade triple negative cancer in the left breast with other sites of disease suggested by imaging.  I discussed her case with Dr. El who agreed to take the patient to surgery given the high grade nature of the breast cancer.  She underwent a left mastectomy and lower axillary dissection on 8/11/2021.  Pathology outlined in the assessment plan below.    The patient did well with her surgery.  She reports no pain and  Steri-Strips remain in place.    The patient initiated weekly carboplatin and Taxol on 10/4/2021.  She tolerated treatment well aside from weakness in her leg the day following treatment and ongoing constipation.  She states the weakness is now resolved.  She thought it could have been related to the icing of her hands and feet though we discussed today is likely related to Taxol.  The constipation she was concerned was related to there steroid premedication.  She has used senokot-s twice daily and miralax with a small movement. She has no skin  changes, nausea, or increased shortness of breath.      Reviewed, confirmed and updated history (past medical, social and family)   Past Medical   Past Medical History:   Diagnosis Date   • Acromioclavicular joint arthritis    • Anemia    • Asthma    • B12 deficiency    • Benign essential hypertension    • Breast cancer (HCC)     s/p Lumpectomy ~2000 and chemo/XRT. Then  Masectomy ~2004 for some recurrence   • Breast cancer (HCC) 06/22/2021    Left breast invasive ammary carcinoma, ER/MA negative, HER2 negative   • Bruises easily    • COPD (chronic obstructive pulmonary disease) (HCC)    • COPD exacerbation (HCC)    • COVID-19 vaccine series completed     2/17/21 2ND DOSE   • Cedric-Barr infection    • Heart disease    • History of chemotherapy     RIGHT BREAST CANCER   • History of radiation therapy     LEFT UPPER LOBE   • History of UTI    • Hyperthyroidism    • Hypoxia    • Mitral regurgitation     mild to moderate   • Multifocal atrial tachycardia (HCC)    • Nocturnal hypoxia    • Non-small cell lung cancer (HCC)     UPPER LOBE LEFT   • Oral aphthae    • Osteopenia    • Other fatigue    • Oxygen dependent     2L - at night only   • Pneumothorax    • Rosacea    • SOB (shortness of breath)    • Supraventricular tachycardia (HCC)    • Tachycardia    • Thyroid nodule    • Toxic multinodular goiter    • Urgency of urination    • Vitamin B deficiency    • Vitamin D deficiency     and   Patient Active Problem List   Diagnosis   • Pneumothorax   • Hypoxia   • Tachycardia   • Hyperthyroidism   • Essential hypertension   • Multifocal atrial tachycardia (HCC)   • Toxic multinodular goiter   • Vitamin D insufficiency   • Malignant neoplasm of upper lobe of left lung (HCC)   • Allergic rhinitis   • B12 deficiency   • Oxygen dependent   • Mitral regurgitation   • Breast cancer (HCC)   • Medicare annual wellness visit, subsequent   • Panlobular emphysema (HCC)   • Recurrent non-small cell lung cancer (HCC)   • Encounter for  long-term (current) use of other medications   • Unspecified fracture of fifth metacarpal bone, left hand, initial encounter for closed fracture   • Immobility syndrome   • Post-menopausal   • Current chronic use of systemic steroids     Social History   Social History     Socioeconomic History   • Marital status: Single   Tobacco Use   • Smoking status: Former Smoker     Packs/day: 1.50     Years: 30.00     Pack years: 45.00     Types: Cigarettes     Quit date: 3/14/1994     Years since quittin.5   • Smokeless tobacco: Never Used   Vaping Use   • Vaping Use: Never used   Substance and Sexual Activity   • Alcohol use: No   • Drug use: No   • Sexual activity: Defer     Comment: drinks decaf      Family History  Family History   Problem Relation Age of Onset   • Arthritis Mother    • Heart failure Mother         Congestive Heart Failure   • Lung cancer Father    • Heart disease Father    • Hypertension Father    • Breast cancer Paternal Grandmother 62   • No Known Problems Sister    • Malig Hyperthermia Neg Hx      Allergies  Allergies   Allergen Reactions   • Codeine Unknown - High Severity     Patient is unaware of the reaction to this medication     • Penicillins Unknown - High Severity     Pt is unaware of the reaction to this medication     • Dm-Guaifenesin Er Unknown - Low Severity   • Mucinex D [Pseudoephedrine-Guaifenesin Er] Nausea Only     Felt hot in chest       Medications: The current medication list was reviewed in the EMR.    Review of Systems  Review of Systems   Constitutional: Positive for fatigue. Negative for activity change, appetite change, chills, diaphoresis, fever and unexpected weight change.   HENT: Negative for congestion and sinus pressure.    Respiratory: Positive for shortness of breath. Negative for cough and chest tightness.    Cardiovascular: Negative for chest pain, palpitations and leg swelling.   Gastrointestinal: Positive for constipation. Negative for abdominal pain, blood  "in stool, diarrhea, nausea and vomiting.   Endocrine: Negative.    Genitourinary: Negative.    Musculoskeletal: Positive for arthralgias and gait problem. Negative for joint swelling and myalgias.   Skin: Negative.    Allergic/Immunologic: Negative.    Neurological: Positive for weakness. Negative for numbness.   Hematological: Negative for adenopathy. Does not bruise/bleed easily.   Psychiatric/Behavioral: Agitation:  Behavioral problem:  The patient is nervous/anxious.        Objective    Objective:     Vitals:    10/11/21 1047   BP: 122/72   Pulse: 104   Resp: 16   Temp: 97.1 °F (36.2 °C)   TempSrc: Temporal   SpO2: 98%   Weight: 40.4 kg (89 lb 1.6 oz)   Height: 152.4 cm (60\")   PainSc: 0-No pain     Current Status 8/23/2021   ECOG score 0   Physical Eaxm  GENERAL:  Well-developed elderly lady in no distress.    SKIN: No rash or induration on visible skin.  EYES:    EOMs intact.  Conjunctivae normal.  HEAD:  Normocephalic.  NECK:  Supple with good range of motion.  RESP: No increased work of breathing. She is on O2 by nasal cannula  Breast: Status post left mastectomy.  NEUROLOGICAL:   No focal neurological deficits.  PSYCHIATRIC:  Normal affect and mood.  Somewhat anxious.      Labs and Imaging  WBC   Date Value Ref Range Status   10/11/2021 6.96 3.40 - 10.80 10*3/mm3 Final   06/11/2019 6.11 3.40 - 10.80 10*3/mm3 Final     RBC   Date Value Ref Range Status   10/11/2021 3.83 3.77 - 5.28 10*6/mm3 Final   06/11/2019 4.17 3.77 - 5.28 10*6/mm3 Final     Hemoglobin   Date Value Ref Range Status   10/11/2021 11.1 (L) 12.0 - 15.9 g/dL Final     Hematocrit   Date Value Ref Range Status   10/11/2021 35.5 34.0 - 46.6 % Final     MCV   Date Value Ref Range Status   10/11/2021 92.7 79.0 - 97.0 fL Final     MCH   Date Value Ref Range Status   10/11/2021 29.0 26.6 - 33.0 pg Final     MCHC   Date Value Ref Range Status   10/11/2021 31.3 (L) 31.5 - 35.7 g/dL Final     RDW   Date Value Ref Range Status   10/11/2021 14.2 12.3 - " 15.4 % Final     RDW-SD   Date Value Ref Range Status   10/11/2021 48.0 37.0 - 54.0 fl Final     MPV   Date Value Ref Range Status   10/11/2021 9.2 6.0 - 12.0 fL Final     Platelets   Date Value Ref Range Status   10/11/2021 313 140 - 450 10*3/mm3 Final     Neutrophil %   Date Value Ref Range Status   10/11/2021 77.7 (H) 42.7 - 76.0 % Final     Lymphocyte %   Date Value Ref Range Status   10/11/2021 11.4 (L) 19.6 - 45.3 % Final     Monocyte %   Date Value Ref Range Status   10/11/2021 8.3 5.0 - 12.0 % Final     Eosinophil %   Date Value Ref Range Status   10/11/2021 1.3 0.3 - 6.2 % Final     Basophil %   Date Value Ref Range Status   10/11/2021 0.7 0.0 - 1.5 % Final     Immature Grans %   Date Value Ref Range Status   10/11/2021 0.6 (H) 0.0 - 0.5 % Final     Neutrophils, Absolute   Date Value Ref Range Status   10/11/2021 5.41 1.70 - 7.00 10*3/mm3 Final     Lymphocytes, Absolute   Date Value Ref Range Status   10/11/2021 0.79 0.70 - 3.10 10*3/mm3 Final     Monocytes, Absolute   Date Value Ref Range Status   10/11/2021 0.58 0.10 - 0.90 10*3/mm3 Final     Eosinophils, Absolute   Date Value Ref Range Status   10/11/2021 0.09 0.00 - 0.40 10*3/mm3 Final     Basophils, Absolute   Date Value Ref Range Status   10/11/2021 0.05 0.00 - 0.20 10*3/mm3 Final     Immature Grans, Absolute   Date Value Ref Range Status   10/11/2021 0.04 0.00 - 0.05 10*3/mm3 Final     nRBC   Date Value Ref Range Status   10/11/2021 0.0 0.0 - 0.2 /100 WBC Final     Lab Results   Component Value Date    GLUCOSE 96 10/11/2021    BUN 17 10/11/2021    CREATININE 0.51 (L) 10/11/2021    EGFRIFNONA 117 10/11/2021    EGFRIFAFRI 111 04/20/2021    BCR 33.3 (H) 10/11/2021    K 4.1 10/11/2021    CO2 25.9 10/11/2021    CALCIUM 9.6 10/11/2021    PROTENTOTREF 7.0 04/20/2021    ALBUMIN 4.10 10/11/2021    LABIL2 1.5 04/20/2021    AST 18 10/11/2021    ALT 11 10/11/2021     CT chest 5/25/2021: There is a left upper lobe pleural-based mass which has increased in size  currently 3.1 x 2.3 cm previously 2.8 x 1.8 cm.  A left hilar lymph node has increased in size from 3 to 7 mm.  There is a new nodule in the left breast 1.3 cm.                                   I personally reviewed CT chest 5/25/2021-there has been progression in size of the left upper lobe tumor    Left breast mammogram/ultrasound 6/17/2021 showed 3 tumors in the upper outer quadrant of the left breast 1.7 cm, 1.1 cm, and 0.4 cm    PET scan on 8/30/2021: Hypermetabolic left upper lobe pleural-based mass 4.5 cm with central necrosis, SUV 9.8, extension into the pleura and chest wall, low-level activity overlying the anterior first rib, hypermetabolic left hilar lymph node 8.5 SUV    Assessment/Plan   Assessment/Plan:   This is a 75 y.o. female with:     1.  Recurrent left upper lobe non-small cell lung cancer, high PDL-1 (80%), negative egfr/alk/ros; [facility] testing showed no actionable gene mutations  · radiation therapy to the left upper lobe nodule/mass completed January 2018  · PET scan from 12/20/2019 showed significant uptake in the mass in the apex of the left lung and also a nodule in the lingula with significant uptake for its size and I think both areas are likely consistent with recurrent disease.  We opted to not send the patient for a biopsy as she has very poor lung function and a pneumothorax might prove fatal for her.  There is some uptake in the left hilum but this was present in October 2018 and stable.  There is uptake in the right thyroid gland which is stable and the patient has declined biopsy of this in the past.   · The patient underwent additional radiation to the hypermetabolic lesions and also initiated Keytruda on 1/21/2020.  · Keytruda stopped after 3 doses secondary to significant diarrhea.  Diarrhea resolved with a short course of steroids.  · CT chest  2/9/2021 shows slight progression of disease in the left upper lobe.  The patient is overall asymptomatic and in fact  looks better than she has on previous visits.  I discussed the case with Dr. Bennett radiation oncology and the tumor in the left upper lobe cannot be treated with additional radiation therapy.  · CT chest 5/25/2021 showed further progression of the mass in the left upper lobe in addition to slight enlargement of the left hilar lymph node  · PET scan 8/30/2021:Hypermetabolic left upper lobe pleural-based mass 4.5 cm with central necrosis, SUV 9.8, extension into the pleura and chest wall, low-level activity overlying the anterior first rib, hypermetabolic left hilar lymph node 8.5 SUV    2.  Multinodular goiter stable by CT--followed by endocrinology.  There is uptake in the right thyroid gland similar to prior PET.  The patient declines biopsy of the hypermetabolic right thyroid lesion.  PET scan 8/30/2021 showed decreased activity of a hypermetabolic focus in the right thyroid SUV 6.9 previously 10.2    3. History of right breast cancer, initially diagnosed in 2000 treated with lumpectomy, radiation, chemotherapy and endocrine therapy with local recurrence in 2005 treated with mastectomy and then Arimidex.     4. COPD, oxygen dependent    5.  New left breast cancer: [id number]  · Left breast nodule incidentally noted by CT 5/25/2021 during staging of the patient's lung cancer; nodule 1.3 cm 1.7 cm, 1.1 cm, 0.4 cm  · Breast biopsy by ultrasound guidance 6/22/2021 showed invasive mammary carcinoma with metaplastic features high histologic grade, high proliferative rate 13 mm with intermediate grade ductal carcinoma in situ.  Tumor is negative for ER, IL and HER-2  · Mammogram/ultrasound 6/17/2021 showed 3 tumors in the upper outer quadrant of the left breast  · Status post left mastectomy and lower axillary dissection 8/11/2021 pathology showed 2 foci of invasive mammary carcinoma.  The largest was 3.9 cm metaplastic carcinoma (50% ductal/50% sarcomatoid) poorly differentiated/grade 3 ER negative, IL negative, HER-2  negative.  The second tumor was invasive ductal carcinoma with apocrine features grade 2+ for lymphovascular and perineural invasion, ER +80%, [place] +70% HER-2 negative and Ki-67 21%.  There was associated high-grade ductal carcinoma in situ with apocrine features measuring 90 millimeters.  1 of 3 axillary lymph nodes positive for carcinoma measuring 4 mm.  · Initiation of weekly carboplatin and Taxol for a planned 12 weeks.  Carboplatin AUC 1.5 and Taxol dose reduced to 60 mg per metered squared secondary to her frailty.  · Patient seen 10/11/21 for week 2 treatment.  We will slightly back off on her dexamethasone because she is concerned it caused constipation.  This can be further decreased is necessary next week.     6.  Constipation  · Using senokot-s 2 BID and Miralax QD PRN.  WIll add Milk of Mag PRN.    7.  Leg Weakness  · Patient noted weakness in her legs a following her first carboplatin and Taxol.  She states this resolved thereafter.  We will continue to monitor.      Oncology plan/recommendations:  Proceed with week 2 Carboplatin and Taxol today  Add milk of Magnesia as needed  Call for any new weakness, fevers, or other concerns  Follow up with Dr. Ventura in 1 week for labs and treatment.      The patient continues on a high risk medication requiring frequent monitoring.    You have chosen to receive care through a telehealth visit.    Do you consent to use a video/audio connection for your medical care today? Yes   The video visit was performed using the [email or web address] platform  The patient was in the office and the provider is at home.    GUILLE Lucas

## 2021-10-11 NOTE — TELEPHONE ENCOUNTER
Caller: Maryam Arredondo    Relationship to patient: Self    Best call back number: [phone]    Chief complaint: PT CALLED BECAUSE SHE NOTICED HER APPT TIME FOR TODAY HAD CHANGED ON MY CHART, SHE WAS GIVEN THE TIME OF 9:50 AND HER SISTER BRINGS HER AND IS PICKING HER UP AT 9:15, THIS MORNING SHE NOTICED HER APPT WAS NOW SCHEDULED FOR 9:15, THE 9:50 HAD BEEN CANCELLED, NO ONE TOLD HER OF THIS, SO NOW HER RIDE WILL NOT BE ABLE TO GET HER THERE ON TIME, WARM TRANSFERRED TO [place], AND EXPLAINED SITUATION, SHE SAID THAT IT WOULD BE FINE IF SHE ARRIVED AT 9:50 LIKE THE ORIGINAL APPT HAD PLANNED, INFORMED PT.

## 2021-10-13 NOTE — TELEPHONE ENCOUNTER
Caller: Maryam Arredondo    Relationship: Self    Best call back number: [phone]    What is the best time to reach you: ASAP    Who are you requesting to speak with (clinical staff, provider,  specific staff member): NURSE    Do you know the name of the person who called:     What was the call regarding: PT IS CONSTIPATED AND WOULD LIKE TO TALK TO A NURSE    Do you require a callback: YES

## 2021-10-13 NOTE — TELEPHONE ENCOUNTER
Returning call to pt. Pt stated she is constipated and she took 2 senokot-s this morning with little to no relief. Pt stated this has happened before and she does not want her bowels to get impacted and is wondering what to do. Per Jeanette DE LEON's last office note pt can take senokot -s 2 tablets BID as well as miralax once daily and add in milk of magnesia as needed.     Told pt the above and she v/u. Told pt to go ahead and take the miralx and a dose of milk of magnesia now, and if she does not have any relief with that she can take senokot-s again tonight. Pt v/u.

## 2021-10-15 NOTE — TELEPHONE ENCOUNTER
PATIENT CALLED TO GIVE AN UPDATE ON HER CONDITION. SHE HAS BEEN TAKING MIRILAX AND MILK OF MAGNESIA.  SHE IS STILL VERY GASSY BUT SHE FEELS THAT THINGS ARE LOOSING UP.  SHE FEELS THAT THIS IS CAUSED FROM HER CHEMO BUT NOT SURE.      WILL SHE BE OK TO CONTINUE TAKING THE MIRILAX AND MILK OF MAGNESIA T THE SAME TIME?  CAN SHE TAKE SENEKOT WITH THEM AS WELL?     PLEASE CALL TO DISCUSS WITH PATIENT.  GIVE HER TIME TO GET TO THE PHONE.

## 2021-10-15 NOTE — TELEPHONE ENCOUNTER
Repeated given instructions for bowel regimine for miralax and M.O.M given by JIM Burch a few days ago.  Pt reports lots of gas and feels that is being effective, but no movement yet.  Pt repeated back and v/u.

## 2021-10-18 NOTE — PROGRESS NOTES
History:     Reason for follow up:   1.  Stage IIB left upper lobe non-small cell lung cancer, high PDL-1 (80%), negative egfr/alk/ros   * status post radiation therapy completed 1/26/18  2.  Recurrent disease involving pleural-based nodule in the lingula and pleural-based mass left upper lobe; both areas treated with radiation therapy completed 2/3/2020  3.  Patient received 3 doses of Keytruda February/March 2020 but stopped secondary to diarrhea and weight loss  4.  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  Her lung cancer staging studies on 5/25/2021 showed a new left breast mass which was confirmed on breast imaging.  She underwent a needle biopsy 6/22/2021 which showed a high-grade triple negative cancer in the left breast with other sites of disease suggested by imaging.  I discussed her case with Dr. El who agreed to take the patient to surgery given the high grade nature of the breast cancer.  She underwent a left mastectomy and lower axillary dissection on 8/11/2021.  Pathology outlined in the assessment plan below.    The patient has completed 2 cycles of weekly carboplatin/Taxol as adjuvant treatment for her breast cancer and also serving as treatment for her non-small cell lung cancer.  She has tolerated the treatments fairly well with no uncontrolled nausea vomiting or diarrhea.  She she has had quite a bit of constipation finally had a bowel movement 2 days ago with Senokot and MiraLAX.      Reviewed, confirmed and updated history (past medical, social and family)   Past Medical   Past Medical History:   Diagnosis Date   • Acromioclavicular joint arthritis    • Anemia    • Asthma    • B12 deficiency    • Benign essential  hypertension    • Breast cancer (HCC)     s/p Lumpectomy ~2000 and chemo/XRT. Then  Masectomy ~2004 for some recurrence   • Breast cancer (HCC) 06/22/2021    Left breast invasive ammary carcinoma, ER/AZ negative, HER2 negative   • Bruises easily    • COPD (chronic obstructive pulmonary disease) (HCC)    • COPD exacerbation (HCC)    • COVID-19 vaccine series completed     2/17/21 2ND DOSE   • Cedric-Barr infection    • Heart disease    • History of chemotherapy     RIGHT BREAST CANCER   • History of radiation therapy     LEFT UPPER LOBE   • History of UTI    • Hyperthyroidism    • Hypoxia    • Mitral regurgitation     mild to moderate   • Multifocal atrial tachycardia (HCC)    • Nocturnal hypoxia    • Non-small cell lung cancer (HCC)     UPPER LOBE LEFT   • Oral aphthae    • Osteopenia    • Other fatigue    • Oxygen dependent     2L - at night only   • Pneumothorax    • Rosacea    • SOB (shortness of breath)    • Supraventricular tachycardia (HCC)    • Tachycardia    • Thyroid nodule    • Toxic multinodular goiter    • Urgency of urination    • Vitamin B deficiency    • Vitamin D deficiency     and   Patient Active Problem List   Diagnosis   • Pneumothorax   • Hypoxia   • Tachycardia   • Hyperthyroidism   • Essential hypertension   • Multifocal atrial tachycardia (HCC)   • Toxic multinodular goiter   • Vitamin D insufficiency   • Malignant neoplasm of upper lobe of left lung (HCC)   • Allergic rhinitis   • B12 deficiency   • Oxygen dependent   • Mitral regurgitation   • Breast cancer (HCC)   • Medicare annual wellness visit, subsequent   • Panlobular emphysema (HCC)   • Recurrent non-small cell lung cancer (HCC)   • Encounter for long-term (current) use of other medications   • Unspecified fracture of fifth metacarpal bone, left hand, initial encounter for closed fracture   • Immobility syndrome   • Post-menopausal   • Current chronic use of systemic steroids     Social History   Social History     Socioeconomic  History   • Marital status: Single   Tobacco Use   • Smoking status: Former Smoker     Packs/day: 1.50     Years: 30.00     Pack years: 45.00     Types: Cigarettes     Quit date: 3/14/1994     Years since quittin.6   • Smokeless tobacco: Never Used   Vaping Use   • Vaping Use: Never used   Substance and Sexual Activity   • Alcohol use: No   • Drug use: No   • Sexual activity: Defer     Comment: drinks decaf      Family History  Family History   Problem Relation Age of Onset   • Arthritis Mother    • Heart failure Mother         Congestive Heart Failure   • Lung cancer Father    • Heart disease Father    • Hypertension Father    • Breast cancer Paternal Grandmother 62   • No Known Problems Sister    • Malig Hyperthermia Neg Hx      Allergies  Allergies   Allergen Reactions   • Codeine Unknown - High Severity     Patient is unaware of the reaction to this medication     • Penicillins Unknown - High Severity     Pt is unaware of the reaction to this medication     • Dm-Guaifenesin Er Unknown - Low Severity   • Mucinex D [Pseudoephedrine-Guaifenesin Er] Nausea Only     Felt hot in chest       Medications: The current medication list was reviewed in the EMR.    Review of Systems  Review of Systems   Constitutional: Positive for fatigue. Negative for activity change, appetite change, chills, diaphoresis, fever and unexpected weight change.   HENT: Negative for congestion and sinus pressure.    Respiratory: Positive for shortness of breath. Negative for cough and chest tightness.    Cardiovascular: Negative for chest pain, palpitations and leg swelling.   Gastrointestinal: Positive for constipation. Negative for abdominal pain, blood in stool, diarrhea, nausea and vomiting.   Endocrine: Negative.    Genitourinary: Negative.    Musculoskeletal: Positive for arthralgias and gait problem. Negative for joint swelling and myalgias.   Skin: Negative.    Allergic/Immunologic: Negative.    Neurological: Positive for weakness.  Negative for numbness.   Hematological: Negative for adenopathy. Does not bruise/bleed easily.   Psychiatric/Behavioral: Agitation:  Behavioral problem:  The patient is nervous/anxious.    ROS unchanged-10/18/2021    Objective    Objective:     There were no vitals filed for this visit.  Current Status 8/23/2021   ECOG score 0   Physical Eaxm  GENERAL:  Well-developed elderly lady in no distress.    SKIN: No rash or induration on visible skin.  EYES:    EOMs intact.  Conjunctivae normal.  HEAD:  Normocephalic.  NECK:  Supple with good range of motion.  RESP: No increased work of breathing. She is on O2 by nasal cannula  Breast: Status post left mastectomy.  Surgical site well-healed.  NEUROLOGICAL:   No focal neurological deficits.  PSYCHIATRIC:  Normal affect and mood.  Somewhat anxious.      Labs and Imaging  WBC   Date Value Ref Range Status   10/11/2021 6.96 3.40 - 10.80 10*3/mm3 Final   06/11/2019 6.11 3.40 - 10.80 10*3/mm3 Final     RBC   Date Value Ref Range Status   10/11/2021 3.83 3.77 - 5.28 10*6/mm3 Final   06/11/2019 4.17 3.77 - 5.28 10*6/mm3 Final     Hemoglobin   Date Value Ref Range Status   10/11/2021 11.1 (L) 12.0 - 15.9 g/dL Final     Hematocrit   Date Value Ref Range Status   10/11/2021 35.5 34.0 - 46.6 % Final     MCV   Date Value Ref Range Status   10/11/2021 92.7 79.0 - 97.0 fL Final     MCH   Date Value Ref Range Status   10/11/2021 29.0 26.6 - 33.0 pg Final     MCHC   Date Value Ref Range Status   10/11/2021 31.3 (L) 31.5 - 35.7 g/dL Final     RDW   Date Value Ref Range Status   10/11/2021 14.2 12.3 - 15.4 % Final     RDW-SD   Date Value Ref Range Status   10/11/2021 48.0 37.0 - 54.0 fl Final     MPV   Date Value Ref Range Status   10/11/2021 9.2 6.0 - 12.0 fL Final     Platelets   Date Value Ref Range Status   10/11/2021 313 140 - 450 10*3/mm3 Final     Neutrophil %   Date Value Ref Range Status   10/11/2021 77.7 (H) 42.7 - 76.0 % Final     Lymphocyte %   Date Value Ref Range Status    10/11/2021 11.4 (L) 19.6 - 45.3 % Final     Monocyte %   Date Value Ref Range Status   10/11/2021 8.3 5.0 - 12.0 % Final     Eosinophil %   Date Value Ref Range Status   10/11/2021 1.3 0.3 - 6.2 % Final     Basophil %   Date Value Ref Range Status   10/11/2021 0.7 0.0 - 1.5 % Final     Immature Grans %   Date Value Ref Range Status   10/11/2021 0.6 (H) 0.0 - 0.5 % Final     Neutrophils, Absolute   Date Value Ref Range Status   10/11/2021 5.41 1.70 - 7.00 10*3/mm3 Final     Lymphocytes, Absolute   Date Value Ref Range Status   10/11/2021 0.79 0.70 - 3.10 10*3/mm3 Final     Monocytes, Absolute   Date Value Ref Range Status   10/11/2021 0.58 0.10 - 0.90 10*3/mm3 Final     Eosinophils, Absolute   Date Value Ref Range Status   10/11/2021 0.09 0.00 - 0.40 10*3/mm3 Final     Basophils, Absolute   Date Value Ref Range Status   10/11/2021 0.05 0.00 - 0.20 10*3/mm3 Final     Immature Grans, Absolute   Date Value Ref Range Status   10/11/2021 0.04 0.00 - 0.05 10*3/mm3 Final     nRBC   Date Value Ref Range Status   10/11/2021 0.0 0.0 - 0.2 /100 WBC Final     Lab Results   Component Value Date    GLUCOSE 96 10/11/2021    BUN 17 10/11/2021    CREATININE 0.51 (L) 10/11/2021    EGFRIFNONA 117 10/11/2021    EGFRIFAFRI 111 04/20/2021    BCR 33.3 (H) 10/11/2021    K 4.1 10/11/2021    CO2 25.9 10/11/2021    CALCIUM 9.6 10/11/2021    PROTENTOTREF 7.0 04/20/2021    ALBUMIN 4.10 10/11/2021    LABIL2 1.5 04/20/2021    AST 18 10/11/2021    ALT 11 10/11/2021     CT chest 5/25/2021: There is a left upper lobe pleural-based mass which has increased in size currently 3.1 x 2.3 cm previously 2.8 x 1.8 cm.  A left hilar lymph node has increased in size from 3 to 7 mm.  There is a new nodule in the left breast 1.3 cm.                                   I personally reviewed CT chest 5/25/2021-there has been progression in size of the left upper lobe tumor    Left breast mammogram/ultrasound 6/17/2021 showed 3 tumors in the upper outer quadrant of  the left breast 1.7 cm, 1.1 cm, and 0.4 cm    PET scan on 8/30/2021: Hypermetabolic left upper lobe pleural-based mass 4.5 cm with central necrosis, SUV 9.8, extension into the pleura and chest wall, low-level activity overlying the anterior first rib, hypermetabolic left hilar lymph node 8.5 SUV    Assessment/Plan   Assessment/Plan:   This is a 75 y.o. female with:     1.  Recurrent left upper lobe non-small cell lung cancer, high PDL-1 (80%), negative egfr/alk/ros; [facility] testing showed no actionable gene mutations  · radiation therapy to the left upper lobe nodule/mass completed January 2018  · PET scan from 12/20/2019 showed significant uptake in the mass in the apex of the left lung and also a nodule in the lingula with significant uptake for its size and I think both areas are likely consistent with recurrent disease.  We opted to not send the patient for a biopsy as she has very poor lung function and a pneumothorax might prove fatal for her.  There is some uptake in the left hilum but this was present in October 2018 and stable.  There is uptake in the right thyroid gland which is stable and the patient has declined biopsy of this in the past.   · The patient underwent additional radiation to the hypermetabolic lesions and also initiated Keytruda on 1/21/2020.  · Keytruda stopped after 3 doses secondary to significant diarrhea.  Diarrhea resolved with a short course of steroids.  · CT chest  2/9/2021 shows slight progression of disease in the left upper lobe.  The patient is overall asymptomatic and in fact looks better than she has on previous visits.  I discussed the case with Dr. Bennett radiation oncology and the tumor in the left upper lobe cannot be treated with additional radiation therapy.  · CT chest 5/25/2021 showed further progression of the mass in the left upper lobe in addition to slight enlargement of the left hilar lymph node  · PET scan 8/30/2021:Hypermetabolic left upper lobe  pleural-based mass 4.5 cm with central necrosis, SUV 9.8, extension into the pleura and chest wall, low-level activity overlying the anterior first rib, hypermetabolic left hilar lymph node 8.5 SUV  · Initiated weekly carboplatin/Taxol 10/4/2021    2.  Multinodular goiter stable by CT--followed by endocrinology.  There is uptake in the right thyroid gland similar to prior PET.  The patient declines biopsy of the hypermetabolic right thyroid lesion.  PET scan 8/30/2021 showed decreased activity of a hypermetabolic focus in the right thyroid SUV 6.9 previously 10.2    3. History of right breast cancer, initially diagnosed in 2000 treated with lumpectomy, radiation, chemotherapy and endocrine therapy with local recurrence in 2005 treated with mastectomy and then Arimidex.     4. COPD, oxygen dependent    5.  New left breast cancer: [id number]  · Left breast nodule incidentally noted by CT 5/25/2021 during staging of the patient's lung cancer; nodule 1.3 cm 1.7 cm, 1.1 cm, 0.4 cm  · Breast biopsy by ultrasound guidance 6/22/2021 showed invasive mammary carcinoma with metaplastic features high histologic grade, high proliferative rate 13 mm with intermediate grade ductal carcinoma in situ.  Tumor is negative for ER, DC and HER-2  · Mammogram/ultrasound 6/17/2021 showed 3 tumors in the upper outer quadrant of the left breast  · Status post left mastectomy and lower axillary dissection 8/11/2021 pathology showed 2 foci of invasive mammary carcinoma.  The largest was 3.9 cm metaplastic carcinoma (50% ductal/50% sarcomatoid) poorly differentiated/grade 3 ER negative, DC negative, HER-2 negative.  The second tumor was invasive ductal carcinoma with apocrine features grade 2+ for lymphovascular and perineural invasion, ER +80%, DC +70% HER-2 negative and Ki-67 21%.  There was associated high-grade ductal carcinoma in situ with apocrine features measuring 90 millimeters.  1 of 3 axillary lymph nodes positive for carcinoma  measuring 4 mm.  · Initiation of weekly carboplatin and Taxol for a planned 12 weeks.  Carboplatin AUC 1.5 and Taxol dose reduced to 60 mg per metered squared secondary to her frailty.       6.  Constipation  · The patient has not been taking Senokot-s regularly only as needed.  I recommended she take 2 Senokot S at bedtime every night, increase to to twice daily if needed and take MiraLAX daily as needed.    ·       Oncology plan/recommendations:  Proceed with week 3 Carboplatin and Taxol today  Continue weekly lab carboplatin/Taxol with MD or NP visit every other treatment.  Add Senokot-S 2 at bedtime every night, increased to twice daily if needed and add MiraLAX daily if needed for constipation    Patient is on medication requiring intensive monitoring for toxicity.    Je Ventura MD

## 2021-10-19 NOTE — TELEPHONE ENCOUNTER
Caller: Phill Arredondo    Relationship: Self    Best call back number: [phone]    What is the best time to reach you: ANYTIME    Who are you requesting to speak with (clinical staff, provider,  specific staff member  OFFICE     Do you know the name of the person who called: PHILL    What was the call regarding:   PT WAS IN YESTERDAY AND GOT A PRINT OUT OF SCHEDULE BUT IS VERY HARD TO READ IN A VERY SMALL FONT  WOULD LIKE TO GET A CORRECTED COPY STARTING WITH THE 10/25 APPT, AND 11/1 BECAUSE CURRENT SCHEDULE PRINTED DOES NOT REFLECT CORRECTLY( I.E PORT FLUSH AND F/U WITH LORIE HAN WAS CANCELLED 10/25)  AND IS VERY HARD TO FOLLOW  PATIENT IS GETTING CONFUSED BY THE SCHEDULE SHE WAS PRINTED     IF CAN MAIL CORRECTED SCHEDULE TO CURRENT ADDRESS ON FILE      BECAUSE HAS TO MAKE ARRANGEMENTS FOR TRANSPORTATION.       LOOKED OVER RECENT SCHEDULE WITH PHILL, STATES SOME OF APPTS ARE SCHEDULED WITH PORT FLUSH AND INFUSION AND SOME ARE NOT, DOESN'T UNDERSTAND WHY. WOULD LIKE SOMEONE TO REVIEW THIS SCHEDULE MAKE SURE THAT IS CORRECT.     Do you require a callback: YES

## 2021-10-19 NOTE — TELEPHONE ENCOUNTER
Caller: Maryam Arredondo    Relationship: Self    Best call back number: [phone]    Who are you requesting to speak with (clinical staff, provider,  specific staff member): NURSE    What was the call regarding: PATIENT ONLY WANTS TO DISCUSS WITH THE NURSE. IT IS IN REGARDS TO HER ALLERGIES.     Do you require a callback: YES

## 2021-10-20 NOTE — TELEPHONE ENCOUNTER
PHILL GARRETT CALLED HAVING ISSUES CONFIRMING APPT 10/25 VIA MY CHART WAS TOLD SENT TO EMAIL CODES BUT HASNT RECEIVED THEM I VERIFIED THE E-MAIL ADDRESS WE HAVE ON FILE WAS CORRECT, AND ADV OF THE MY CHART ASSISTANCE LINE [phone] AND OFFERED TO WARM TRANSFER PT DECLINED WILL CALL THEM LATER TO WORK ON. ALSO CONFIRMED APPT 10/25 OVER THE PHONE WITH PT.

## 2021-10-25 NOTE — TELEPHONE ENCOUNTER
Caller: PHILL GARRETT    Relationship: SELF    Best call back number: [phone]    What is the best time to reach you: ANY    What was the call regarding: PT STATES HER SISTER ANGÉLICA WAS GIVEN PERMISSION TO ACCOMPANY HER TO ALL OF HER INFUSIONS AND APPTS. THE PT SAYS HER SISTER IS UNABLE TO COME TODAY AND WANTS TO MAKE SURE IT IS OKAY THAT HER FRIEND MICHAEL MCKINNEY CAN BRING HER INSTEAD AND ACCOMPANY HER TO THE APPT    Do you require a callback: YES

## 2021-11-01 NOTE — PROGRESS NOTES
History:     Reason for follow up:   1.  Stage IIB left upper lobe non-small cell lung cancer, high PDL-1 (80%), negative egfr/alk/ros   * status post radiation therapy completed 1/26/18  2.  Recurrent disease involving pleural-based nodule in the lingula and pleural-based mass left upper lobe; both areas treated with radiation therapy completed 2/3/2020  3.  Patient received 3 doses of Keytruda February/March 2020 but stopped secondary to diarrhea and weight loss  4.  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  Her lung cancer staging studies on 5/25/2021 showed a new left breast mass which was confirmed on breast imaging.  She underwent a needle biopsy 6/22/2021 which showed a high-grade triple negative cancer in the left breast with other sites of disease suggested by imaging.  I discussed her case with Dr. El who agreed to take the patient to surgery given the high grade nature of the breast cancer.  She underwent a left mastectomy and lower axillary dissection on 8/11/2021.  Pathology outlined in the assessment plan below.    Patient comes in today for cycle 2-day 8, blood and Taxol.  She reports tolerating treatment well aside from leg achiness a day or 2 following treatment.  She believes the icing of her hands and feet are causing this and states she stopped icing midway through treatment last week with improvement in her discomfort.  Today she is going to try to not ice at all to see if this helps further.  She denies neuropathy above her baseline.  She denies any fevers or chills.  She reports constipation that is improved with the use of Senokot-S as well as MiraLAX.  She denies swelling or worsening shortness of breath above her  baseline.  She is down 1 pound from last week but states she is using pure protein drinks twice daily.      Reviewed, confirmed and updated history (past medical, social and family)   Past Medical   Past Medical History:   Diagnosis Date   • Acromioclavicular joint arthritis    • Anemia    • Asthma    • B12 deficiency    • Benign essential hypertension    • Breast cancer (HCC)     s/p Lumpectomy ~2000 and chemo/XRT. Then  Masectomy ~2004 for some recurrence   • Breast cancer (HCC) 06/22/2021    Left breast invasive ammary carcinoma, ER/IA negative, HER2 negative   • Bruises easily    • COPD (chronic obstructive pulmonary disease) (HCC)    • COPD exacerbation (HCC)    • COVID-19 vaccine series completed     2/17/21 2ND DOSE   • Cedric-Barr infection    • Heart disease    • History of chemotherapy     RIGHT BREAST CANCER   • History of radiation therapy     LEFT UPPER LOBE   • History of UTI    • Hyperthyroidism    • Hypoxia    • Mitral regurgitation     mild to moderate   • Multifocal atrial tachycardia (HCC)    • Nocturnal hypoxia    • Non-small cell lung cancer (HCC)     UPPER LOBE LEFT   • Oral aphthae    • Osteopenia    • Other fatigue    • Oxygen dependent     2L - at night only   • Pneumothorax    • Rosacea    • SOB (shortness of breath)    • Supraventricular tachycardia (HCC)    • Tachycardia    • Thyroid nodule    • Toxic multinodular goiter    • Urgency of urination    • Vitamin B deficiency    • Vitamin D deficiency     and   Patient Active Problem List   Diagnosis   • Pneumothorax   • Hypoxia   • Tachycardia   • Hyperthyroidism   • Essential hypertension   • Multifocal atrial tachycardia (HCC)   • Toxic multinodular goiter   • Vitamin D insufficiency   • Malignant neoplasm of upper lobe of left lung (HCC)   • Allergic rhinitis   • B12 deficiency   • Oxygen dependent   • Mitral regurgitation   • Breast cancer (HCC)   • Medicare annual wellness visit, subsequent   • Panlobular emphysema (HCC)   • Recurrent  non-small cell lung cancer (HCC)   • Encounter for long-term (current) use of other medications   • Unspecified fracture of fifth metacarpal bone, left hand, initial encounter for closed fracture   • Immobility syndrome   • Post-menopausal   • Current chronic use of systemic steroids     Social History   Social History     Socioeconomic History   • Marital status: Single   Tobacco Use   • Smoking status: Former Smoker     Packs/day: 1.50     Years: 30.00     Pack years: 45.00     Types: Cigarettes     Quit date: 3/14/1994     Years since quittin.6   • Smokeless tobacco: Never Used   Vaping Use   • Vaping Use: Never used   Substance and Sexual Activity   • Alcohol use: No   • Drug use: No   • Sexual activity: Defer     Comment: drinks decaf      Family History  Family History   Problem Relation Age of Onset   • Arthritis Mother    • Heart failure Mother         Congestive Heart Failure   • Lung cancer Father    • Heart disease Father    • Hypertension Father    • Breast cancer Paternal Grandmother 62   • No Known Problems Sister    • Malig Hyperthermia Neg Hx      Allergies  Allergies   Allergen Reactions   • Codeine Unknown - High Severity     Patient is unaware of the reaction to this medication     • Morphine Mental Status Change   • Penicillins Unknown - High Severity     Pt is unaware of the reaction to this medication     • Dm-Guaifenesin Er Unknown - Low Severity   • Mucinex D [Pseudoephedrine-Guaifenesin Er] Nausea Only     Felt hot in chest       Medications: The current medication list was reviewed in the EMR.    Review of Systems  Review of Systems   Constitutional: Positive for fatigue. Negative for activity change, appetite change, chills, diaphoresis, fever and unexpected weight change.   HENT: Negative for congestion and sinus pressure.    Respiratory: Positive for shortness of breath. Negative for cough and chest tightness.    Cardiovascular: Negative for chest pain, palpitations and leg  "swelling.   Gastrointestinal: Positive for constipation. Negative for abdominal pain, blood in stool, diarrhea, nausea and vomiting.   Endocrine: Negative.    Genitourinary: Negative.    Musculoskeletal: Positive for arthralgias and gait problem. Negative for joint swelling and myalgias.   Skin: Negative.    Allergic/Immunologic: Negative.    Neurological: Positive for weakness. Negative for numbness.   Hematological: Negative for adenopathy. Does not bruise/bleed easily.   Psychiatric/Behavioral: Agitation:  Behavioral problem:  The patient is nervous/anxious.    Review of systems 11/01/2021 unchanged from previous office visit except as updated.       Objective    Objective:     Vitals:    11/01/21 1059   BP: 120/68   Pulse: 108   Resp: 16   Temp: 98 °F (36.7 °C)   TempSrc: Infrared   SpO2: 99%  Comment: w/oxy   Weight: 39.7 kg (87 lb 8 oz)   Height: 152 cm (59.84\")   PainSc: 0-No pain     Current Status 11/1/2021   ECOG score 0   Physical Eaxm  GENERAL:  Well-developed elderly lady in no distress.    SKIN: No rash or induration on visible skin.  EYES:    EOMs intact.  Conjunctivae normal.  HEAD:  Normocephalic.  NECK:  Supple with good range of motion.  CARDIAC: Regular rate and rhythm.  RESP: No increased work of breathing. She is on O2 by nasal cannula.  Lungs clear to auscultation bilaterally.  Breast: Status post left mastectomy.  Surgical site well-healed.  NEUROLOGICAL:   No focal neurological deficits.  PSYCHIATRIC:  Normal affect and mood.  Somewhat anxious.      Labs and Imaging  WBC   Date Value Ref Range Status   11/01/2021 4.90 3.40 - 10.80 10*3/mm3 Final   06/11/2019 6.11 3.40 - 10.80 10*3/mm3 Final     RBC   Date Value Ref Range Status   11/01/2021 3.41 (L) 3.77 - 5.28 10*6/mm3 Final   06/11/2019 4.17 3.77 - 5.28 10*6/mm3 Final     Hemoglobin   Date Value Ref Range Status   11/01/2021 10.3 (L) 12.0 - 15.9 g/dL Final     Hematocrit   Date Value Ref Range Status   11/01/2021 30.8 (L) 34.0 - 46.6 % " Final     MCV   Date Value Ref Range Status   11/01/2021 90.3 79.0 - 97.0 fL Final     MCH   Date Value Ref Range Status   11/01/2021 30.2 26.6 - 33.0 pg Final     MCHC   Date Value Ref Range Status   11/01/2021 33.4 31.5 - 35.7 g/dL Final     RDW   Date Value Ref Range Status   11/01/2021 15.7 (H) 12.3 - 15.4 % Final     RDW-SD   Date Value Ref Range Status   11/01/2021 50.0 37.0 - 54.0 fl Final     MPV   Date Value Ref Range Status   11/01/2021 8.7 6.0 - 12.0 fL Final     Platelets   Date Value Ref Range Status   11/01/2021 299 140 - 450 10*3/mm3 Final     Neutrophil %   Date Value Ref Range Status   11/01/2021 74.5 42.7 - 76.0 % Final     Lymphocyte %   Date Value Ref Range Status   11/01/2021 11.4 (L) 19.6 - 45.3 % Final     Monocyte %   Date Value Ref Range Status   11/01/2021 12.7 (H) 5.0 - 12.0 % Final     Eosinophil %   Date Value Ref Range Status   11/01/2021 0.4 0.3 - 6.2 % Final     Basophil %   Date Value Ref Range Status   11/01/2021 0.6 0.0 - 1.5 % Final     Immature Grans %   Date Value Ref Range Status   11/01/2021 0.4 0.0 - 0.5 % Final     Neutrophils, Absolute   Date Value Ref Range Status   11/01/2021 3.65 1.70 - 7.00 10*3/mm3 Final     Lymphocytes, Absolute   Date Value Ref Range Status   11/01/2021 0.56 (L) 0.70 - 3.10 10*3/mm3 Final     Monocytes, Absolute   Date Value Ref Range Status   11/01/2021 0.62 0.10 - 0.90 10*3/mm3 Final     Eosinophils, Absolute   Date Value Ref Range Status   11/01/2021 0.02 0.00 - 0.40 10*3/mm3 Final     Basophils, Absolute   Date Value Ref Range Status   11/01/2021 0.03 0.00 - 0.20 10*3/mm3 Final     Immature Grans, Absolute   Date Value Ref Range Status   11/01/2021 0.02 0.00 - 0.05 10*3/mm3 Final     nRBC   Date Value Ref Range Status   11/01/2021 0.0 0.0 - 0.2 /100 WBC Final     Lab Results   Component Value Date    GLUCOSE 103 11/01/2021    BUN 14 11/01/2021    CREATININE 0.49 (L) 11/01/2021    EGFRIFNONA 122 11/01/2021    EGFRIFAFRI 111 04/20/2021    BCR 28.6  11/01/2021    K 3.7 11/01/2021    CO2 29.1 (H) 11/01/2021    CALCIUM 9.1 11/01/2021    PROTENTOTREF 7.0 04/20/2021    ALBUMIN 3.80 11/01/2021    LABIL2 1.5 04/20/2021    AST 13 11/01/2021    ALT 8 11/01/2021     CT chest 5/25/2021: There is a left upper lobe pleural-based mass which has increased in size currently 3.1 x 2.3 cm previously 2.8 x 1.8 cm.  A left hilar lymph node has increased in size from 3 to 7 mm.  There is a new nodule in the left breast 1.3 cm.                                   I personally reviewed CT chest 5/25/2021-there has been progression in size of the left upper lobe tumor    Left breast mammogram/ultrasound 6/17/2021 showed 3 tumors in the upper outer quadrant of the left breast 1.7 cm, 1.1 cm, and 0.4 cm    PET scan on 8/30/2021: Hypermetabolic left upper lobe pleural-based mass 4.5 cm with central necrosis, SUV 9.8, extension into the pleura and chest wall, low-level activity overlying the anterior first rib, hypermetabolic left hilar lymph node 8.5 SUV    Assessment/Plan   Assessment/Plan:   This is a 75 y.o. female with:     1.  Recurrent left upper lobe non-small cell lung cancer, high PDL-1 (80%), negative egfr/alk/ros; [facility] testing showed no actionable gene mutations  · radiation therapy to the left upper lobe nodule/mass completed January 2018  · PET scan from 12/20/2019 showed significant uptake in the mass in the apex of the left lung and also a nodule in the lingula with significant uptake for its size and I think both areas are likely consistent with recurrent disease.  We opted to not send the patient for a biopsy as she has very poor lung function and a pneumothorax might prove fatal for her.  There is some uptake in the left hilum but this was present in October 2018 and stable.  There is uptake in the right thyroid gland which is stable and the patient has declined biopsy of this in the past.   · The patient underwent additional radiation to the  hypermetabolic lesions and also initiated Keytruda on 1/21/2020.  · Keytruda stopped after 3 doses secondary to significant diarrhea.  Diarrhea resolved with a short course of steroids.  · CT chest  2/9/2021 shows slight progression of disease in the left upper lobe.  The patient is overall asymptomatic and in fact looks better than she has on previous visits.  I discussed the case with Dr. Bennett radiation oncology and the tumor in the left upper lobe cannot be treated with additional radiation therapy.  · CT chest 5/25/2021 showed further progression of the mass in the left upper lobe in addition to slight enlargement of the left hilar lymph node  · PET scan 8/30/2021:Hypermetabolic left upper lobe pleural-based mass 4.5 cm with central necrosis, SUV 9.8, extension into the pleura and chest wall, low-level activity overlying the anterior first rib, hypermetabolic left hilar lymph node 8.5 SUV  · Initiated weekly carboplatin/Taxol 10/4/2021  · Patient comes in today for cycle 2-day 8 treatment and tolerating this fairly well thus far aside from leg achiness that she attributes to icing of her hands and feet and constipation that is now being managed.  We will proceed with treatment today.    2.  Multinodular goiter stable by CT--followed by endocrinology.  There is uptake in the right thyroid gland similar to prior PET.  The patient declines biopsy of the hypermetabolic right thyroid lesion.  PET scan 8/30/2021 showed decreased activity of a hypermetabolic focus in the right thyroid SUV 6.9 previously 10.2    3. History of right breast cancer, initially diagnosed in 2000 treated with lumpectomy, radiation, chemotherapy and endocrine therapy with local recurrence in 2005 treated with mastectomy and then Arimidex.     4. COPD, oxygen dependent    5.  New left breast cancer: [id number]  · Left breast nodule incidentally noted by CT 5/25/2021 during staging of the patient's lung cancer; nodule 1.3 cm 1.7 cm, 1.1 cm,  0.4 cm  · Breast biopsy by ultrasound guidance 6/22/2021 showed invasive mammary carcinoma with metaplastic features high histologic grade, high proliferative rate 13 mm with intermediate grade ductal carcinoma in situ.  Tumor is negative for ER, AR and HER-2  · Mammogram/ultrasound 6/17/2021 showed 3 tumors in the upper outer quadrant of the left breast  · Status post left mastectomy and lower axillary dissection 8/11/2021 pathology showed 2 foci of invasive mammary carcinoma.  The largest was 3.9 cm metaplastic carcinoma (50% ductal/50% sarcomatoid) poorly differentiated/grade 3 ER negative, AR negative, HER-2 negative.  The second tumor was invasive ductal carcinoma with apocrine features grade 2+ for lymphovascular and perineural invasion, ER +80%, AR +70% HER-2 negative and Ki-67 21%.  There was associated high-grade ductal carcinoma in situ with apocrine features measuring 90 millimeters.  1 of 3 axillary lymph nodes positive for carcinoma measuring 4 mm.  · Initiation of weekly carboplatin and Taxol for a planned 12 weeks.  Carboplatin AUC 1.5 and Taxol dose reduced to 60 mg per metered squared secondary to her frailty.       6.  Constipation  · The patient has not been taking Senokot-s regularly only as needed.  I recommended she take 2 Senokot S at bedtime every night, increase to to twice daily if needed and take MiraLAX daily as needed.  · Patient reports constipation is being managed well with Senokot S2 tablets at night and 1 in the morning and MiraLAX.      Oncology plan/recommendations:  Proceed with week 5 Carboplatin and Taxol today  Continue weekly lab carboplatin/Taxol with MD or NP visit every other treatment.  Continue Senokot-S 2 at bedtime every night, increased to twice daily if needed and add MiraLAX daily if needed for constipation  Patient will discontinue the icing of her hands and feet during her Taxol infusion as this was causing weakness and discomfort in her legs.    Patient is on  medication requiring intensive monitoring for toxicity.    Jeanette Sparks, APRN

## 2021-11-02 NOTE — PROGRESS NOTES
Patient drains removed x 2 without any difficulty. Drain totals are noted on last telephone visit. Bacitracin and guaze were applied and covered with a transparent film dressing. Patient instructed to leave dressing in place and educated on care. Provided patient with Drain removal instructions and discussed with Nephew that patient to leave dressing on until next visit. Both voiced understanding.    Thalidomide Counseling: I discussed with the patient the risks of thalidomide including but not limited to birth defects, anxiety, weakness, chest pain, dizziness, cough and severe allergy.

## 2021-11-08 NOTE — PROGRESS NOTES
S/w Jeanette HAN about pts HR. Pt c/o SOA this morning and having to use rescue inhaler. Pt reports that the SOA has resolved since using rescue inhaler. Pt denies cough, CP. Pt lungs are clear. Pt states that her only complaint at this time is weakness in her legs that has been present since last tx. Pts last note from provider reads to hold cold therapy d/t weakness. Per Jeanette HAN pts HR needs to be under 120 before starting tx. Will continue to monitor pt closely and recheck HR.     Lab Results   Component Value Date    WBC 5.78 11/08/2021    HGB 10.4 (L) 11/08/2021    HCT 31.1 (L) 11/08/2021    MCV 90.7 11/08/2021     11/08/2021      HR still 122. S/w Jeanette HAN who s/w Dr. Ventura. Pt ok to treat today.

## 2021-11-09 NOTE — TELEPHONE ENCOUNTER
Caller: Maryam Arredondo    Relationship: Self    Best call back number: [phone]    What form or medical record are you requesting: MEDICAL EXCUSE  PERMANENTLY EXCUSING PT FROM [employer]    Who is requesting this form or medical record from you: [facility]     How would you like to receive the form or medical records (pick-up, mail, fax): PT IS NOT SURE HOW SHE WANTS TO RECEIVE LETTER, BECAUSE OF THE TIME FRAME NEEDED, WOULD LIKE A CALL BACK WHEN DONE    Timeframe paperwork needed: ASAP    Additional notes: PT RECEIVED A [employer] SUMMONS YESTERDAY, NEEDS A LETTER PERMANENTLY EXCUSING HER FROM [employer] DUE TO MEDICAL REASONS, SHE IS ON OXYGEN 24/7 AND HAS COPD, SHE CANNOT DO [employer] D[employer], SHE IS SUPPOSE TO HAVE THIS MAILED BACK WITHIN 5 DAYS, PLEASE ADVISE?     [facility] #[id number]

## 2021-11-15 NOTE — PROGRESS NOTES
History:     Reason for follow up:   1.  Stage IIB left upper lobe non-small cell lung cancer, high PDL-1 (80%), negative egfr/alk/ros   * status post radiation therapy completed 1/26/18  2.  Recurrent disease involving pleural-based nodule in the lingula and pleural-based mass left upper lobe; both areas treated with radiation therapy completed 2/3/2020  3.  Patient received 3 doses of Keytruda February/March 2020 but stopped secondary to diarrhea and weight loss  4.  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  Her lung cancer staging studies on 5/25/2021 showed a new left breast mass which was confirmed on breast imaging.  She underwent a needle biopsy 6/22/2021 which showed a high-grade triple negative cancer in the left breast with other sites of disease suggested by imaging.  I discussed her case with Dr. El who agreed to take the patient to surgery given the high grade nature of the breast cancer.  She underwent a left mastectomy and lower axillary dissection on 8/11/2021.  Pathology outlined in the assessment plan below.    The patient has completed 6 weekly doses of carboplatin/Taxol for treatment of her non-small cell lung cancer and adjuvant therapy for high risk triple negative breast cancer.  She is tolerating the treatments fairly well.  She has mild leg weakness and is scheduled to begin home PT.  She has decreased appetite but no uncontrolled nausea or vomiting.  She denies increasing shortness of breath or cough.  She denies peripheral neuropathy.  She has constipation controlled with a Senokot S.      Reviewed, confirmed and updated history (past medical, social and family)   Past Medical   Past Medical History:   Diagnosis  Date   • Acromioclavicular joint arthritis    • Anemia    • Asthma    • B12 deficiency    • Benign essential hypertension    • Breast cancer (HCC)     s/p Lumpectomy ~2000 and chemo/XRT. Then  Masectomy ~2004 for some recurrence   • Breast cancer (HCC) 06/22/2021    Left breast invasive ammary carcinoma, ER/VT negative, HER2 negative   • Bruises easily    • COPD (chronic obstructive pulmonary disease) (HCC)    • COPD exacerbation (HCC)    • COVID-19 vaccine series completed     2/17/21 2ND DOSE   • Cedric-Barr infection    • Heart disease    • History of chemotherapy     RIGHT BREAST CANCER   • History of radiation therapy     LEFT UPPER LOBE   • History of UTI    • Hyperthyroidism    • Hypoxia    • Mitral regurgitation     mild to moderate   • Multifocal atrial tachycardia (HCC)    • Nocturnal hypoxia    • Non-small cell lung cancer (HCC)     UPPER LOBE LEFT   • Oral aphthae    • Osteopenia    • Other fatigue    • Oxygen dependent     2L - at night only   • Pneumothorax    • Rosacea    • SOB (shortness of breath)    • Supraventricular tachycardia (HCC)    • Tachycardia    • Thyroid nodule    • Toxic multinodular goiter    • Urgency of urination    • Vitamin B deficiency    • Vitamin D deficiency     and   Patient Active Problem List   Diagnosis   • Pneumothorax   • Hypoxia   • Tachycardia   • Hyperthyroidism   • Essential hypertension   • Multifocal atrial tachycardia (HCC)   • Toxic multinodular goiter   • Vitamin D insufficiency   • Malignant neoplasm of upper lobe of left lung (HCC)   • Allergic rhinitis   • B12 deficiency   • Oxygen dependent   • Mitral regurgitation   • Breast cancer (HCC)   • Medicare annual wellness visit, subsequent   • Panlobular emphysema (HCC)   • Recurrent non-small cell lung cancer (HCC)   • Encounter for long-term (current) use of other medications   • Unspecified fracture of fifth metacarpal bone, left hand, initial encounter for closed fracture   • Immobility syndrome   •  Post-menopausal   • Current chronic use of systemic steroids     Social History   Social History     Socioeconomic History   • Marital status: Single   Tobacco Use   • Smoking status: Former Smoker     Packs/day: 1.50     Years: 30.00     Pack years: 45.00     Types: Cigarettes     Quit date: 3/14/1994     Years since quittin.6   • Smokeless tobacco: Never Used   Vaping Use   • Vaping Use: Never used   Substance and Sexual Activity   • Alcohol use: No   • Drug use: No   • Sexual activity: Defer     Comment: drinks decaf      Family History  Family History   Problem Relation Age of Onset   • Arthritis Mother    • Heart failure Mother         Congestive Heart Failure   • Lung cancer Father    • Heart disease Father    • Hypertension Father    • Breast cancer Paternal Grandmother 62   • No Known Problems Sister    • Malig Hyperthermia Neg Hx      Allergies  Allergies   Allergen Reactions   • Codeine Unknown - High Severity     Patient is unaware of the reaction to this medication     • Morphine Mental Status Change   • Penicillins Unknown - High Severity     Pt is unaware of the reaction to this medication     • Dm-Guaifenesin Er Unknown - Low Severity   • Mucinex D [Pseudoephedrine-Guaifenesin Er] Nausea Only     Felt hot in chest       Medications: The current medication list was reviewed in the EMR.    Review of Systems  Review of Systems   Constitutional: Positive for fatigue. Negative for activity change, appetite change, chills, diaphoresis, fever and unexpected weight change.   HENT: Negative for congestion and sinus pressure.    Respiratory: Positive for shortness of breath. Negative for cough and chest tightness.    Cardiovascular: Negative for chest pain, palpitations and leg swelling.   Gastrointestinal: Positive for constipation. Negative for abdominal pain, blood in stool, diarrhea, nausea and vomiting.   Endocrine: Negative.    Genitourinary: Negative.    Musculoskeletal: Positive for arthralgias and  "gait problem. Negative for joint swelling and myalgias.   Skin: Negative.    Allergic/Immunologic: Negative.    Neurological: Positive for weakness. Negative for numbness.   Hematological: Negative for adenopathy. Does not bruise/bleed easily.   Psychiatric/Behavioral: Agitation:  Behavioral problem:  The patient is nervous/anxious.    Review of systems 11/15/2021 unchanged from previous office visit except as updated.       Objective    Objective:     Vitals:    11/15/21 1252   BP: 121/67   Pulse: 116   Resp: 16   Temp: 96.4 °F (35.8 °C)   TempSrc: Temporal   SpO2: 98%  Comment: 2 L   Weight: 38.7 kg (85 lb 6.4 oz)   Height: 152 cm (59.84\")   PainSc: 0-No pain     Current Status 11/15/2021   ECOG score 0   Physical Eaxm  GENERAL:  Well-developed elderly lady in no distress.    SKIN: No rash or induration on visible skin.  EYES:    EOMs intact.  Conjunctivae normal.  HEAD:  Normocephalic.  NECK:  Supple with good range of motion.  CARDIAC: Regular rate and rhythm.  RESP: No increased work of breathing. She is on O2 by nasal cannula.  Lungs clear to auscultation bilaterally.  Breast: Status post left mastectomy.  Surgical site well-healed.  NEUROLOGICAL:   No focal neurological deficits.  PSYCHIATRIC:  Normal affect and mood.  Somewhat anxious.    Exam unchanged-11/15/2021  Labs and Imaging  WBC   Date Value Ref Range Status   11/15/2021 5.14 3.40 - 10.80 10*3/mm3 Final   06/11/2019 6.11 3.40 - 10.80 10*3/mm3 Final     RBC   Date Value Ref Range Status   11/15/2021 3.41 (L) 3.77 - 5.28 10*6/mm3 Final   06/11/2019 4.17 3.77 - 5.28 10*6/mm3 Final     Hemoglobin   Date Value Ref Range Status   11/15/2021 10.3 (L) 12.0 - 15.9 g/dL Final     Hematocrit   Date Value Ref Range Status   11/15/2021 31.8 (L) 34.0 - 46.6 % Final     MCV   Date Value Ref Range Status   11/15/2021 93.3 79.0 - 97.0 fL Final     MCH   Date Value Ref Range Status   11/15/2021 30.2 26.6 - 33.0 pg Final     [facility]   Date Value Ref Range Status "   11/15/2021 32.4 31.5 - 35.7 g/dL Final     RDW   Date Value Ref Range Status   11/15/2021 16.6 (H) 12.3 - 15.4 % Final     RDW-SD   Date Value Ref Range Status   11/15/2021 54.0 37.0 - 54.0 fl Final     MPV   Date Value Ref Range Status   11/15/2021 8.6 6.0 - 12.0 fL Final     Platelets   Date Value Ref Range Status   11/15/2021 313 140 - 450 10*3/mm3 Final     Neutrophil %   Date Value Ref Range Status   11/15/2021 75.2 42.7 - 76.0 % Final     Lymphocyte %   Date Value Ref Range Status   11/15/2021 12.1 (L) 19.6 - 45.3 % Final     Monocyte %   Date Value Ref Range Status   11/15/2021 10.7 5.0 - 12.0 % Final     Eosinophil %   Date Value Ref Range Status   11/15/2021 0.0 (L) 0.3 - 6.2 % Final     Basophil %   Date Value Ref Range Status   11/15/2021 0.8 0.0 - 1.5 % Final     Immature Grans %   Date Value Ref Range Status   11/15/2021 1.2 (H) 0.0 - 0.5 % Final     Neutrophils, Absolute   Date Value Ref Range Status   11/15/2021 3.87 1.70 - 7.00 10*3/mm3 Final     Lymphocytes, Absolute   Date Value Ref Range Status   11/15/2021 0.62 (L) 0.70 - 3.10 10*3/mm3 Final     Monocytes, Absolute   Date Value Ref Range Status   11/15/2021 0.55 0.10 - 0.90 10*3/mm3 Final     Eosinophils, Absolute   Date Value Ref Range Status   11/15/2021 0.00 0.00 - 0.40 10*3/mm3 Final     Basophils, Absolute   Date Value Ref Range Status   11/15/2021 0.04 0.00 - 0.20 10*3/mm3 Final     Immature Grans, Absolute   Date Value Ref Range Status   11/15/2021 0.06 (H) 0.00 - 0.05 10*3/mm3 Final     nRBC   Date Value Ref Range Status   11/15/2021 0.0 0.0 - 0.2 /100 WBC Final     Lab Results   Component Value Date    GLUCOSE 101 (H) 11/08/2021    BUN 19 11/08/2021    CREATININE 0.47 (L) 11/08/2021    EGFRIFNONA 128 11/08/2021    EGFRIFAFRI 111 04/20/2021    BCR 40.4 (H) 11/08/2021    K 4.3 11/08/2021    CO2 26.4 11/08/2021    CALCIUM 9.7 11/08/2021    PROTENTOTREF 7.0 04/20/2021    ALBUMIN 4.00 11/08/2021    LABIL2 1.5 04/20/2021    AST 14 11/08/2021     ALT 9 11/08/2021     CT chest 5/25/2021: There is a left upper lobe pleural-based mass which has increased in size currently 3.1 x 2.3 cm previously 2.8 x 1.8 cm.  A left hilar lymph node has increased in size from 3 to 7 mm.  There is a new nodule in the left breast 1.3 cm.                                   I personally reviewed CT chest 5/25/2021-there has been progression in size of the left upper lobe tumor    Left breast mammogram/ultrasound 6/17/2021 showed 3 tumors in the upper outer quadrant of the left breast 1.7 cm, 1.1 cm, and 0.4 cm    PET scan on 8/30/2021: Hypermetabolic left upper lobe pleural-based mass 4.5 cm with central necrosis, SUV 9.8, extension into the pleura and chest wall, low-level activity overlying the anterior first rib, hypermetabolic left hilar lymph node 8.5 SUV    Assessment/Plan   Assessment/Plan:   This is a 75 y.o. female with:     1.  Recurrent left upper lobe non-small cell lung cancer, high PDL-1 (80%), negative egfr/alk/ros; [facility] testing showed no actionable gene mutations  · radiation therapy to the left upper lobe nodule/mass completed January 2018  · PET scan from 12/20/2019 showed significant uptake in the mass in the apex of the left lung and also a nodule in the lingula with significant uptake for its size and I think both areas are likely consistent with recurrent disease.  We opted to not send the patient for a biopsy as she has very poor lung function and a pneumothorax might prove fatal for her.  There is some uptake in the left hilum but this was present in October 2018 and stable.  There is uptake in the right thyroid gland which is stable and the patient has declined biopsy of this in the past.   · The patient underwent additional radiation to the hypermetabolic lesions and also initiated Keytruda on 1/21/2020.  · Keytruda stopped after 3 doses secondary to significant diarrhea.  Diarrhea resolved with a short course of steroids.  · CT chest   2/9/2021 shows slight progression of disease in the left upper lobe.  The patient is overall asymptomatic and in fact looks better than she has on previous visits.  I discussed the case with Dr. Bennett radiation oncology and the tumor in the left upper lobe cannot be treated with additional radiation therapy.  · CT chest 5/25/2021 showed further progression of the mass in the left upper lobe in addition to slight enlargement of the left hilar lymph node  · PET scan 8/30/2021:Hypermetabolic left upper lobe pleural-based mass 4.5 cm with central necrosis, SUV 9.8, extension into the pleura and chest wall, low-level activity overlying the anterior first rib, hypermetabolic left hilar lymph node 8.5 SUV  · Initiated weekly carboplatin/Taxol 10/4/2021  · Patient comes in today for cycle 3-day 1 treatment and continues to tolerate treatment well    2.  Multinodular goiter stable by CT--followed by endocrinology.  There is uptake in the right thyroid gland similar to prior PET.  The patient declines biopsy of the hypermetabolic right thyroid lesion.  PET scan 8/30/2021 showed decreased activity of a hypermetabolic focus in the right thyroid SUV 6.9 previously 10.2    3. History of right breast cancer, initially diagnosed in 2000 treated with lumpectomy, radiation, chemotherapy and endocrine therapy with local recurrence in 2005 treated with mastectomy and then Arimidex.     4. COPD, oxygen dependent    5.  New left breast cancer: [id number]  · Left breast nodule incidentally noted by CT 5/25/2021 during staging of the patient's lung cancer; nodule 1.3 cm 1.7 cm, 1.1 cm, 0.4 cm  · Breast biopsy by ultrasound guidance 6/22/2021 showed invasive mammary carcinoma with metaplastic features high histologic grade, high proliferative rate 13 mm with intermediate grade ductal carcinoma in situ.  Tumor is negative for ER, W[place] and HER-2  · Mammogram/ultrasound 6/17/2021 showed 3 tumors in the upper outer quadrant of the left  breast  · Status post left mastectomy and lower axillary dissection 8/11/2021 pathology showed 2 foci of invasive mammary carcinoma.  The largest was 3.9 cm metaplastic carcinoma (50% ductal/50% sarcomatoid) poorly differentiated/grade 3 ER negative, DC negative, HER-2 negative.  The second tumor was invasive ductal carcinoma with apocrine features grade 2+ for lymphovascular and perineural invasion, ER +80%, DC +70% HER-2 negative and Ki-67 21%.  There was associated high-grade ductal carcinoma in situ with apocrine features measuring 90 millimeters.  1 of 3 axillary lymph nodes positive for carcinoma measuring 4 mm.  · Receiving weekly carboplatin and Taxol for a planned 12 weeks (currently week 7).  Carboplatin AUC 1.5 and Taxol dose reduced to 60 mg per metered squared secondary to her frailty.       6.  Constipation  · The patient has not been taking Senokot-s regularly only as needed.  I recommended she take 2 Senokot S at bedtime every night, increase to to twice daily if needed and take MiraLAX daily as needed.  · Patient reports constipation is being managed well with Senokot S2 tablets at night and 1 in the morning and MiraLAX.    7.  Chemotherapy induced anemia: Hemoglobin fairly stable 10.3    Oncology plan/recommendations:  Proceed with week 7 Carboplatin and Taxol today  Continue weekly lab carboplatin/Taxol with MD or NP visit every other treatment.  Continue Senokot-S 2 at bedtime every night, increased to twice daily if needed and add MiraLAX daily if needed for constipation  Plan to repeat CT chest abdomen pelvis after completion of 12 weeks carboplatin/Taxol    Patient is on medication requiring intensive monitoring for toxicity.    Je Ventura MD

## 2021-11-29 NOTE — TELEPHONE ENCOUNTER
Pt called in c/o of constipation and a lot of discomfort from this. Would like to move her appt to tomorrow; however if treatment is moved to tomorrow, will need to adjust all appts to Tuesdays. Pt wishes to keep appts on Monday's. Offered pt later appt today to allow her time to recover, however states she is unable to come later and wishes to try Miralax throughout the day today and come in next week. Rescheduled per scheduling and treatment plan deferred one week.

## 2021-11-29 NOTE — TELEPHONE ENCOUNTER
Called pt and she stated she is not coming in today, stated she wanted to push all out to next wk, df

## 2021-11-29 NOTE — TELEPHONE ENCOUNTER
PATIENT CALLED, NEEDED TO RESCHEDULE HER APPOINTMENT, INFUSION AND PORT FLUSH.    WARM TRANSFERRED TO [facility] AT THE OFFICE.    ACTIVE TREATMENT PATIENT.

## 2021-11-29 NOTE — TELEPHONE ENCOUNTER
Caller: Maryam Arredondo    Relationship to patient: Self    Best call back number: [phone]    Chief complaint: CANC./VENKATA. DUE TO CONSTIPATION    Type of visit: PORT FLUSH/ FOLLOW UP & INFUSION.    If rescheduling, when is the original appointment: 11/29/2021    Additional notes: WT TO MACRINA WHO WT ME TO [employer] WHO STATED TO SEND AN ENCOUNTER.  PATIENT HUNG UP WHILE TRYING TO WT.

## 2021-12-01 NOTE — TELEPHONE ENCOUNTER
Caller: Maryam Arredondo    Relationship: Self    Best call back number: [phone]    What form or medical record are you requesting: IMMUNIZATION RECORDS    Who is requesting this form or medical record from you: SELF    How would you like to receive the form or medical records (pick-up, mail, fax): MAIL  [address] [id number]    Timeframe paperwork needed: ASAP    Additional notes: PATIENT WOULD LIKE A COPY OF HER COVID VACCINE RECORDS SENT TO HER BY MAIL

## 2021-12-01 NOTE — TELEPHONE ENCOUNTER
Caller: Maryam Arredondo    Relationship: Self    Best call back number: [phone]    What is the best time to reach you: ANYTIME    Who are you requesting to speak with (clinical staff, provider,  specific staff member): CLINICAL      What was the call regarding: PATIENT VERIFYING THAT SHE RECEIVED COVID BOOSTER / [facility] BEFORE HER SURGERY.    Do you require a callback: YES

## 2021-12-01 NOTE — TELEPHONE ENCOUNTER
PATIENT CALLED BACK NEEDING TO KNOW THE DATE OF HER COVID BOOSTER SHOT. PLEASE CALL [phone]    SHE NEEDS TO KNOW ASAP

## 2021-12-06 NOTE — PROGRESS NOTES
"Outpatient Oncology Nutrition  Assessment/PES    Patient Name:  Maryam Arredondo  YOB: 1945  MRN: [id number]    Assessment Date:  12/6/2021    Comments:  Met patient today during infusion due to referral for weight loss and poor appetite. She has completed 6 cycles carboplatin, taxol. She lives alone and does not cook often. Eats frozen meals (lean cuisine), drinking pure protein shakes. Discussed strategies to increase calorie and protein intake and recommended higher calorie alternatives to the food choices she is currently making. Suggested setting a timer throughout the day to remind herself to eat as she is frequently not hungry. Provided her with a written handout of information discussed. Will follow for weight and intake.      General Info     Row Name 12/06/21 1610       Today's Session    Person(s) attending today's session Patient       General Information    Oncology patient? yes  recurrent non small cell lung cancer, breast cancer s/p L mastectomy 8/11/21       Oncology Specific Assessment    Type of treatment Chemotherapy    Frequency of treatment weekly carboplatin, taxol    Reported symptoms Weight loss               Physical Findings     Row Name 12/06/21 1611          Physical Findings    Overall Physical Appearance underweight                Anthropometrics     Row Name 12/06/21 1611          Anthropometrics    Height 152 cm (59.84\")     Weight 37.5 kg (82 lb 9.6 oz)            Ideal Body Weight (IBW)    Ideal Body Weight (IBW) (kg) 45.5     % Ideal Body Weight 82.35            Usual Body Weight (UBW)    Usual Body Weight 41.7 kg (92 lb)     % Usual Body Weight 89.78     Weight Loss unintentional     Weight Loss Time Frame 4 months            Body Mass Index (BMI)    BMI (kg/m2) 16.25                Nutritional Info/Activity     Row Name 12/06/21 1612       Nutritional Information    Have you had weight changes? Yes    Describe weight changes patient reports a weight loss of about 8 " "lb               Home Nutrition Report     Row Name 12/06/21 1612          Home Nutrition Report    Fluid/Beverage Intake Oral supplements used     Diet No specific     Typical Food/Fluid Intake lives alone, does not cook. Buys [employer], drinks Pure protein shakes                Estimated/Assessed Needs     Row Name 12/06/21 1613 12/06/21 1611       Calculation Measurements    Weight Used For Calculations 37.2 kg (82 lb) --    Height -- 152 cm (59.84\")       Estimated/Assessed Needs    Additional Documentation KCAL/KG (Group); Protein Requirements (Group); Fluid Requirements (Group) --       KCAL/KG    KCAL/KG 30 Kcal/Kg (kcal) --    30 Kcal/Kg (kcal) 1115.85 --       Protein Requirements    Weight Used For Protein Calculations 37.2 kg (82 lb) --    Est Protein Requirement Amount (gms/kg) 1.2 gm protein --    Estimated Protein Requirements (gms/day) 44.63 --       Fluid Requirements    Fluid Requirements (mL/day) 1200 --    Estimated Fluid Requirement Method RDA Method --    RDA Method (mL) 1200 --                 Labs/Tests/Procedures/Meds     Row Name 12/06/21 1613          Labs/Procedures/Meds    Lab Results Reviewed reviewed            Diagnostic Tests/Procedures    Diagnostic Test/Procedure Reviewed reviewed            Medications    Pertinent Medications Reviewed reviewed     Pertinent Medications Comments vitamin D3, flonase, MVI                   Problem/Interventions:   Problem 1     Row Name 12/06/21 1614          Nutrition Diagnoses Problem 1    Problem 1 Unintended Weight Loss     Etiology (related to) Medical Diagnosis; Factors Affecting Nutrition     Oncology Lung cancer     Signs/Symptoms (evidenced by) Report of Mnimal PO Intake; Unintended Weight Change; Report/Observation     Unintended Weight Change Loss     Number of Pounds Lost 10 lb     Weight loss time period 4 months     Reported/Observed By Patient; Family     Appetite Fair                        Intervention Goal     Row Name 12/06/21 " 1615          Intervention Goal    General Provide information regarding MNT for treatment/condition; Meet nutritional needs for age/condition; Disease management/therapy     PO Tolerate PO; Increase intake; Meet estimated needs     Weight Maintain weight                  Nutrition Prescription     Row Name 12/06/21 1615          Nutrition Prescription PO    PO Prescription Begin/change supplement     Supplement Ensure Plus; Boost Plus; Milkshake; Ice Cream; Yogurt     Other Modifiers High protein/high calorie; Small feedings                Education/Evaluation     Row Name 12/06/21 1615          Education    Education Education topics; Advised regarding habits/behavior; Provided education regarding     Advised Regarding Habits/Behavior Eating pattern; Food choices; Use supplement; Weight gain strategy; Increased nutrient density; Snacks            Monitor/Evaluation    Education Follow-up Reinforce PRN                 Electronically signed by:  Melody Soto RD, CARLOS  12/06/21 16:16 EST

## 2021-12-06 NOTE — PROGRESS NOTES
History:     Reason for follow up:   1.  Stage IIB left upper lobe non-small cell lung cancer, high PDL-1 (80%), negative egfr/alk/ros   * status post radiation therapy completed 1/26/18  2.  Recurrent disease involving pleural-based nodule in the lingula and pleural-based mass left upper lobe; both areas treated with radiation therapy completed 2/3/2020  3.  Patient received 3 doses of Keytruda February/March 2020 but stopped secondary to diarrhea and weight loss  4.  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  Her lung cancer staging studies on 5/25/2021 showed a new left breast mass which was confirmed on breast imaging.  She underwent a needle biopsy 6/22/2021 which showed a high-grade triple negative cancer in the left breast with other sites of disease suggested by imaging.  I discussed her case with Dr. El who agreed to take the patient to surgery given the high grade nature of the breast cancer.  She underwent a left mastectomy and lower axillary dissection on 8/11/2021.  Pathology outlined in the assessment plan below.    The patient has completed 8 weekly doses of carboplatin/Taxol for treatment of her non-small cell lung cancer and adjuvant therapy for high risk triple negative breast cancer.  She was due for treatment last week however call to there that she would not coming in as she was severely constipated.  She was utilizing Senokot-S, 2 tablets daily and added MiraLAX.  This is since resolved itself but she has had some loose stool.  We discussed today that she is able to titrate the number of pills she is taking based upon her current stool.  She denies nausea.  It is noted that her weight has declined further to 82 pounds  today.  She reports she is just not hungry when she does eat a small amount.  She will be added to meals per day and is not using daily boost supplements.  She denies any recent fevers, chills, or dysuria.    She continues home physical therapy and feels this is helping her weakness.  She denies neuropathy.    Reviewed, confirmed and updated history (past medical, social and family)   Past Medical   Past Medical History:   Diagnosis Date   • Acromioclavicular joint arthritis    • Anemia    • Asthma    • B12 deficiency    • Benign essential hypertension    • Breast cancer (HCC)     s/p Lumpectomy ~2000 and chemo/XRT. Then  Masectomy ~2004 for some recurrence   • Breast cancer (HCC) 06/22/2021    Left breast invasive ammary carcinoma, ER/CA negative, HER2 negative   • Bruises easily    • COPD (chronic obstructive pulmonary disease) (HCC)    • COPD exacerbation (HCC)    • COVID-19 vaccine series completed     2/17/21 2ND DOSE   • Cedric-Barr infection    • Heart disease    • History of chemotherapy     RIGHT BREAST CANCER   • History of radiation therapy     LEFT UPPER LOBE   • History of UTI    • Hyperthyroidism    • Hypoxia    • Mitral regurgitation     mild to moderate   • Multifocal atrial tachycardia (HCC)    • Nocturnal hypoxia    • Non-small cell lung cancer (HCC)     UPPER LOBE LEFT   • Oral aphthae    • Osteopenia    • Other fatigue    • Oxygen dependent     2L - at night only   • Pneumothorax    • Rosacea    • SOB (shortness of breath)    • Supraventricular tachycardia (HCC)    • Tachycardia    • Thyroid nodule    • Toxic multinodular goiter    • Urgency of urination    • Vitamin B deficiency    • Vitamin D deficiency     and   Patient Active Problem List   Diagnosis   • Pneumothorax   • Hypoxia   • Tachycardia   • Hyperthyroidism   • Essential hypertension   • Multifocal atrial tachycardia (HCC)   • Toxic multinodular goiter   • Vitamin D insufficiency   • Malignant neoplasm of upper lobe of left lung (HCC)    • Allergic rhinitis   • B12 deficiency   • Oxygen dependent   • Mitral regurgitation   • Breast cancer (HCC)   • Medicare annual wellness visit, subsequent   • Panlobular emphysema (HCC)   • Recurrent non-small cell lung cancer (HCC)   • Encounter for long-term (current) use of other medications   • Unspecified fracture of fifth metacarpal bone, left hand, initial encounter for closed fracture   • Immobility syndrome   • Post-menopausal   • Current chronic use of systemic steroids     Social History   Social History     Socioeconomic History   • Marital status: Single   Tobacco Use   • Smoking status: Former Smoker     Packs/day: 1.50     Years: 30.00     Pack years: 45.00     Types: Cigarettes     Quit date: 3/14/1994     Years since quittin.7   • Smokeless tobacco: Never Used   Vaping Use   • Vaping Use: Never used   Substance and Sexual Activity   • Alcohol use: No   • Drug use: No   • Sexual activity: Defer     Comment: drinks decaf      Family History  Family History   Problem Relation Age of Onset   • Arthritis Mother    • Heart failure Mother         Congestive Heart Failure   • Lung cancer Father    • Heart disease Father    • Hypertension Father    • Breast cancer Paternal Grandmother 62   • No Known Problems Sister    • Malig Hyperthermia Neg Hx      Allergies  Allergies   Allergen Reactions   • Codeine Unknown - High Severity     Patient is unaware of the reaction to this medication     • Morphine Mental Status Change   • Penicillins Unknown - High Severity     Pt is unaware of the reaction to this medication     • Dm-Guaifenesin Er Unknown - Low Severity   • Mucinex D [Pseudoephedrine-Guaifenesin Er] Nausea Only     Felt hot in chest       Medications: The current medication list was reviewed in the EMR.    Review of Systems  Review of Systems   Constitutional: Positive for fatigue. Negative for activity change, appetite change, chills, diaphoresis, fever and unexpected weight change.   HENT:  "Negative for congestion and sinus pressure.    Respiratory: Positive for shortness of breath. Negative for cough and chest tightness.    Cardiovascular: Negative for chest pain, palpitations and leg swelling.   Gastrointestinal: Positive for constipation. Negative for abdominal pain, blood in stool, diarrhea, nausea and vomiting.   Endocrine: Negative.    Genitourinary: Negative.    Musculoskeletal: Positive for arthralgias and gait problem. Negative for joint swelling and myalgias.   Skin: Negative.    Allergic/Immunologic: Negative.    Neurological: Positive for weakness. Negative for numbness.   Hematological: Negative for adenopathy. Does not bruise/bleed easily.   Psychiatric/Behavioral: Agitation:  Behavioral problem:  The patient is nervous/anxious.    Review of systems 12/06/2021 unchanged from previous office visit except as updated.       Objective  Heart rate was rechecked at 108 sitting.  Objective:     Vitals:    12/06/21 1104   BP: 135/74   Pulse: (!) 129   Resp: 18   Temp: 97.3 °F (36.3 °C)   TempSrc: Temporal   SpO2: 98%   Weight: 37.5 kg (82 lb 9.6 oz)   Height: 152 cm (59.84\")   PainSc: 0-No pain     Current Status 12/6/2021   ECOG score 0   Physical Eaxm  GENERAL:  Well-developed elderly lady in no distress.    SKIN: No rash or induration on visible skin.  EYES:    EOMs intact.  Conjunctivae normal.  HEAD:  Normocephalic.  NECK:  Supple with good range of motion.  CARDIAC: Regular rate and rhythm.  RESP: No increased work of breathing. She is on O2 by nasal cannula.  Lungs clear to auscultation bilaterally.  Breast: Status post left mastectomy.    NEUROLOGICAL:   No focal neurological deficits.  PSYCHIATRIC:  Normal affect and mood.  Somewhat anxious.    Exam unchanged-12/62021  Labs and Imaging  WBC   Date Value Ref Range Status   12/06/2021 7.91 3.40 - 10.80 10*3/mm3 Final   06/11/2019 6.11 3.40 - 10.80 10*3/mm3 Final     RBC   Date Value Ref Range Status   12/06/2021 3.62 (L) 3.77 - 5.28 " 10*6/mm3 Final   06/11/2019 4.17 3.77 - 5.28 10*6/mm3 Final     Hemoglobin   Date Value Ref Range Status   12/06/2021 10.9 (L) 12.0 - 15.9 g/dL Final     Hematocrit   Date Value Ref Range Status   12/06/2021 35.2 34.0 - 46.6 % Final     MCV   Date Value Ref Range Status   12/06/2021 97.2 (H) 79.0 - 97.0 fL Final     MCH   Date Value Ref Range Status   12/06/2021 30.1 26.6 - 33.0 pg Final     MCHC   Date Value Ref Range Status   12/06/2021 31.0 (L) 31.5 - 35.7 g/dL Final     RDW   Date Value Ref Range Status   12/06/2021 17.6 (H) 12.3 - 15.4 % Final     RDW-SD   Date Value Ref Range Status   12/06/2021 61.9 (H) 37.0 - 54.0 fl Final     MPV   Date Value Ref Range Status   12/06/2021 8.4 6.0 - 12.0 fL Final     Platelets   Date Value Ref Range Status   12/06/2021 380 140 - 450 10*3/mm3 Final     Neutrophil %   Date Value Ref Range Status   12/06/2021 78.8 (H) 42.7 - 76.0 % Final     Lymphocyte %   Date Value Ref Range Status   12/06/2021 8.5 (L) 19.6 - 45.3 % Final     Monocyte %   Date Value Ref Range Status   12/06/2021 11.4 5.0 - 12.0 % Final     Eosinophil %   Date Value Ref Range Status   12/06/2021 0.3 0.3 - 6.2 % Final     Basophil %   Date Value Ref Range Status   12/06/2021 0.4 0.0 - 1.5 % Final     Immature Grans %   Date Value Ref Range Status   12/06/2021 0.6 (H) 0.0 - 0.5 % Final     Neutrophils, Absolute   Date Value Ref Range Status   12/06/2021 6.24 1.70 - 7.00 10*3/mm3 Final     Lymphocytes, Absolute   Date Value Ref Range Status   12/06/2021 0.67 (L) 0.70 - 3.10 10*3/mm3 Final     Monocytes, Absolute   Date Value Ref Range Status   12/06/2021 0.90 0.10 - 0.90 10*3/mm3 Final     Eosinophils, Absolute   Date Value Ref Range Status   12/06/2021 0.02 0.00 - 0.40 10*3/mm3 Final     Basophils, Absolute   Date Value Ref Range Status   12/06/2021 0.03 0.00 - 0.20 10*3/mm3 Final     Immature Grans, Absolute   Date Value Ref Range Status   12/06/2021 0.05 0.00 - 0.05 10*3/mm3 Final     nRBC   Date Value Ref  Range Status   12/06/2021 0.0 0.0 - 0.2 /100 WBC Final     Lab Results   Component Value Date    GLUCOSE 115 11/22/2021    BUN 17 11/22/2021    CREATININE 0.47 (L) 11/22/2021    EGFRIFNONA 128 11/22/2021    EGFRIFAFRI 111 04/20/2021    BCR 36.2 (H) 11/22/2021    K 4.1 11/22/2021    CO2 27.9 11/22/2021    CALCIUM 9.2 11/22/2021    PROTENTOTREF 7.0 04/20/2021    ALBUMIN 3.50 11/22/2021    LABIL2 1.5 04/20/2021    AST 12 11/22/2021    ALT 9 11/22/2021     CT chest 5/25/2021: There is a left upper lobe pleural-based mass which has increased in size currently 3.1 x 2.3 cm previously 2.8 x 1.8 cm.  A left hilar lymph node has increased in size from 3 to 7 mm.  There is a new nodule in the left breast 1.3 cm.                                   I personally reviewed CT chest 5/25/2021-there has been progression in size of the left upper lobe tumor    Left breast mammogram/ultrasound 6/17/2021 showed 3 tumors in the upper outer quadrant of the left breast 1.7 cm, 1.1 cm, and 0.4 cm    PET scan on 8/30/2021: Hypermetabolic left upper lobe pleural-based mass 4.5 cm with central necrosis, SUV 9.8, extension into the pleura and chest wall, low-level activity overlying the anterior first rib, hypermetabolic left hilar lymph node 8.5 SUV    Assessment/Plan   Assessment/Plan:   This is a 75 y.o. female with:     1.  Recurrent left upper lobe non-small cell lung cancer, high PDL-1 (80%), negative egfr/alk/ros; [facility] testing showed no actionable gene mutations  · radiation therapy to the left upper lobe nodule/mass completed January 2018  · PET scan from 12/20/2019 showed significant uptake in the mass in the apex of the left lung and also a nodule in the lingula with significant uptake for its size and I think both areas are likely consistent with recurrent disease.  We opted to not send the patient for a biopsy as she has very poor lung function and a pneumothorax might prove fatal for her.  There is some uptake in the  left hilum but this was present in October 2018 and stable.  There is uptake in the right thyroid gland which is stable and the patient has declined biopsy of this in the past.   · The patient underwent additional radiation to the hypermetabolic lesions and also initiated Keytruda on 1/21/2020.  · Keytruda stopped after 3 doses secondary to significant diarrhea.  Diarrhea resolved with a short course of steroids.  · CT chest  2/9/2021 shows slight progression of disease in the left upper lobe.  The patient is overall asymptomatic and in fact looks better than she has on previous visits.  I discussed the case with Dr. Bennett radiation oncology and the tumor in the left upper lobe cannot be treated with additional radiation therapy.  · CT chest 5/25/2021 showed further progression of the mass in the left upper lobe in addition to slight enlargement of the left hilar lymph node  · PET scan 8/30/2021:Hypermetabolic left upper lobe pleural-based mass 4.5 cm with central necrosis, SUV 9.8, extension into the pleura and chest wall, low-level activity overlying the anterior first rib, hypermetabolic left hilar lymph node 8.5 SUV  · Initiated weekly carboplatin/Taxol 10/4/2021  · Cycle 3-day 15 carboplatin and Taxol delayed as patient canceled appointment due to severe constipation.  · Patient comes in today for cycle 3-day 15 treatment after a 1 week dose delay for the patient as above.  The constipation is now managed.  She has lost additional weight and is now 82 pounds.  We discussed further below.  We will proceed with carboplatin Taxol as planned.    2.  Multinodular goiter stable by CT--followed by endocrinology.  There is uptake in the right thyroid gland similar to prior PET.  The patient declines biopsy of the hypermetabolic right thyroid lesion.  PET scan 8/30/2021 showed decreased activity of a hypermetabolic focus in the right thyroid SUV 6.9 previously 10.2    3. History of right breast cancer, initially  diagnosed in 2000 treated with lumpectomy, radiation, chemotherapy and endocrine therapy with local recurrence in 2005 treated with mastectomy and then Arimidex.     4. COPD, oxygen dependent    5.  New left breast cancer: [id number]  · Left breast nodule incidentally noted by CT 5/25/2021 during staging of the patient's lung cancer; nodule 1.3 cm 1.7 cm, 1.1 cm, 0.4 cm  · Breast biopsy by ultrasound guidance 6/22/2021 showed invasive mammary carcinoma with metaplastic features high histologic grade, high proliferative rate 13 mm with intermediate grade ductal carcinoma in situ.  Tumor is negative for ER, WI and HER-2  · Mammogram/ultrasound 6/17/2021 showed 3 tumors in the upper outer quadrant of the left breast  · Status post left mastectomy and lower axillary dissection 8/11/2021 pathology showed 2 foci of invasive mammary carcinoma.  The largest was 3.9 cm metaplastic carcinoma (50% ductal/50% sarcomatoid) poorly differentiated/grade 3 ER negative, WI negative, HER-2 negative.  The second tumor was invasive ductal carcinoma with apocrine features grade 2+ for lymphovascular and perineural invasion, ER +80%, WI +70% HER-2 negative and Ki-67 21%.  There was associated high-grade ductal carcinoma in situ with apocrine features measuring 90 millimeters.  1 of 3 axillary lymph nodes positive for carcinoma measuring 4 mm.  · Receiving weekly carboplatin and Taxol for a planned 12 weeks (currently week 7).  Carboplatin AUC 1.5 and Taxol dose reduced to 60 mg per metered squared secondary to her frailty.       6.  Constipation  · The patient has not been taking Senokot-s regularly only as needed.  I recommended she take 2 Senokot S at bedtime every night, increase to to twice daily if needed and take MiraLAX daily as needed.  · Patient reports constipation is being managed well with Senokot S2 tablets at night and 1 in the morning and MiraLAX.  · The patient delayed cycle 3-day 15 treatment x1 week secondary to severe  constipation.  She has been utilizing Senokot-S2 tablets daily and reports these were the instructions on her bottle.  We have discussed previously once again today that she may increase this up to 2 to at a maximum she is struggling with constipation.  She did add in MiraLAX and had further improvement.  She reports she drinks plenty of fluids.    7.  Chemotherapy induced anemia: Hemoglobin improved after having last week off to 10.9 today.    8.  Weight loss and poor appetite: The patient had lost additional weight today and is weighing 82 pounds as of 12/6/2021.  He denies nausea or vomiting but does report poor appetite.  She eats approximately 2 meals per day and occasionally uses a boost though not daily.  When she does use boost this is instead of a meal typically.  I have asked her to try to eat at least 3 times a day if not up to 5 or 6.  We discussed eating small amount at each time.  I have encouraged her to only take a few bites at mealtime for breakfast, lunch, and dinner.  She will try to take in 1 boost in between her meals.  I have consulted Melody Soto to evaluate her as well.  This was discussed with Dr. Ventura if the patient has further weight loss we would consider an appetite stimulant.    Oncology plan/recommendations:  Proceed with week 9 Carboplatin and Taxol today  Continue weekly lab carboplatin/Taxol with MD or NP visit every other treatment.  Continue Senokot-S 2 1 2 tablets at bedtime and may repeat in the a.m. if needed.  Patient is aware she can add in MiraLAX if needed as well.  Patient encouraged to eat 3 meals per day at minimum with a boost in between breakfast and lunch as well as between lunch and dinner.  Referral to Melody Soto, oncology dietitian.  Plan to repeat CT chest abdomen pelvis after completion of 12 weeks carboplatin/Taxol    Patient is on medication requiring intensive monitoring for toxicity.  Case was discussed with nursing today.  Case was discussed with   Lorenzo today.I spent 42 minutes caring for Maryam on this date of service. This time includes time spent by me in the following activities: preparing for the visit, obtaining and/or reviewing a separately obtained history, performing a medically appropriate examination and/or evaluation, counseling and educating the patient/family/caregiver, ordering medications, tests, or procedures, referring and communicating with other health care professionals, documenting information in the medical record, independently interpreting results and communicating that information with the patient/family/caregiver and care coordination.       Jeanette Sparks, APRN

## 2021-12-13 NOTE — PROGRESS NOTES
Outpatient Oncology Nutrition      Patient Name:  Maryam Arredondo  YOB: 1945  MRN: [id number]    Assessment Date:  12/13/2021    Comments:  Follow up with patient during infusion today. Weight 83 lb.   Wt Readings from Last 3 Encounters:   12/13/21 37.6 kg (83 lb)   12/06/21 37.5 kg (82 lb 9.6 oz)   12/06/21 37.5 kg (82 lb 9.6 oz)     She has been working to eat throughout the day, says she can't eat any more than she already is. Drinking Boost and other protein shakes, eating ice cream desserts in the evening. Family is bringing over meals in smaller portions and freezing the rest for her. Eats a lot of popcorn- gave higher calorie alternatives.   Will continue to follow and encourage intake.                      Electronically signed by:  Melody Soto RD, LD  12/13/21 13:49 EST

## 2021-12-20 NOTE — PROGRESS NOTES
History:     Reason for follow up:   1.  Stage IIB left upper lobe non-small cell lung cancer, high PDL-1 (80%), negative egfr/alk/ros   * status post radiation therapy completed 1/26/18  2.  Recurrent disease involving pleural-based nodule in the lingula and pleural-based mass left upper lobe; both areas treated with radiation therapy completed 2/3/2020  3.  Patient received 3 doses of Keytruda February/March 2020 but stopped secondary to diarrhea and weight loss  4.  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  Her lung cancer staging studies on 5/25/2021 showed a new left breast mass which was confirmed on breast imaging.  She underwent a needle biopsy 6/22/2021 which showed a high-grade triple negative cancer in the left breast with other sites of disease suggested by imaging.  I discussed her case with Dr. El who agreed to take the patient to surgery given the high grade nature of the breast cancer.  She underwent a left mastectomy and lower axillary dissection on 8/11/2021.  Pathology outlined in the assessment plan below.  She is currently undergoing adjuvant chemotherapy with weekly carboplatin/Taxol.    The patient has completed 10 weeks of carboplatin/taxol weekly.  She continues to tolerate the chemotherapy reasonably well.  She denies worsening peripheral neuropathy.  She complains of fatigue.  She has gained 2 pounds of weight.  She denies palpitations or chest pain.  She has chronic dyspnea which is stable.    Reviewed, confirmed and updated history (past medical, social and family)   Past Medical   Past Medical History:   Diagnosis Date   • Acromioclavicular joint arthritis    • Anemia    • Asthma    • B12 deficiency    •  Benign essential hypertension    • Breast cancer (HCC)     s/p Lumpectomy ~2000 and chemo/XRT. Then  Masectomy ~2004 for some recurrence   • Breast cancer (HCC) 06/22/2021    Left breast invasive ammary carcinoma, ER/GA negative, HER2 negative   • Bruises easily    • COPD (chronic obstructive pulmonary disease) (HCC)    • COPD exacerbation (HCC)    • COVID-19 vaccine series completed     2/17/21 2ND DOSE   • Cedric-Barr infection    • Heart disease    • History of chemotherapy     RIGHT BREAST CANCER   • History of radiation therapy     LEFT UPPER LOBE   • History of UTI    • Hyperthyroidism    • Hypoxia    • Mitral regurgitation     mild to moderate   • Multifocal atrial tachycardia (HCC)    • Nocturnal hypoxia    • Non-small cell lung cancer (HCC)     UPPER LOBE LEFT   • Oral aphthae    • Osteopenia    • Other fatigue    • Oxygen dependent     2L - at night only   • Pneumothorax    • Rosacea    • SOB (shortness of breath)    • Supraventricular tachycardia (HCC)    • Tachycardia    • Thyroid nodule    • Toxic multinodular goiter    • Urgency of urination    • Vitamin B deficiency    • Vitamin D deficiency     and   Patient Active Problem List   Diagnosis   • Pneumothorax   • Hypoxia   • Tachycardia   • Hyperthyroidism   • Essential hypertension   • Multifocal atrial tachycardia (HCC)   • Toxic multinodular goiter   • Vitamin D insufficiency   • Malignant neoplasm of upper lobe of left lung (HCC)   • Allergic rhinitis   • B12 deficiency   • Oxygen dependent   • Mitral regurgitation   • Breast cancer (HCC)   • Medicare annual wellness visit, subsequent   • Panlobular emphysema (HCC)   • Recurrent non-small cell lung cancer (HCC)   • Encounter for long-term (current) use of other medications   • Unspecified fracture of fifth metacarpal bone, left hand, initial encounter for closed fracture   • Immobility syndrome   • Post-menopausal   • Current chronic use of systemic steroids     Social History   Social History      Socioeconomic History   • Marital status: Single   Tobacco Use   • Smoking status: Former Smoker     Packs/day: 1.50     Years: 30.00     Pack years: 45.00     Types: Cigarettes     Quit date: 3/14/1994     Years since quittin.7   • Smokeless tobacco: Never Used   Vaping Use   • Vaping Use: Never used   Substance and Sexual Activity   • Alcohol use: No   • Drug use: No   • Sexual activity: Defer     Comment: drinks decaf      Family History  Family History   Problem Relation Age of Onset   • Arthritis Mother    • Heart failure Mother         Congestive Heart Failure   • Lung cancer Father    • Heart disease Father    • Hypertension Father    • Breast cancer Paternal Grandmother 62   • No Known Problems Sister    • Malig Hyperthermia Neg Hx      Allergies  Allergies   Allergen Reactions   • Codeine Unknown - High Severity     Patient is unaware of the reaction to this medication     • Morphine Mental Status Change   • Penicillins Unknown - High Severity     Pt is unaware of the reaction to this medication     • Dm-Guaifenesin Er Unknown - Low Severity   • Mucinex D [Pseudoephedrine-Guaifenesin Er] Nausea Only     Felt hot in chest       Medications: The current medication list was reviewed in the EMR.    Review of Systems  Review of Systems   Constitutional: Positive for fatigue. Negative for activity change, appetite change, chills, diaphoresis, fever and unexpected weight change.   HENT: Negative for congestion and sinus pressure.    Respiratory: Positive for shortness of breath. Negative for cough and chest tightness.    Cardiovascular: Negative for chest pain, palpitations and leg swelling.   Gastrointestinal: Positive for constipation. Negative for abdominal pain, blood in stool, diarrhea, nausea and vomiting.   Endocrine: Negative.    Genitourinary: Negative.    Musculoskeletal: Positive for arthralgias and gait problem. Negative for joint swelling and myalgias.   Skin: Negative.   "  Allergic/Immunologic: Negative.    Neurological: Positive for weakness. Negative for numbness.   Hematological: Negative for adenopathy. Does not bruise/bleed easily.   Psychiatric/Behavioral: Agitation:  Behavioral problem:  The patient is nervous/anxious.    Review of systems 12/20/2021 unchanged from previous office visit except as updated.       Objective  Heart rate was rechecked at 108 sitting.  Objective:     Vitals:    12/20/21 1229   BP: 140/74   Pulse: (!) 131   Resp: 16   Temp: 97.7 °F (36.5 °C)   TempSrc: Infrared   SpO2: 98%  Comment: w/oxy   Weight: 38.1 kg (84 lb)   Height: 152 cm (59.84\")   PainSc: 0-No pain     Current Status 12/20/2021   ECOG score 0   Physical Eaxm  GENERAL:  Well-developed elderly lady in no distress.    SKIN: No rash or induration on visible skin.  EYES:    EOMs intact.  Conjunctivae normal.  HEAD:  Normocephalic.  NECK:  Supple with good range of motion.  CARDIAC: Tachycardic  RESP: No increased work of breathing. She is on O2 by nasal cannula.  Lungs clear to auscultation bilaterally.  Breast: Status post left mastectomy.    NEUROLOGICAL:   No focal neurological deficits.  PSYCHIATRIC:  Normal affect and mood.  Somewhat anxious.      Labs and Imaging  WBC   Date Value Ref Range Status   12/20/2021 4.77 3.40 - 10.80 10*3/mm3 Final   06/11/2019 6.11 3.40 - 10.80 10*3/mm3 Final     RBC   Date Value Ref Range Status   12/20/2021 3.27 (L) 3.77 - 5.28 10*6/mm3 Final   06/11/2019 4.17 3.77 - 5.28 10*6/mm3 Final     Hemoglobin   Date Value Ref Range Status   12/20/2021 10.1 (L) 12.0 - 15.9 g/dL Final     Hematocrit   Date Value Ref Range Status   12/20/2021 31.8 (L) 34.0 - 46.6 % Final     MCV   Date Value Ref Range Status   12/20/2021 97.2 (H) 79.0 - 97.0 fL Final     MCH   Date Value Ref Range Status   12/20/2021 30.9 26.6 - 33.0 pg Final     MCHC   Date Value Ref Range Status   12/20/2021 31.8 31.5 - 35.7 g/dL Final     RDW   Date Value Ref Range Status   12/20/2021 18.0 (H) 12.3 " - 15.4 % Final     RDW-SD   Date Value Ref Range Status   12/20/2021 62.4 (H) 37.0 - 54.0 fl Final     MPV   Date Value Ref Range Status   12/20/2021 9.1 6.0 - 12.0 fL Final     Platelets   Date Value Ref Range Status   12/20/2021 358 140 - 450 10*3/mm3 Final     Neutrophil %   Date Value Ref Range Status   12/20/2021 75.8 42.7 - 76.0 % Final     Lymphocyte %   Date Value Ref Range Status   12/20/2021 12.8 (L) 19.6 - 45.3 % Final     Monocyte %   Date Value Ref Range Status   12/20/2021 9.6 5.0 - 12.0 % Final     Eosinophil %   Date Value Ref Range Status   12/20/2021 0.2 (L) 0.3 - 6.2 % Final     Basophil %   Date Value Ref Range Status   12/20/2021 0.6 0.0 - 1.5 % Final     Immature Grans %   Date Value Ref Range Status   12/20/2021 1.0 (H) 0.0 - 0.5 % Final     Neutrophils, Absolute   Date Value Ref Range Status   12/20/2021 3.61 1.70 - 7.00 10*3/mm3 Final     Lymphocytes, Absolute   Date Value Ref Range Status   12/20/2021 0.61 (L) 0.70 - 3.10 10*3/mm3 Final     Monocytes, Absolute   Date Value Ref Range Status   12/20/2021 0.46 0.10 - 0.90 10*3/mm3 Final     Eosinophils, Absolute   Date Value Ref Range Status   12/20/2021 0.01 0.00 - 0.40 10*3/mm3 Final     Basophils, Absolute   Date Value Ref Range Status   12/20/2021 0.03 0.00 - 0.20 10*3/mm3 Final     Immature Grans, Absolute   Date Value Ref Range Status   12/20/2021 0.05 0.00 - 0.05 10*3/mm3 Final     nRBC   Date Value Ref Range Status   12/20/2021 0.0 0.0 - 0.2 /100 WBC Final     Lab Results   Component Value Date    GLUCOSE 129 (H) 12/13/2021    BUN 14 12/13/2021    CREATININE 0.56 (L) 12/13/2021    EGFRIFNONA 105 12/13/2021    EGFRIFAFRI 111 04/20/2021    BCR 25.0 12/13/2021    K 4.1 12/13/2021    CO2 29.5 (H) 12/13/2021    CALCIUM 9.8 12/13/2021    PROTENTOTREF 7.0 04/20/2021    ALBUMIN 3.80 12/13/2021    LABIL2 1.5 04/20/2021    AST 12 12/13/2021    ALT 9 12/13/2021     CT chest 5/25/2021: There is a left upper lobe pleural-based mass which has  increased in size currently 3.1 x 2.3 cm previously 2.8 x 1.8 cm.  A left hilar lymph node has increased in size from 3 to 7 mm.  There is a new nodule in the left breast 1.3 cm.                                   I personally reviewed CT chest 5/25/2021-there has been progression in size of the left upper lobe tumor    Left breast mammogram/ultrasound 6/17/2021 showed 3 tumors in the upper outer quadrant of the left breast 1.7 cm, 1.1 cm, and 0.4 cm    PET scan on 8/30/2021: Hypermetabolic left upper lobe pleural-based mass 4.5 cm with central necrosis, SUV 9.8, extension into the pleura and chest wall, low-level activity overlying the anterior first rib, hypermetabolic left hilar lymph node 8.5 SUV    Assessment/Plan   Assessment/Plan:   This is a 76 y.o. female with:     1.  Recurrent left upper lobe non-small cell lung cancer, high PDL-1 (80%), negative egfr/alk/ros; [facility] testing showed no actionable gene mutations  · radiation therapy to the left upper lobe nodule/mass completed January 2018  · PET scan from 12/20/2019 showed significant uptake in the mass in the apex of the left lung and also a nodule in the lingula with significant uptake for its size and I think both areas are likely consistent with recurrent disease.  We opted to not send the patient for a biopsy as she has very poor lung function and a pneumothorax might prove fatal for her.  There is some uptake in the left hilum but this was present in October 2018 and stable.  There is uptake in the right thyroid gland which is stable and the patient has declined biopsy of this in the past.   · The patient underwent additional radiation to the hypermetabolic lesions and also initiated Keytruda on 1/21/2020.  · Keytruda stopped after 3 doses secondary to significant diarrhea.  Diarrhea resolved with a short course of steroids.  · CT chest  2/9/2021 shows slight progression of disease in the left upper lobe.  The patient is overall  asymptomatic and in fact looks better than she has on previous visits.  I discussed the case with Dr. Bennett radiation oncology and the tumor in the left upper lobe cannot be treated with additional radiation therapy.  · CT chest 5/25/2021 showed further progression of the mass in the left upper lobe in addition to slight enlargement of the left hilar lymph node  · PET scan 8/30/2021:Hypermetabolic left upper lobe pleural-based mass 4.5 cm with central necrosis, SUV 9.8, extension into the pleura and chest wall, low-level activity overlying the anterior first rib, hypermetabolic left hilar lymph node 8.5 SUV  · Initiated weekly carboplatin/Taxol 10/4/2021      2.  Multinodular goiter stable by CT--followed by endocrinology.  There is uptake in the right thyroid gland similar to prior PET.  The patient declines biopsy of the hypermetabolic right thyroid lesion.  PET scan 8/30/2021 showed decreased activity of a hypermetabolic focus in the right thyroid SUV 6.9 previously 10.2    3. History of right breast cancer, initially diagnosed in 2000 treated with lumpectomy, radiation, chemotherapy and endocrine therapy with local recurrence in 2005 treated with mastectomy and then Arimidex.     4. COPD, oxygen dependent    5.  New left breast cancer: [id number]  · Left breast nodule incidentally noted by CT 5/25/2021 during staging of the patient's lung cancer; nodule 1.3 cm 1.7 cm, 1.1 cm, 0.4 cm  · Breast biopsy by ultrasound guidance 6/22/2021 showed invasive mammary carcinoma with metaplastic features high histologic grade, high proliferative rate 13 mm with intermediate grade ductal carcinoma in situ.  Tumor is negative for ER, OH and HER-2  · Mammogram/ultrasound 6/17/2021 showed 3 tumors in the upper outer quadrant of the left breast  · Status post left mastectomy and lower axillary dissection 8/11/2021 pathology showed 2 foci of invasive mammary carcinoma.  The largest was 3.9 cm metaplastic carcinoma (50% ductal/50%  sarcomatoid) poorly differentiated/grade 3 ER negative, KY negative, HER-2 negative.  The second tumor was invasive ductal carcinoma with apocrine features grade 2+ for lymphovascular and perineural invasion, ER +80%, KY +70% HER-2 negative and Ki-67 21%.  There was associated high-grade ductal carcinoma in situ with apocrine features measuring 90 millimeters.  1 of 3 axillary lymph nodes positive for carcinoma measuring 4 mm.  · Receiving weekly carboplatin and Taxol for a planned 12 weeks (currently week 11).  Carboplatin AUC 1.5 and Taxol dose reduced to 60 mg per metered squared secondary to her frailty.       6.  Constipation  · Continue Senokot S as needed    7.  Chemotherapy induced anemia: Hemoglobin relatively stable at 10.1 today.    8.  Weight loss and poor appetite: Weight up 2 pounds today    Oncology plan/recommendations:  1. Proceed with week 11 carboplatin/Taxol  2. Follow-up 1 week for week 12 labs and carboplatin/Taxol  3. I plan to see the patient back in 3 to 4 weeks with a restaging PET scan for reassessment of her lung cancer and breast cancer      Je Ventura MD

## 2022-01-01 ENCOUNTER — TELEPHONE (OUTPATIENT)
Dept: FAMILY MEDICINE CLINIC | Facility: CLINIC | Age: 77
End: 2022-01-01

## 2022-01-01 ENCOUNTER — TELEPHONE (OUTPATIENT)
Dept: ENDOCRINOLOGY | Age: 77
End: 2022-01-01

## 2022-01-01 ENCOUNTER — APPOINTMENT (OUTPATIENT)
Dept: CT IMAGING | Facility: HOSPITAL | Age: 77
End: 2022-01-01

## 2022-01-01 ENCOUNTER — OFFICE VISIT (OUTPATIENT)
Dept: ONCOLOGY | Facility: CLINIC | Age: 77
End: 2022-01-01

## 2022-01-01 ENCOUNTER — LAB (OUTPATIENT)
Dept: LAB | Facility: HOSPITAL | Age: 77
End: 2022-01-01

## 2022-01-01 ENCOUNTER — TELEPHONE (OUTPATIENT)
Dept: ONCOLOGY | Facility: CLINIC | Age: 77
End: 2022-01-01

## 2022-01-01 ENCOUNTER — TELEMEDICINE (OUTPATIENT)
Dept: FAMILY MEDICINE CLINIC | Facility: CLINIC | Age: 77
End: 2022-01-01

## 2022-01-01 ENCOUNTER — APPOINTMENT (OUTPATIENT)
Dept: GENERAL RADIOLOGY | Facility: HOSPITAL | Age: 77
End: 2022-01-01

## 2022-01-01 ENCOUNTER — PATIENT MESSAGE (OUTPATIENT)
Dept: FAMILY MEDICINE CLINIC | Facility: CLINIC | Age: 77
End: 2022-01-01

## 2022-01-01 ENCOUNTER — TELEPHONE (OUTPATIENT)
Dept: OTHER | Facility: HOSPITAL | Age: 77
End: 2022-01-01

## 2022-01-01 ENCOUNTER — HOSPITAL ENCOUNTER (OUTPATIENT)
Dept: PET IMAGING | Facility: HOSPITAL | Age: 77
Discharge: HOME | End: 2022-01-05

## 2022-01-01 ENCOUNTER — HOSPITAL ENCOUNTER (INPATIENT)
Facility: HOSPITAL | Age: 77
LOS: 6 days | Discharge: NURSING, REHAB OR LONG-TERM CARE | End: 2022-02-24
Attending: EMERGENCY MEDICINE | Admitting: INTERNAL MEDICINE

## 2022-01-01 ENCOUNTER — HOSPITAL ENCOUNTER (INPATIENT)
Facility: HOSPITAL | Age: 77
LOS: 1 days | End: 2022-03-23
Attending: EMERGENCY MEDICINE | Admitting: INTERNAL MEDICINE

## 2022-01-01 ENCOUNTER — INFUSION (OUTPATIENT)
Dept: ONCOLOGY | Facility: HOSPITAL | Age: 77
End: 2022-01-01

## 2022-01-01 ENCOUNTER — TELEMEDICINE - AUDIO (OUTPATIENT)
Dept: NUTRITION | Facility: HOSPITAL | Age: 77
End: 2022-01-01

## 2022-01-01 VITALS
WEIGHT: 78.4 LBS | DIASTOLIC BLOOD PRESSURE: 75 MMHG | HEART RATE: 105 BPM | TEMPERATURE: 97.3 F | BODY MASS INDEX: 15.39 KG/M2 | OXYGEN SATURATION: 100 % | RESPIRATION RATE: 16 BRPM | HEIGHT: 60 IN | SYSTOLIC BLOOD PRESSURE: 123 MMHG

## 2022-01-01 VITALS
HEIGHT: 60 IN | WEIGHT: 69.44 LBS | OXYGEN SATURATION: 85 % | TEMPERATURE: 97.8 F | SYSTOLIC BLOOD PRESSURE: 73 MMHG | DIASTOLIC BLOOD PRESSURE: 48 MMHG | BODY MASS INDEX: 13.63 KG/M2

## 2022-01-01 VITALS
BODY MASS INDEX: 16.06 KG/M2 | OXYGEN SATURATION: 99 % | HEIGHT: 60 IN | DIASTOLIC BLOOD PRESSURE: 76 MMHG | RESPIRATION RATE: 16 BRPM | WEIGHT: 81.8 LBS | TEMPERATURE: 96.6 F | HEART RATE: 115 BPM | SYSTOLIC BLOOD PRESSURE: 122 MMHG

## 2022-01-01 VITALS
DIASTOLIC BLOOD PRESSURE: 73 MMHG | OXYGEN SATURATION: 92 % | WEIGHT: 72.9 LBS | HEART RATE: 111 BPM | TEMPERATURE: 95.7 F | RESPIRATION RATE: 18 BRPM | SYSTOLIC BLOOD PRESSURE: 129 MMHG | BODY MASS INDEX: 14.31 KG/M2 | HEIGHT: 60 IN

## 2022-01-01 VITALS
OXYGEN SATURATION: 95 % | SYSTOLIC BLOOD PRESSURE: 104 MMHG | BODY MASS INDEX: 14.31 KG/M2 | HEART RATE: 106 BPM | TEMPERATURE: 96.8 F | WEIGHT: 72.9 LBS | RESPIRATION RATE: 20 BRPM | DIASTOLIC BLOOD PRESSURE: 71 MMHG | HEIGHT: 60 IN

## 2022-01-01 DIAGNOSIS — Z17.1 MALIGNANT NEOPLASM OF UPPER-OUTER QUADRANT OF LEFT BREAST IN FEMALE, ESTROGEN RECEPTOR NEGATIVE: ICD-10-CM

## 2022-01-01 DIAGNOSIS — C34.90 RECURRENT NON-SMALL CELL LUNG CANCER: ICD-10-CM

## 2022-01-01 DIAGNOSIS — J96.21 ACUTE ON CHRONIC RESPIRATORY FAILURE WITH HYPOXIA: ICD-10-CM

## 2022-01-01 DIAGNOSIS — R06.00 DYSPNEA, UNSPECIFIED TYPE: ICD-10-CM

## 2022-01-01 DIAGNOSIS — C50.412 MALIGNANT NEOPLASM OF UPPER-OUTER QUADRANT OF LEFT BREAST IN FEMALE, ESTROGEN RECEPTOR NEGATIVE: ICD-10-CM

## 2022-01-01 DIAGNOSIS — J12.82 PNEUMONIA DUE TO COVID-19 VIRUS: ICD-10-CM

## 2022-01-01 DIAGNOSIS — R06.03 RESPIRATORY DISTRESS: Primary | ICD-10-CM

## 2022-01-01 DIAGNOSIS — U07.1 COVID: ICD-10-CM

## 2022-01-01 DIAGNOSIS — C34.12 MALIGNANT NEOPLASM OF UPPER LOBE OF LEFT LUNG: Primary | ICD-10-CM

## 2022-01-01 DIAGNOSIS — J18.9 PNEUMONIA OF RIGHT UPPER LOBE DUE TO INFECTIOUS ORGANISM: Primary | ICD-10-CM

## 2022-01-01 DIAGNOSIS — R91.8 LUNG MASS: ICD-10-CM

## 2022-01-01 DIAGNOSIS — J43.1 PANLOBULAR EMPHYSEMA: ICD-10-CM

## 2022-01-01 DIAGNOSIS — U07.1 PNEUMONIA DUE TO COVID-19 VIRUS: ICD-10-CM

## 2022-01-01 DIAGNOSIS — E05.90 HYPERTHYROIDISM: ICD-10-CM

## 2022-01-01 DIAGNOSIS — J44.1 COPD WITH ACUTE EXACERBATION: ICD-10-CM

## 2022-01-01 DIAGNOSIS — Z99.81 OXYGEN DEPENDENT: ICD-10-CM

## 2022-01-01 DIAGNOSIS — J18.9 PNEUMONIA OF RIGHT LOWER LOBE DUE TO INFECTIOUS ORGANISM: Primary | ICD-10-CM

## 2022-01-01 DIAGNOSIS — Z85.3 HISTORY OF BREAST CANCER: ICD-10-CM

## 2022-01-01 DIAGNOSIS — E05.20 TOXIC MULTINODULAR GOITER: Primary | ICD-10-CM

## 2022-01-01 LAB
ALBUMIN SERPL-MCNC: 2.5 G/DL (ref 3.5–5.2)
ALBUMIN SERPL-MCNC: 2.7 G/DL (ref 3.5–5.2)
ALBUMIN SERPL-MCNC: 2.9 G/DL (ref 3.5–5.2)
ALBUMIN SERPL-MCNC: 3.1 G/DL (ref 3.5–5.2)
ALBUMIN SERPL-MCNC: 3.1 G/DL (ref 3.5–5.2)
ALBUMIN SERPL-MCNC: 3.2 G/DL (ref 3.5–5.2)
ALBUMIN SERPL-MCNC: 3.3 G/DL (ref 3.5–5.2)
ALBUMIN SERPL-MCNC: 3.5 G/DL (ref 3.5–5.2)
ALBUMIN SERPL-MCNC: 3.5 G/DL (ref 3.5–5.2)
ALBUMIN SERPL-MCNC: 3.6 G/DL (ref 3.5–5.2)
ALBUMIN/GLOB SERPL: 0.7 G/DL
ALBUMIN/GLOB SERPL: 0.8 G/DL
ALBUMIN/GLOB SERPL: 0.9 G/DL
ALBUMIN/GLOB SERPL: 0.9 G/DL (ref 1.1–2.4)
ALBUMIN/GLOB SERPL: 1 G/DL
ALBUMIN/GLOB SERPL: 1.1 G/DL
ALBUMIN/GLOB SERPL: 1.1 G/DL (ref 1.1–2.4)
ALBUMIN/GLOB SERPL: 1.1 G/DL (ref 1.1–2.4)
ALBUMIN/GLOB SERPL: 1.2 G/DL
ALP SERPL-CCNC: 106 U/L (ref 39–117)
ALP SERPL-CCNC: 70 U/L (ref 39–117)
ALP SERPL-CCNC: 70 U/L (ref 39–117)
ALP SERPL-CCNC: 72 U/L (ref 39–117)
ALP SERPL-CCNC: 73 U/L (ref 39–117)
ALP SERPL-CCNC: 74 U/L (ref 38–116)
ALP SERPL-CCNC: 83 U/L (ref 38–116)
ALP SERPL-CCNC: 88 U/L (ref 39–117)
ALP SERPL-CCNC: 88 U/L (ref 39–117)
ALP SERPL-CCNC: 96 U/L (ref 38–116)
ALT SERPL W P-5'-P-CCNC: 10 U/L (ref 0–33)
ALT SERPL W P-5'-P-CCNC: 10 U/L (ref 1–33)
ALT SERPL W P-5'-P-CCNC: 11 U/L (ref 1–33)
ALT SERPL W P-5'-P-CCNC: 18 U/L (ref 1–33)
ALT SERPL W P-5'-P-CCNC: 19 U/L (ref 1–33)
ALT SERPL W P-5'-P-CCNC: 20 U/L (ref 1–33)
ALT SERPL W P-5'-P-CCNC: 21 U/L (ref 0–33)
ALT SERPL W P-5'-P-CCNC: 26 U/L (ref 1–33)
ALT SERPL W P-5'-P-CCNC: 30 U/L (ref 1–33)
ALT SERPL W P-5'-P-CCNC: 8 U/L (ref 1–33)
ALT SERPL W P-5'-P-CCNC: 9 U/L (ref 0–33)
ALT SERPL W P-5'-P-CCNC: 9 U/L (ref 1–33)
ANION GAP SERPL CALCULATED.3IONS-SCNC: 10 MMOL/L (ref 5–15)
ANION GAP SERPL CALCULATED.3IONS-SCNC: 11.1 MMOL/L (ref 5–15)
ANION GAP SERPL CALCULATED.3IONS-SCNC: 11.3 MMOL/L (ref 5–15)
ANION GAP SERPL CALCULATED.3IONS-SCNC: 12 MMOL/L (ref 5–15)
ANION GAP SERPL CALCULATED.3IONS-SCNC: 12.1 MMOL/L (ref 5–15)
ANION GAP SERPL CALCULATED.3IONS-SCNC: 13 MMOL/L (ref 5–15)
ANION GAP SERPL CALCULATED.3IONS-SCNC: 14.3 MMOL/L (ref 5–15)
ANION GAP SERPL CALCULATED.3IONS-SCNC: 6.7 MMOL/L (ref 5–15)
ANION GAP SERPL CALCULATED.3IONS-SCNC: 7 MMOL/L (ref 5–15)
ANION GAP SERPL CALCULATED.3IONS-SCNC: 7.7 MMOL/L (ref 5–15)
ANION GAP SERPL CALCULATED.3IONS-SCNC: 8 MMOL/L (ref 5–15)
ANION GAP SERPL CALCULATED.3IONS-SCNC: 9.5 MMOL/L (ref 5–15)
AST SERPL-CCNC: 10 U/L (ref 1–32)
AST SERPL-CCNC: 11 U/L (ref 1–32)
AST SERPL-CCNC: 12 U/L (ref 0–32)
AST SERPL-CCNC: 13 U/L (ref 0–32)
AST SERPL-CCNC: 15 U/L (ref 1–32)
AST SERPL-CCNC: 17 U/L (ref 1–32)
AST SERPL-CCNC: 18 U/L (ref 1–32)
AST SERPL-CCNC: 18 U/L (ref 1–32)
AST SERPL-CCNC: 19 U/L (ref 0–32)
AST SERPL-CCNC: 19 U/L (ref 1–32)
AST SERPL-CCNC: 23 U/L (ref 1–32)
AST SERPL-CCNC: 26 U/L (ref 1–32)
B PARAPERT DNA SPEC QL NAA+PROBE: NOT DETECTED
B PARAPERT DNA SPEC QL NAA+PROBE: NOT DETECTED
B PERT DNA SPEC QL NAA+PROBE: NOT DETECTED
B PERT DNA SPEC QL NAA+PROBE: NOT DETECTED
BACTERIA SPEC AEROBE CULT: NORMAL
BACTERIA SPEC AEROBE CULT: NORMAL
BASOPHILS # BLD AUTO: 0 10*3/MM3 (ref 0–0.2)
BASOPHILS # BLD AUTO: 0 10*3/MM3 (ref 0–0.2)
BASOPHILS # BLD AUTO: 0.01 10*3/MM3 (ref 0–0.2)
BASOPHILS # BLD AUTO: 0.03 10*3/MM3 (ref 0–0.2)
BASOPHILS # BLD AUTO: 0.03 10*3/MM3 (ref 0–0.2)
BASOPHILS # BLD AUTO: 0.04 10*3/MM3 (ref 0–0.2)
BASOPHILS # BLD AUTO: 0.05 10*3/MM3 (ref 0–0.2)
BASOPHILS NFR BLD AUTO: 0 % (ref 0–1.5)
BASOPHILS NFR BLD AUTO: 0 % (ref 0–1.5)
BASOPHILS NFR BLD AUTO: 0.1 % (ref 0–1.5)
BASOPHILS NFR BLD AUTO: 0.1 % (ref 0–1.5)
BASOPHILS NFR BLD AUTO: 0.2 % (ref 0–1.5)
BASOPHILS NFR BLD AUTO: 0.2 % (ref 0–1.5)
BASOPHILS NFR BLD AUTO: 0.4 % (ref 0–1.5)
BILIRUB SERPL-MCNC: 0.2 MG/DL (ref 0.2–1.2)
BILIRUB SERPL-MCNC: 0.2 MG/DL (ref 0.2–1.2)
BILIRUB SERPL-MCNC: 0.2 MG/DL (ref 0–1.2)
BILIRUB SERPL-MCNC: 0.3 MG/DL (ref 0.2–1.2)
BILIRUB SERPL-MCNC: 0.3 MG/DL (ref 0–1.2)
BILIRUB SERPL-MCNC: <0.2 MG/DL (ref 0–1.2)
BUN SERPL-MCNC: 10 MG/DL (ref 6–20)
BUN SERPL-MCNC: 11 MG/DL (ref 8–23)
BUN SERPL-MCNC: 12 MG/DL (ref 6–20)
BUN SERPL-MCNC: 13 MG/DL (ref 8–23)
BUN SERPL-MCNC: 15 MG/DL (ref 8–23)
BUN SERPL-MCNC: 16 MG/DL (ref 8–23)
BUN SERPL-MCNC: 18 MG/DL (ref 8–23)
BUN SERPL-MCNC: 21 MG/DL (ref 6–20)
BUN SERPL-MCNC: 28 MG/DL (ref 8–23)
BUN SERPL-MCNC: 29 MG/DL (ref 8–23)
BUN/CREAT SERPL: 22.2 (ref 7.3–30)
BUN/CREAT SERPL: 23.1 (ref 7.3–30)
BUN/CREAT SERPL: 31.7 (ref 7–25)
BUN/CREAT SERPL: 32.6 (ref 7–25)
BUN/CREAT SERPL: 34.4 (ref 7–25)
BUN/CREAT SERPL: 36.6 (ref 7–25)
BUN/CREAT SERPL: 38.2 (ref 7.3–30)
BUN/CREAT SERPL: 39.5 (ref 7–25)
BUN/CREAT SERPL: 44.1 (ref 7–25)
BUN/CREAT SERPL: 47.4 (ref 7–25)
BUN/CREAT SERPL: 49.1 (ref 7–25)
BUN/CREAT SERPL: 53.3 (ref 7–25)
C PNEUM DNA NPH QL NAA+NON-PROBE: NOT DETECTED
C PNEUM DNA NPH QL NAA+NON-PROBE: NOT DETECTED
CALCIUM SPEC-SCNC: 8.4 MG/DL (ref 8.6–10.5)
CALCIUM SPEC-SCNC: 8.9 MG/DL (ref 8.6–10.5)
CALCIUM SPEC-SCNC: 8.9 MG/DL (ref 8.6–10.5)
CALCIUM SPEC-SCNC: 9 MG/DL (ref 8.6–10.5)
CALCIUM SPEC-SCNC: 9.2 MG/DL (ref 8.5–10.2)
CALCIUM SPEC-SCNC: 9.4 MG/DL (ref 8.5–10.2)
CALCIUM SPEC-SCNC: 9.4 MG/DL (ref 8.5–10.2)
CALCIUM SPEC-SCNC: 9.4 MG/DL (ref 8.6–10.5)
CALCIUM SPEC-SCNC: 9.5 MG/DL (ref 8.6–10.5)
CALCIUM SPEC-SCNC: 9.7 MG/DL (ref 8.6–10.5)
CHLORIDE SERPL-SCNC: 100 MMOL/L (ref 98–107)
CHLORIDE SERPL-SCNC: 100 MMOL/L (ref 98–107)
CHLORIDE SERPL-SCNC: 101 MMOL/L (ref 98–107)
CHLORIDE SERPL-SCNC: 101 MMOL/L (ref 98–107)
CHLORIDE SERPL-SCNC: 102 MMOL/L (ref 98–107)
CHLORIDE SERPL-SCNC: 103 MMOL/L (ref 98–107)
CHLORIDE SERPL-SCNC: 93 MMOL/L (ref 98–107)
CHLORIDE SERPL-SCNC: 97 MMOL/L (ref 98–107)
CHLORIDE SERPL-SCNC: 99 MMOL/L (ref 98–107)
CK SERPL-CCNC: 21 U/L (ref 20–180)
CK SERPL-CCNC: 24 U/L (ref 20–180)
CK SERPL-CCNC: 26 U/L (ref 20–180)
CK SERPL-CCNC: 33 U/L (ref 20–180)
CK SERPL-CCNC: 33 U/L (ref 20–180)
CO2 SERPL-SCNC: 26 MMOL/L (ref 22–29)
CO2 SERPL-SCNC: 26.7 MMOL/L (ref 22–29)
CO2 SERPL-SCNC: 26.9 MMOL/L (ref 22–29)
CO2 SERPL-SCNC: 26.9 MMOL/L (ref 22–29)
CO2 SERPL-SCNC: 27.5 MMOL/L (ref 22–29)
CO2 SERPL-SCNC: 28 MMOL/L (ref 22–29)
CO2 SERPL-SCNC: 29 MMOL/L (ref 22–29)
CO2 SERPL-SCNC: 30 MMOL/L (ref 22–29)
CO2 SERPL-SCNC: 31.3 MMOL/L (ref 22–29)
CO2 SERPL-SCNC: 31.7 MMOL/L (ref 22–29)
CO2 SERPL-SCNC: 32.3 MMOL/L (ref 22–29)
CO2 SERPL-SCNC: 33 MMOL/L (ref 22–29)
CREAT SERPL-MCNC: 0.3 MG/DL (ref 0.57–1)
CREAT SERPL-MCNC: 0.32 MG/DL (ref 0.57–1)
CREAT SERPL-MCNC: 0.34 MG/DL (ref 0.57–1)
CREAT SERPL-MCNC: 0.38 MG/DL (ref 0.57–1)
CREAT SERPL-MCNC: 0.38 MG/DL (ref 0.57–1)
CREAT SERPL-MCNC: 0.41 MG/DL (ref 0.57–1)
CREAT SERPL-MCNC: 0.41 MG/DL (ref 0.57–1)
CREAT SERPL-MCNC: 0.45 MG/DL (ref 0.6–1.1)
CREAT SERPL-MCNC: 0.52 MG/DL (ref 0.6–1.1)
CREAT SERPL-MCNC: 0.55 MG/DL (ref 0.6–1.1)
CREAT SERPL-MCNC: 0.57 MG/DL (ref 0.57–1)
CREAT SERPL-MCNC: 0.89 MG/DL (ref 0.57–1)
CRP SERPL-MCNC: 0.4 MG/DL (ref 0–0.5)
CRP SERPL-MCNC: 0.46 MG/DL (ref 0–0.5)
CRP SERPL-MCNC: 0.51 MG/DL (ref 0–0.5)
CRP SERPL-MCNC: 0.77 MG/DL (ref 0–0.5)
CRP SERPL-MCNC: 1.55 MG/DL (ref 0–0.5)
CRP SERPL-MCNC: 4.22 MG/DL (ref 0–0.5)
D DIMER PPP FEU-MCNC: 0.4 MCGFEU/ML (ref 0–0.49)
D DIMER PPP FEU-MCNC: 0.57 MCGFEU/ML (ref 0–0.49)
D DIMER PPP FEU-MCNC: 0.75 MCGFEU/ML (ref 0–0.49)
D DIMER PPP FEU-MCNC: 1.26 MCGFEU/ML (ref 0–0.49)
D-LACTATE SERPL-SCNC: 1.3 MMOL/L (ref 0.5–2)
D-LACTATE SERPL-SCNC: 1.5 MMOL/L (ref 0.5–2)
D-LACTATE SERPL-SCNC: 3.8 MMOL/L (ref 0.5–2)
DEPRECATED RDW RBC AUTO: 49.9 FL (ref 37–54)
DEPRECATED RDW RBC AUTO: 50.4 FL (ref 37–54)
DEPRECATED RDW RBC AUTO: 50.9 FL (ref 37–54)
DEPRECATED RDW RBC AUTO: 51.6 FL (ref 37–54)
DEPRECATED RDW RBC AUTO: 51.7 FL (ref 37–54)
DEPRECATED RDW RBC AUTO: 52.5 FL (ref 37–54)
DEPRECATED RDW RBC AUTO: 53 FL (ref 37–54)
DEPRECATED RDW RBC AUTO: 53.3 FL (ref 37–54)
DEPRECATED RDW RBC AUTO: 54.6 FL (ref 37–54)
DEPRECATED RDW RBC AUTO: 63.2 FL (ref 37–54)
DEPRECATED RDW RBC AUTO: 64.2 FL (ref 37–54)
DEPRECATED RDW RBC AUTO: 65.1 FL (ref 37–54)
DIGOXIN SERPL-MCNC: 1.1 NG/ML (ref 0.6–1.2)
DIGOXIN SERPL-MCNC: <0.3 NG/ML (ref 0.6–1.2)
EGFRCR SERPLBLD CKD-EPI 2021: 66.9 ML/MIN/1.73
EGFRCR SERPLBLD CKD-EPI 2021: 93.7 ML/MIN/1.73
EGFRCR SERPLBLD CKD-EPI 2021: 94.5 ML/MIN/1.73
EOSINOPHIL # BLD AUTO: 0 10*3/MM3 (ref 0–0.4)
EOSINOPHIL # BLD AUTO: 0.01 10*3/MM3 (ref 0–0.4)
EOSINOPHIL # BLD AUTO: 0.02 10*3/MM3 (ref 0–0.4)
EOSINOPHIL # BLD AUTO: 0.02 10*3/MM3 (ref 0–0.4)
EOSINOPHIL # BLD AUTO: 0.05 10*3/MM3 (ref 0–0.4)
EOSINOPHIL # BLD AUTO: 0.07 10*3/MM3 (ref 0–0.4)
EOSINOPHIL NFR BLD AUTO: 0 % (ref 0.3–6.2)
EOSINOPHIL NFR BLD AUTO: 0.1 % (ref 0.3–6.2)
EOSINOPHIL NFR BLD AUTO: 0.1 % (ref 0.3–6.2)
EOSINOPHIL NFR BLD AUTO: 0.2 % (ref 0.3–6.2)
EOSINOPHIL NFR BLD AUTO: 0.6 % (ref 0.3–6.2)
EOSINOPHIL NFR BLD AUTO: 0.8 % (ref 0.3–6.2)
ERYTHROCYTE [DISTWIDTH] IN BLOOD BY AUTOMATED COUNT: 14.8 % (ref 12.3–15.4)
ERYTHROCYTE [DISTWIDTH] IN BLOOD BY AUTOMATED COUNT: 14.8 % (ref 12.3–15.4)
ERYTHROCYTE [DISTWIDTH] IN BLOOD BY AUTOMATED COUNT: 14.9 % (ref 12.3–15.4)
ERYTHROCYTE [DISTWIDTH] IN BLOOD BY AUTOMATED COUNT: 15 % (ref 12.3–15.4)
ERYTHROCYTE [DISTWIDTH] IN BLOOD BY AUTOMATED COUNT: 15.2 % (ref 12.3–15.4)
ERYTHROCYTE [DISTWIDTH] IN BLOOD BY AUTOMATED COUNT: 15.4 % (ref 12.3–15.4)
ERYTHROCYTE [DISTWIDTH] IN BLOOD BY AUTOMATED COUNT: 15.6 % (ref 12.3–15.4)
ERYTHROCYTE [DISTWIDTH] IN BLOOD BY AUTOMATED COUNT: 15.7 % (ref 12.3–15.4)
ERYTHROCYTE [DISTWIDTH] IN BLOOD BY AUTOMATED COUNT: 16 % (ref 12.3–15.4)
ERYTHROCYTE [DISTWIDTH] IN BLOOD BY AUTOMATED COUNT: 17 % (ref 12.3–15.4)
ERYTHROCYTE [DISTWIDTH] IN BLOOD BY AUTOMATED COUNT: 17.8 % (ref 12.3–15.4)
ERYTHROCYTE [DISTWIDTH] IN BLOOD BY AUTOMATED COUNT: 17.9 % (ref 12.3–15.4)
ERYTHROCYTE [SEDIMENTATION RATE] IN BLOOD: 53 MM/HR (ref 0–30)
FERRITIN SERPL-MCNC: 191 NG/ML (ref 13–150)
FERRITIN SERPL-MCNC: 201 NG/ML (ref 13–150)
FERRITIN SERPL-MCNC: 206 NG/ML (ref 13–150)
FERRITIN SERPL-MCNC: 213 NG/ML (ref 13–150)
FERRITIN SERPL-MCNC: 219 NG/ML (ref 13–150)
FERRITIN SERPL-MCNC: 228 NG/ML (ref 13–150)
FLUAV SUBTYP SPEC NAA+PROBE: NOT DETECTED
FLUAV SUBTYP SPEC NAA+PROBE: NOT DETECTED
FLUBV RNA ISLT QL NAA+PROBE: NOT DETECTED
FLUBV RNA ISLT QL NAA+PROBE: NOT DETECTED
GFR SERPL CREATININE-BSD FRML MDRD: 114 ML/MIN/1.73
GFR SERPL CREATININE-BSD FRML MDRD: 135 ML/MIN/1.73
GFR SERPL CREATININE-BSD FRML MDRD: 150 ML/MIN/1.73
GFR SERPL CREATININE-BSD FRML MDRD: 150 ML/MIN/1.73
GFR SERPL CREATININE-BSD FRML MDRD: >150 ML/MIN/1.73
GLOBULIN UR ELPH-MCNC: 2.6 GM/DL
GLOBULIN UR ELPH-MCNC: 2.8 GM/DL
GLOBULIN UR ELPH-MCNC: 2.9 GM/DL (ref 1.8–3.5)
GLOBULIN UR ELPH-MCNC: 3 GM/DL
GLOBULIN UR ELPH-MCNC: 3.3 GM/DL
GLOBULIN UR ELPH-MCNC: 3.3 GM/DL (ref 1.8–3.5)
GLOBULIN UR ELPH-MCNC: 3.4 GM/DL
GLOBULIN UR ELPH-MCNC: 3.6 GM/DL
GLOBULIN UR ELPH-MCNC: 3.7 GM/DL
GLOBULIN UR ELPH-MCNC: 3.7 GM/DL (ref 1.8–3.5)
GLOBULIN UR ELPH-MCNC: 3.8 GM/DL
GLOBULIN UR ELPH-MCNC: 4 GM/DL
GLUCOSE BLDC GLUCOMTR-MCNC: 123 MG/DL (ref 70–130)
GLUCOSE SERPL-MCNC: 104 MG/DL (ref 65–99)
GLUCOSE SERPL-MCNC: 119 MG/DL (ref 65–99)
GLUCOSE SERPL-MCNC: 122 MG/DL (ref 65–99)
GLUCOSE SERPL-MCNC: 122 MG/DL (ref 74–124)
GLUCOSE SERPL-MCNC: 134 MG/DL (ref 65–99)
GLUCOSE SERPL-MCNC: 136 MG/DL (ref 74–124)
GLUCOSE SERPL-MCNC: 142 MG/DL (ref 65–99)
GLUCOSE SERPL-MCNC: 147 MG/DL (ref 65–99)
GLUCOSE SERPL-MCNC: 149 MG/DL (ref 65–99)
GLUCOSE SERPL-MCNC: 150 MG/DL (ref 65–99)
GLUCOSE SERPL-MCNC: 169 MG/DL (ref 74–124)
GLUCOSE SERPL-MCNC: 88 MG/DL (ref 65–99)
HADV DNA SPEC NAA+PROBE: NOT DETECTED
HADV DNA SPEC NAA+PROBE: NOT DETECTED
HCOV 229E RNA SPEC QL NAA+PROBE: NOT DETECTED
HCOV 229E RNA SPEC QL NAA+PROBE: NOT DETECTED
HCOV HKU1 RNA SPEC QL NAA+PROBE: NOT DETECTED
HCOV HKU1 RNA SPEC QL NAA+PROBE: NOT DETECTED
HCOV NL63 RNA SPEC QL NAA+PROBE: NOT DETECTED
HCOV NL63 RNA SPEC QL NAA+PROBE: NOT DETECTED
HCOV OC43 RNA SPEC QL NAA+PROBE: NOT DETECTED
HCOV OC43 RNA SPEC QL NAA+PROBE: NOT DETECTED
HCT VFR BLD AUTO: 31.6 % (ref 34–46.6)
HCT VFR BLD AUTO: 33.3 % (ref 34–46.6)
HCT VFR BLD AUTO: 34.2 % (ref 34–46.6)
HCT VFR BLD AUTO: 34.3 % (ref 34–46.6)
HCT VFR BLD AUTO: 35 % (ref 34–46.6)
HCT VFR BLD AUTO: 35.3 % (ref 34–46.6)
HCT VFR BLD AUTO: 38.5 % (ref 34–46.6)
HCT VFR BLD AUTO: 39.1 % (ref 34–46.6)
HCT VFR BLD AUTO: 39.1 % (ref 34–46.6)
HCT VFR BLD AUTO: 39.6 % (ref 34–46.6)
HCT VFR BLD AUTO: 39.6 % (ref 34–46.6)
HCT VFR BLD AUTO: 40.5 % (ref 34–46.6)
HGB BLD-MCNC: 10.9 G/DL (ref 12–15.9)
HGB BLD-MCNC: 11 G/DL (ref 12–15.9)
HGB BLD-MCNC: 11.1 G/DL (ref 12–15.9)
HGB BLD-MCNC: 11.5 G/DL (ref 12–15.9)
HGB BLD-MCNC: 11.7 G/DL (ref 12–15.9)
HGB BLD-MCNC: 12.7 G/DL (ref 12–15.9)
HGB BLD-MCNC: 12.8 G/DL (ref 12–15.9)
HGB BLD-MCNC: 12.9 G/DL (ref 12–15.9)
HGB BLD-MCNC: 13 G/DL (ref 12–15.9)
HGB BLD-MCNC: 13.1 G/DL (ref 12–15.9)
HGB BLD-MCNC: 13.2 G/DL (ref 12–15.9)
HGB BLD-MCNC: 9.9 G/DL (ref 12–15.9)
HMPV RNA NPH QL NAA+NON-PROBE: NOT DETECTED
HMPV RNA NPH QL NAA+NON-PROBE: NOT DETECTED
HPIV1 RNA ISLT QL NAA+PROBE: NOT DETECTED
HPIV1 RNA ISLT QL NAA+PROBE: NOT DETECTED
HPIV2 RNA SPEC QL NAA+PROBE: NOT DETECTED
HPIV2 RNA SPEC QL NAA+PROBE: NOT DETECTED
HPIV3 RNA NPH QL NAA+PROBE: NOT DETECTED
HPIV3 RNA NPH QL NAA+PROBE: NOT DETECTED
HPIV4 P GENE NPH QL NAA+PROBE: NOT DETECTED
HPIV4 P GENE NPH QL NAA+PROBE: NOT DETECTED
IMM GRANULOCYTES # BLD AUTO: 0.02 10*3/MM3 (ref 0–0.05)
IMM GRANULOCYTES # BLD AUTO: 0.03 10*3/MM3 (ref 0–0.05)
IMM GRANULOCYTES # BLD AUTO: 0.03 10*3/MM3 (ref 0–0.05)
IMM GRANULOCYTES # BLD AUTO: 0.04 10*3/MM3 (ref 0–0.05)
IMM GRANULOCYTES # BLD AUTO: 0.06 10*3/MM3 (ref 0–0.05)
IMM GRANULOCYTES # BLD AUTO: 0.09 10*3/MM3 (ref 0–0.05)
IMM GRANULOCYTES # BLD AUTO: 0.1 10*3/MM3 (ref 0–0.05)
IMM GRANULOCYTES NFR BLD AUTO: 0.3 % (ref 0–0.5)
IMM GRANULOCYTES NFR BLD AUTO: 0.3 % (ref 0–0.5)
IMM GRANULOCYTES NFR BLD AUTO: 0.4 % (ref 0–0.5)
IMM GRANULOCYTES NFR BLD AUTO: 0.4 % (ref 0–0.5)
IMM GRANULOCYTES NFR BLD AUTO: 0.5 % (ref 0–0.5)
IMM GRANULOCYTES NFR BLD AUTO: 0.8 % (ref 0–0.5)
IMM GRANULOCYTES NFR BLD AUTO: 0.9 % (ref 0–0.5)
IMM GRANULOCYTES NFR BLD AUTO: 1.1 % (ref 0–0.5)
INR PPP: 1.1 (ref 0.9–1.1)
INR PPP: 1.17 (ref 0.9–1.1)
INR PPP: 1.18 (ref 0.9–1.1)
L PNEUMO1 AG UR QL IA: NEGATIVE
LDH SERPL-CCNC: 165 U/L (ref 135–214)
LYMPHOCYTES # BLD AUTO: 0.16 10*3/MM3 (ref 0.7–3.1)
LYMPHOCYTES # BLD AUTO: 0.22 10*3/MM3 (ref 0.7–3.1)
LYMPHOCYTES # BLD AUTO: 0.34 10*3/MM3 (ref 0.7–3.1)
LYMPHOCYTES # BLD AUTO: 0.37 10*3/MM3 (ref 0.7–3.1)
LYMPHOCYTES # BLD AUTO: 0.41 10*3/MM3 (ref 0.7–3.1)
LYMPHOCYTES # BLD AUTO: 0.5 10*3/MM3 (ref 0.7–3.1)
LYMPHOCYTES # BLD AUTO: 0.5 10*3/MM3 (ref 0.7–3.1)
LYMPHOCYTES # BLD AUTO: 0.54 10*3/MM3 (ref 0.7–3.1)
LYMPHOCYTES # BLD AUTO: 0.66 10*3/MM3 (ref 0.7–3.1)
LYMPHOCYTES # BLD AUTO: 0.75 10*3/MM3 (ref 0.7–3.1)
LYMPHOCYTES # BLD MANUAL: 0 10*3/MM3 (ref 0.7–3.1)
LYMPHOCYTES # BLD MANUAL: 0.88 10*3/MM3 (ref 0.7–3.1)
LYMPHOCYTES NFR BLD AUTO: 2.5 % (ref 19.6–45.3)
LYMPHOCYTES NFR BLD AUTO: 2.6 % (ref 19.6–45.3)
LYMPHOCYTES NFR BLD AUTO: 3.8 % (ref 19.6–45.3)
LYMPHOCYTES NFR BLD AUTO: 5 % (ref 19.6–45.3)
LYMPHOCYTES NFR BLD AUTO: 5.1 % (ref 19.6–45.3)
LYMPHOCYTES NFR BLD AUTO: 5.3 % (ref 19.6–45.3)
LYMPHOCYTES NFR BLD AUTO: 5.9 % (ref 19.6–45.3)
LYMPHOCYTES NFR BLD AUTO: 6.8 % (ref 19.6–45.3)
LYMPHOCYTES NFR BLD AUTO: 8.2 % (ref 19.6–45.3)
LYMPHOCYTES NFR BLD AUTO: 8.5 % (ref 19.6–45.3)
LYMPHOCYTES NFR BLD MANUAL: 1 % (ref 5–12)
LYMPHOCYTES NFR BLD MANUAL: 2 % (ref 5–12)
M PNEUMO IGG SER IA-ACNC: NOT DETECTED
M PNEUMO IGG SER IA-ACNC: NOT DETECTED
MAGNESIUM SERPL-MCNC: 1.8 MG/DL (ref 1.6–2.4)
MCH RBC QN AUTO: 30.5 PG (ref 26.6–33)
MCH RBC QN AUTO: 30.6 PG (ref 26.6–33)
MCH RBC QN AUTO: 30.8 PG (ref 26.6–33)
MCH RBC QN AUTO: 30.9 PG (ref 26.6–33)
MCH RBC QN AUTO: 31 PG (ref 26.6–33)
MCH RBC QN AUTO: 31 PG (ref 26.6–33)
MCH RBC QN AUTO: 31.2 PG (ref 26.6–33)
MCH RBC QN AUTO: 31.3 PG (ref 26.6–33)
MCH RBC QN AUTO: 31.7 PG (ref 26.6–33)
MCHC RBC AUTO-ENTMCNC: 31.3 G/DL (ref 31.5–35.7)
MCHC RBC AUTO-ENTMCNC: 31.4 G/DL (ref 31.5–35.7)
MCHC RBC AUTO-ENTMCNC: 31.4 G/DL (ref 31.5–35.7)
MCHC RBC AUTO-ENTMCNC: 32.5 G/DL (ref 31.5–35.7)
MCHC RBC AUTO-ENTMCNC: 32.7 G/DL (ref 31.5–35.7)
MCHC RBC AUTO-ENTMCNC: 32.7 G/DL (ref 31.5–35.7)
MCHC RBC AUTO-ENTMCNC: 33 G/DL (ref 31.5–35.7)
MCHC RBC AUTO-ENTMCNC: 33.1 G/DL (ref 31.5–35.7)
MCHC RBC AUTO-ENTMCNC: 33.1 G/DL (ref 31.5–35.7)
MCHC RBC AUTO-ENTMCNC: 33.3 G/DL (ref 31.5–35.7)
MCHC RBC AUTO-ENTMCNC: 33.5 G/DL (ref 31.5–35.7)
MCHC RBC AUTO-ENTMCNC: 33.8 G/DL (ref 31.5–35.7)
MCV RBC AUTO: 100.9 FL (ref 79–97)
MCV RBC AUTO: 92.5 FL (ref 79–97)
MCV RBC AUTO: 92.5 FL (ref 79–97)
MCV RBC AUTO: 93 FL (ref 79–97)
MCV RBC AUTO: 93 FL (ref 79–97)
MCV RBC AUTO: 93.3 FL (ref 79–97)
MCV RBC AUTO: 93.5 FL (ref 79–97)
MCV RBC AUTO: 93.5 FL (ref 79–97)
MCV RBC AUTO: 94.1 FL (ref 79–97)
MCV RBC AUTO: 95.3 FL (ref 79–97)
MCV RBC AUTO: 98.5 FL (ref 79–97)
MCV RBC AUTO: 98.8 FL (ref 79–97)
MONOCYTES # BLD AUTO: 0.06 10*3/MM3 (ref 0.1–0.9)
MONOCYTES # BLD AUTO: 0.31 10*3/MM3 (ref 0.1–0.9)
MONOCYTES # BLD AUTO: 0.51 10*3/MM3 (ref 0.1–0.9)
MONOCYTES # BLD AUTO: 0.53 10*3/MM3 (ref 0.1–0.9)
MONOCYTES # BLD AUTO: 0.57 10*3/MM3 (ref 0.1–0.9)
MONOCYTES # BLD AUTO: 0.86 10*3/MM3 (ref 0.1–0.9)
MONOCYTES # BLD AUTO: 0.89 10*3/MM3 (ref 0.1–0.9)
MONOCYTES # BLD AUTO: 0.98 10*3/MM3 (ref 0.1–0.9)
MONOCYTES # BLD AUTO: 1 10*3/MM3 (ref 0.1–0.9)
MONOCYTES # BLD AUTO: 1.04 10*3/MM3 (ref 0.1–0.9)
MONOCYTES # BLD: 0.18 10*3/MM3 (ref 0.1–0.9)
MONOCYTES # BLD: 0.29 10*3/MM3 (ref 0.1–0.9)
MONOCYTES NFR BLD AUTO: 1 % (ref 5–12)
MONOCYTES NFR BLD AUTO: 10.9 % (ref 5–12)
MONOCYTES NFR BLD AUTO: 13.2 % (ref 5–12)
MONOCYTES NFR BLD AUTO: 13.5 % (ref 5–12)
MONOCYTES NFR BLD AUTO: 4.1 % (ref 5–12)
MONOCYTES NFR BLD AUTO: 5.4 % (ref 5–12)
MONOCYTES NFR BLD AUTO: 6.8 % (ref 5–12)
MONOCYTES NFR BLD AUTO: 6.9 % (ref 5–12)
MONOCYTES NFR BLD AUTO: 9.1 % (ref 5–12)
MONOCYTES NFR BLD AUTO: 9.5 % (ref 5–12)
NEUTROPHILS # BLD AUTO: 14.34 10*3/MM3 (ref 1.7–7)
NEUTROPHILS # BLD AUTO: 16.99 10*3/MM3 (ref 1.7–7)
NEUTROPHILS NFR BLD AUTO: 12.82 10*3/MM3 (ref 1.7–7)
NEUTROPHILS NFR BLD AUTO: 5.19 10*3/MM3 (ref 1.7–7)
NEUTROPHILS NFR BLD AUTO: 5.8 10*3/MM3 (ref 1.7–7)
NEUTROPHILS NFR BLD AUTO: 5.82 10*3/MM3 (ref 1.7–7)
NEUTROPHILS NFR BLD AUTO: 6.2 10*3/MM3 (ref 1.7–7)
NEUTROPHILS NFR BLD AUTO: 6.47 10*3/MM3 (ref 1.7–7)
NEUTROPHILS NFR BLD AUTO: 6.51 10*3/MM3 (ref 1.7–7)
NEUTROPHILS NFR BLD AUTO: 7.34 10*3/MM3 (ref 1.7–7)
NEUTROPHILS NFR BLD AUTO: 7.49 10*3/MM3 (ref 1.7–7)
NEUTROPHILS NFR BLD AUTO: 78.7 % (ref 42.7–76)
NEUTROPHILS NFR BLD AUTO: 8.25 10*3/MM3 (ref 1.7–7)
NEUTROPHILS NFR BLD AUTO: 80.7 % (ref 42.7–76)
NEUTROPHILS NFR BLD AUTO: 80.7 % (ref 42.7–76)
NEUTROPHILS NFR BLD AUTO: 81.8 % (ref 42.7–76)
NEUTROPHILS NFR BLD AUTO: 83.3 % (ref 42.7–76)
NEUTROPHILS NFR BLD AUTO: 84.8 % (ref 42.7–76)
NEUTROPHILS NFR BLD AUTO: 87.6 % (ref 42.7–76)
NEUTROPHILS NFR BLD AUTO: 90.1 % (ref 42.7–76)
NEUTROPHILS NFR BLD AUTO: 92.6 % (ref 42.7–76)
NEUTROPHILS NFR BLD AUTO: 95.9 % (ref 42.7–76)
NEUTROPHILS NFR BLD MANUAL: 94.1 % (ref 42.7–76)
NEUTROPHILS NFR BLD MANUAL: 98 % (ref 42.7–76)
NRBC BLD AUTO-RTO: 0 /100 WBC (ref 0–0.2)
NT-PROBNP SERPL-MCNC: 3740 PG/ML (ref 0–1800)
NT-PROBNP SERPL-MCNC: 794 PG/ML (ref 0–1800)
OVALOCYTES BLD QL SMEAR: ABNORMAL
PHOSPHATE SERPL-MCNC: 3.3 MG/DL (ref 2.5–4.5)
PLAT MORPH BLD: NORMAL
PLAT MORPH BLD: NORMAL
PLATELET # BLD AUTO: 329 10*3/MM3 (ref 140–450)
PLATELET # BLD AUTO: 375 10*3/MM3 (ref 140–450)
PLATELET # BLD AUTO: 382 10*3/MM3 (ref 140–450)
PLATELET # BLD AUTO: 409 10*3/MM3 (ref 140–450)
PLATELET # BLD AUTO: 455 10*3/MM3 (ref 140–450)
PLATELET # BLD AUTO: 469 10*3/MM3 (ref 140–450)
PLATELET # BLD AUTO: 471 10*3/MM3 (ref 140–450)
PLATELET # BLD AUTO: 472 10*3/MM3 (ref 140–450)
PLATELET # BLD AUTO: 476 10*3/MM3 (ref 140–450)
PLATELET # BLD AUTO: 487 10*3/MM3 (ref 140–450)
PLATELET # BLD AUTO: 535 10*3/MM3 (ref 140–450)
PLATELET # BLD AUTO: 544 10*3/MM3 (ref 140–450)
PMV BLD AUTO: 10 FL (ref 6–12)
PMV BLD AUTO: 8.7 FL (ref 6–12)
PMV BLD AUTO: 8.7 FL (ref 6–12)
PMV BLD AUTO: 8.8 FL (ref 6–12)
PMV BLD AUTO: 9.2 FL (ref 6–12)
PMV BLD AUTO: 9.3 FL (ref 6–12)
PMV BLD AUTO: 9.3 FL (ref 6–12)
PMV BLD AUTO: 9.4 FL (ref 6–12)
PMV BLD AUTO: 9.5 FL (ref 6–12)
PMV BLD AUTO: 9.6 FL (ref 6–12)
POIKILOCYTOSIS BLD QL SMEAR: ABNORMAL
POLYCHROMASIA BLD QL SMEAR: ABNORMAL
POTASSIUM SERPL-SCNC: 3.4 MMOL/L (ref 3.5–4.7)
POTASSIUM SERPL-SCNC: 3.7 MMOL/L (ref 3.5–4.7)
POTASSIUM SERPL-SCNC: 3.8 MMOL/L (ref 3.5–5.2)
POTASSIUM SERPL-SCNC: 3.8 MMOL/L (ref 3.5–5.2)
POTASSIUM SERPL-SCNC: 4 MMOL/L (ref 3.5–5.2)
POTASSIUM SERPL-SCNC: 4.1 MMOL/L (ref 3.5–4.7)
POTASSIUM SERPL-SCNC: 4.1 MMOL/L (ref 3.5–5.2)
POTASSIUM SERPL-SCNC: 4.1 MMOL/L (ref 3.5–5.2)
POTASSIUM SERPL-SCNC: 4.4 MMOL/L (ref 3.5–5.2)
POTASSIUM SERPL-SCNC: 4.4 MMOL/L (ref 3.5–5.2)
PROCALCITONIN SERPL-MCNC: 0.09 NG/ML (ref 0–0.25)
PROCALCITONIN SERPL-MCNC: 0.68 NG/ML (ref 0–0.25)
PROT SERPL-MCNC: 5.3 G/DL (ref 6–8.5)
PROT SERPL-MCNC: 6.1 G/DL (ref 6.3–8)
PROT SERPL-MCNC: 6.1 G/DL (ref 6–8.5)
PROT SERPL-MCNC: 6.2 G/DL (ref 6–8.5)
PROT SERPL-MCNC: 6.6 G/DL (ref 6–8.5)
PROT SERPL-MCNC: 6.7 G/DL (ref 6–8.5)
PROT SERPL-MCNC: 6.9 G/DL (ref 6.3–8)
PROT SERPL-MCNC: 6.9 G/DL (ref 6–8.5)
PROT SERPL-MCNC: 7.2 G/DL (ref 6.3–8)
PROT SERPL-MCNC: 7.5 G/DL (ref 6–8.5)
PROTHROMBIN TIME: 14.1 SECONDS (ref 11.7–14.2)
PROTHROMBIN TIME: 14.8 SECONDS (ref 11.7–14.2)
PROTHROMBIN TIME: 14.9 SECONDS (ref 11.7–14.2)
QT INTERVAL: 310 MS
QT INTERVAL: 346 MS
QT INTERVAL: 346 MS
RBC # BLD AUTO: 3.2 10*6/MM3 (ref 3.77–5.28)
RBC # BLD AUTO: 3.47 10*6/MM3 (ref 3.77–5.28)
RBC # BLD AUTO: 3.56 10*6/MM3 (ref 3.77–5.28)
RBC # BLD AUTO: 3.59 10*6/MM3 (ref 3.77–5.28)
RBC # BLD AUTO: 3.71 10*6/MM3 (ref 3.77–5.28)
RBC # BLD AUTO: 3.75 10*6/MM3 (ref 3.77–5.28)
RBC # BLD AUTO: 4.11 10*6/MM3 (ref 3.77–5.28)
RBC # BLD AUTO: 4.16 10*6/MM3 (ref 3.77–5.28)
RBC # BLD AUTO: 4.18 10*6/MM3 (ref 3.77–5.28)
RBC # BLD AUTO: 4.19 10*6/MM3 (ref 3.77–5.28)
RBC # BLD AUTO: 4.26 10*6/MM3 (ref 3.77–5.28)
RBC # BLD AUTO: 4.26 10*6/MM3 (ref 3.77–5.28)
RHINOVIRUS RNA SPEC NAA+PROBE: NOT DETECTED
RHINOVIRUS RNA SPEC NAA+PROBE: NOT DETECTED
RSV RNA NPH QL NAA+NON-PROBE: NOT DETECTED
RSV RNA NPH QL NAA+NON-PROBE: NOT DETECTED
S PNEUM AG SPEC QL LA: NEGATIVE
SARS-COV-2 RNA NPH QL NAA+NON-PROBE: DETECTED
SARS-COV-2 RNA NPH QL NAA+NON-PROBE: NOT DETECTED
SODIUM SERPL-SCNC: 134 MMOL/L (ref 136–145)
SODIUM SERPL-SCNC: 138 MMOL/L (ref 136–145)
SODIUM SERPL-SCNC: 139 MMOL/L (ref 136–145)
SODIUM SERPL-SCNC: 139 MMOL/L (ref 136–145)
SODIUM SERPL-SCNC: 140 MMOL/L (ref 134–145)
SODIUM SERPL-SCNC: 140 MMOL/L (ref 134–145)
SODIUM SERPL-SCNC: 140 MMOL/L (ref 136–145)
SODIUM SERPL-SCNC: 142 MMOL/L (ref 134–145)
SODIUM SERPL-SCNC: 142 MMOL/L (ref 136–145)
T4 FREE SERPL-MCNC: 1.23 NG/DL (ref 0.93–1.7)
TROPONIN T SERPL-MCNC: 0.01 NG/ML (ref 0–0.03)
TROPONIN T SERPL-MCNC: <0.01 NG/ML (ref 0–0.03)
TSH SERPL DL<=0.05 MIU/L-ACNC: 0.31 UIU/ML (ref 0.27–4.2)
VARIANT LYMPHS NFR BLD MANUAL: 0 % (ref 19.6–45.3)
VARIANT LYMPHS NFR BLD MANUAL: 4.9 % (ref 19.6–45.3)
WBC MORPH BLD: NORMAL
WBC MORPH BLD: NORMAL
WBC NRBC COR # BLD: 13.84 10*3/MM3 (ref 3.4–10.8)
WBC NRBC COR # BLD: 14.63 10*3/MM3 (ref 3.4–10.8)
WBC NRBC COR # BLD: 18.05 10*3/MM3 (ref 3.4–10.8)
WBC NRBC COR # BLD: 5.76 10*3/MM3 (ref 3.4–10.8)
WBC NRBC COR # BLD: 6.05 10*3/MM3 (ref 3.4–10.8)
WBC NRBC COR # BLD: 7.39 10*3/MM3 (ref 3.4–10.8)
WBC NRBC COR # BLD: 7.4 10*3/MM3 (ref 3.4–10.8)
WBC NRBC COR # BLD: 7.69 10*3/MM3 (ref 3.4–10.8)
WBC NRBC COR # BLD: 7.81 10*3/MM3 (ref 3.4–10.8)
WBC NRBC COR # BLD: 9.1 10*3/MM3 (ref 3.4–10.8)
WBC NRBC COR # BLD: 9.16 10*3/MM3 (ref 3.4–10.8)
WBC NRBC COR # BLD: 9.74 10*3/MM3 (ref 3.4–10.8)

## 2022-01-01 PROCEDURE — 99223 1ST HOSP IP/OBS HIGH 75: CPT | Performed by: INTERNAL MEDICINE

## 2022-01-01 PROCEDURE — 80162 ASSAY OF DIGOXIN TOTAL: CPT | Performed by: EMERGENCY MEDICINE

## 2022-01-01 PROCEDURE — 85652 RBC SED RATE AUTOMATED: CPT | Performed by: NURSE PRACTITIONER

## 2022-01-01 PROCEDURE — 94799 UNLISTED PULMONARY SVC/PX: CPT

## 2022-01-01 PROCEDURE — 0 IOPAMIDOL PER 1 ML: Performed by: EMERGENCY MEDICINE

## 2022-01-01 PROCEDURE — 94760 N-INVAS EAR/PLS OXIMETRY 1: CPT

## 2022-01-01 PROCEDURE — 87185 SC STD ENZYME DETCJ PER NZM: CPT | Performed by: EMERGENCY MEDICINE

## 2022-01-01 PROCEDURE — 85379 FIBRIN DEGRADATION QUANT: CPT | Performed by: NURSE PRACTITIONER

## 2022-01-01 PROCEDURE — 83880 ASSAY OF NATRIURETIC PEPTIDE: CPT | Performed by: EMERGENCY MEDICINE

## 2022-01-01 PROCEDURE — 36415 COLL VENOUS BLD VENIPUNCTURE: CPT | Performed by: EMERGENCY MEDICINE

## 2022-01-01 PROCEDURE — 86140 C-REACTIVE PROTEIN: CPT | Performed by: NURSE PRACTITIONER

## 2022-01-01 PROCEDURE — 85610 PROTHROMBIN TIME: CPT | Performed by: NURSE PRACTITIONER

## 2022-01-01 PROCEDURE — 97161 PT EVAL LOW COMPLEX 20 MIN: CPT

## 2022-01-01 PROCEDURE — 86140 C-REACTIVE PROTEIN: CPT | Performed by: INTERNAL MEDICINE

## 2022-01-01 PROCEDURE — 93005 ELECTROCARDIOGRAM TRACING: CPT | Performed by: EMERGENCY MEDICINE

## 2022-01-01 PROCEDURE — 36415 COLL VENOUS BLD VENIPUNCTURE: CPT

## 2022-01-01 PROCEDURE — 85025 COMPLETE CBC W/AUTO DIFF WBC: CPT | Performed by: INTERNAL MEDICINE

## 2022-01-01 PROCEDURE — 25010000002 DEXAMETHASONE PER 1 MG: Performed by: EMERGENCY MEDICINE

## 2022-01-01 PROCEDURE — 85025 COMPLETE CBC W/AUTO DIFF WBC: CPT | Performed by: EMERGENCY MEDICINE

## 2022-01-01 PROCEDURE — 82728 ASSAY OF FERRITIN: CPT | Performed by: INTERNAL MEDICINE

## 2022-01-01 PROCEDURE — 25010000002 LORAZEPAM PER 2 MG: Performed by: EMERGENCY MEDICINE

## 2022-01-01 PROCEDURE — 85379 FIBRIN DEGRADATION QUANT: CPT | Performed by: INTERNAL MEDICINE

## 2022-01-01 PROCEDURE — 94761 N-INVAS EAR/PLS OXIMETRY MLT: CPT

## 2022-01-01 PROCEDURE — 93010 ELECTROCARDIOGRAM REPORT: CPT | Performed by: INTERNAL MEDICINE

## 2022-01-01 PROCEDURE — 99214 OFFICE O/P EST MOD 30 MIN: CPT | Performed by: FAMILY MEDICINE

## 2022-01-01 PROCEDURE — 99231 SBSQ HOSP IP/OBS SF/LOW 25: CPT | Performed by: INTERNAL MEDICINE

## 2022-01-01 PROCEDURE — 94664 DEMO&/EVAL PT USE INHALER: CPT

## 2022-01-01 PROCEDURE — 71275 CT ANGIOGRAPHY CHEST: CPT

## 2022-01-01 PROCEDURE — 0 FLUDEOXYGLUCOSE F18 SOLUTION: Performed by: INTERNAL MEDICINE

## 2022-01-01 PROCEDURE — 25010000002 SODIUM CHLORIDE 0.9 % WITH KCL 20 MEQ 20-0.9 MEQ/L-% SOLUTION: Performed by: INTERNAL MEDICINE

## 2022-01-01 PROCEDURE — 85025 COMPLETE CBC W/AUTO DIFF WBC: CPT | Performed by: NURSE PRACTITIONER

## 2022-01-01 PROCEDURE — 99232 SBSQ HOSP IP/OBS MODERATE 35: CPT | Performed by: INTERNAL MEDICINE

## 2022-01-01 PROCEDURE — 82550 ASSAY OF CK (CPK): CPT | Performed by: NURSE PRACTITIONER

## 2022-01-01 PROCEDURE — 84484 ASSAY OF TROPONIN QUANT: CPT | Performed by: EMERGENCY MEDICINE

## 2022-01-01 PROCEDURE — 80053 COMPREHEN METABOLIC PANEL: CPT

## 2022-01-01 PROCEDURE — 80053 COMPREHEN METABOLIC PANEL: CPT | Performed by: INTERNAL MEDICINE

## 2022-01-01 PROCEDURE — 87040 BLOOD CULTURE FOR BACTERIA: CPT | Performed by: EMERGENCY MEDICINE

## 2022-01-01 PROCEDURE — 94640 AIRWAY INHALATION TREATMENT: CPT

## 2022-01-01 PROCEDURE — 83735 ASSAY OF MAGNESIUM: CPT | Performed by: EMERGENCY MEDICINE

## 2022-01-01 PROCEDURE — 83605 ASSAY OF LACTIC ACID: CPT | Performed by: EMERGENCY MEDICINE

## 2022-01-01 PROCEDURE — 25010000002 CEFTRIAXONE PER 250 MG: Performed by: NURSE PRACTITIONER

## 2022-01-01 PROCEDURE — 84439 ASSAY OF FREE THYROXINE: CPT | Performed by: INTERNAL MEDICINE

## 2022-01-01 PROCEDURE — 87076 CULTURE ANAEROBE IDENT EACH: CPT | Performed by: EMERGENCY MEDICINE

## 2022-01-01 PROCEDURE — 0202U NFCT DS 22 TRGT SARS-COV-2: CPT | Performed by: EMERGENCY MEDICINE

## 2022-01-01 PROCEDURE — 85025 COMPLETE CBC W/AUTO DIFF WBC: CPT

## 2022-01-01 PROCEDURE — 97530 THERAPEUTIC ACTIVITIES: CPT

## 2022-01-01 PROCEDURE — 99285 EMERGENCY DEPT VISIT HI MDM: CPT

## 2022-01-01 PROCEDURE — 63710000001 DEXAMETHASONE PER 0.25 MG: Performed by: INTERNAL MEDICINE

## 2022-01-01 PROCEDURE — 25010000002 CEFTRIAXONE PER 250 MG: Performed by: EMERGENCY MEDICINE

## 2022-01-01 PROCEDURE — A9552 F18 FDG: HCPCS | Performed by: INTERNAL MEDICINE

## 2022-01-01 PROCEDURE — 25010000002 REMDESIVIR 100 MG/20ML SOLUTION 1 EACH VIAL: Performed by: INTERNAL MEDICINE

## 2022-01-01 PROCEDURE — 84443 ASSAY THYROID STIM HORMONE: CPT | Performed by: INTERNAL MEDICINE

## 2022-01-01 PROCEDURE — 80053 COMPREHEN METABOLIC PANEL: CPT | Performed by: NURSE PRACTITIONER

## 2022-01-01 PROCEDURE — 3E0333Z INTRODUCTION OF ANTI-INFLAMMATORY INTO PERIPHERAL VEIN, PERCUTANEOUS APPROACH: ICD-10-PCS | Performed by: INTERNAL MEDICINE

## 2022-01-01 PROCEDURE — 25010000002 ENOXAPARIN PER 10 MG: Performed by: NURSE PRACTITIONER

## 2022-01-01 PROCEDURE — 25010000002 LORAZEPAM PER 2 MG: Performed by: NURSE PRACTITIONER

## 2022-01-01 PROCEDURE — 84145 PROCALCITONIN (PCT): CPT | Performed by: EMERGENCY MEDICINE

## 2022-01-01 PROCEDURE — 25010000002 AZITHROMYCIN PER 500 MG: Performed by: EMERGENCY MEDICINE

## 2022-01-01 PROCEDURE — 84100 ASSAY OF PHOSPHORUS: CPT | Performed by: NURSE PRACTITIONER

## 2022-01-01 PROCEDURE — 87899 AGENT NOS ASSAY W/OPTIC: CPT | Performed by: NURSE PRACTITIONER

## 2022-01-01 PROCEDURE — 85007 BL SMEAR W/DIFF WBC COUNT: CPT | Performed by: EMERGENCY MEDICINE

## 2022-01-01 PROCEDURE — 25010000002 HEPARIN (PORCINE) PER 1000 UNITS: Performed by: INTERNAL MEDICINE

## 2022-01-01 PROCEDURE — 25010000002 HYDROMORPHONE 1 MG/ML SOLUTION: Performed by: INTERNAL MEDICINE

## 2022-01-01 PROCEDURE — 36415 COLL VENOUS BLD VENIPUNCTURE: CPT | Performed by: INTERNAL MEDICINE

## 2022-01-01 PROCEDURE — 36415 COLL VENOUS BLD VENIPUNCTURE: CPT | Performed by: NURSE PRACTITIONER

## 2022-01-01 PROCEDURE — 87899 AGENT NOS ASSAY W/OPTIC: CPT | Performed by: INTERNAL MEDICINE

## 2022-01-01 PROCEDURE — XW033E5 INTRODUCTION OF REMDESIVIR ANTI-INFECTIVE INTO PERIPHERAL VEIN, PERCUTANEOUS APPROACH, NEW TECHNOLOGY GROUP 5: ICD-10-PCS | Performed by: INTERNAL MEDICINE

## 2022-01-01 PROCEDURE — 99215 OFFICE O/P EST HI 40 MIN: CPT | Performed by: INTERNAL MEDICINE

## 2022-01-01 PROCEDURE — 82728 ASSAY OF FERRITIN: CPT | Performed by: NURSE PRACTITIONER

## 2022-01-01 PROCEDURE — 25010000002 HYDROMORPHONE PER 4 MG: Performed by: INTERNAL MEDICINE

## 2022-01-01 PROCEDURE — 25010000002 LORAZEPAM PER 2 MG: Performed by: INTERNAL MEDICINE

## 2022-01-01 PROCEDURE — 97110 THERAPEUTIC EXERCISES: CPT

## 2022-01-01 PROCEDURE — 80053 COMPREHEN METABOLIC PANEL: CPT | Performed by: EMERGENCY MEDICINE

## 2022-01-01 PROCEDURE — 71045 X-RAY EXAM CHEST 1 VIEW: CPT

## 2022-01-01 PROCEDURE — 99213 OFFICE O/P EST LOW 20 MIN: CPT | Performed by: INTERNAL MEDICINE

## 2022-01-01 PROCEDURE — 85007 BL SMEAR W/DIFF WBC COUNT: CPT | Performed by: INTERNAL MEDICINE

## 2022-01-01 PROCEDURE — 83615 LACTATE (LD) (LDH) ENZYME: CPT | Performed by: NURSE PRACTITIONER

## 2022-01-01 PROCEDURE — 87040 BLOOD CULTURE FOR BACTERIA: CPT | Performed by: INTERNAL MEDICINE

## 2022-01-01 PROCEDURE — 78815 PET IMAGE W/CT SKULL-THIGH: CPT

## 2022-01-01 PROCEDURE — 82962 GLUCOSE BLOOD TEST: CPT

## 2022-01-01 RX ORDER — ALBUTEROL SULFATE 2.5 MG/3ML
2.5 SOLUTION RESPIRATORY (INHALATION) EVERY 6 HOURS PRN
Status: DISCONTINUED | OUTPATIENT
Start: 2022-01-01 | End: 2022-01-01

## 2022-01-01 RX ORDER — UREA 10 %
3 LOTION (ML) TOPICAL
Status: DISCONTINUED | OUTPATIENT
Start: 2022-01-01 | End: 2022-01-01 | Stop reason: HOSPADM

## 2022-01-01 RX ORDER — MULTIPLE VITAMINS W/ MINERALS TAB 9MG-400MCG
1 TAB ORAL DAILY
Status: DISCONTINUED | OUTPATIENT
Start: 2022-01-01 | End: 2022-01-01

## 2022-01-01 RX ORDER — LORAZEPAM 2 MG/ML
1 CONCENTRATE ORAL
Status: DISCONTINUED | OUTPATIENT
Start: 2022-01-01 | End: 2022-03-24 | Stop reason: HOSPADM

## 2022-01-01 RX ORDER — MONTELUKAST SODIUM 10 MG/1
10 TABLET ORAL DAILY
Status: DISCONTINUED | OUTPATIENT
Start: 2022-01-01 | End: 2022-01-01 | Stop reason: HOSPADM

## 2022-01-01 RX ORDER — DIPHENOXYLATE HYDROCHLORIDE AND ATROPINE SULFATE 2.5; .025 MG/1; MG/1
1 TABLET ORAL
Status: DISCONTINUED | OUTPATIENT
Start: 2022-01-01 | End: 2022-03-24 | Stop reason: HOSPADM

## 2022-01-01 RX ORDER — DIGOXIN 125 MCG
125 TABLET ORAL DAILY
Status: DISCONTINUED | OUTPATIENT
Start: 2022-01-01 | End: 2022-01-01 | Stop reason: HOSPADM

## 2022-01-01 RX ORDER — AMOXICILLIN 250 MG
2 CAPSULE ORAL DAILY
Status: DISCONTINUED | OUTPATIENT
Start: 2022-01-01 | End: 2022-01-01

## 2022-01-01 RX ORDER — ONDANSETRON 4 MG/1
4 TABLET, FILM COATED ORAL EVERY 8 HOURS PRN
Start: 2022-01-01

## 2022-01-01 RX ORDER — MELATONIN
2000 DAILY
Status: DISCONTINUED | OUTPATIENT
Start: 2022-01-01 | End: 2022-01-01

## 2022-01-01 RX ORDER — UREA 10 %
3 LOTION (ML) TOPICAL
Status: DISCONTINUED | OUTPATIENT
Start: 2022-01-01 | End: 2022-01-01

## 2022-01-01 RX ORDER — ACETAMINOPHEN 325 MG/1
650 TABLET ORAL EVERY 4 HOURS PRN
Status: DISCONTINUED | OUTPATIENT
Start: 2022-01-01 | End: 2022-03-24 | Stop reason: HOSPADM

## 2022-01-01 RX ORDER — PANTOPRAZOLE SODIUM 40 MG/1
40 TABLET, DELAYED RELEASE ORAL
Status: DISCONTINUED | OUTPATIENT
Start: 2022-01-01 | End: 2022-01-01 | Stop reason: HOSPADM

## 2022-01-01 RX ORDER — ALBUTEROL SULFATE 2.5 MG/3ML
2.5 SOLUTION RESPIRATORY (INHALATION) EVERY 4 HOURS PRN
Status: DISCONTINUED | OUTPATIENT
Start: 2022-01-01 | End: 2022-01-01 | Stop reason: HOSPADM

## 2022-01-01 RX ORDER — KETOROLAC TROMETHAMINE 30 MG/ML
15 INJECTION, SOLUTION INTRAMUSCULAR; INTRAVENOUS EVERY 6 HOURS PRN
Status: DISCONTINUED | OUTPATIENT
Start: 2022-01-01 | End: 2022-03-24 | Stop reason: HOSPADM

## 2022-01-01 RX ORDER — ACETAMINOPHEN 160 MG/5ML
650 SOLUTION ORAL EVERY 4 HOURS PRN
Status: DISCONTINUED | OUTPATIENT
Start: 2022-01-01 | End: 2022-03-24 | Stop reason: HOSPADM

## 2022-01-01 RX ORDER — MORPHINE SULFATE 20 MG/ML
5 SOLUTION ORAL
Status: DISCONTINUED | OUTPATIENT
Start: 2022-01-01 | End: 2022-03-24 | Stop reason: HOSPADM

## 2022-01-01 RX ORDER — ACETAMINOPHEN 650 MG/1
650 SUPPOSITORY RECTAL EVERY 4 HOURS PRN
Status: DISCONTINUED | OUTPATIENT
Start: 2022-01-01 | End: 2022-01-01 | Stop reason: HOSPADM

## 2022-01-01 RX ORDER — AMOXICILLIN 250 MG
2 CAPSULE ORAL DAILY
Status: DISCONTINUED | OUTPATIENT
Start: 2022-01-01 | End: 2022-01-01 | Stop reason: HOSPADM

## 2022-01-01 RX ORDER — DEXAMETHASONE SODIUM PHOSPHATE 10 MG/ML
6 INJECTION INTRAMUSCULAR; INTRAVENOUS ONCE
Status: COMPLETED | OUTPATIENT
Start: 2022-01-01 | End: 2022-01-01

## 2022-01-01 RX ORDER — DILTIAZEM HYDROCHLORIDE 180 MG/1
180 CAPSULE, COATED, EXTENDED RELEASE ORAL DAILY
Status: DISCONTINUED | OUTPATIENT
Start: 2022-01-01 | End: 2022-01-01 | Stop reason: HOSPADM

## 2022-01-01 RX ORDER — SODIUM CHLORIDE 0.9 % (FLUSH) 0.9 %
10 SYRINGE (ML) INJECTION EVERY 12 HOURS SCHEDULED
Status: DISCONTINUED | OUTPATIENT
Start: 2022-01-01 | End: 2022-03-24 | Stop reason: HOSPADM

## 2022-01-01 RX ORDER — SODIUM CHLORIDE 0.9 % (FLUSH) 0.9 %
10 SYRINGE (ML) INJECTION AS NEEDED
Status: DISCONTINUED | OUTPATIENT
Start: 2022-01-01 | End: 2022-03-24 | Stop reason: HOSPADM

## 2022-01-01 RX ORDER — DEXAMETHASONE 2 MG/1
2 TABLET ORAL DAILY
Status: DISCONTINUED | OUTPATIENT
Start: 2022-01-01 | End: 2022-01-01 | Stop reason: HOSPADM

## 2022-01-01 RX ORDER — MORPHINE SULFATE 2 MG/ML
2 INJECTION, SOLUTION INTRAMUSCULAR; INTRAVENOUS
Status: DISCONTINUED | OUTPATIENT
Start: 2022-01-01 | End: 2022-03-24 | Stop reason: HOSPADM

## 2022-01-01 RX ORDER — IPRATROPIUM BROMIDE AND ALBUTEROL SULFATE 2.5; .5 MG/3ML; MG/3ML
3 SOLUTION RESPIRATORY (INHALATION) EVERY 4 HOURS PRN
COMMUNITY

## 2022-01-01 RX ORDER — HYDROXYZINE HYDROCHLORIDE 25 MG/1
25 TABLET, FILM COATED ORAL DAILY PRN
COMMUNITY

## 2022-01-01 RX ORDER — ACETAMINOPHEN 325 MG/1
650 TABLET ORAL EVERY 4 HOURS PRN
Status: DISCONTINUED | OUTPATIENT
Start: 2022-01-01 | End: 2022-01-01 | Stop reason: HOSPADM

## 2022-01-01 RX ORDER — FLUTICASONE PROPIONATE 50 MCG
2 SPRAY, SUSPENSION (ML) NASAL 2 TIMES DAILY
Status: DISCONTINUED | OUTPATIENT
Start: 2022-01-01 | End: 2022-01-01

## 2022-01-01 RX ORDER — SODIUM CHLORIDE AND POTASSIUM CHLORIDE 150; 900 MG/100ML; MG/100ML
100 INJECTION, SOLUTION INTRAVENOUS CONTINUOUS
Status: DISCONTINUED | OUTPATIENT
Start: 2022-01-01 | End: 2022-01-01

## 2022-01-01 RX ORDER — METHIMAZOLE 5 MG/1
7.5 TABLET ORAL DAILY
Status: DISCONTINUED | OUTPATIENT
Start: 2022-01-01 | End: 2022-01-01

## 2022-01-01 RX ORDER — ACETAMINOPHEN 325 MG/1
650 TABLET ORAL EVERY 6 HOURS PRN
Start: 2022-01-01

## 2022-01-01 RX ORDER — LORAZEPAM 2 MG/ML
1 INJECTION INTRAMUSCULAR
Status: DISCONTINUED | OUTPATIENT
Start: 2022-01-01 | End: 2022-03-24 | Stop reason: HOSPADM

## 2022-01-01 RX ORDER — LORAZEPAM 2 MG/ML
0.5 INJECTION INTRAMUSCULAR ONCE
Status: COMPLETED | OUTPATIENT
Start: 2022-01-01 | End: 2022-01-01

## 2022-01-01 RX ORDER — HYDROXYZINE HYDROCHLORIDE 25 MG/1
25 TABLET, FILM COATED ORAL DAILY PRN
Status: DISCONTINUED | OUTPATIENT
Start: 2022-01-01 | End: 2022-03-24 | Stop reason: HOSPADM

## 2022-01-01 RX ORDER — LORAZEPAM 2 MG/ML
0.5 CONCENTRATE ORAL
Status: DISCONTINUED | OUTPATIENT
Start: 2022-01-01 | End: 2022-03-24 | Stop reason: HOSPADM

## 2022-01-01 RX ORDER — MORPHINE SULFATE 20 MG/ML
10 SOLUTION ORAL
Status: DISCONTINUED | OUTPATIENT
Start: 2022-01-01 | End: 2022-03-24 | Stop reason: HOSPADM

## 2022-01-01 RX ORDER — ONDANSETRON 2 MG/ML
4 INJECTION INTRAMUSCULAR; INTRAVENOUS EVERY 6 HOURS PRN
Status: DISCONTINUED | OUTPATIENT
Start: 2022-01-01 | End: 2022-03-24 | Stop reason: HOSPADM

## 2022-01-01 RX ORDER — DEXAMETHASONE SODIUM PHOSPHATE 4 MG/ML
2 INJECTION, SOLUTION INTRA-ARTICULAR; INTRALESIONAL; INTRAMUSCULAR; INTRAVENOUS; SOFT TISSUE DAILY
Status: DISCONTINUED | OUTPATIENT
Start: 2022-01-01 | End: 2022-01-01 | Stop reason: HOSPADM

## 2022-01-01 RX ORDER — FLUTICASONE PROPIONATE 50 MCG
2 SPRAY, SUSPENSION (ML) NASAL 2 TIMES DAILY
Status: DISCONTINUED | OUTPATIENT
Start: 2022-01-01 | End: 2022-01-01 | Stop reason: HOSPADM

## 2022-01-01 RX ORDER — DEXAMETHASONE 4 MG/1
4 TABLET ORAL DAILY
Status: DISCONTINUED | OUTPATIENT
Start: 2022-01-01 | End: 2022-01-01

## 2022-01-01 RX ORDER — HYDROMORPHONE HYDROCHLORIDE 1 MG/ML
0.5 INJECTION, SOLUTION INTRAMUSCULAR; INTRAVENOUS; SUBCUTANEOUS
Status: DISCONTINUED | OUTPATIENT
Start: 2022-01-01 | End: 2022-03-24 | Stop reason: HOSPADM

## 2022-01-01 RX ORDER — LORAZEPAM 2 MG/ML
2 INJECTION INTRAMUSCULAR
Status: DISCONTINUED | OUTPATIENT
Start: 2022-01-01 | End: 2022-03-24 | Stop reason: HOSPADM

## 2022-01-01 RX ORDER — MELATONIN
2000 DAILY
Status: DISCONTINUED | OUTPATIENT
Start: 2022-01-01 | End: 2022-01-01 | Stop reason: HOSPADM

## 2022-01-01 RX ORDER — LEVOFLOXACIN 500 MG/1
250 TABLET, FILM COATED ORAL DAILY
Qty: 7 TABLET | Refills: 0 | Status: SHIPPED | OUTPATIENT
Start: 2022-01-01 | End: 2022-01-01 | Stop reason: HOSPADM

## 2022-01-01 RX ORDER — DEXAMETHASONE 6 MG/1
6 TABLET ORAL DAILY
Status: DISCONTINUED | OUTPATIENT
Start: 2022-01-01 | End: 2022-01-01

## 2022-01-01 RX ORDER — MORPHINE SULFATE 2 MG/ML
6 INJECTION, SOLUTION INTRAMUSCULAR; INTRAVENOUS
Status: DISCONTINUED | OUTPATIENT
Start: 2022-01-01 | End: 2022-03-24 | Stop reason: HOSPADM

## 2022-01-01 RX ORDER — DILTIAZEM HYDROCHLORIDE 180 MG/1
180 CAPSULE, COATED, EXTENDED RELEASE ORAL DAILY
Status: DISCONTINUED | OUTPATIENT
Start: 2022-01-01 | End: 2022-01-01

## 2022-01-01 RX ORDER — LANOLIN ALCOHOL/MO/W.PET/CERES
3 CREAM (GRAM) TOPICAL
Qty: 30 TABLET
Start: 2022-01-01

## 2022-01-01 RX ORDER — MULTIPLE VITAMINS W/ MINERALS TAB 9MG-400MCG
1 TAB ORAL DAILY
Status: DISCONTINUED | OUTPATIENT
Start: 2022-01-01 | End: 2022-01-01 | Stop reason: HOSPADM

## 2022-01-01 RX ORDER — MORPHINE SULFATE 20 MG/ML
20 SOLUTION ORAL
Status: DISCONTINUED | OUTPATIENT
Start: 2022-01-01 | End: 2022-03-24 | Stop reason: HOSPADM

## 2022-01-01 RX ORDER — DILTIAZEM HYDROCHLORIDE 120 MG/1
120 CAPSULE, COATED, EXTENDED RELEASE ORAL DAILY
Status: DISCONTINUED | OUTPATIENT
Start: 2022-01-01 | End: 2022-01-01

## 2022-01-01 RX ORDER — MORPHINE SULFATE 2 MG/ML
4 INJECTION, SOLUTION INTRAMUSCULAR; INTRAVENOUS
Status: DISCONTINUED | OUTPATIENT
Start: 2022-01-01 | End: 2022-03-24 | Stop reason: HOSPADM

## 2022-01-01 RX ORDER — FAMOTIDINE 20 MG/1
20 TABLET, FILM COATED ORAL 2 TIMES DAILY PRN
Status: DISCONTINUED | OUTPATIENT
Start: 2022-01-01 | End: 2022-03-24 | Stop reason: HOSPADM

## 2022-01-01 RX ORDER — HEPARIN SODIUM 5000 [USP'U]/ML
5000 INJECTION, SOLUTION INTRAVENOUS; SUBCUTANEOUS EVERY 12 HOURS SCHEDULED
Status: DISCONTINUED | OUTPATIENT
Start: 2022-01-01 | End: 2022-01-01 | Stop reason: HOSPADM

## 2022-01-01 RX ORDER — ACETAMINOPHEN 160 MG/5ML
650 SOLUTION ORAL EVERY 4 HOURS PRN
Status: DISCONTINUED | OUTPATIENT
Start: 2022-01-01 | End: 2022-01-01 | Stop reason: HOSPADM

## 2022-01-01 RX ORDER — LORAZEPAM 2 MG/ML
0.5 INJECTION INTRAMUSCULAR
Status: DISCONTINUED | OUTPATIENT
Start: 2022-01-01 | End: 2022-03-24 | Stop reason: HOSPADM

## 2022-01-01 RX ORDER — NITROGLYCERIN 0.4 MG/1
0.4 TABLET SUBLINGUAL
Status: DISCONTINUED | OUTPATIENT
Start: 2022-01-01 | End: 2022-01-01 | Stop reason: HOSPADM

## 2022-01-01 RX ORDER — BUDESONIDE AND FORMOTEROL FUMARATE DIHYDRATE 160; 4.5 UG/1; UG/1
2 AEROSOL RESPIRATORY (INHALATION)
Status: DISCONTINUED | OUTPATIENT
Start: 2022-01-01 | End: 2022-01-01 | Stop reason: HOSPADM

## 2022-01-01 RX ORDER — MONTELUKAST SODIUM 10 MG/1
10 TABLET ORAL DAILY
Status: DISCONTINUED | OUTPATIENT
Start: 2022-01-01 | End: 2022-01-01

## 2022-01-01 RX ORDER — LORAZEPAM 2 MG/ML
2 CONCENTRATE ORAL
Status: DISCONTINUED | OUTPATIENT
Start: 2022-01-01 | End: 2022-03-24 | Stop reason: HOSPADM

## 2022-01-01 RX ORDER — UREA 10 %
3 LOTION (ML) TOPICAL NIGHTLY
Status: DISCONTINUED | OUTPATIENT
Start: 2022-01-01 | End: 2022-01-01

## 2022-01-01 RX ORDER — IPRATROPIUM BROMIDE AND ALBUTEROL SULFATE 2.5; .5 MG/3ML; MG/3ML
3 SOLUTION RESPIRATORY (INHALATION) ONCE
Status: COMPLETED | OUTPATIENT
Start: 2022-01-01 | End: 2022-01-01

## 2022-01-01 RX ORDER — CLINDAMYCIN HYDROCHLORIDE 150 MG/1
150 CAPSULE ORAL 3 TIMES DAILY
Qty: 30 CAPSULE | Refills: 0 | Status: SHIPPED | OUTPATIENT
Start: 2022-01-01 | End: 2022-01-01 | Stop reason: HOSPADM

## 2022-01-01 RX ORDER — ACETAMINOPHEN 325 MG/1
650 TABLET ORAL EVERY 6 HOURS PRN
Status: DISCONTINUED | OUTPATIENT
Start: 2022-01-01 | End: 2022-03-24 | Stop reason: HOSPADM

## 2022-01-01 RX ORDER — UREA 10 %
1 LOTION (ML) TOPICAL NIGHTLY
Status: DISCONTINUED | OUTPATIENT
Start: 2022-01-01 | End: 2022-01-01

## 2022-01-01 RX ORDER — METHIMAZOLE 5 MG/1
7.5 TABLET ORAL DAILY
Status: DISCONTINUED | OUTPATIENT
Start: 2022-01-01 | End: 2022-01-01 | Stop reason: HOSPADM

## 2022-01-01 RX ORDER — ACETAMINOPHEN 650 MG/1
650 SUPPOSITORY RECTAL EVERY 4 HOURS PRN
Status: DISCONTINUED | OUTPATIENT
Start: 2022-01-01 | End: 2022-03-24 | Stop reason: HOSPADM

## 2022-01-01 RX ORDER — SODIUM CHLORIDE 0.9 % (FLUSH) 0.9 %
10 SYRINGE (ML) INJECTION AS NEEDED
Status: DISCONTINUED | OUTPATIENT
Start: 2022-01-01 | End: 2022-01-01 | Stop reason: HOSPADM

## 2022-01-01 RX ORDER — DEXAMETHASONE SODIUM PHOSPHATE 4 MG/ML
4 INJECTION, SOLUTION INTRA-ARTICULAR; INTRALESIONAL; INTRAMUSCULAR; INTRAVENOUS; SOFT TISSUE DAILY
Status: DISCONTINUED | OUTPATIENT
Start: 2022-01-01 | End: 2022-01-01

## 2022-01-01 RX ORDER — IPRATROPIUM BROMIDE AND ALBUTEROL SULFATE 2.5; .5 MG/3ML; MG/3ML
3 SOLUTION RESPIRATORY (INHALATION) EVERY 4 HOURS PRN
Status: DISCONTINUED | OUTPATIENT
Start: 2022-01-01 | End: 2022-03-24 | Stop reason: HOSPADM

## 2022-01-01 RX ORDER — ONDANSETRON 2 MG/ML
4 INJECTION INTRAMUSCULAR; INTRAVENOUS EVERY 6 HOURS PRN
Status: DISCONTINUED | OUTPATIENT
Start: 2022-01-01 | End: 2022-01-01 | Stop reason: HOSPADM

## 2022-01-01 RX ORDER — MIRTAZAPINE 15 MG/1
15 TABLET, FILM COATED ORAL NIGHTLY
Status: DISCONTINUED | OUTPATIENT
Start: 2022-01-01 | End: 2022-01-01

## 2022-01-01 RX ORDER — GLYCOPYRROLATE 0.2 MG/ML
0.2 INJECTION INTRAMUSCULAR; INTRAVENOUS
Status: DISCONTINUED | OUTPATIENT
Start: 2022-01-01 | End: 2022-03-24 | Stop reason: HOSPADM

## 2022-01-01 RX ORDER — DOXYCYCLINE HYCLATE 100 MG/1
100 CAPSULE ORAL DAILY
COMMUNITY
Start: 2022-01-01

## 2022-01-01 RX ORDER — ALBUTEROL SULFATE 2.5 MG/3ML
2.5 SOLUTION RESPIRATORY (INHALATION) ONCE
Status: COMPLETED | OUTPATIENT
Start: 2022-01-01 | End: 2022-01-01

## 2022-01-01 RX ORDER — DILTIAZEM HYDROCHLORIDE 180 MG/1
180 CAPSULE, COATED, EXTENDED RELEASE ORAL DAILY
Start: 2022-01-01

## 2022-01-01 RX ORDER — MIRTAZAPINE 15 MG/1
15 TABLET, FILM COATED ORAL NIGHTLY
COMMUNITY

## 2022-01-01 RX ORDER — DEXAMETHASONE SODIUM PHOSPHATE 10 MG/ML
6 INJECTION INTRAMUSCULAR; INTRAVENOUS DAILY
Status: DISCONTINUED | OUTPATIENT
Start: 2022-01-01 | End: 2022-01-01

## 2022-01-01 RX ORDER — DIPHENOXYLATE HYDROCHLORIDE AND ATROPINE SULFATE 2.5; .025 MG/1; MG/1
1 TABLET ORAL
Status: DISCONTINUED | OUTPATIENT
Start: 2022-01-01 | End: 2022-01-01 | Stop reason: SDUPTHER

## 2022-01-01 RX ORDER — LEVOTHYROXINE SODIUM 0.03 MG/1
25 TABLET ORAL DAILY
COMMUNITY

## 2022-01-01 RX ORDER — BUDESONIDE AND FORMOTEROL FUMARATE DIHYDRATE 160; 4.5 UG/1; UG/1
2 AEROSOL RESPIRATORY (INHALATION)
Status: DISCONTINUED | OUTPATIENT
Start: 2022-01-01 | End: 2022-01-01

## 2022-01-01 RX ORDER — ALBUTEROL SULFATE 2.5 MG/3ML
2.5 SOLUTION RESPIRATORY (INHALATION)
Status: DISCONTINUED | OUTPATIENT
Start: 2022-01-01 | End: 2022-01-01 | Stop reason: HOSPADM

## 2022-01-01 RX ORDER — GLYCOPYRROLATE 0.2 MG/ML
0.4 INJECTION INTRAMUSCULAR; INTRAVENOUS
Status: DISCONTINUED | OUTPATIENT
Start: 2022-01-01 | End: 2022-03-24 | Stop reason: HOSPADM

## 2022-01-01 RX ORDER — METHIMAZOLE 5 MG/1
TABLET ORAL
Qty: 135 TABLET | Refills: 1 | Status: SHIPPED | OUTPATIENT
Start: 2022-01-01

## 2022-01-01 RX ORDER — ALBUTEROL SULFATE 90 UG/1
2 AEROSOL, METERED RESPIRATORY (INHALATION) EVERY 4 HOURS PRN
Qty: 8.5 G | Refills: 10 | Status: SHIPPED | OUTPATIENT
Start: 2022-01-01

## 2022-01-01 RX ORDER — ALBUTEROL SULFATE 90 UG/1
2 AEROSOL, METERED RESPIRATORY (INHALATION) EVERY 4 HOURS PRN
Status: DISCONTINUED | OUTPATIENT
Start: 2022-01-01 | End: 2022-03-24 | Stop reason: HOSPADM

## 2022-01-01 RX ORDER — ACYCLOVIR 400 MG/1
400 TABLET ORAL 2 TIMES DAILY PRN
Status: DISCONTINUED | OUTPATIENT
Start: 2022-01-01 | End: 2022-03-24 | Stop reason: HOSPADM

## 2022-01-01 RX ORDER — DEXAMETHASONE 1 MG
TABLET ORAL
Start: 2022-01-01 | End: 2022-01-01

## 2022-01-01 RX ORDER — FAMOTIDINE 20 MG/1
20 TABLET, FILM COATED ORAL 2 TIMES DAILY PRN
Start: 2022-01-01

## 2022-01-01 RX ORDER — LEVOTHYROXINE SODIUM 0.03 MG/1
25 TABLET ORAL
Status: DISCONTINUED | OUTPATIENT
Start: 2022-01-01 | End: 2022-01-01

## 2022-01-01 RX ORDER — DIGOXIN 125 MCG
125 TABLET ORAL DAILY
Status: DISCONTINUED | OUTPATIENT
Start: 2022-01-01 | End: 2022-01-01

## 2022-01-01 RX ORDER — SODIUM CHLORIDE 0.9 % (FLUSH) 0.9 %
10 SYRINGE (ML) INJECTION EVERY 12 HOURS SCHEDULED
Status: DISCONTINUED | OUTPATIENT
Start: 2022-01-01 | End: 2022-01-01 | Stop reason: HOSPADM

## 2022-01-01 RX ADMIN — ALBUTEROL SULFATE 2.5 MG: 2.5 SOLUTION RESPIRATORY (INHALATION) at 11:29

## 2022-01-01 RX ADMIN — METHIMAZOLE 7.5 MG: 5 TABLET ORAL at 08:20

## 2022-01-01 RX ADMIN — REMDESIVIR 200 MG: 100 INJECTION, POWDER, LYOPHILIZED, FOR SOLUTION INTRAVENOUS at 12:17

## 2022-01-01 RX ADMIN — DIGOXIN 125 MCG: 125 TABLET ORAL at 08:49

## 2022-01-01 RX ADMIN — NYSTATIN 500000 UNITS: 500000 SUSPENSION ORAL at 20:19

## 2022-01-01 RX ADMIN — PANTOPRAZOLE SODIUM 40 MG: 40 TABLET, DELAYED RELEASE ORAL at 12:16

## 2022-01-01 RX ADMIN — NYSTATIN 500000 UNITS: 500000 SUSPENSION ORAL at 17:49

## 2022-01-01 RX ADMIN — ALBUTEROL SULFATE 2.5 MG: 2.5 SOLUTION RESPIRATORY (INHALATION) at 08:00

## 2022-01-01 RX ADMIN — NYSTATIN 500000 UNITS: 500000 SUSPENSION ORAL at 11:33

## 2022-01-01 RX ADMIN — MONTELUKAST SODIUM 10 MG: 10 TABLET, FILM COATED ORAL at 08:20

## 2022-01-01 RX ADMIN — METRONIDAZOLE 1 APPLICATION: 7.5 CREAM TOPICAL at 12:24

## 2022-01-01 RX ADMIN — MULTIPLE VITAMINS W/ MINERALS TAB 1 TABLET: TAB at 08:48

## 2022-01-01 RX ADMIN — FLUTICASONE PROPIONATE 2 SPRAY: 50 SPRAY, METERED NASAL at 12:18

## 2022-01-01 RX ADMIN — MONTELUKAST SODIUM 10 MG: 10 TABLET, FILM COATED ORAL at 08:48

## 2022-01-01 RX ADMIN — SODIUM CHLORIDE 500 MG: 9 INJECTION, SOLUTION INTRAVENOUS at 00:51

## 2022-01-01 RX ADMIN — HEPARIN SODIUM 5000 UNITS: 5000 INJECTION INTRAVENOUS; SUBCUTANEOUS at 20:48

## 2022-01-01 RX ADMIN — MULTIPLE VITAMINS W/ MINERALS TAB 1 TABLET: TAB at 08:49

## 2022-01-01 RX ADMIN — DIGOXIN 125 MCG: 125 TABLET ORAL at 08:46

## 2022-01-01 RX ADMIN — CEFTRIAXONE 1 G: 1 INJECTION, POWDER, FOR SOLUTION INTRAMUSCULAR; INTRAVENOUS at 20:33

## 2022-01-01 RX ADMIN — LORAZEPAM 0.5 MG: 2 INJECTION INTRAMUSCULAR; INTRAVENOUS at 20:34

## 2022-01-01 RX ADMIN — PANTOPRAZOLE SODIUM 40 MG: 40 TABLET, DELAYED RELEASE ORAL at 05:12

## 2022-01-01 RX ADMIN — HYDROMORPHONE HYDROCHLORIDE 1 MG: 1 INJECTION, SOLUTION INTRAMUSCULAR; INTRAVENOUS; SUBCUTANEOUS at 12:24

## 2022-01-01 RX ADMIN — FLUTICASONE PROPIONATE 2 SPRAY: 50 SPRAY, METERED NASAL at 20:20

## 2022-01-01 RX ADMIN — DIGOXIN 125 MCG: 125 TABLET ORAL at 08:21

## 2022-01-01 RX ADMIN — DOCUSATE SODIUM 50MG AND SENNOSIDES 8.6MG 2 TABLET: 8.6; 5 TABLET, FILM COATED ORAL at 08:21

## 2022-01-01 RX ADMIN — ENOXAPARIN SODIUM 30 MG: 30 INJECTION SUBCUTANEOUS at 20:20

## 2022-01-01 RX ADMIN — SODIUM CHLORIDE, PRESERVATIVE FREE 10 ML: 5 INJECTION INTRAVENOUS at 20:28

## 2022-01-01 RX ADMIN — DEXAMETHASONE 6 MG: 6 TABLET ORAL at 09:01

## 2022-01-01 RX ADMIN — SODIUM CHLORIDE, PRESERVATIVE FREE 10 ML: 5 INJECTION INTRAVENOUS at 20:20

## 2022-01-01 RX ADMIN — DOCUSATE SODIUM 50MG AND SENNOSIDES 8.6MG 2 TABLET: 8.6; 5 TABLET, FILM COATED ORAL at 08:20

## 2022-01-01 RX ADMIN — HEPARIN SODIUM 5000 UNITS: 5000 INJECTION INTRAVENOUS; SUBCUTANEOUS at 20:27

## 2022-01-01 RX ADMIN — POTASSIUM CHLORIDE AND SODIUM CHLORIDE 100 ML/HR: 900; 150 INJECTION, SOLUTION INTRAVENOUS at 06:35

## 2022-01-01 RX ADMIN — HEPARIN SODIUM 5000 UNITS: 5000 INJECTION INTRAVENOUS; SUBCUTANEOUS at 12:47

## 2022-01-01 RX ADMIN — HEPARIN SODIUM 5000 UNITS: 5000 INJECTION INTRAVENOUS; SUBCUTANEOUS at 08:49

## 2022-01-01 RX ADMIN — Medication 2000 UNITS: at 08:17

## 2022-01-01 RX ADMIN — PANTOPRAZOLE SODIUM 40 MG: 40 TABLET, DELAYED RELEASE ORAL at 06:35

## 2022-01-01 RX ADMIN — POTASSIUM CHLORIDE AND SODIUM CHLORIDE 100 ML/HR: 900; 150 INJECTION, SOLUTION INTRAVENOUS at 23:05

## 2022-01-01 RX ADMIN — SODIUM CHLORIDE, PRESERVATIVE FREE 10 ML: 5 INJECTION INTRAVENOUS at 12:17

## 2022-01-01 RX ADMIN — DILTIAZEM HYDROCHLORIDE 120 MG: 120 CAPSULE, COATED, EXTENDED RELEASE ORAL at 08:17

## 2022-01-01 RX ADMIN — METRONIDAZOLE 1 APPLICATION: 7.5 CREAM TOPICAL at 12:19

## 2022-01-01 RX ADMIN — HEPARIN SODIUM 5000 UNITS: 5000 INJECTION INTRAVENOUS; SUBCUTANEOUS at 08:16

## 2022-01-01 RX ADMIN — TIOTROPIUM BROMIDE INHALATION SPRAY 2 PUFF: 3.12 SPRAY, METERED RESPIRATORY (INHALATION) at 07:13

## 2022-01-01 RX ADMIN — FLUTICASONE PROPIONATE 2 SPRAY: 50 SPRAY, METERED NASAL at 08:21

## 2022-01-01 RX ADMIN — NYSTATIN 500000 UNITS: 500000 SUSPENSION ORAL at 20:33

## 2022-01-01 RX ADMIN — FLUTICASONE PROPIONATE 2 SPRAY: 50 SPRAY, METERED NASAL at 20:18

## 2022-01-01 RX ADMIN — NYSTATIN 500000 UNITS: 500000 SUSPENSION ORAL at 08:16

## 2022-01-01 RX ADMIN — DILTIAZEM HYDROCHLORIDE 180 MG: 180 CAPSULE, COATED, EXTENDED RELEASE ORAL at 08:49

## 2022-01-01 RX ADMIN — DILTIAZEM HYDROCHLORIDE 120 MG: 120 CAPSULE, COATED, EXTENDED RELEASE ORAL at 08:21

## 2022-01-01 RX ADMIN — Medication 1 MG: at 20:33

## 2022-01-01 RX ADMIN — Medication 2000 UNITS: at 08:20

## 2022-01-01 RX ADMIN — BUDESONIDE AND FORMOTEROL FUMARATE DIHYDRATE 2 PUFF: 160; 4.5 AEROSOL RESPIRATORY (INHALATION) at 20:52

## 2022-01-01 RX ADMIN — DEXAMETHASONE 4 MG: 4 TABLET ORAL at 08:48

## 2022-01-01 RX ADMIN — FLUTICASONE PROPIONATE 2 SPRAY: 50 SPRAY, METERED NASAL at 08:25

## 2022-01-01 RX ADMIN — REMDESIVIR 100 MG: 100 INJECTION, POWDER, LYOPHILIZED, FOR SOLUTION INTRAVENOUS at 11:35

## 2022-01-01 RX ADMIN — TIOTROPIUM BROMIDE INHALATION SPRAY 2 PUFF: 3.12 SPRAY, METERED RESPIRATORY (INHALATION) at 08:13

## 2022-01-01 RX ADMIN — CEFTRIAXONE 1 G: 1 INJECTION, POWDER, FOR SOLUTION INTRAMUSCULAR; INTRAVENOUS at 20:48

## 2022-01-01 RX ADMIN — LORAZEPAM 0.5 MG: 2 INJECTION INTRAMUSCULAR; INTRAVENOUS at 07:20

## 2022-01-01 RX ADMIN — NYSTATIN 500000 UNITS: 500000 SUSPENSION ORAL at 18:17

## 2022-01-01 RX ADMIN — METHIMAZOLE 7.5 MG: 5 TABLET ORAL at 08:48

## 2022-01-01 RX ADMIN — SODIUM CHLORIDE, PRESERVATIVE FREE 10 ML: 5 INJECTION INTRAVENOUS at 08:17

## 2022-01-01 RX ADMIN — DILTIAZEM HYDROCHLORIDE 120 MG: 120 CAPSULE, COATED, EXTENDED RELEASE ORAL at 08:25

## 2022-01-01 RX ADMIN — DEXAMETHASONE 4 MG: 4 TABLET ORAL at 08:16

## 2022-01-01 RX ADMIN — Medication 2000 UNITS: at 12:13

## 2022-01-01 RX ADMIN — NYSTATIN 500000 UNITS: 500000 SUSPENSION ORAL at 17:29

## 2022-01-01 RX ADMIN — BUDESONIDE AND FORMOTEROL FUMARATE DIHYDRATE 2 PUFF: 160; 4.5 AEROSOL RESPIRATORY (INHALATION) at 21:02

## 2022-01-01 RX ADMIN — DIGOXIN 125 MCG: 125 TABLET ORAL at 12:13

## 2022-01-01 RX ADMIN — SODIUM CHLORIDE, PRESERVATIVE FREE 10 ML: 5 INJECTION INTRAVENOUS at 23:52

## 2022-01-01 RX ADMIN — METRONIDAZOLE 1 APPLICATION: 7.5 CREAM TOPICAL at 08:17

## 2022-01-01 RX ADMIN — MULTIPLE VITAMINS W/ MINERALS TAB 1 TABLET: TAB at 12:16

## 2022-01-01 RX ADMIN — Medication 2000 UNITS: at 08:48

## 2022-01-01 RX ADMIN — SODIUM CHLORIDE, PRESERVATIVE FREE 10 ML: 5 INJECTION INTRAVENOUS at 20:34

## 2022-01-01 RX ADMIN — Medication 3 MG: at 17:34

## 2022-01-01 RX ADMIN — CEFTRIAXONE 1 G: 1 INJECTION, POWDER, FOR SOLUTION INTRAMUSCULAR; INTRAVENOUS at 20:27

## 2022-01-01 RX ADMIN — TIOTROPIUM BROMIDE INHALATION SPRAY 2 PUFF: 3.12 SPRAY, METERED RESPIRATORY (INHALATION) at 08:02

## 2022-01-01 RX ADMIN — FLUTICASONE PROPIONATE 2 SPRAY: 50 SPRAY, METERED NASAL at 20:48

## 2022-01-01 RX ADMIN — REMDESIVIR 100 MG: 100 INJECTION, POWDER, LYOPHILIZED, FOR SOLUTION INTRAVENOUS at 10:33

## 2022-01-01 RX ADMIN — BUDESONIDE AND FORMOTEROL FUMARATE DIHYDRATE 2 PUFF: 160; 4.5 AEROSOL RESPIRATORY (INHALATION) at 08:02

## 2022-01-01 RX ADMIN — FLUTICASONE PROPIONATE 2 SPRAY: 50 SPRAY, METERED NASAL at 12:24

## 2022-01-01 RX ADMIN — HEPARIN SODIUM 5000 UNITS: 5000 INJECTION INTRAVENOUS; SUBCUTANEOUS at 20:18

## 2022-01-01 RX ADMIN — PANTOPRAZOLE SODIUM 40 MG: 40 TABLET, DELAYED RELEASE ORAL at 05:16

## 2022-01-01 RX ADMIN — NYSTATIN 500000 UNITS: 500000 SUSPENSION ORAL at 08:48

## 2022-01-01 RX ADMIN — ALBUTEROL SULFATE 2.5 MG: 2.5 SOLUTION RESPIRATORY (INHALATION) at 14:40

## 2022-01-01 RX ADMIN — DOCUSATE SODIUM 50MG AND SENNOSIDES 8.6MG 2 TABLET: 8.6; 5 TABLET, FILM COATED ORAL at 10:30

## 2022-01-01 RX ADMIN — NYSTATIN 500000 UNITS: 500000 SUSPENSION ORAL at 12:27

## 2022-01-01 RX ADMIN — BUDESONIDE AND FORMOTEROL FUMARATE DIHYDRATE 2 PUFF: 160; 4.5 AEROSOL RESPIRATORY (INHALATION) at 08:13

## 2022-01-01 RX ADMIN — SODIUM CHLORIDE, PRESERVATIVE FREE 10 ML: 5 INJECTION INTRAVENOUS at 20:49

## 2022-01-01 RX ADMIN — NYSTATIN 500000 UNITS: 500000 SUSPENSION ORAL at 08:19

## 2022-01-01 RX ADMIN — POTASSIUM CHLORIDE AND SODIUM CHLORIDE 100 ML/HR: 900; 150 INJECTION, SOLUTION INTRAVENOUS at 23:18

## 2022-01-01 RX ADMIN — IOPAMIDOL 95 ML: 755 INJECTION, SOLUTION INTRAVENOUS at 21:35

## 2022-01-01 RX ADMIN — NYSTATIN 500000 UNITS: 500000 SUSPENSION ORAL at 12:26

## 2022-01-01 RX ADMIN — DIGOXIN 125 MCG: 125 TABLET ORAL at 08:16

## 2022-01-01 RX ADMIN — MULTIPLE VITAMINS W/ MINERALS TAB 1 TABLET: TAB at 08:20

## 2022-01-01 RX ADMIN — DEXAMETHASONE SODIUM PHOSPHATE 6 MG: 10 INJECTION INTRAMUSCULAR; INTRAVENOUS at 23:51

## 2022-01-01 RX ADMIN — SODIUM CHLORIDE, POTASSIUM CHLORIDE, SODIUM LACTATE AND CALCIUM CHLORIDE 1000 ML: 600; 310; 30; 20 INJECTION, SOLUTION INTRAVENOUS at 17:55

## 2022-01-01 RX ADMIN — LORAZEPAM 0.5 MG: 2 INJECTION INTRAMUSCULAR; INTRAVENOUS at 12:24

## 2022-01-01 RX ADMIN — CEFTRIAXONE 1 G: 1 INJECTION, POWDER, FOR SOLUTION INTRAMUSCULAR; INTRAVENOUS at 20:20

## 2022-01-01 RX ADMIN — CEFTRIAXONE 2 G: 2 INJECTION, POWDER, FOR SOLUTION INTRAMUSCULAR; INTRAVENOUS at 18:00

## 2022-01-01 RX ADMIN — Medication 10 ML: at 00:33

## 2022-01-01 RX ADMIN — ENOXAPARIN SODIUM 30 MG: 30 INJECTION SUBCUTANEOUS at 00:51

## 2022-01-01 RX ADMIN — BUDESONIDE AND FORMOTEROL FUMARATE DIHYDRATE 2 PUFF: 160; 4.5 AEROSOL RESPIRATORY (INHALATION) at 07:12

## 2022-01-01 RX ADMIN — BUDESONIDE AND FORMOTEROL FUMARATE DIHYDRATE 2 PUFF: 160; 4.5 AEROSOL RESPIRATORY (INHALATION) at 20:32

## 2022-01-01 RX ADMIN — MONTELUKAST SODIUM 10 MG: 10 TABLET, FILM COATED ORAL at 08:49

## 2022-01-01 RX ADMIN — SODIUM CHLORIDE 1000 ML: 9 INJECTION, SOLUTION INTRAVENOUS at 23:50

## 2022-01-01 RX ADMIN — MONTELUKAST SODIUM 10 MG: 10 TABLET, FILM COATED ORAL at 12:13

## 2022-01-01 RX ADMIN — DOCUSATE SODIUM 50MG AND SENNOSIDES 8.6MG 2 TABLET: 8.6; 5 TABLET, FILM COATED ORAL at 08:49

## 2022-01-01 RX ADMIN — MULTIPLE VITAMINS W/ MINERALS TAB 1 TABLET: TAB at 08:21

## 2022-01-01 RX ADMIN — LORAZEPAM 0.5 MG: 2 INJECTION INTRAMUSCULAR; INTRAVENOUS at 16:47

## 2022-01-01 RX ADMIN — METRONIDAZOLE 1 APPLICATION: 7.5 CREAM TOPICAL at 10:04

## 2022-01-01 RX ADMIN — FLUTICASONE PROPIONATE 2 SPRAY: 50 SPRAY, METERED NASAL at 08:50

## 2022-01-01 RX ADMIN — NYSTATIN 500000 UNITS: 500000 SUSPENSION ORAL at 11:35

## 2022-01-01 RX ADMIN — ALBUTEROL SULFATE 2.5 MG: 2.5 SOLUTION RESPIRATORY (INHALATION) at 00:18

## 2022-01-01 RX ADMIN — DIGOXIN 125 MCG: 125 TABLET ORAL at 08:20

## 2022-01-01 RX ADMIN — TIOTROPIUM BROMIDE INHALATION SPRAY 2 PUFF: 3.12 SPRAY, METERED RESPIRATORY (INHALATION) at 07:58

## 2022-01-01 RX ADMIN — ALBUTEROL SULFATE 2.5 MG: 2.5 SOLUTION RESPIRATORY (INHALATION) at 10:43

## 2022-01-01 RX ADMIN — MONTELUKAST SODIUM 10 MG: 10 TABLET, FILM COATED ORAL at 08:16

## 2022-01-01 RX ADMIN — HYDROMORPHONE HYDROCHLORIDE 1 MG: 1 INJECTION, SOLUTION INTRAMUSCULAR; INTRAVENOUS; SUBCUTANEOUS at 16:47

## 2022-01-01 RX ADMIN — ALBUTEROL SULFATE 2.5 MG: 2.5 SOLUTION RESPIRATORY (INHALATION) at 21:03

## 2022-01-01 RX ADMIN — ENOXAPARIN SODIUM 30 MG: 30 INJECTION SUBCUTANEOUS at 20:33

## 2022-01-01 RX ADMIN — SODIUM CHLORIDE, PRESERVATIVE FREE 10 ML: 5 INJECTION INTRAVENOUS at 08:49

## 2022-01-01 RX ADMIN — METHIMAZOLE 7.5 MG: 5 TABLET ORAL at 08:17

## 2022-01-01 RX ADMIN — FLUTICASONE PROPIONATE 2 SPRAY: 50 SPRAY, METERED NASAL at 20:27

## 2022-01-01 RX ADMIN — DEXAMETHASONE 6 MG: 6 TABLET ORAL at 08:20

## 2022-01-01 RX ADMIN — METHIMAZOLE 7.5 MG: 5 TABLET ORAL at 12:13

## 2022-01-01 RX ADMIN — Medication 1 MG: at 20:27

## 2022-01-01 RX ADMIN — ALBUTEROL SULFATE 2.5 MG: 2.5 SOLUTION RESPIRATORY (INHALATION) at 07:42

## 2022-01-01 RX ADMIN — BUDESONIDE AND FORMOTEROL FUMARATE DIHYDRATE 2 PUFF: 160; 4.5 AEROSOL RESPIRATORY (INHALATION) at 20:39

## 2022-01-01 RX ADMIN — REMDESIVIR 100 MG: 100 INJECTION, POWDER, LYOPHILIZED, FOR SOLUTION INTRAVENOUS at 11:28

## 2022-01-01 RX ADMIN — LORAZEPAM 0.5 MG: 2 INJECTION INTRAMUSCULAR; INTRAVENOUS at 00:32

## 2022-01-01 RX ADMIN — ALBUTEROL SULFATE 2.5 MG: 2.5 SOLUTION RESPIRATORY (INHALATION) at 11:19

## 2022-01-01 RX ADMIN — PANTOPRAZOLE SODIUM 40 MG: 40 TABLET, DELAYED RELEASE ORAL at 06:27

## 2022-01-01 RX ADMIN — ALBUTEROL SULFATE 2.5 MG: 2.5 SOLUTION RESPIRATORY (INHALATION) at 11:55

## 2022-01-01 RX ADMIN — METRONIDAZOLE 1 APPLICATION: 7.5 CREAM TOPICAL at 08:26

## 2022-01-01 RX ADMIN — PANTOPRAZOLE SODIUM 40 MG: 40 TABLET, DELAYED RELEASE ORAL at 06:23

## 2022-01-01 RX ADMIN — METHIMAZOLE 7.5 MG: 5 TABLET ORAL at 08:50

## 2022-01-01 RX ADMIN — BUDESONIDE AND FORMOTEROL FUMARATE DIHYDRATE 2 PUFF: 160; 4.5 AEROSOL RESPIRATORY (INHALATION) at 08:23

## 2022-01-01 RX ADMIN — CEFTRIAXONE 1 G: 1 INJECTION, POWDER, FOR SOLUTION INTRAMUSCULAR; INTRAVENOUS at 23:51

## 2022-01-01 RX ADMIN — DOCUSATE SODIUM 50MG AND SENNOSIDES 8.6MG 2 TABLET: 8.6; 5 TABLET, FILM COATED ORAL at 08:16

## 2022-01-01 RX ADMIN — METRONIDAZOLE 1 APPLICATION: 7.5 CREAM TOPICAL at 08:21

## 2022-01-01 RX ADMIN — DILTIAZEM HYDROCHLORIDE 120 MG: 120 CAPSULE, COATED, EXTENDED RELEASE ORAL at 12:13

## 2022-01-01 RX ADMIN — LORAZEPAM 0.5 MG: 2 INJECTION INTRAMUSCULAR; INTRAVENOUS at 17:32

## 2022-01-01 RX ADMIN — TIOTROPIUM BROMIDE INHALATION SPRAY 2 PUFF: 3.12 SPRAY, METERED RESPIRATORY (INHALATION) at 08:21

## 2022-01-01 RX ADMIN — NYSTATIN 500000 UNITS: 500000 SUSPENSION ORAL at 20:20

## 2022-01-01 RX ADMIN — CEFTRIAXONE 1 G: 1 INJECTION, POWDER, FOR SOLUTION INTRAMUSCULAR; INTRAVENOUS at 20:14

## 2022-01-01 RX ADMIN — SODIUM CHLORIDE, PRESERVATIVE FREE 10 ML: 5 INJECTION INTRAVENOUS at 08:21

## 2022-01-01 RX ADMIN — DILTIAZEM HYDROCHLORIDE 120 MG: 120 CAPSULE, COATED, EXTENDED RELEASE ORAL at 08:48

## 2022-01-01 RX ADMIN — ALBUTEROL SULFATE 2.5 MG: 2.5 SOLUTION RESPIRATORY (INHALATION) at 17:39

## 2022-01-01 RX ADMIN — Medication 2000 UNITS: at 08:21

## 2022-01-01 RX ADMIN — NYSTATIN 500000 UNITS: 500000 SUSPENSION ORAL at 09:01

## 2022-01-01 RX ADMIN — BUDESONIDE AND FORMOTEROL FUMARATE DIHYDRATE 2 PUFF: 160; 4.5 AEROSOL RESPIRATORY (INHALATION) at 07:58

## 2022-01-01 RX ADMIN — NYSTATIN 500000 UNITS: 500000 SUSPENSION ORAL at 08:21

## 2022-01-01 RX ADMIN — NYSTATIN 500000 UNITS: 500000 SUSPENSION ORAL at 20:49

## 2022-01-01 RX ADMIN — DEXAMETHASONE 6 MG: 6 TABLET ORAL at 08:21

## 2022-01-01 RX ADMIN — NYSTATIN 500000 UNITS: 500000 SUSPENSION ORAL at 20:27

## 2022-01-01 RX ADMIN — HYDROMORPHONE HYDROCHLORIDE 1 MG: 1 INJECTION, SOLUTION INTRAMUSCULAR; INTRAVENOUS; SUBCUTANEOUS at 20:34

## 2022-01-01 RX ADMIN — HYDROMORPHONE HYDROCHLORIDE 0.5 MG: 1 INJECTION, SOLUTION INTRAMUSCULAR; INTRAVENOUS; SUBCUTANEOUS at 10:27

## 2022-01-01 RX ADMIN — MULTIPLE VITAMINS W/ MINERALS TAB 1 TABLET: TAB at 08:17

## 2022-01-01 RX ADMIN — ALBUTEROL SULFATE 2.5 MG: 2.5 SOLUTION RESPIRATORY (INHALATION) at 00:37

## 2022-01-01 RX ADMIN — BUDESONIDE AND FORMOTEROL FUMARATE DIHYDRATE 2 PUFF: 160; 4.5 AEROSOL RESPIRATORY (INHALATION) at 20:27

## 2022-01-01 RX ADMIN — HYDROMORPHONE HYDROCHLORIDE 1 MG: 1 INJECTION, SOLUTION INTRAMUSCULAR; INTRAVENOUS; SUBCUTANEOUS at 14:51

## 2022-01-01 RX ADMIN — ALBUTEROL SULFATE 2.5 MG: 2.5 SOLUTION RESPIRATORY (INHALATION) at 16:13

## 2022-01-01 RX ADMIN — Medication 1 MG: at 20:20

## 2022-01-01 RX ADMIN — FLUDEOXYGLUCOSE F18 1 DOSE: 300 INJECTION INTRAVENOUS at 09:41

## 2022-01-01 RX ADMIN — BUDESONIDE AND FORMOTEROL FUMARATE DIHYDRATE 2 PUFF: 160; 4.5 AEROSOL RESPIRATORY (INHALATION) at 20:43

## 2022-01-01 RX ADMIN — DEXAMETHASONE 2 MG: 2 TABLET ORAL at 08:49

## 2022-01-01 RX ADMIN — SODIUM CHLORIDE, PRESERVATIVE FREE 10 ML: 5 INJECTION INTRAVENOUS at 08:50

## 2022-01-01 RX ADMIN — ALBUTEROL SULFATE 2.5 MG: 2.5 SOLUTION RESPIRATORY (INHALATION) at 01:46

## 2022-01-01 RX ADMIN — Medication 2000 UNITS: at 08:49

## 2022-01-01 RX ADMIN — IPRATROPIUM BROMIDE AND ALBUTEROL SULFATE 3 ML: .5; 3 SOLUTION RESPIRATORY (INHALATION) at 18:49

## 2022-01-01 RX ADMIN — REMDESIVIR 100 MG: 100 INJECTION, POWDER, LYOPHILIZED, FOR SOLUTION INTRAVENOUS at 11:30

## 2022-01-01 RX ADMIN — SODIUM CHLORIDE, PRESERVATIVE FREE 10 ML: 5 INJECTION INTRAVENOUS at 08:26

## 2022-01-01 RX ADMIN — FLUTICASONE PROPIONATE 2 SPRAY: 50 SPRAY, METERED NASAL at 08:17

## 2022-01-01 RX ADMIN — FLUTICASONE PROPIONATE 2 SPRAY: 50 SPRAY, METERED NASAL at 20:34

## 2022-01-01 RX ADMIN — NYSTATIN 500000 UNITS: 500000 SUSPENSION ORAL at 11:30

## 2022-01-01 RX ADMIN — NYSTATIN 500000 UNITS: 500000 SUSPENSION ORAL at 17:43

## 2022-01-01 RX ADMIN — Medication 10 ML: at 22:00

## 2022-01-10 NOTE — PROGRESS NOTES
History:     Reason for follow up:   1.  Stage IIB left upper lobe non-small cell lung cancer, high PDL-1 (80%), negative egfr/alk/ros   * status post radiation therapy completed 1/26/18  2.  Recurrent disease involving pleural-based nodule in the lingula and pleural-based mass left upper lobe; both areas treated with radiation therapy completed 2/3/2020  3.  Patient received 3 doses of Keytruda February/March 2020 but stopped secondary to diarrhea and weight loss  4.  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  Her lung cancer staging studies on 5/25/2021 showed a new left breast mass which was confirmed on breast imaging.  She underwent a needle biopsy 6/22/2021 which showed a high-grade triple negative cancer in the left breast with no other sites of disease suggested by imaging.  I discussed her case with Dr. El who agreed to take the patient to surgery given the high grade nature of the breast cancer.  She underwent a left mastectomy and lower axillary dissection on 8/11/2021.  Pathology outlined in the assessment plan below.  She completed 12 weekly doses of carboplatin/Taxol adjuvantly 12/27/2021.    The patient return today for follow-up and PET scan review.  She complains of fatigue and poor appetite.  Her weight is down.  She complains of cough and chest congestion without fevers or chills and reports no worsening shortness of breath.  Cough is productive of clear sputum.    Reviewed, confirmed and updated history (past medical, social and family)   Past Medical   Past Medical History:   Diagnosis Date   • Acromioclavicular joint arthritis    • Anemia    • Asthma    • B12 deficiency    • Benign essential hypertension    • Breast  cancer (HCC)     s/p Lumpectomy ~2000 and chemo/XRT. Then  Masectomy ~2004 for some recurrence   • Breast cancer (HCC) 06/22/2021    Left breast invasive ammary carcinoma, ER/LA negative, HER2 negative   • Bruises easily    • COPD (chronic obstructive pulmonary disease) (HCC)    • COPD exacerbation (HCC)    • COVID-19 vaccine series completed     2/17/21 2ND DOSE   • Cedric-Barr infection    • Heart disease    • History of chemotherapy     RIGHT BREAST CANCER   • History of radiation therapy     LEFT UPPER LOBE   • History of UTI    • Hyperthyroidism    • Hypoxia    • Mitral regurgitation     mild to moderate   • Multifocal atrial tachycardia (HCC)    • Nocturnal hypoxia    • Non-small cell lung cancer (HCC)     UPPER LOBE LEFT   • Oral aphthae    • Osteopenia    • Other fatigue    • Oxygen dependent     2L - at night only   • Pneumothorax    • Rosacea    • SOB (shortness of breath)    • Supraventricular tachycardia (HCC)    • Tachycardia    • Thyroid nodule    • Toxic multinodular goiter    • Urgency of urination    • Vitamin B deficiency    • Vitamin D deficiency     and   Patient Active Problem List   Diagnosis   • Pneumothorax   • Hypoxia   • Tachycardia   • Hyperthyroidism   • Essential hypertension   • Multifocal atrial tachycardia (HCC)   • Toxic multinodular goiter   • Vitamin D insufficiency   • Malignant neoplasm of upper lobe of left lung (HCC)   • Allergic rhinitis   • B12 deficiency   • Oxygen dependent   • Mitral regurgitation   • Breast cancer (HCC)   • Medicare annual wellness visit, subsequent   • Panlobular emphysema (HCC)   • Recurrent non-small cell lung cancer (HCC)   • Encounter for long-term (current) use of other medications   • Unspecified fracture of fifth metacarpal bone, left hand, initial encounter for closed fracture   • Immobility syndrome   • Post-menopausal   • Current chronic use of systemic steroids     Social History   Social History     Socioeconomic History   • Marital status:  Single   Tobacco Use   • Smoking status: Former Smoker     Packs/day: 1.50     Years: 30.00     Pack years: 45.00     Types: Cigarettes     Quit date: 3/14/1994     Years since quittin.8   • Smokeless tobacco: Never Used   Vaping Use   • Vaping Use: Never used   Substance and Sexual Activity   • Alcohol use: No   • Drug use: No   • Sexual activity: Defer     Comment: drinks decaf      Family History  Family History   Problem Relation Age of Onset   • Arthritis Mother    • Heart failure Mother         Congestive Heart Failure   • Lung cancer Father    • Heart disease Father    • Hypertension Father    • Breast cancer Paternal Grandmother 62   • No Known Problems Sister    • Malig Hyperthermia Neg Hx      Allergies  Allergies   Allergen Reactions   • Codeine Unknown - High Severity     Patient is unaware of the reaction to this medication     • Morphine Mental Status Change   • Penicillins Unknown - High Severity     Pt is unaware of the reaction to this medication     • Dm-Guaifenesin Er Unknown - Low Severity   • Mucinex D [Pseudoephedrine-Guaifenesin Er] Nausea Only     Felt hot in chest       Medications: The current medication list was reviewed in the EMR.    Review of Systems  Review of systems 01/10/2022 unchanged from previous office visit except as updated.       Objective  Heart rate was rechecked at 108 sitting.  Objective:     There were no vitals filed for this visit.  Current Status 2021   ECOG score 0   Physical Eaxm  GENERAL: Thin chronic ill-appearing elderly lady in no distress.    SKIN: No rash or induration on visible skin.  EYES:    EOMs intact.  Conjunctivae normal.  HEAD:  Normocephalic.  NECK:  Supple with good range of motion.  CARDIAC: Tachycardic  RESP: No increased work of breathing. She is on O2 by nasal cannula.  Lungs have rhonchi in the left upper lobe  Breast: Status post left mastectomy.    NEUROLOGICAL:   No focal neurological deficits.  PSYCHIATRIC:  Normal affect and  mood.  Somewhat anxious.      Labs and Imaging  WBC   Date Value Ref Range Status   12/27/2021 5.08 3.40 - 10.80 10*3/mm3 Final   06/11/2019 6.11 3.40 - 10.80 10*3/mm3 Final     RBC   Date Value Ref Range Status   12/27/2021 3.23 (L) 3.77 - 5.28 10*6/mm3 Final   06/11/2019 4.17 3.77 - 5.28 10*6/mm3 Final     Hemoglobin   Date Value Ref Range Status   12/27/2021 9.9 (L) 12.0 - 15.9 g/dL Final     Hematocrit   Date Value Ref Range Status   12/27/2021 30.9 (L) 34.0 - 46.6 % Final     MCV   Date Value Ref Range Status   12/27/2021 95.7 79.0 - 97.0 fL Final     MCH   Date Value Ref Range Status   12/27/2021 30.7 26.6 - 33.0 pg Final     MCHC   Date Value Ref Range Status   12/27/2021 32.0 31.5 - 35.7 g/dL Final     RDW   Date Value Ref Range Status   12/27/2021 18.0 (H) 12.3 - 15.4 % Final     RDW-SD   Date Value Ref Range Status   12/27/2021 61.9 (H) 37.0 - 54.0 fl Final     MPV   Date Value Ref Range Status   12/27/2021 8.7 6.0 - 12.0 fL Final     Platelets   Date Value Ref Range Status   12/27/2021 370 140 - 450 10*3/mm3 Final     Neutrophil %   Date Value Ref Range Status   12/27/2021 78.7 (H) 42.7 - 76.0 % Final     Lymphocyte %   Date Value Ref Range Status   12/27/2021 11.0 (L) 19.6 - 45.3 % Final     Monocyte %   Date Value Ref Range Status   12/27/2021 8.5 5.0 - 12.0 % Final     Eosinophil %   Date Value Ref Range Status   12/27/2021 0.2 (L) 0.3 - 6.2 % Final     Basophil %   Date Value Ref Range Status   12/27/2021 0.8 0.0 - 1.5 % Final     Immature Grans %   Date Value Ref Range Status   12/27/2021 0.8 (H) 0.0 - 0.5 % Final     Neutrophils, Absolute   Date Value Ref Range Status   12/27/2021 4.00 1.70 - 7.00 10*3/mm3 Final     Lymphocytes, Absolute   Date Value Ref Range Status   12/27/2021 0.56 (L) 0.70 - 3.10 10*3/mm3 Final     Monocytes, Absolute   Date Value Ref Range Status   12/27/2021 0.43 0.10 - 0.90 10*3/mm3 Final     Eosinophils, Absolute   Date Value Ref Range Status   12/27/2021 0.01 0.00 - 0.40  10*3/mm3 Final     Basophils, Absolute   Date Value Ref Range Status   12/27/2021 0.04 0.00 - 0.20 10*3/mm3 Final     Immature Grans, Absolute   Date Value Ref Range Status   12/27/2021 0.04 0.00 - 0.05 10*3/mm3 Final     nRBC   Date Value Ref Range Status   12/27/2021 0.0 0.0 - 0.2 /100 WBC Final     Lab Results   Component Value Date    GLUCOSE 114 12/27/2021    BUN 18 12/27/2021    CREATININE 0.65 12/27/2021    EGFRIFNONA 88 12/27/2021    EGFRIFAFRI 111 04/20/2021    BCR 27.7 12/27/2021    K 4.1 12/27/2021    CO2 29.2 (H) 12/27/2021    CALCIUM 9.8 12/27/2021    PROTENTOTREF 7.0 04/20/2021    ALBUMIN 3.90 12/27/2021    LABIL2 1.5 04/20/2021    AST 14 12/27/2021    ALT 9 12/27/2021     CT chest 5/25/2021: There is a left upper lobe pleural-based mass which has increased in size currently 3.1 x 2.3 cm previously 2.8 x 1.8 cm.  A left hilar lymph node has increased in size from 3 to 7 mm.  There is a new nodule in the left breast 1.3 cm.                                   I personally reviewed CT chest 5/25/2021-there has been progression in size of the left upper lobe tumor    Left breast mammogram/ultrasound 6/17/2021 showed 3 tumors in the upper outer quadrant of the left breast 1.7 cm, 1.1 cm, and 0.4 cm    PET scan on 8/30/2021: Hypermetabolic left upper lobe pleural-based mass 4.5 cm with central necrosis, SUV 9.8, extension into the pleura and chest wall, low-level activity overlying the anterior first rib, hypermetabolic left hilar lymph node 8.5 SUV    PET scan 1/5/2022: Focus of uptake over the right thyroid with no cervical lymphadenopathy, no right hilar or mediastinal lymphadenopathy.  Left hilar lymph node hypermetabolic 1 cm SUV 4.1 previously 8.5.  New area of opacification posterior right upper lobe minimally avid compared to adjacent lung.  Pleural-based mass left upper lobe 2.8 x 4.5 cm SUV 9.1 with worsening nodular interlobular septal thickening and groundglass extending from the periphery of the   lesion.  New 1.7 cm nodular opacification left upper lobe.  I personally reviewed the PET 1/5/2022-there is progression of disease in the left upper lobe of the lung  Assessment/Plan   Assessment/Plan:   This is a 76 y.o. female with:     1.  Recurrent left upper lobe non-small cell lung cancer, high PDL-1 (80%), negative egfr/alk/ros; [facility] testing showed no actionable gene mutations  · radiation therapy to the left upper lobe nodule/mass completed January 2018  · PET scan from 12/20/2019 showed significant uptake in the mass in the apex of the left lung and also a nodule in the lingula with significant uptake for its size and I think both areas are likely consistent with recurrent disease.  We opted to not send the patient for a biopsy as she has very poor lung function and a pneumothorax might prove fatal for her.  There is some uptake in the left hilum but this was present in October 2018 and stable.  There is uptake in the right thyroid gland which is stable and the patient has declined biopsy of this in the past.   · The patient underwent additional radiation to the hypermetabolic lesions and also initiated Keytruda on 1/21/2020.  · Keytruda stopped after 3 doses secondary to significant diarrhea.  Diarrhea resolved with a short course of steroids.  · CT chest  2/9/2021 shows slight progression of disease in the left upper lobe.  The patient is overall asymptomatic and in fact looks better than she has on previous visits.  I discussed the case with Dr. Bennett radiation oncology and the tumor in the left upper lobe cannot be treated with additional radiation therapy.  · CT chest 5/25/2021 showed further progression of the mass in the left upper lobe in addition to slight enlargement of the left hilar lymph node  · PET scan 8/30/2021:Hypermetabolic left upper lobe pleural-based mass 4.5 cm with central necrosis, SUV 9.8, extension into the pleura and chest wall, low-level activity overlying the  anterior first rib, hypermetabolic left hilar lymph node 8.5 SUV  · Initiated weekly carboplatin/Taxol 10/4/2021 and completed 12 doses 12/27/2021.  · PET scan 1/5/2022 showed increased size of the pleural-based mass left upper lobe with invasion of the chest wall      2.  Multinodular goiter stable by CT--followed by endocrinology.  There is uptake in the right thyroid gland similar to prior PET.  The patient declines biopsy of the hypermetabolic right thyroid lesion.  PET scan 8/30/2021 showed decreased activity of a hypermetabolic focus in the right thyroid SUV 6.9 previously 10.2    3. History of right breast cancer, initially diagnosed in 2000 treated with lumpectomy, radiation, chemotherapy and endocrine therapy with local recurrence in 2005 treated with mastectomy and then Arimidex.     4. COPD, oxygen dependent    5.  left breast cancer: [id number]  · Left breast nodule incidentally noted by CT 5/25/2021 during staging of the patient's lung cancer; nodule 1.3 cm 1.7 cm, 1.1 cm, 0.4 cm  · Breast biopsy by ultrasound guidance 6/22/2021 showed invasive mammary carcinoma with metaplastic features high histologic grade, high proliferative rate 13 mm with intermediate grade ductal carcinoma in situ.  Tumor is negative for ER, WV and HER-2  · Mammogram/ultrasound 6/17/2021 showed 3 tumors in the upper outer quadrant of the left breast  · Status post left mastectomy and lower axillary dissection 8/11/2021 pathology showed 2 foci of invasive mammary carcinoma.  The largest was 3.9 cm metaplastic carcinoma (50% ductal/50% sarcomatoid) poorly differentiated/grade 3 ER negative, WV negative, HER-2 negative.  The second tumor was invasive ductal carcinoma with apocrine features grade 2+ for lymphovascular and perineural invasion, ER +80%, WV +70% HER-2 negative and Ki-67 21%.  There was associated high-grade ductal carcinoma in situ with apocrine features measuring 90 millimeters.  1 of 3 axillary lymph nodes positive  for carcinoma measuring 4 mm.  · Completed 12 weekly doses of carboplatin/Taxol 12/27/2021       6.  Constipation  · Continue Senokot S as needed    7.  Chemotherapy induced anemia: Hemoglobin relatively stable at 9.9 today.    8.  Weight loss and poor appetite    9.  Cough/congestion consistent with bronchitis    Oncology plan/recommendations:  1. The patient has progression of her non-small cell lung cancer in the left upper lobe of the lung.  Options discussed with the patient of supportive/palliative care, alternative chemotherapy, retrial of immunotherapy.  She is weak and has poor appetite after completing recent 12 weekly doses of carboplatin/Taxol.  She does not feel strong enough presently for additional chemotherapy and does not want to reattempt immunotherapy.  I am going to see her back in 1 month to reassess her labs and status.  If the patient wants to attempt further chemotherapy would consider weekly Gemzar 2/3 with dose reductions.  2. Levaquin to 50 mg daily for 7 days for treatment of bronchitis  3. We will monitor her hemoglobin with a CBC 1 month      Je Ventura MD

## 2022-01-11 NOTE — TELEPHONE ENCOUNTER
Returned call to patient, who is reporting that she took a whole Levaquin tablet yesterday instead of 1/2 tablet as prescribed,  She wanted to know what to do.  She will not take a tablet today and will resume at 1/2 tablet daily tomorrow as prescribed.  Encouraged her to go ahead and cut the tablets in half, so that she will remember to take only a half a tablet daily.  She v/u.

## 2022-01-11 NOTE — TELEPHONE ENCOUNTER
[facility] MULTIDISCIPLINARY CLINIC  SURVIVORSHIP SERVICES CARE COORDINATION NOTE  Survivorship Treatment Summary Referral Scheduling  PHONE      Call placed to Patient RE: open referral to survivorship treatment summary visit.    Left voice mail for patient    Introduced myself and reviewed purpose and goals of survivorship treatment summary visit as well as what to expect..    Patient mailed name and contact information for Vickie Adler DNP, APRN.    Patient encouraged to call the office at any point for additional information, resources or support.

## 2022-01-11 NOTE — TELEPHONE ENCOUNTER
Caller: Phill Arredondo    Relationship: Self    Best call back number: [phone]    What was the call regarding: PHILL CALLED REGARDING HER LEVOFLOXACIN MEDICATION. SHE SAYS YESTERDAY, SHE ACCIDENTALLY TOOK A WHOLE PILL WHEN SHE IS ONLY SUPPOSED TO TAKE HALF. SHE IS NOT SURE WHAT SHE IS SUPPOSED TO DO NOW.    Do you require a callback: YES

## 2022-01-12 NOTE — TELEPHONE ENCOUNTER
Tell her her scan showed a pneumonia in R upper lobe.  Get Levaquin filled as per Dr Ventura.  This should help her energy level.

## 2022-01-12 NOTE — TELEPHONE ENCOUNTER
Patient unsure why she has been feeling tired and if she is able to have a telephone visit with Dr Seymour as she's concerned about what the imaging showed on her recent visit with Dr Ventura. She believes she has pneumonia but was never confirmed and Dr Ventura didn't tell her she did or did not also did not tell her he was sending in the Levaquin prescription. Please advise.

## 2022-01-12 NOTE — TELEPHONE ENCOUNTER
"PATIENT CALLED TO MAKE AN APPT WITH DR BROWN \"I AM NOT FEELING WELL, I NEED TO SPEAK TO THE DOCTOR\"  ASKED IF SHE COULD BE MORE SPECIFIC ABOUT IT AND SHE STATED FEELING TIRED FOR 2 DAYS.    PLEASE ADVISE    [phone]  "

## 2022-01-12 NOTE — TELEPHONE ENCOUNTER
Patient was prescribed Levaquin by Dr Ventura on 1-10-22. Says she thinks she has pneumonia because of her cough. She took a whole pill on 1-10-22 and says she wasn't feeling right. Took 1/2 a pill this morning. Wants to speak to Dr Seymour. Please call [phone]

## 2022-01-13 NOTE — TELEPHONE ENCOUNTER
PATIENT WANTS TO MAKE SURE THAT SHE IS TO TAKE 1 WHOLE PILL WITH FOOD. SHE WOULD LIKE BINTA TO CALL HER BACK

## 2022-01-17 NOTE — TELEPHONE ENCOUNTER
Caller: Maryam Arredondo    Relationship to patient: Self    Best call back number: [phone]    Patient is needing: PATIENT IS ASKING FOR A CALL BACK FROM DR. BROWN'S NURSE. PATIENT STATES THIS IS ABOUT HER MEDICATION AND SHE HAS ONE PILL LEFT OF levoFLOXacin (Levaquin) 500 MG tablet AND WANTS TO KNOW WHAT TO DO.

## 2022-01-18 NOTE — PROGRESS NOTES
Subjective   Maryam Arredondo is a 76 y.o. female.     History of Present Illness   telemedicine visit due to unable to come out of house.  Consent obtained.  11 minute visit.  F/u R upper lobe pneumonia seen on PET CT from 1/5/21.  Pt finished Levaquin tx.  Pt states still with cough.  Being treated for lung ca with Dr Ventura.  No fever.  Still with slight SOA.    F/U emphysema with hypoxia.  On O2 2-3 L a minute via NC.  No wheezing per pt.    The following portions of the patient's history were reviewed and updated as appropriate: allergies, current medications, past family history, past medical history, past social history, past surgical history and problem list.    Review of Systems   Constitutional: Positive for fatigue. Negative for fever.   HENT: Negative for rhinorrhea and sinus pressure.    Respiratory: Positive for cough. Negative for wheezing.        Patient Active Problem List   Diagnosis   • Pneumothorax   • Hypoxia   • Tachycardia   • Hyperthyroidism   • Essential hypertension   • Multifocal atrial tachycardia (HCC)   • Toxic multinodular goiter   • Vitamin D insufficiency   • Pneumonia   • Malignant neoplasm of upper lobe of left lung (HCC)   • Allergic rhinitis   • B12 deficiency   • Oxygen dependent   • Mitral regurgitation   • Breast cancer (HCC)   • Medicare annual wellness visit, subsequent   • Panlobular emphysema (HCC)   • Recurrent non-small cell lung cancer (HCC)   • Encounter for long-term (current) use of other medications   • Unspecified fracture of fifth metacarpal bone, left hand, initial encounter for closed fracture   • Immobility syndrome   • Post-menopausal   • Current chronic use of systemic steroids       Allergies   Allergen Reactions   • Codeine Unknown - High Severity     Patient is unaware of the reaction to this medication     • Morphine Mental Status Change   • Penicillins Unknown - High Severity     Pt is unaware of the reaction to this medication     • Dm-Guaifenesin Er  Unknown - Low Severity   • Mucinex D [Pseudoephedrine-Guaifenesin Er] Nausea Only     Felt hot in chest         Current Outpatient Medications:   •  acyclovir (ZOVIRAX) 400 MG tablet, Take 400 mg by mouth 2 (Two) Times a Day As Needed. Take no more than 5 doses a day., Disp: , Rfl:   •  albuterol sulfate  (90 Base) MCG/ACT inhaler, INHALE TWO PUFFS BY MOUTH EVERY 4 HOURS AS NEEDED FOR WHEEZING (Patient taking differently: Inhale 2 puffs As Needed.), Disp: 8.5 g, Rfl: 10  •  budesonide-formoterol (Symbicort) 160-4.5 MCG/ACT inhaler, Inhale 2 puffs 2 (Two) Times a Day., Disp: 10.2 g, Rfl: 11  •  Cholecalciferol (VITAMIN D3) 2000 UNITS tablet, Take 1 tablet by mouth daily., Disp: , Rfl:   •  ciclopirox (Penlac) 8 % solution, Apply  topically to the appropriate area as directed Every Night. (Patient taking differently: Apply 1 application topically to the appropriate area as directed Daily. ROSACEA), Disp: 6 mL, Rfl: 1  •  clindamycin (Cleocin) 150 MG capsule, Take 1 capsule by mouth 3 (Three) Times a Day., Disp: 30 capsule, Rfl: 0  •  digoxin (LANOXIN) 125 MCG tablet, TAKE ONE TABLET BY MOUTH DAILY, Disp: 90 tablet, Rfl: 1  •  dilTIAZem CD (Cartia XT) 120 MG 24 hr capsule, Take 1 capsule by mouth Daily., Disp: 90 capsule, Rfl: 3  •  fluticasone (FLONASE) 50 MCG/ACT nasal spray, SPRAY TWO SPRAYS IN EACH NOSTRIL TWICE DAILY, Disp: 48 mL, Rfl: 3  •  levoFLOXacin (Levaquin) 500 MG tablet, Take 0.5 tablets by mouth Daily., Disp: 7 tablet, Rfl: 0  •  methIMAzole (TAPAZOLE) 5 MG tablet, TAKE 1 AND 1/2 TABLET BY MOUTH DAILY, Disp: 135 tablet, Rfl: 1  •  metroNIDAZOLE (METROCREAM) 0.75 % cream, Apply  topically to the appropriate area as directed 2 (Two) Times a Day. (Patient taking differently: Apply 1 application topically to the appropriate area as directed Daily. ROSACEA), Disp: , Rfl:   •  montelukast (SINGULAIR) 10 MG tablet, TAKE ONE TABLET BY MOUTH DAILY, Disp: 90 tablet, Rfl: 1  •  Multiple Vitamins-Minerals  (PRESERVISION AREDS 2 PO), Take 1 tablet by mouth Daily., Disp: , Rfl:   •  mupirocin (BACTROBAN) 2 % ointment, APPLY TWO TIMES A DAY TO ABRASION/CUT/SKIN LESION, Disp: 22 each, Rfl: 5  •  Nutritional Supplements (ENSURE ACTIVE PO), Take 1 dose by mouth 2 (Two) Times a Day. Ensure or boost , Disp: , Rfl:   •  O2 (OXYGEN), Inhale 2 L/min Continuous., Disp: , Rfl:   •  sennosides-docusate (senna-docusate sodium) 8.6-50 MG per tablet, Take 2 tablets by mouth Daily., Disp: 60 tablet, Rfl: 2  •  Spiriva Respimat 2.5 MCG/ACT aerosol solution inhaler, INHALE TWO PUFFS BY MOUTH DAILY, Disp: 4 each, Rfl: 11    Past Medical History:   Diagnosis Date   • Acromioclavicular joint arthritis    • Anemia    • Asthma    • B12 deficiency    • Benign essential hypertension    • Breast cancer (HCC)     s/p Lumpectomy ~2000 and chemo/XRT. Then  Masectomy ~2004 for some recurrence   • Breast cancer (HCC) 06/22/2021    Left breast invasive ammary carcinoma, ER/NM negative, HER2 negative   • Bruises easily    • COPD (chronic obstructive pulmonary disease) (HCC)    • COPD exacerbation (HCC)    • COVID-19 vaccine series completed     2/17/21 2ND DOSE   • Cedric-Barr infection    • Heart disease    • History of chemotherapy     RIGHT BREAST CANCER   • History of radiation therapy     LEFT UPPER LOBE   • History of UTI    • Hyperthyroidism    • Hypoxia    • Mitral regurgitation     mild to moderate   • Multifocal atrial tachycardia (HCC)    • Nocturnal hypoxia    • Non-small cell lung cancer (HCC)     UPPER LOBE LEFT   • Oral aphthae    • Osteopenia    • Other fatigue    • Oxygen dependent     2L - at night only   • Pneumothorax    • Rosacea    • SOB (shortness of breath)    • Supraventricular tachycardia (HCC)    • Tachycardia    • Thyroid nodule    • Toxic multinodular goiter    • Urgency of urination    • Vitamin B deficiency    • Vitamin D deficiency        Past Surgical History:   Procedure Laterality Date   • BREAST BIOPSY Left  2021    US guided core needle biopsy of left brest-Dr. Robyn Tobin, [facility]   • BREAST BIOPSY Right    • BREAST BIOPSY Right    • BREAST LUMPECTOMY Right     Dr. Jaime Meyer   • BREAST LUMPECTOMY Right     DR. MEYER    • COLONOSCOPY N/A 2017    Procedure: COLONOSCOPY WITH POLYPECTOMY(COLD BIOPSY AND HOT SNARE);  Surgeon: Luiza Eddy MD;  Location: [facility] ENDOSCOPY;  Service:    • ENDOVENOUS ABLATION SAPHENOUS VEIN W/ LASER Right    • LUNG SURGERY Left     Partial left lung collapse   • MASTECTOMY Right     Dr. Jaime Meyer-WITH LYMPH NODE REMOVAL   • MASTECTOMY Left 2021    Procedure: LEFT BREAST MASTECTOMY WITH LOWER AXILLARY DISSECTION;  Surgeon: Rachel El MD;  Location: [facility] MAIN OR;  Service: General;  Laterality: Left;   • NECK SURGERY N/A     2 spurs removed   • TOTAL LAPAROSCOPIC HYSTERECTOMY SALPINGO OOPHORECTOMY Bilateral        Family History   Problem Relation Age of Onset   • Arthritis Mother    • Heart failure Mother         Congestive Heart Failure   • Lung cancer Father    • Heart disease Father    • Hypertension Father    • Breast cancer Paternal Grandmother 62   • No Known Problems Sister    • Malig Hyperthermia Neg Hx        Social History     Tobacco Use   • Smoking status: Former Smoker     Packs/day: 1.50     Years: 30.00     Pack years: 45.00     Types: Cigarettes     Quit date: 3/14/1994     Years since quittin.8   • Smokeless tobacco: Never Used   Substance Use Topics   • Alcohol use: No            Objective     There were no vitals filed for this visit.  There is no height or weight on file to calculate BMI.    Physical Exam  Pulmonary:      Effort: Pulmonary effort is normal.   Neurological:      Mental Status: She is alert and oriented to person, place, and time.   Psychiatric:         Mood and Affect: Mood normal.         Thought Content: Thought content normal.         Judgment: Judgment normal.         Lab Results    Component Value Date    GLUCOSE 136 (H) 01/10/2022    BUN 10 01/10/2022    CREATININE 0.45 (L) 01/10/2022    EGFRIFNONA 135 01/10/2022    EGFRIFAFRI 111 04/20/2021    BCR 22.2 01/10/2022    K 3.7 01/10/2022    CO2 26.9 01/10/2022    CALCIUM 9.4 01/10/2022    PROTENTOTREF 7.0 04/20/2021    ALBUMIN 3.60 01/10/2022    LABIL2 1.5 04/20/2021    AST 12 01/10/2022    ALT 9 01/10/2022       WBC   Date Value Ref Range Status   01/10/2022 7.40 3.40 - 10.80 10*3/mm3 Final   06/11/2019 6.11 3.40 - 10.80 10*3/mm3 Final     RBC   Date Value Ref Range Status   01/10/2022 3.20 (L) 3.77 - 5.28 10*6/mm3 Final   06/11/2019 4.17 3.77 - 5.28 10*6/mm3 Final     Hemoglobin   Date Value Ref Range Status   01/10/2022 9.9 (L) 12.0 - 15.9 g/dL Final     Hematocrit   Date Value Ref Range Status   01/10/2022 31.6 (L) 34.0 - 46.6 % Final     MCV   Date Value Ref Range Status   01/10/2022 98.8 (H) 79.0 - 97.0 fL Final     MCH   Date Value Ref Range Status   01/10/2022 30.9 26.6 - 33.0 pg Final     MCHC   Date Value Ref Range Status   01/10/2022 31.3 (L) 31.5 - 35.7 g/dL Final     RDW   Date Value Ref Range Status   01/10/2022 17.8 (H) 12.3 - 15.4 % Final     RDW-SD   Date Value Ref Range Status   01/10/2022 64.2 (H) 37.0 - 54.0 fl Final     MPV   Date Value Ref Range Status   01/10/2022 8.8 6.0 - 12.0 fL Final     Platelets   Date Value Ref Range Status   01/10/2022 329 140 - 450 10*3/mm3 Final     Neutrophil %   Date Value Ref Range Status   01/10/2022 78.7 (H) 42.7 - 76.0 % Final     Lymphocyte %   Date Value Ref Range Status   01/10/2022 6.8 (L) 19.6 - 45.3 % Final     Monocyte %   Date Value Ref Range Status   01/10/2022 13.2 (H) 5.0 - 12.0 % Final     Eosinophil %   Date Value Ref Range Status   01/10/2022 0.1 (L) 0.3 - 6.2 % Final     Basophil %   Date Value Ref Range Status   01/10/2022 0.4 0.0 - 1.5 % Final     Immature Grans %   Date Value Ref Range Status   01/10/2022 0.8 (H) 0.0 - 0.5 % Final     Neutrophils, Absolute   Date Value  Ref Range Status   01/10/2022 5.82 1.70 - 7.00 10*3/mm3 Final     Lymphocytes, Absolute   Date Value Ref Range Status   01/10/2022 0.50 (L) 0.70 - 3.10 10*3/mm3 Final     Monocytes, Absolute   Date Value Ref Range Status   01/10/2022 0.98 (H) 0.10 - 0.90 10*3/mm3 Final     Eosinophils, Absolute   Date Value Ref Range Status   01/10/2022 0.01 0.00 - 0.40 10*3/mm3 Final     Basophils, Absolute   Date Value Ref Range Status   01/10/2022 0.03 0.00 - 0.20 10*3/mm3 Final     Immature Grans, Absolute   Date Value Ref Range Status   01/10/2022 0.06 (H) 0.00 - 0.05 10*3/mm3 Final     nRBC   Date Value Ref Range Status   01/10/2022 0.0 0.0 - 0.2 /100 WBC Final       No results found for: HGBA1C    Lab Results   Component Value Date    CZISZGDR37 1,228 (H) 09/03/2019       TSH   Date Value Ref Range Status   12/13/2021 0.205 (L) 0.450 - 4.500 uIU/mL Final   03/02/2020 1.500 0.270 - 4.200 uIU/mL Final       No results found for: CHOL  Lab Results   Component Value Date    TRIG 58 09/03/2019     Lab Results   Component Value Date    HDL 72 (H) 09/03/2019     Lab Results   Component Value Date     (H) 09/03/2019     Lab Results   Component Value Date    VLDL 11.6 09/03/2019     No results found for: LDLHDL      Procedures    Assessment/Plan   Problems Addressed this Visit     Panlobular emphysema (HCC)    Pneumonia - Primary    Relevant Medications    clindamycin (Cleocin) 150 MG capsule    Recurrent non-small cell lung cancer (HCC)      Diagnoses       Codes Comments    Pneumonia of right upper lobe due to infectious organism    -  Primary ICD-10-CM: J18.9  ICD-9-CM: 486     Panlobular emphysema (HCC)     ICD-10-CM: J43.1  ICD-9-CM: 492.8     Recurrent non-small cell lung cancer (HCC)     ICD-10-CM: C34.90  ICD-9-CM: 162.9       RUL pneumonia.  Not much improved.  Will treat with clinda to cover possible anaerobes as possibly post obstructive.     Emphysema.  Controlled.  Continue symbicort/albuterol/spiriva.   SCLC.  F/U  with Dr Ventura.      No orders of the defined types were placed in this encounter.      Current Outpatient Medications   Medication Sig Dispense Refill   • acyclovir (ZOVIRAX) 400 MG tablet Take 400 mg by mouth 2 (Two) Times a Day As Needed. Take no more than 5 doses a day.     • albuterol sulfate  (90 Base) MCG/ACT inhaler INHALE TWO PUFFS BY MOUTH EVERY 4 HOURS AS NEEDED FOR WHEEZING (Patient taking differently: Inhale 2 puffs As Needed.) 8.5 g 10   • budesonide-formoterol (Symbicort) 160-4.5 MCG/ACT inhaler Inhale 2 puffs 2 (Two) Times a Day. 10.2 g 11   • Cholecalciferol (VITAMIN D3) 2000 UNITS tablet Take 1 tablet by mouth daily.     • ciclopirox (Penlac) 8 % solution Apply  topically to the appropriate area as directed Every Night. (Patient taking differently: Apply 1 application topically to the appropriate area as directed Daily. ROSACEA) 6 mL 1   • clindamycin (Cleocin) 150 MG capsule Take 1 capsule by mouth 3 (Three) Times a Day. 30 capsule 0   • digoxin (LANOXIN) 125 MCG tablet TAKE ONE TABLET BY MOUTH DAILY 90 tablet 1   • dilTIAZem CD (Cartia XT) 120 MG 24 hr capsule Take 1 capsule by mouth Daily. 90 capsule 3   • fluticasone (FLONASE) 50 MCG/ACT nasal spray SPRAY TWO SPRAYS IN EACH NOSTRIL TWICE DAILY 48 mL 3   • levoFLOXacin (Levaquin) 500 MG tablet Take 0.5 tablets by mouth Daily. 7 tablet 0   • methIMAzole (TAPAZOLE) 5 MG tablet TAKE 1 AND 1/2 TABLET BY MOUTH DAILY 135 tablet 1   • metroNIDAZOLE (METROCREAM) 0.75 % cream Apply  topically to the appropriate area as directed 2 (Two) Times a Day. (Patient taking differently: Apply 1 application topically to the appropriate area as directed Daily. ROSACEA)     • montelukast (SINGULAIR) 10 MG tablet TAKE ONE TABLET BY MOUTH DAILY 90 tablet 1   • Multiple Vitamins-Minerals (PRESERVISION AREDS 2 PO) Take 1 tablet by mouth Daily.     • mupirocin (BACTROBAN) 2 % ointment APPLY TWO TIMES A DAY TO ABRASION/CUT/SKIN LESION 22 each 5   • Nutritional  Supplements (ENSURE ACTIVE PO) Take 1 dose by mouth 2 (Two) Times a Day. Ensure or boost      • O2 (OXYGEN) Inhale 2 L/min Continuous.     • sennosides-docusate (senna-docusate sodium) 8.6-50 MG per tablet Take 2 tablets by mouth Daily. 60 tablet 2   • Spiriva Respimat 2.5 MCG/ACT aerosol solution inhaler INHALE TWO PUFFS BY MOUTH DAILY 4 each 11     No current facility-administered medications for this visit.       Maryam Arredondo had no medications administered during this visit.    No follow-ups on file.    There are no Patient Instructions on file for this visit.

## 2022-01-25 NOTE — TELEPHONE ENCOUNTER
Pt called into the office to ask about heart rate.    -read pt message regarding fluctuations.     -Advised if oxygen drops below 90% to seek emergency room care.    -Pt verbalized understanding.

## 2022-01-25 NOTE — TELEPHONE ENCOUNTER
That is okay to have those fluctuations.  Monitor O2 and as long as doesn't go below 90% on oxygen, then that is okay.  If lower than that, then go to ER.

## 2022-01-28 NOTE — TELEPHONE ENCOUNTER
From: Maryam Arredondo  To: Kevin Seymour MD  Sent: 1/28/2022 6:46 AM EST  Subject: Medication question      Good morning Dr. Lazcano.    I HAVE ONLY THREE MORE OF MY ANTOBIC LEFT.???    i also need another refill of my por-aire.    If you would you, please Kenna or some one to call me regarding these two request.    Thanks Maryam

## 2022-02-07 NOTE — PROGRESS NOTES
History:     Reason for follow up:   1.  Stage IIB left upper lobe non-small cell lung cancer, high PDL-1 (80%), negative egfr/alk/ros   * status post radiation therapy completed 1/26/18  2.  Recurrent disease involving pleural-based nodule in the lingula and pleural-based mass left upper lobe; both areas treated with radiation therapy completed 2/3/2020  3.  Patient received 3 doses of Keytruda February/March 2020 but stopped secondary to diarrhea and weight loss  4.  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  Her lung cancer staging studies on 5/25/2021 showed a new left breast mass which was confirmed on breast imaging.  She underwent a needle biopsy 6/22/2021 which showed a high-grade triple negative cancer in the left breast with no other sites of disease suggested by imaging.  I discussed her case with Dr. El who agreed to take the patient to surgery given the high grade nature of the breast cancer.  She underwent a left mastectomy and lower axillary dissection on 8/11/2021.  Pathology outlined in the assessment plan below.  She completed 12 weekly doses of carboplatin/Taxol adjuvantly 12/27/2021.    The patient return today for follow-up.  She was treated over the last month for community-acquired pneumonia with Levaquin and then a course of clindamycin.  Her cough has improved.  She reports no fevers or chills.  She still has a chronic cough productive of clear sputum.  Shortness of breath and chronic hypoxic respiratory failure at baseline.  She continues to have poor appetite and slow weight loss.    Reviewed, confirmed and updated history (past medical, social and family)   Past Medical   Past Medical History:   Diagnosis Date    • Acromioclavicular joint arthritis    • Anemia    • Asthma    • B12 deficiency    • Benign essential hypertension    • Breast cancer (HCC)     s/p Lumpectomy ~2000 and chemo/XRT. Then  Masectomy ~2004 for some recurrence   • Breast cancer (HCC) 06/22/2021    Left breast invasive ammary carcinoma, ER/TX negative, HER2 negative   • Bruises easily    • COPD (chronic obstructive pulmonary disease) (HCC)    • COPD exacerbation (HCC)    • COVID-19 vaccine series completed     2/17/21 2ND DOSE   • Cedric-Barr infection    • Heart disease    • History of chemotherapy     RIGHT BREAST CANCER   • History of radiation therapy     LEFT UPPER LOBE   • History of UTI    • Hyperthyroidism    • Hypoxia    • Mitral regurgitation     mild to moderate   • Multifocal atrial tachycardia (HCC)    • Nocturnal hypoxia    • Non-small cell lung cancer (HCC)     UPPER LOBE LEFT   • Oral aphthae    • Osteopenia    • Other fatigue    • Oxygen dependent     2L - at night only   • Pneumonia    • Pneumothorax    • Rosacea    • SOB (shortness of breath)    • Supraventricular tachycardia (HCC)    • Tachycardia    • Thyroid nodule    • Toxic multinodular goiter    • Urgency of urination    • Vitamin B deficiency    • Vitamin D deficiency     and   Patient Active Problem List   Diagnosis   • Pneumothorax   • Hypoxia   • Tachycardia   • Hyperthyroidism   • Essential hypertension   • Multifocal atrial tachycardia (HCC)   • Toxic multinodular goiter   • Vitamin D insufficiency   • Pneumonia   • Malignant neoplasm of upper lobe of left lung (HCC)   • Allergic rhinitis   • B12 deficiency   • Oxygen dependent   • Mitral regurgitation   • Breast cancer (HCC)   • Medicare annual wellness visit, subsequent   • Panlobular emphysema (HCC)   • Recurrent non-small cell lung cancer (HCC)   • Encounter for long-term (current) use of other medications   • Unspecified fracture of fifth metacarpal bone, left hand, initial encounter for closed fracture   •  "Immobility syndrome   • Post-menopausal   • Current chronic use of systemic steroids     Social History   Social History     Socioeconomic History   • Marital status: Single   Tobacco Use   • Smoking status: Former Smoker     Packs/day: 1.50     Years: 30.00     Pack years: 45.00     Types: Cigarettes     Quit date: 3/14/1994     Years since quittin.9   • Smokeless tobacco: Never Used   Vaping Use   • Vaping Use: Never used   Substance and Sexual Activity   • Alcohol use: No   • Drug use: No   • Sexual activity: Defer     Comment: drinks decaf      Family History  Family History   Problem Relation Age of Onset   • Arthritis Mother    • Heart failure Mother         Congestive Heart Failure   • Lung cancer Father    • Heart disease Father    • Hypertension Father    • Breast cancer Paternal Grandmother 62   • No Known Problems Sister    • Malig Hyperthermia Neg Hx      Allergies  Allergies   Allergen Reactions   • Codeine Unknown - High Severity     Patient is unaware of the reaction to this medication     • Morphine Mental Status Change   • Penicillins Unknown - High Severity     Pt is unaware of the reaction to this medication     • Dm-Guaifenesin Er Unknown - Low Severity   • Mucinex D [Pseudoephedrine-Guaifenesin Er] Nausea Only     Felt hot in chest       Medications: The current medication list was reviewed in the EMR.    Review of Systems  Review of systems 2022 unchanged from previous office visit except as updated.       Objective  Heart rate was rechecked at 108 sitting.  Objective:     Vitals:    22 1014   BP: 123/75   Pulse: 105   Resp: 16   Temp: 97.3 °F (36.3 °C)   TempSrc: Infrared   SpO2: 100%  Comment: w/oxy   Weight: 35.6 kg (78 lb 6.4 oz)   Height: 152 cm (59.84\")   PainSc: 0-No pain     Current Status 2022   ECOG score 1   Physical Eaxm  GENERAL: Thin chronic ill-appearing elderly lady in no distress.    SKIN: No rash or induration on visible skin.  EYES:    EOMs intact.  " Conjunctivae normal.  HEAD:  Normocephalic.  NECK:  Supple with good range of motion.  CARDIAC: Mildly tachycardic  RESP: No increased work of breathing. She is on O2 by nasal cannula.  Diminished breath sounds, no rhonchi  Breast: Status post left mastectomy.    NEUROLOGICAL:   No focal neurological deficits.  PSYCHIATRIC:  Normal affect and mood.  Somewhat anxious.      Labs and Imaging  WBC   Date Value Ref Range Status   02/07/2022 7.81 3.40 - 10.80 10*3/mm3 Final   06/11/2019 6.11 3.40 - 10.80 10*3/mm3 Final     RBC   Date Value Ref Range Status   02/07/2022 3.47 (L) 3.77 - 5.28 10*6/mm3 Final   06/11/2019 4.17 3.77 - 5.28 10*6/mm3 Final     Hemoglobin   Date Value Ref Range Status   02/07/2022 11.0 (L) 12.0 - 15.9 g/dL Final     Hematocrit   Date Value Ref Range Status   02/07/2022 35.0 34.0 - 46.6 % Final     MCV   Date Value Ref Range Status   02/07/2022 100.9 (H) 79.0 - 97.0 fL Final     MCH   Date Value Ref Range Status   02/07/2022 31.7 26.6 - 33.0 pg Final     MCHC   Date Value Ref Range Status   02/07/2022 31.4 (L) 31.5 - 35.7 g/dL Final     RDW   Date Value Ref Range Status   02/07/2022 17.0 (H) 12.3 - 15.4 % Final     RDW-SD   Date Value Ref Range Status   02/07/2022 63.2 (H) 37.0 - 54.0 fl Final     MPV   Date Value Ref Range Status   02/07/2022 8.7 6.0 - 12.0 fL Final     Platelets   Date Value Ref Range Status   02/07/2022 382 140 - 450 10*3/mm3 Final     Neutrophil %   Date Value Ref Range Status   02/07/2022 83.3 (H) 42.7 - 76.0 % Final     Lymphocyte %   Date Value Ref Range Status   02/07/2022 8.5 (L) 19.6 - 45.3 % Final     Monocyte %   Date Value Ref Range Status   02/07/2022 6.8 5.0 - 12.0 % Final     Eosinophil %   Date Value Ref Range Status   02/07/2022 0.6 0.3 - 6.2 % Final     Basophil %   Date Value Ref Range Status   02/07/2022 0.4 0.0 - 1.5 % Final     Immature Grans %   Date Value Ref Range Status   02/07/2022 0.4 0.0 - 0.5 % Final     Neutrophils, Absolute   Date Value Ref Range  Status   02/07/2022 6.51 1.70 - 7.00 10*3/mm3 Final     Lymphocytes, Absolute   Date Value Ref Range Status   02/07/2022 0.66 (L) 0.70 - 3.10 10*3/mm3 Final     Monocytes, Absolute   Date Value Ref Range Status   02/07/2022 0.53 0.10 - 0.90 10*3/mm3 Final     Eosinophils, Absolute   Date Value Ref Range Status   02/07/2022 0.05 0.00 - 0.40 10*3/mm3 Final     Basophils, Absolute   Date Value Ref Range Status   02/07/2022 0.03 0.00 - 0.20 10*3/mm3 Final     Immature Grans, Absolute   Date Value Ref Range Status   02/07/2022 0.03 0.00 - 0.05 10*3/mm3 Final     nRBC   Date Value Ref Range Status   02/07/2022 0.0 0.0 - 0.2 /100 WBC Final     Lab Results   Component Value Date    GLUCOSE 136 (H) 01/10/2022    BUN 10 01/10/2022    CREATININE 0.45 (L) 01/10/2022    EGFRIFNONA 135 01/10/2022    EGFRIFAFRI 111 04/20/2021    BCR 22.2 01/10/2022    K 3.7 01/10/2022    CO2 26.9 01/10/2022    CALCIUM 9.4 01/10/2022    PROTENTOTREF 7.0 04/20/2021    ALBUMIN 3.60 01/10/2022    LABIL2 1.5 04/20/2021    AST 12 01/10/2022    ALT 9 01/10/2022     CT chest 5/25/2021: There is a left upper lobe pleural-based mass which has increased in size currently 3.1 x 2.3 cm previously 2.8 x 1.8 cm.  A left hilar lymph node has increased in size from 3 to 7 mm.  There is a new nodule in the left breast 1.3 cm.                                   I personally reviewed CT chest 5/25/2021-there has been progression in size of the left upper lobe tumor    Left breast mammogram/ultrasound 6/17/2021 showed 3 tumors in the upper outer quadrant of the left breast 1.7 cm, 1.1 cm, and 0.4 cm    PET scan on 8/30/2021: Hypermetabolic left upper lobe pleural-based mass 4.5 cm with central necrosis, SUV 9.8, extension into the pleura and chest wall, low-level activity overlying the anterior first rib, hypermetabolic left hilar lymph node 8.5 SUV    PET scan 1/5/2022: Focus of uptake over the right thyroid with no cervical lymphadenopathy, no right hilar or  mediastinal lymphadenopathy.  Left hilar lymph node hypermetabolic 1 cm SUV 4.1 previously 8.5.  New area of opacification posterior right upper lobe minimally avid compared to adjacent lung.  Pleural-based mass left upper lobe 2.8 x 4.5 cm SUV 9.1 with worsening nodular interlobular septal thickening and groundglass extending from the periphery of the  lesion.  New 1.7 cm nodular opacification left upper lobe.  I personally reviewed the PET 1/5/2022-there is progression of disease in the left upper lobe of the lung  Assessment/Plan   Assessment/Plan:   This is a 76 y.o. female with:     1.  Recurrent left upper lobe non-small cell lung cancer, high PDL-1 (80%), negative egfr/alk/ros; [facility] testing showed no actionable gene mutations  · radiation therapy to the left upper lobe nodule/mass completed January 2018  · PET scan from 12/20/2019 showed significant uptake in the mass in the apex of the left lung and also a nodule in the lingula with significant uptake for its size and I think both areas are likely consistent with recurrent disease.  We opted to not send the patient for a biopsy as she has very poor lung function and a pneumothorax might prove fatal for her.  There is some uptake in the left hilum but this was present in October 2018 and stable.  There is uptake in the right thyroid gland which is stable and the patient has declined biopsy of this in the past.   · The patient underwent additional radiation to the hypermetabolic lesions and also initiated Keytruda on 1/21/2020.  · Keytruda stopped after 3 doses secondary to significant diarrhea.  Diarrhea resolved with a short course of steroids.  · CT chest  2/9/2021 shows slight progression of disease in the left upper lobe.  The patient is overall asymptomatic and in fact looks better than she has on previous visits.  I discussed the case with Dr. Bennett radiation oncology and the tumor in the left upper lobe cannot be treated with additional  radiation therapy.  · CT chest 5/25/2021 showed further progression of the mass in the left upper lobe in addition to slight enlargement of the left hilar lymph node  · PET scan 8/30/2021:Hypermetabolic left upper lobe pleural-based mass 4.5 cm with central necrosis, SUV 9.8, extension into the pleura and chest wall, low-level activity overlying the anterior first rib, hypermetabolic left hilar lymph node 8.5 SUV  · Initiated weekly carboplatin/Taxol 10/4/2021 and completed 12 doses 12/27/2021.  · PET scan 1/5/2022 showed increased size of the pleural-based mass left upper lobe with invasion of the chest wall      2.  Multinodular goiter stable by CT--followed by endocrinology.  There is uptake in the right thyroid gland similar to prior PET.  The patient declines biopsy of the hypermetabolic right thyroid lesion.  PET scan 8/30/2021 showed decreased activity of a hypermetabolic focus in the right thyroid SUV 6.9 previously 10.2    3. History of right breast cancer, initially diagnosed in 2000 treated with lumpectomy, radiation, chemotherapy and endocrine therapy with local recurrence in 2005 treated with mastectomy and then Arimidex.     4. COPD, oxygen dependent    5.  left breast cancer: [id number]  · Left breast nodule incidentally noted by CT 5/25/2021 during staging of the patient's lung cancer; nodule 1.3 cm 1.7 cm, 1.1 cm, 0.4 cm  · Breast biopsy by ultrasound guidance 6/22/2021 showed invasive mammary carcinoma with metaplastic features high histologic grade, high proliferative rate 13 mm with intermediate grade ductal carcinoma in situ.  Tumor is negative for ER, SD and HER-2  · Mammogram/ultrasound 6/17/2021 showed 3 tumors in the upper outer quadrant of the left breast  · Status post left mastectomy and lower axillary dissection 8/11/2021 pathology showed 2 foci of invasive mammary carcinoma.  The largest was 3.9 cm metaplastic carcinoma (50% ductal/50% sarcomatoid) poorly differentiated/grade 3 ER  negative, RI negative, HER-2 negative.  The second tumor was invasive ductal carcinoma with apocrine features grade 2+ for lymphovascular and perineural invasion, ER +80%, RI +70% HER-2 negative and Ki-67 21%.  There was associated high-grade ductal carcinoma in situ with apocrine features measuring 90 millimeters.  1 of 3 axillary lymph nodes positive for carcinoma measuring 4 mm.  · Completed 12 weekly doses of carboplatin/Taxol 12/27/2021       6.  Constipation  · Continue Senokot S as needed    7.  Chemotherapy induced anemia: Hemoglobin improved today 11.0    8.  Weight loss and poor appetite    9.  Right upper lobe pneumonia status post Levaquin and clindamycin    Oncology plan/recommendations:  1. The patient has progression of her non-small cell lung cancer in the left upper lobe of the lung.  Options discussed with the patient of supportive/palliative care, alternative chemotherapy, re-trial of immunotherapy.  She is weak and has poor appetite after completing recent 12 weekly doses of carboplatin/Taxol.  She continues to have global weakness, poor appetite/weight loss and PS 2.  I am concerned about her ability to tolerate palliative chemotherapy at this time and think risks of chemotherapy outweigh benefit for her.  She is in agreement that she does not think she can tolerate chemotherapy right now.  We plan to repeat a CT chest approximately 5 weeks and reassess her performance status at that time.        Je Ventura MD

## 2022-02-08 NOTE — TELEPHONE ENCOUNTER
Caller: Maryam Arredondo    Relationship: Self    Best call back number: [phone]    Who are you requesting to speak with (clinical staff, provider,  specific staff member):CLINICAL    What was the call regarding: PATIENT CALLING TO VERIFY IF SHE IS CONTAGIOUS FOR PNEUMONIA    Do you require a callback: YES

## 2022-02-08 NOTE — TELEPHONE ENCOUNTER
Caller: ANGÉLICARICH BENÍTEZ    Relationship to patient: SISTER    Best call back number: [phone]    ANGÉLICA HAS QUESTIONS ABOUT FIXING FOOD FOR HER SISTER. SHE WANTS TO KNOW WHAT SHE CAN FIX. SHE REQUESTS A CALL TO DISCUSS.    ANGÉLICA ALSO WANTS TO VERIFY HER SISTER'S WEIGHT.

## 2022-02-09 NOTE — TELEPHONE ENCOUNTER
Returned call to patient this morning after unable to reach her yesterday, I explained that she has completed two different antibiotics, so she should not be considered contagious.  She v/u.

## 2022-02-09 NOTE — PROGRESS NOTES
Outpatient Oncology Nutrition      Patient Name:  Maryam Arredondo  YOB: 1945  MRN: [id number]    Assessment Date:  2/9/2022    Comments:  Spoke to patient's sister Patience Tripathi today after receiving message to call her regarding nutrition questions. Last weight 78 lb 2/7/22.  She is attempting to provide meals for her sister, wants to make sure she is making the correct types of food. Voiced frustration that the patient is not eating enough, losing weight. Says the patient had been drinking one Boost per day but then stopped. She notices them piling up and knows she is not drinking them.   Reviewed ideas for high calorie high protein dishes the patient might eat. Meals that are easy to freeze and heat up.   Have encouraged the patient to set a timer to remind herself to eat, encouraged Boost or equivalent. Suggested choosing high calorie snacks like nuts, nut butter, cheese, granola bars, trail mix, etc. Apparently the patient continues to eat popcorn and lean cuisines.   Emailed Patience a list of high calorie high protein foods, with tips on increasing calories and protein and recipes for dishes the patient might eat.   She believes that after the last MD visit with Dr. Ventura and the conversation the had about her weight she is going to try to eat more and make an effort. The patient agreed to sit down with her sister to come up with a meal plan for the week.   Will be available for any questions and plan to follow up with patient.       Electronically signed by:  Melody Soto RD, CARLOS  02/09/22 15:04 EST

## 2022-02-15 NOTE — TELEPHONE ENCOUNTER
Tell her she needs to talk to her lung doctor about this.  They need to be aware of this and controlling treatment.

## 2022-02-15 NOTE — TELEPHONE ENCOUNTER
Caller: Maryam Arredondo    Relationship: Self    Best call back number:     What is the best time to reach you: ANYTIME    Who are you requesting to speak with (clinical staff, provider,  specific staff member): BINTA    What was the call regarding: PATIENT STATES HAS PNEUMONIA AND HAS STARTING COUGHING AGAIN WOULD LIE TO SPEAK TO BINTA    Do you require a callback: YES

## 2022-02-18 PROBLEM — U07.1 COVID-19 VIRUS DETECTED: Status: ACTIVE | Noted: 2022-01-01

## 2022-02-19 PROBLEM — J96.21 ACUTE ON CHRONIC RESPIRATORY FAILURE WITH HYPOXIA (HCC): Status: ACTIVE | Noted: 2022-01-01

## 2022-02-19 PROBLEM — A41.9 SEPSIS (HCC): Status: ACTIVE | Noted: 2022-01-01

## 2022-02-19 PROBLEM — E43 SEVERE MALNUTRITION (HCC): Status: ACTIVE | Noted: 2022-01-01

## 2022-02-19 PROBLEM — R63.6 UNDERWEIGHT: Status: ACTIVE | Noted: 2022-01-01

## 2022-02-19 PROBLEM — R73.9 HYPERGLYCEMIA: Status: ACTIVE | Noted: 2022-01-01

## 2022-02-19 PROBLEM — J12.82 PNEUMONIA DUE TO COVID-19 VIRUS: Status: ACTIVE | Noted: 2022-01-01

## 2022-02-19 PROBLEM — E87.1 HYPONATREMIA: Status: ACTIVE | Noted: 2022-01-01

## 2022-02-19 PROBLEM — C78.02 MALIGNANT NEOPLASM METASTATIC TO LEFT LUNG (HCC): Status: ACTIVE | Noted: 2022-01-01

## 2022-02-22 NOTE — TELEPHONE ENCOUNTER
Caller: DeuceAngélica     Relationship: SISTER    Best call back number: [phone]     What is your medical concern?     COVID CONCERNS    How long has this issue been going on?     PATIENT IS IN THE HOSPITAL SINCE FRIDAY    Is your provider already aware of this issue? YES    ADDITIONAL NOTES:    ANGÉLICA  IS VERY CONCERNED ABOUT HER SISTER AND WOULD LIKE FOR DR. BROWN TO CALL HER TO ADVISE HER IN REGARDS TO HER SISTER'S CARE.    SHE IS SCARED, WORRIED, AND VERY CONCERNED.      PLEASE CALL ANGÉLICA THE PATIENTS SISTER.  SHE HAS SEVERAL QUESTIONS.

## 2022-02-24 PROBLEM — D89.832 CYTOKINE RELEASE SYNDROME, GRADE 2: Status: ACTIVE | Noted: 2022-01-01

## 2022-02-24 PROBLEM — A41.9 SEPSIS (HCC): Status: RESOLVED | Noted: 2022-01-01 | Resolved: 2022-01-01

## 2022-03-08 NOTE — TELEPHONE ENCOUNTER
No medicine that I have her on would cause that.  Her illness could cause that or another medicine that she is on in the rehab facility could cause that.  There should be a rehab doctor there that follows her and is mild monitoring her medications.  Tell her to bring this up to the rehab doctor.

## 2022-03-08 NOTE — TELEPHONE ENCOUNTER
The patient's sister called requesting a copy of her medicine list. She said that the patient is at a rehab facility and is hallucinating and she is wondering if something with her medicine is causing it. She also would like to know if there is anything that can be done to try to get her to eat. Please call her back at [phone].

## 2022-03-21 NOTE — PROGRESS NOTES
History:     Reason for follow up:   1.  Stage IIB left upper lobe non-small cell lung cancer, high PDL-1 (80%), negative egfr/alk/ros   * status post radiation therapy completed 1/26/18  2.  Recurrent disease involving pleural-based nodule in the lingula and pleural-based mass left upper lobe; both areas treated with radiation therapy completed 2/3/2020  3.  Patient received 3 doses of Keytruda February/March 2020 but stopped secondary to diarrhea and weight loss  4.  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  The patient was recently admitted for Covid and possible bacterial pneumonia.  Since discharge she has had a continued performance status decline.  She is currently in a nursing home but to be discharged on Wednesday.  She has not eating or drinking well.  She is progressively more short of breath.  She denies fevers or chills.  She is accompanied by her caregiver, her sister.    Reviewed, confirmed and updated history (past medical, social and family)   Past Medical   Past Medical History:   Diagnosis Date   • Acromioclavicular joint arthritis    • Anemia    • Asthma    • B12 deficiency    • Benign essential hypertension    • Breast cancer (HCC)     s/p Lumpectomy ~2000 and chemo/XRT. Then  Masectomy ~2004 for some recurrence   • Breast cancer (HCC) 06/22/2021    Left breast invasive ammary carcinoma, ER/NH negative, HER2 negative   • Bruises easily    • COPD (chronic obstructive pulmonary disease) (HCC)    • COPD exacerbation (HCC)    • COVID-19 vaccine series completed     2/17/21 2ND DOSE   • Cedric-Barr infection    • Heart disease    • History of  chemotherapy     RIGHT BREAST CANCER   • History of radiation therapy     LEFT UPPER LOBE   • History of UTI    • Hyperthyroidism    • Hypoxia    • Mitral regurgitation     mild to moderate   • Multifocal atrial tachycardia (HCC)    • Nocturnal hypoxia    • Non-small cell lung cancer (HCC)     UPPER LOBE LEFT   • Oral aphthae    • Osteopenia    • Other fatigue    • Oxygen dependent     2L - at night only   • Pneumonia    • Pneumothorax    • Rosacea    • SOB (shortness of breath)    • Supraventricular tachycardia (HCC)    • Tachycardia    • Thyroid nodule    • Toxic multinodular goiter    • Urgency of urination    • Vitamin B deficiency    • Vitamin D deficiency     and   Patient Active Problem List   Diagnosis   • Pneumothorax   • COPD with acute exacerbation (HCC)   • Tachycardia   • Hyperthyroidism   • Essential hypertension   • Multifocal atrial tachycardia (HCC)   • Toxic multinodular goiter   • Vitamin D insufficiency   • Pneumonia of right lower lobe due to infectious organism   • Malignant neoplasm of upper lobe of left lung (HCC)   • Allergic rhinitis   • B12 deficiency   • Oxygen dependent   • Mitral regurgitation   • Breast cancer (HCC)   • Medicare annual wellness visit, subsequent   • Panlobular emphysema (HCC)   • Recurrent non-small cell lung cancer (HCC)   • Encounter for long-term (current) use of other medications   • Unspecified fracture of fifth metacarpal bone, left hand, initial encounter for closed fracture   • Immobility syndrome   • Post-menopausal   • Current chronic use of systemic steroids   • Pneumonia due to COVID-19 virus   • Hyperglycemia   • Hyponatremia   • Acute on chronic respiratory failure with hypoxia (HCC)   • Underweight   • Severe malnutrition (CMS/HCC)   • Cytokine release syndrome, grade 2     Social History   Social History     Socioeconomic History   • Marital status: Single   Tobacco Use   • Smoking status: Former Smoker     Packs/day: 1.50     Years: 30.00     Pack  "years: 45.00     Types: Cigarettes     Quit date: 3/14/1994     Years since quittin.0   • Smokeless tobacco: Never Used   Vaping Use   • Vaping Use: Never used   Substance and Sexual Activity   • Alcohol use: No   • Drug use: No   • Sexual activity: Defer     Comment: drinks decaf      Family History  Family History   Problem Relation Age of Onset   • Arthritis Mother    • Heart failure Mother         Congestive Heart Failure   • Lung cancer Father    • Heart disease Father    • Hypertension Father    • Breast cancer Paternal Grandmother 62   • No Known Problems Sister    • Malig Hyperthermia Neg Hx      Allergies  Allergies   Allergen Reactions   • Codeine Unknown - High Severity     Patient is unaware of the reaction to this medication     • Morphine Mental Status Change   • Penicillins Unknown - High Severity     Pt is unaware of the reaction to this medication     • Dm-Guaifenesin Er Unknown - Low Severity   • Mucinex D [Pseudoephedrine-Guaifenesin Er] Nausea Only     Felt hot in chest       Medications: The current medication list was reviewed in the EMR.    Review of Systems  Positive for progressive weakness, progressive shortness of breath, progressive anorexia and weight loss      Objective    Objective:     Vitals:    22 0958   BP: 104/71   Pulse: 106   Resp: 20   Temp: 96.8 °F (36 °C)   TempSrc: Infrared   SpO2: 95%   Weight: 33.1 kg (72 lb 14.4 oz)   Height: 152 cm (59.84\")   PainSc: 0-No pain     Current Status 3/21/2022   ECOG score 2   Physical Eaxm  GENERAL: Thin chronic ill-appearing elderly lady in no distress.    SKIN: No rash or induration on visible skin.  EYES:    EOMs intact.  Conjunctivae normal.  HEAD:  Normocephalic.  NECK:  Supple with good range of motion.  CARDIAC: Mildly tachycardic  RESP: Increased pursed lip breathing. She is on O2 by nasal cannula.  Diminished breath sounds, no rhonchi  Breast: Status post left mastectomy.    NEUROLOGICAL:   Global weakness " noted  PSYCHIATRIC:  Normal affect and mood.  Somewhat anxious.      Labs and Imaging  WBC   Date Value Ref Range Status   03/21/2022 13.84 (H) 3.40 - 10.80 10*3/mm3 Final   06/11/2019 6.11 3.40 - 10.80 10*3/mm3 Final     RBC   Date Value Ref Range Status   03/21/2022 4.11 3.77 - 5.28 10*6/mm3 Final   06/11/2019 4.17 3.77 - 5.28 10*6/mm3 Final     Hemoglobin   Date Value Ref Range Status   03/21/2022 12.7 12.0 - 15.9 g/dL Final     Hematocrit   Date Value Ref Range Status   03/21/2022 40.5 34.0 - 46.6 % Final     MCV   Date Value Ref Range Status   03/21/2022 98.5 (H) 79.0 - 97.0 fL Final     MCH   Date Value Ref Range Status   03/21/2022 30.9 26.6 - 33.0 pg Final     MCHC   Date Value Ref Range Status   03/21/2022 31.4 (L) 31.5 - 35.7 g/dL Final     RDW   Date Value Ref Range Status   03/21/2022 17.9 (H) 12.3 - 15.4 % Final     RDW-SD   Date Value Ref Range Status   03/21/2022 65.1 (H) 37.0 - 54.0 fl Final     MPV   Date Value Ref Range Status   03/21/2022 8.8 6.0 - 12.0 fL Final     Platelets   Date Value Ref Range Status   03/21/2022 544 (H) 140 - 450 10*3/mm3 Final     Neutrophil %   Date Value Ref Range Status   03/21/2022 92.6 (H) 42.7 - 76.0 % Final     Lymphocyte %   Date Value Ref Range Status   03/21/2022 2.5 (L) 19.6 - 45.3 % Final     Monocyte %   Date Value Ref Range Status   03/21/2022 4.1 (L) 5.0 - 12.0 % Final     Eosinophil %   Date Value Ref Range Status   03/21/2022 0.1 (L) 0.3 - 6.2 % Final     Basophil %   Date Value Ref Range Status   03/21/2022 0.4 0.0 - 1.5 % Final     Immature Grans %   Date Value Ref Range Status   03/21/2022 0.3 0.0 - 0.5 % Final     Neutrophils, Absolute   Date Value Ref Range Status   03/21/2022 12.82 (H) 1.70 - 7.00 10*3/mm3 Final     Lymphocytes, Absolute   Date Value Ref Range Status   03/21/2022 0.34 (L) 0.70 - 3.10 10*3/mm3 Final     Monocytes, Absolute   Date Value Ref Range Status   03/21/2022 0.57 0.10 - 0.90 10*3/mm3 Final     Eosinophils, Absolute   Date Value  Ref Range Status   03/21/2022 0.02 0.00 - 0.40 10*3/mm3 Final     Basophils, Absolute   Date Value Ref Range Status   03/21/2022 0.05 0.00 - 0.20 10*3/mm3 Final     Immature Grans, Absolute   Date Value Ref Range Status   03/21/2022 0.04 0.00 - 0.05 10*3/mm3 Final     nRBC   Date Value Ref Range Status   03/21/2022 0.0 0.0 - 0.2 /100 WBC Final     Lab Results   Component Value Date    GLUCOSE 104 (H) 02/24/2022    BUN 16 02/24/2022    CREATININE 0.30 (L) 02/24/2022    EGFRIFNONA >150 02/24/2022    EGFRIFAFRI 111 04/20/2021    BCR 53.3 (H) 02/24/2022    K 4.0 02/24/2022    CO2 27.5 02/24/2022    CALCIUM 9.0 02/24/2022    PROTENTOTREF 7.0 04/20/2021    ALBUMIN 2.90 (L) 02/24/2022    LABIL2 1.5 04/20/2021    AST 17 02/24/2022    ALT 19 02/24/2022     CT chest 5/25/2021: There is a left upper lobe pleural-based mass which has increased in size currently 3.1 x 2.3 cm previously 2.8 x 1.8 cm.  A left hilar lymph node has increased in size from 3 to 7 mm.  There is a new nodule in the left breast 1.3 cm.                                   I personally reviewed CT chest 5/25/2021-there has been progression in size of the left upper lobe tumor    Left breast mammogram/ultrasound 6/17/2021 showed 3 tumors in the upper outer quadrant of the left breast 1.7 cm, 1.1 cm, and 0.4 cm    PET scan on 8/30/2021: Hypermetabolic left upper lobe pleural-based mass 4.5 cm with central necrosis, SUV 9.8, extension into the pleura and chest wall, low-level activity overlying the anterior first rib, hypermetabolic left hilar lymph node 8.5 SUV    PET scan 1/5/2022: Focus of uptake over the right thyroid with no cervical lymphadenopathy, no right hilar or mediastinal lymphadenopathy.  Left hilar lymph node hypermetabolic 1 cm SUV 4.1 previously 8.5.  New area of opacification posterior right upper lobe minimally avid compared to adjacent lung.  Pleural-based mass left upper lobe 2.8 x 4.5 cm SUV 9.1 with worsening nodular interlobular septal  thickening and groundglass extending from the periphery of the  lesion.  New 1.7 cm nodular opacification left upper lobe.    Assessment/Plan   Assessment/Plan:   This is a 76 y.o. female with progressive non-small cell lung cancer, progressive COPD with chronic hypoxic respiratory failure, global weakness, anorexia and progressive decline in the performance status.  She is no longer a candidate for active treatment of her lung cancer.  She is currently in a nursing home but being discharged home on Wednesday and her family is arranging 24-hour in-home care.  I discussed with the patient and her sister today that she is not a candidate for treatment of her lung cancer and that continued clinical decline is expected secondary to her cancer and COPD.  I think her life expectancy is likely a few weeks if not less.  I recommended hospice care at home; the patient and family were agreeable and will be arranged for her.  The patient can follow-up in oncology as needed at this point.      Je Ventura MD

## 2022-03-22 PROBLEM — R06.03 RESPIRATORY DISTRESS: Status: ACTIVE | Noted: 2022-01-01

## 2022-03-22 NOTE — TELEPHONE ENCOUNTER
Barb with [facility] about pt's lab results.     T4 - 4.2   TSH - 13.063  T3 - 0.7    Barb also wanted to be informed of the plan for pt's medications from this point forward.

## 2022-03-26 LAB
BACTERIA SPEC AEROBE CULT: ABNORMAL
GRAM STN SPEC: ABNORMAL
ISOLATED FROM: ABNORMAL

## 2022-03-27 LAB — BACTERIA SPEC AEROBE CULT: NORMAL

## 2023-01-24 NOTE — TELEPHONE ENCOUNTER
No answer, unable to leave message.   Oral Minoxidil Pregnancy And Lactation Text: This medication should only be used when clearly needed if you are pregnant, attempting to become pregnant or breast feeding.

## 2023-11-14 NOTE — TELEPHONE ENCOUNTER
Educated patient on wound care related to showering. She voiced understanding by verbally repeating the instructions. She will continue to allow the home health and wound care to come to her house.   
Patient called and was wanting to ask about the wounds on her legs. She said that when she seen you for her last visit that you said that her wounds were healing and looking good. However, the nurse told her that she didn't need to get it wet. So, she has been doing a sponge bath despite, you telling her that she could now shower. She is wanting to know does she even need a wound specialist? Should she be able to shower?   
Detail Level: Simple
Additional Notes: Patient consent was obtained to proceed with the visit and recommended plan of care after discussion of all risks and benefits, including the risk of COVID-19 exposure.
Render Risk Assessment In Note?: no

## (undated) DEVICE — APPL CHLORAPREP HI/LITE 26ML ORNG

## (undated) DEVICE — SUT VIC 5/0 PS2 18IN J495H

## (undated) DEVICE — DRSNG WND GZ PAD BORDERED 4X8IN STRL

## (undated) DEVICE — 1010 S-DRAPE TOWEL DRAPE 10/BX: Brand: STERI-DRAPE™

## (undated) DEVICE — SUT VIC 3/0 TIES 18IN J110T

## (undated) DEVICE — STPLR SKIN VISISTAT WD 35CT

## (undated) DEVICE — ELECTRD BLD EZ CLN MOD XLNG 2.75IN

## (undated) DEVICE — PATIENT RETURN ELECTRODE, SINGLE-USE, CONTACT QUALITY MONITORING, ADULT, WITH 9FT CORD, FOR PATIENTS WEIGING OVER 33LBS. (15KG): Brand: MEGADYNE

## (undated) DEVICE — Device

## (undated) DEVICE — SPNG GZ WOVN 4X4IN 12PLY 10/BX STRL

## (undated) DEVICE — SUT SILK 2/0 FS BLK 18IN 685G

## (undated) DEVICE — DRAPE,REIN 53X77,STERILE: Brand: MEDLINE

## (undated) DEVICE — PENCL ES MEGADINE EZ/CLEAN BUTN W/HOLSTR 10FT

## (undated) DEVICE — JACKSON-PRATT 100CC BULB RESERVOIR: Brand: CARDINAL HEALTH

## (undated) DEVICE — PK UNIV COMPL 40

## (undated) DEVICE — SUT VIC 3/0 CTI 36IN J944H